# Patient Record
Sex: FEMALE | Race: WHITE | Employment: OTHER | URBAN - METROPOLITAN AREA
[De-identification: names, ages, dates, MRNs, and addresses within clinical notes are randomized per-mention and may not be internally consistent; named-entity substitution may affect disease eponyms.]

---

## 2017-02-10 ENCOUNTER — TELEPHONE (OUTPATIENT)
Dept: FAMILY MEDICINE CLINIC | Age: 67
End: 2017-02-10

## 2017-02-10 RX ORDER — VENLAFAXINE HYDROCHLORIDE 150 MG/1
150 CAPSULE, EXTENDED RELEASE ORAL DAILY
Qty: 90 CAPSULE | Refills: 1 | Status: SHIPPED | OUTPATIENT
Start: 2017-02-10 | End: 2017-09-12 | Stop reason: SDUPTHER

## 2017-04-21 RX ORDER — LEVOTHYROXINE SODIUM 0.12 MG/1
TABLET ORAL
Qty: 90 TABLET | Refills: 0 | Status: SHIPPED | OUTPATIENT
Start: 2017-04-21 | End: 2017-04-21 | Stop reason: SDUPTHER

## 2017-04-21 RX ORDER — LEVOTHYROXINE SODIUM 0.12 MG/1
TABLET ORAL
Qty: 90 TABLET | Refills: 0 | Status: SHIPPED | OUTPATIENT
Start: 2017-04-21 | End: 2017-08-19 | Stop reason: SDUPTHER

## 2017-04-21 RX ORDER — CLOPIDOGREL BISULFATE 75 MG/1
TABLET ORAL
Qty: 90 TABLET | Refills: 0 | Status: SHIPPED | OUTPATIENT
Start: 2017-04-21 | End: 2018-04-09 | Stop reason: SDUPTHER

## 2017-09-12 ENCOUNTER — OFFICE VISIT (OUTPATIENT)
Dept: FAMILY MEDICINE CLINIC | Age: 67
End: 2017-09-12

## 2017-09-12 VITALS
OXYGEN SATURATION: 98 % | HEIGHT: 60 IN | HEART RATE: 86 BPM | SYSTOLIC BLOOD PRESSURE: 122 MMHG | TEMPERATURE: 97.9 F | WEIGHT: 150 LBS | RESPIRATION RATE: 16 BRPM | BODY MASS INDEX: 29.45 KG/M2 | DIASTOLIC BLOOD PRESSURE: 74 MMHG

## 2017-09-12 DIAGNOSIS — F41.9 ANXIETY AND DEPRESSION: Primary | ICD-10-CM

## 2017-09-12 DIAGNOSIS — E55.9 VITAMIN D DEFICIENCY: ICD-10-CM

## 2017-09-12 DIAGNOSIS — E03.9 HYPOTHYROIDISM, UNSPECIFIED TYPE: ICD-10-CM

## 2017-09-12 DIAGNOSIS — E78.5 HYPERLIPIDEMIA, UNSPECIFIED HYPERLIPIDEMIA TYPE: ICD-10-CM

## 2017-09-12 DIAGNOSIS — F32.A ANXIETY AND DEPRESSION: Primary | ICD-10-CM

## 2017-09-12 DIAGNOSIS — Z12.31 ENCOUNTER FOR MAMMOGRAM TO ESTABLISH BASELINE MAMMOGRAM: ICD-10-CM

## 2017-09-12 LAB
ALBUMIN SERPL-MCNC: 4.5 G/DL (ref 3.4–5)
ALP BLD-CCNC: 84 U/L (ref 40–129)
ALT SERPL-CCNC: 25 U/L (ref 10–40)
AST SERPL-CCNC: 32 U/L (ref 15–37)
BILIRUB SERPL-MCNC: 0.6 MG/DL (ref 0–1)
BILIRUBIN DIRECT: <0.2 MG/DL (ref 0–0.3)
BILIRUBIN, INDIRECT: NORMAL MG/DL (ref 0–1)
CHOLESTEROL, TOTAL: 226 MG/DL (ref 0–199)
HDLC SERPL-MCNC: 61 MG/DL (ref 40–60)
LDL CHOLESTEROL CALCULATED: 140 MG/DL
TOTAL PROTEIN: 7.9 G/DL (ref 6.4–8.2)
TRIGL SERPL-MCNC: 126 MG/DL (ref 0–150)
TSH SERPL DL<=0.05 MIU/L-ACNC: 2.12 UIU/ML (ref 0.27–4.2)
VITAMIN D 25-HYDROXY: 53.7 NG/ML
VLDLC SERPL CALC-MCNC: 25 MG/DL

## 2017-09-12 PROCEDURE — 1090F PRES/ABSN URINE INCON ASSESS: CPT | Performed by: FAMILY MEDICINE

## 2017-09-12 PROCEDURE — G8399 PT W/DXA RESULTS DOCUMENT: HCPCS | Performed by: FAMILY MEDICINE

## 2017-09-12 PROCEDURE — 1036F TOBACCO NON-USER: CPT | Performed by: FAMILY MEDICINE

## 2017-09-12 PROCEDURE — 4040F PNEUMOC VAC/ADMIN/RCVD: CPT | Performed by: FAMILY MEDICINE

## 2017-09-12 PROCEDURE — 3014F SCREEN MAMMO DOC REV: CPT | Performed by: FAMILY MEDICINE

## 2017-09-12 PROCEDURE — 1123F ACP DISCUSS/DSCN MKR DOCD: CPT | Performed by: FAMILY MEDICINE

## 2017-09-12 PROCEDURE — G8419 CALC BMI OUT NRM PARAM NOF/U: HCPCS | Performed by: FAMILY MEDICINE

## 2017-09-12 PROCEDURE — 99214 OFFICE O/P EST MOD 30 MIN: CPT | Performed by: FAMILY MEDICINE

## 2017-09-12 PROCEDURE — 36415 COLL VENOUS BLD VENIPUNCTURE: CPT | Performed by: FAMILY MEDICINE

## 2017-09-12 PROCEDURE — 3017F COLORECTAL CA SCREEN DOC REV: CPT | Performed by: FAMILY MEDICINE

## 2017-09-12 PROCEDURE — G8427 DOCREV CUR MEDS BY ELIG CLIN: HCPCS | Performed by: FAMILY MEDICINE

## 2017-09-12 RX ORDER — ATORVASTATIN CALCIUM 80 MG/1
80 TABLET, FILM COATED ORAL DAILY
Qty: 90 TABLET | Refills: 2 | Status: SHIPPED | OUTPATIENT
Start: 2017-09-12 | End: 2018-04-09 | Stop reason: SDUPTHER

## 2017-09-12 RX ORDER — CLOPIDOGREL BISULFATE 75 MG/1
TABLET ORAL
Qty: 90 TABLET | Refills: 2 | Status: SHIPPED | OUTPATIENT
Start: 2017-09-12 | End: 2018-04-09 | Stop reason: SDUPTHER

## 2017-09-12 RX ORDER — VENLAFAXINE HYDROCHLORIDE 150 MG/1
150 CAPSULE, EXTENDED RELEASE ORAL DAILY
Qty: 90 CAPSULE | Refills: 2 | Status: SHIPPED | OUTPATIENT
Start: 2017-09-12 | End: 2018-04-09 | Stop reason: SDUPTHER

## 2017-09-12 RX ORDER — LEVOTHYROXINE SODIUM 0.12 MG/1
125 TABLET ORAL DAILY
Qty: 90 TABLET | Refills: 2 | Status: SHIPPED | OUTPATIENT
Start: 2017-09-12 | End: 2018-04-09 | Stop reason: SDUPTHER

## 2017-09-12 RX ORDER — ALPRAZOLAM 0.25 MG/1
0.25 TABLET ORAL 3 TIMES DAILY PRN
Qty: 40 TABLET | Refills: 2 | Status: SHIPPED | OUTPATIENT
Start: 2017-09-12 | End: 2018-04-09 | Stop reason: SDUPTHER

## 2018-03-09 ENCOUNTER — OFFICE VISIT (OUTPATIENT)
Dept: FAMILY MEDICINE CLINIC | Age: 68
End: 2018-03-09

## 2018-03-09 VITALS
OXYGEN SATURATION: 97 % | DIASTOLIC BLOOD PRESSURE: 78 MMHG | BODY MASS INDEX: 27.75 KG/M2 | WEIGHT: 147 LBS | TEMPERATURE: 97.6 F | HEIGHT: 61 IN | HEART RATE: 104 BPM | SYSTOLIC BLOOD PRESSURE: 130 MMHG

## 2018-03-09 DIAGNOSIS — R05.8 COUGH WITH SPUTUM: Primary | ICD-10-CM

## 2018-03-09 DIAGNOSIS — J01.90 ACUTE BACTERIAL SINUSITIS: ICD-10-CM

## 2018-03-09 DIAGNOSIS — B96.89 ACUTE BACTERIAL SINUSITIS: ICD-10-CM

## 2018-03-09 LAB
A/G RATIO: 1.5 (ref 1.1–2.2)
ALBUMIN SERPL-MCNC: 4.9 G/DL (ref 3.4–5)
ALP BLD-CCNC: 92 U/L (ref 40–129)
ALT SERPL-CCNC: 23 U/L (ref 10–40)
ANION GAP SERPL CALCULATED.3IONS-SCNC: 15 MMOL/L (ref 3–16)
AST SERPL-CCNC: 32 U/L (ref 15–37)
BASOPHILS ABSOLUTE: 0 K/UL (ref 0–0.2)
BASOPHILS RELATIVE PERCENT: 0.3 %
BILIRUB SERPL-MCNC: 0.8 MG/DL (ref 0–1)
BUN BLDV-MCNC: 9 MG/DL (ref 7–20)
CALCIUM SERPL-MCNC: 9.9 MG/DL (ref 8.3–10.6)
CHLORIDE BLD-SCNC: 99 MMOL/L (ref 99–110)
CHOLESTEROL, TOTAL: 231 MG/DL (ref 0–199)
CO2: 25 MMOL/L (ref 21–32)
CREAT SERPL-MCNC: 1 MG/DL (ref 0.6–1.2)
EOSINOPHILS ABSOLUTE: 0.2 K/UL (ref 0–0.6)
EOSINOPHILS RELATIVE PERCENT: 1.3 %
GFR AFRICAN AMERICAN: >60
GFR NON-AFRICAN AMERICAN: 55
GLOBULIN: 3.2 G/DL
GLUCOSE BLD-MCNC: 104 MG/DL (ref 70–99)
HCT VFR BLD CALC: 46.3 % (ref 36–48)
HDLC SERPL-MCNC: 61 MG/DL (ref 40–60)
HEMOGLOBIN: 15.7 G/DL (ref 12–16)
LDL CHOLESTEROL CALCULATED: 135 MG/DL
LYMPHOCYTES ABSOLUTE: 3.4 K/UL (ref 1–5.1)
LYMPHOCYTES RELATIVE PERCENT: 26.9 %
MCH RBC QN AUTO: 32.7 PG (ref 26–34)
MCHC RBC AUTO-ENTMCNC: 34 G/DL (ref 31–36)
MCV RBC AUTO: 96.3 FL (ref 80–100)
MONOCYTES ABSOLUTE: 0.7 K/UL (ref 0–1.3)
MONOCYTES RELATIVE PERCENT: 5.8 %
NEUTROPHILS ABSOLUTE: 8.2 K/UL (ref 1.7–7.7)
NEUTROPHILS RELATIVE PERCENT: 65.7 %
PDW BLD-RTO: 12.7 % (ref 12.4–15.4)
PLATELET # BLD: 311 K/UL (ref 135–450)
PMV BLD AUTO: 8.3 FL (ref 5–10.5)
POTASSIUM SERPL-SCNC: 5.1 MMOL/L (ref 3.5–5.1)
RBC # BLD: 4.81 M/UL (ref 4–5.2)
SODIUM BLD-SCNC: 139 MMOL/L (ref 136–145)
TOTAL PROTEIN: 8.1 G/DL (ref 6.4–8.2)
TRIGL SERPL-MCNC: 177 MG/DL (ref 0–150)
TSH SERPL DL<=0.05 MIU/L-ACNC: 3.43 UIU/ML (ref 0.27–4.2)
VLDLC SERPL CALC-MCNC: 35 MG/DL
WBC # BLD: 12.5 K/UL (ref 4–11)

## 2018-03-09 PROCEDURE — 3014F SCREEN MAMMO DOC REV: CPT | Performed by: NURSE PRACTITIONER

## 2018-03-09 PROCEDURE — 1090F PRES/ABSN URINE INCON ASSESS: CPT | Performed by: NURSE PRACTITIONER

## 2018-03-09 PROCEDURE — 4040F PNEUMOC VAC/ADMIN/RCVD: CPT | Performed by: NURSE PRACTITIONER

## 2018-03-09 PROCEDURE — G8484 FLU IMMUNIZE NO ADMIN: HCPCS | Performed by: NURSE PRACTITIONER

## 2018-03-09 PROCEDURE — 99214 OFFICE O/P EST MOD 30 MIN: CPT | Performed by: NURSE PRACTITIONER

## 2018-03-09 PROCEDURE — 3017F COLORECTAL CA SCREEN DOC REV: CPT | Performed by: NURSE PRACTITIONER

## 2018-03-09 PROCEDURE — G8419 CALC BMI OUT NRM PARAM NOF/U: HCPCS | Performed by: NURSE PRACTITIONER

## 2018-03-09 PROCEDURE — G8427 DOCREV CUR MEDS BY ELIG CLIN: HCPCS | Performed by: NURSE PRACTITIONER

## 2018-03-09 PROCEDURE — 1036F TOBACCO NON-USER: CPT | Performed by: NURSE PRACTITIONER

## 2018-03-09 PROCEDURE — G8399 PT W/DXA RESULTS DOCUMENT: HCPCS | Performed by: NURSE PRACTITIONER

## 2018-03-09 PROCEDURE — 1123F ACP DISCUSS/DSCN MKR DOCD: CPT | Performed by: NURSE PRACTITIONER

## 2018-03-09 RX ORDER — PROMETHAZINE HYDROCHLORIDE AND CODEINE PHOSPHATE 6.25; 1 MG/5ML; MG/5ML
5 SYRUP ORAL EVERY 4 HOURS PRN
Qty: 240 ML | Refills: 0 | Status: SHIPPED | OUTPATIENT
Start: 2018-03-09 | End: 2018-03-19

## 2018-03-09 RX ORDER — AZITHROMYCIN 250 MG/1
TABLET, FILM COATED ORAL
Qty: 1 PACKET | Refills: 0 | Status: SHIPPED | OUTPATIENT
Start: 2018-03-09 | End: 2018-03-19

## 2018-03-09 ASSESSMENT — PATIENT HEALTH QUESTIONNAIRE - PHQ9
1. LITTLE INTEREST OR PLEASURE IN DOING THINGS: 0
SUM OF ALL RESPONSES TO PHQ QUESTIONS 1-9: 0
SUM OF ALL RESPONSES TO PHQ9 QUESTIONS 1 & 2: 0
2. FEELING DOWN, DEPRESSED OR HOPELESS: 0

## 2018-03-09 ASSESSMENT — ENCOUNTER SYMPTOMS
SINUS PRESSURE: 1
SHORTNESS OF BREATH: 1
SORE THROAT: 1
COUGH: 1
SWOLLEN GLANDS: 1

## 2018-03-09 NOTE — PROGRESS NOTES
clopidogrel (PLAVIX) 75 MG tablet TAKE 1 TABLET DAILY 90 tablet 2    levothyroxine (SYNTHROID) 125 MCG tablet Take 1 tablet by mouth Daily 90 tablet 2    venlafaxine (EFFEXOR XR) 150 MG extended release capsule Take 1 capsule by mouth daily TAKE 1 CAPSULE DAILY 90 capsule 2    levothyroxine (SYNTHROID) 125 MCG tablet TAKE 1 TABLET BY MOUTH DAILY 90 tablet 1    clopidogrel (PLAVIX) 75 MG tablet TAKE 1 TABLET DAILY 90 tablet 0    Lactobacillus Rhamnosus, GG, ( PROBIOTIC DIGESTIVE CARE) CAPS Take 1 tablet by mouth daily      Omega-3 Fatty Acids (OMEGA 3 PO) Take 300 mg by mouth      Calcium Carbonate-Vitamin D (CALCIUM + D PO) Take  by mouth.  Cholecalciferol (VITAMIN D3) 5000 UNITS CAPS Take 1 tablet by mouth daily.  aspirin 325 MG tablet Take 325 mg by mouth daily.  zoledronic acid (RECLAST) 5 MG/100ML SOLN Infuse 100 mLs intravenously once for 1 dose 100 mL 0     No current facility-administered medications on file prior to visit. Review of Systems   Constitutional: Positive for chills. HENT: Positive for congestion, sinus pressure, sneezing and sore throat. Respiratory: Positive for cough and shortness of breath. Neurological: Positive for headaches. Patient Active Problem List    Diagnosis Date Noted    Elevated LFTs 04/12/2016    Fibromyalgia 10/09/2015    Hyperlipidemia 03/15/2013    Hypothyroidism 03/15/2013    Anxiety and depression 03/15/2013    Osteopenia 03/15/2013    Vitamin D deficiency 03/15/2013       Objective:     Physical Exam   Constitutional: She is oriented to person, place, and time. She appears well-developed and well-nourished. HENT:   Head: Normocephalic and atraumatic. Right Ear: External ear normal.   Left Ear: External ear normal.   Mouth/Throat: Oropharynx is clear and moist. No oropharyngeal exudate.    Sinus tenderness at ethmoid process bilaterally   Eyes: Conjunctivae and EOM are normal. Pupils are equal, round, and reactive to

## 2018-03-19 ENCOUNTER — TELEPHONE (OUTPATIENT)
Dept: FAMILY MEDICINE CLINIC | Age: 68
End: 2018-03-19

## 2018-03-26 ENCOUNTER — TELEPHONE (OUTPATIENT)
Dept: FAMILY MEDICINE CLINIC | Age: 68
End: 2018-03-26

## 2018-03-26 RX ORDER — AMOXICILLIN AND CLAVULANATE POTASSIUM 875; 125 MG/1; MG/1
1 TABLET, FILM COATED ORAL 2 TIMES DAILY
Qty: 20 TABLET | Refills: 0 | Status: SHIPPED | OUTPATIENT
Start: 2018-03-26 | End: 2018-04-05

## 2018-03-26 NOTE — TELEPHONE ENCOUNTER
Symptoms:glands are still swollen,hoarse,mucus-yellow,feels bad, cough    How long have you had the symptoms:since appt 3/9/18    What medications have you tried:zpak    [facility]    Pt would like to see if something else could be called in. She still has cough med. Please advise.

## 2018-04-09 ENCOUNTER — OFFICE VISIT (OUTPATIENT)
Dept: FAMILY MEDICINE CLINIC | Age: 68
End: 2018-04-09

## 2018-04-09 VITALS
HEIGHT: 59 IN | BODY MASS INDEX: 30.24 KG/M2 | DIASTOLIC BLOOD PRESSURE: 110 MMHG | HEART RATE: 80 BPM | TEMPERATURE: 97.5 F | SYSTOLIC BLOOD PRESSURE: 176 MMHG | WEIGHT: 150 LBS

## 2018-04-09 DIAGNOSIS — Z00.00 PHYSICAL EXAM: Primary | ICD-10-CM

## 2018-04-09 DIAGNOSIS — F41.9 ANXIETY: ICD-10-CM

## 2018-04-09 DIAGNOSIS — Z12.31 ENCOUNTER FOR SCREENING MAMMOGRAM FOR BREAST CANCER: ICD-10-CM

## 2018-04-09 DIAGNOSIS — Z12.11 SCREENING FOR COLON CANCER: ICD-10-CM

## 2018-04-09 DIAGNOSIS — Z13.1 ENCOUNTER FOR SCREENING FOR DIABETES MELLITUS: ICD-10-CM

## 2018-04-09 DIAGNOSIS — K59.00 CONSTIPATION, UNSPECIFIED CONSTIPATION TYPE: ICD-10-CM

## 2018-04-09 DIAGNOSIS — M85.80 OSTEOPENIA, UNSPECIFIED LOCATION: ICD-10-CM

## 2018-04-09 PROCEDURE — 99215 OFFICE O/P EST HI 40 MIN: CPT | Performed by: NURSE PRACTITIONER

## 2018-04-09 RX ORDER — ZOLEDRONIC ACID 5 MG/100ML
5 INJECTION, SOLUTION INTRAVENOUS ONCE
Qty: 100 ML | Refills: 0 | Status: SHIPPED | OUTPATIENT
Start: 2018-04-09 | End: 2019-10-01 | Stop reason: ALTCHOICE

## 2018-04-09 RX ORDER — LEVOTHYROXINE SODIUM 0.12 MG/1
125 TABLET ORAL DAILY
Qty: 90 TABLET | Refills: 1 | Status: SHIPPED | OUTPATIENT
Start: 2018-04-09 | End: 2018-10-04 | Stop reason: SDUPTHER

## 2018-04-09 RX ORDER — VENLAFAXINE HYDROCHLORIDE 150 MG/1
150 CAPSULE, EXTENDED RELEASE ORAL DAILY
Qty: 90 CAPSULE | Refills: 2 | Status: SHIPPED | OUTPATIENT
Start: 2018-04-09 | End: 2019-01-06 | Stop reason: SDUPTHER

## 2018-04-09 RX ORDER — CLOPIDOGREL BISULFATE 75 MG/1
TABLET ORAL
Qty: 90 TABLET | Refills: 2 | Status: SHIPPED | OUTPATIENT
Start: 2018-04-09 | End: 2019-04-12 | Stop reason: SDUPTHER

## 2018-04-09 RX ORDER — ATORVASTATIN CALCIUM 80 MG/1
80 TABLET, FILM COATED ORAL DAILY
Qty: 90 TABLET | Refills: 2 | Status: SHIPPED | OUTPATIENT
Start: 2018-04-09 | End: 2019-04-12 | Stop reason: SDUPTHER

## 2018-04-09 RX ORDER — ALPRAZOLAM 0.25 MG/1
0.25 TABLET ORAL 3 TIMES DAILY PRN
Qty: 40 TABLET | Refills: 0 | Status: SHIPPED | OUTPATIENT
Start: 2018-04-09 | End: 2018-10-26 | Stop reason: SDUPTHER

## 2018-04-09 ASSESSMENT — ENCOUNTER SYMPTOMS
SHORTNESS OF BREATH: 0
RESPIRATORY NEGATIVE: 1
BLURRED VISION: 0
ORTHOPNEA: 0

## 2018-05-17 DIAGNOSIS — Z12.11 SCREENING FOR COLON CANCER: ICD-10-CM

## 2018-05-17 LAB
CONTROL: NORMAL
HEMOCCULT STL QL: NORMAL

## 2018-05-17 PROCEDURE — 82274 ASSAY TEST FOR BLOOD FECAL: CPT | Performed by: NURSE PRACTITIONER

## 2018-10-05 RX ORDER — LEVOTHYROXINE SODIUM 0.12 MG/1
125 TABLET ORAL DAILY
Qty: 90 TABLET | Refills: 1 | Status: SHIPPED | OUTPATIENT
Start: 2018-10-05 | End: 2019-07-15 | Stop reason: SDUPTHER

## 2018-10-12 ENCOUNTER — OFFICE VISIT (OUTPATIENT)
Dept: FAMILY MEDICINE CLINIC | Age: 68
End: 2018-10-12
Payer: MEDICARE

## 2018-10-12 VITALS
WEIGHT: 148 LBS | OXYGEN SATURATION: 98 % | SYSTOLIC BLOOD PRESSURE: 152 MMHG | HEART RATE: 68 BPM | BODY MASS INDEX: 29.84 KG/M2 | DIASTOLIC BLOOD PRESSURE: 110 MMHG | HEIGHT: 59 IN

## 2018-10-12 DIAGNOSIS — L03.119 CELLULITIS OF WRIST: ICD-10-CM
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
## 2018-10-12 DIAGNOSIS — I10 ESSENTIAL HYPERTENSION: ICD-10-CM

## 2018-10-12 PROCEDURE — 99213 OFFICE O/P EST LOW 20 MIN: CPT | Performed by: FAMILY MEDICINE

## 2018-10-12 PROCEDURE — G8399 PT W/DXA RESULTS DOCUMENT: HCPCS | Performed by: FAMILY MEDICINE

## 2018-10-12 PROCEDURE — G8417 CALC BMI ABV UP PARAM F/U: HCPCS | Performed by: FAMILY MEDICINE

## 2018-10-12 PROCEDURE — G8484 FLU IMMUNIZE NO ADMIN: HCPCS | Performed by: FAMILY MEDICINE

## 2018-10-12 PROCEDURE — 3017F COLORECTAL CA SCREEN DOC REV: CPT | Performed by: FAMILY MEDICINE

## 2018-10-12 PROCEDURE — 4040F PNEUMOC VAC/ADMIN/RCVD: CPT | Performed by: FAMILY MEDICINE

## 2018-10-12 PROCEDURE — 1036F TOBACCO NON-USER: CPT | Performed by: FAMILY MEDICINE

## 2018-10-12 PROCEDURE — G8427 DOCREV CUR MEDS BY ELIG CLIN: HCPCS | Performed by: FAMILY MEDICINE

## 2018-10-12 PROCEDURE — 1101F PT FALLS ASSESS-DOCD LE1/YR: CPT | Performed by: FAMILY MEDICINE

## 2018-10-12 PROCEDURE — 1123F ACP DISCUSS/DSCN MKR DOCD: CPT | Performed by: FAMILY MEDICINE

## 2018-10-12 PROCEDURE — 1090F PRES/ABSN URINE INCON ASSESS: CPT | Performed by: FAMILY MEDICINE

## 2018-10-12 RX ORDER — BLOOD PRESSURE TEST KIT
1 KIT MISCELLANEOUS 2 TIMES DAILY
Qty: 1 KIT | Refills: 0 | Status: SHIPPED | OUTPATIENT
Start: 2018-10-12

## 2018-10-12 RX ORDER — CEPHALEXIN 500 MG/1
500 CAPSULE ORAL 4 TIMES DAILY
Qty: 28 CAPSULE | Refills: 0 | Status: SHIPPED | OUTPATIENT
Start: 2018-10-12 | End: 2018-10-19

## 2018-10-12 RX ORDER — LOSARTAN POTASSIUM 50 MG/1
50 TABLET ORAL DAILY
Qty: 30 TABLET | Refills: 3 | Status: SHIPPED | OUTPATIENT
Start: 2018-10-12 | End: 2018-10-26 | Stop reason: ALTCHOICE

## 2018-10-12 ASSESSMENT — PATIENT HEALTH QUESTIONNAIRE - PHQ9
SUM OF ALL RESPONSES TO PHQ QUESTIONS 1-9: 0
2. FEELING DOWN, DEPRESSED OR HOPELESS: 0
1. LITTLE INTEREST OR PLEASURE IN DOING THINGS: 0
SUM OF ALL RESPONSES TO PHQ9 QUESTIONS 1 & 2: 0
SUM OF ALL RESPONSES TO PHQ QUESTIONS 1-9: 0

## 2018-10-24 ENCOUNTER — TELEPHONE (OUTPATIENT)
Dept: FAMILY MEDICINE CLINIC | Age: 68
End: 2018-10-24

## 2018-10-26 ENCOUNTER — OFFICE VISIT (OUTPATIENT)
Dept: FAMILY MEDICINE CLINIC | Age: 68
End: 2018-10-26
Payer: MEDICARE

## 2018-10-26 VITALS
WEIGHT: 150 LBS | HEART RATE: 70 BPM | TEMPERATURE: 97.7 F | BODY MASS INDEX: 30.3 KG/M2 | DIASTOLIC BLOOD PRESSURE: 106 MMHG | RESPIRATION RATE: 16 BRPM | SYSTOLIC BLOOD PRESSURE: 183 MMHG

## 2018-10-26 DIAGNOSIS — I10 HYPERTENSION, UNSPECIFIED TYPE: Primary | ICD-10-CM

## 2018-10-26 DIAGNOSIS — F32.A ANXIETY AND DEPRESSION: ICD-10-CM

## 2018-10-26 DIAGNOSIS — R94.31 ABNORMAL ELECTROCARDIOGRAM: ICD-10-CM

## 2018-10-26 DIAGNOSIS — Z82.49 FH: CORONARY ARTERY DISEASE: ICD-10-CM

## 2018-10-26 DIAGNOSIS — F41.9 ANXIETY AND DEPRESSION: ICD-10-CM

## 2018-10-26 DIAGNOSIS — Z23 NEED FOR PROPHYLACTIC VACCINATION AND INOCULATION AGAINST VARICELLA: ICD-10-CM

## 2018-10-26 DIAGNOSIS — Z23 NEED FOR PROPHYLACTIC VACCINATION AGAINST DIPHTHERIA-TETANUS-PERTUSSIS (DTP): ICD-10-CM

## 2018-10-26 PROCEDURE — 1090F PRES/ABSN URINE INCON ASSESS: CPT | Performed by: NURSE PRACTITIONER

## 2018-10-26 PROCEDURE — 3017F COLORECTAL CA SCREEN DOC REV: CPT | Performed by: NURSE PRACTITIONER

## 2018-10-26 PROCEDURE — 99213 OFFICE O/P EST LOW 20 MIN: CPT | Performed by: NURSE PRACTITIONER

## 2018-10-26 PROCEDURE — 1036F TOBACCO NON-USER: CPT | Performed by: NURSE PRACTITIONER

## 2018-10-26 PROCEDURE — 1101F PT FALLS ASSESS-DOCD LE1/YR: CPT | Performed by: NURSE PRACTITIONER

## 2018-10-26 PROCEDURE — 4040F PNEUMOC VAC/ADMIN/RCVD: CPT | Performed by: NURSE PRACTITIONER

## 2018-10-26 PROCEDURE — G8427 DOCREV CUR MEDS BY ELIG CLIN: HCPCS | Performed by: NURSE PRACTITIONER

## 2018-10-26 PROCEDURE — G8399 PT W/DXA RESULTS DOCUMENT: HCPCS | Performed by: NURSE PRACTITIONER

## 2018-10-26 PROCEDURE — G8417 CALC BMI ABV UP PARAM F/U: HCPCS | Performed by: NURSE PRACTITIONER

## 2018-10-26 PROCEDURE — G8484 FLU IMMUNIZE NO ADMIN: HCPCS | Performed by: NURSE PRACTITIONER

## 2018-10-26 PROCEDURE — 1123F ACP DISCUSS/DSCN MKR DOCD: CPT | Performed by: NURSE PRACTITIONER

## 2018-10-26 RX ORDER — LOSARTAN POTASSIUM AND HYDROCHLOROTHIAZIDE 25; 100 MG/1; MG/1
1 TABLET ORAL DAILY
Qty: 30 TABLET | Refills: 3 | Status: SHIPPED | OUTPATIENT
Start: 2018-10-26 | End: 2019-06-26 | Stop reason: SDUPTHER

## 2018-10-26 RX ORDER — ALPRAZOLAM 0.25 MG/1
0.25 TABLET ORAL 3 TIMES DAILY PRN
Qty: 50 TABLET | Refills: 0 | Status: SHIPPED | OUTPATIENT
Start: 2018-10-26 | End: 2018-11-25

## 2018-10-26 ASSESSMENT — PATIENT HEALTH QUESTIONNAIRE - PHQ9
SUM OF ALL RESPONSES TO PHQ QUESTIONS 1-9: 1
SUM OF ALL RESPONSES TO PHQ9 QUESTIONS 1 & 2: 1
1. LITTLE INTEREST OR PLEASURE IN DOING THINGS: 0
2. FEELING DOWN, DEPRESSED OR HOPELESS: 1
SUM OF ALL RESPONSES TO PHQ QUESTIONS 1-9: 1

## 2018-10-26 ASSESSMENT — ENCOUNTER SYMPTOMS
BLURRED VISION: 0
ORTHOPNEA: 0
SHORTNESS OF BREATH: 1

## 2018-10-26 NOTE — PATIENT INSTRUCTIONS
Thank you for enrolling in [address] Please follow the instructions below to securely access your online medical record. [facility] allows you to send messages to your doctor, view your test results, renew your prescriptions, schedule appointments, and more. How Do I Sign Up? 1. In your Internet browser, go to [email or web address]  2. Click on the Sign Up Now link in the Sign In box. You will see the New Member Sign Up page. 3. Enter your [facility] Access Code exactly as it appears below. You will not need to use this code after youve completed the sign-up process. If you do not sign up before the expiration date, you must request a new code. [facility] Access Code: Activation code not generated  Current [facility] Status: Active    4. Enter your Social Security Number ([id number]) and Date of Birth (mm/dd/yyyy) as indicated and click Submit. You will be taken to the next sign-up page. 5. Create a [facility] ID. This will be your [facility] login ID and cannot be changed, so think of one that is secure and easy to remember. 6. Create a [facility] password. You can change your password at any time. 7. Enter your Password Reset Question and Answer. This can be used at a later time if you forget your password. 8. Enter your e-mail address. You will receive e-mail notification when new information is available in [address] 9. Click Sign Up. You can now view your medical record. Additional Information  If you have questions, please contact your physician practice where you receive care. Remember, [facility] is NOT to be used for urgent needs. For medical emergencies, dial 911.

## 2018-10-29 ENCOUNTER — HOSPITAL ENCOUNTER (OUTPATIENT)
Dept: MAMMOGRAPHY | Age: 68
Discharge: HOME | End: 2018-10-29
Payer: MEDICARE

## 2018-10-29 DIAGNOSIS — Z12.31 ENCOUNTER FOR SCREENING MAMMOGRAM FOR BREAST CANCER: ICD-10-CM

## 2018-10-29 PROCEDURE — 77063 BREAST TOMOSYNTHESIS BI: CPT

## 2018-12-06 ENCOUNTER — HOSPITAL ENCOUNTER (OUTPATIENT)
Dept: NON INVASIVE DIAGNOSTICS | Age: 68
Discharge: HOME | End: 2018-12-06
Payer: MEDICARE

## 2018-12-06 VITALS — BODY MASS INDEX: 28.47 KG/M2 | WEIGHT: 145 LBS | HEIGHT: 60 IN

## 2018-12-06 DIAGNOSIS — Z82.49 FH: CORONARY ARTERY DISEASE: ICD-10-CM

## 2018-12-06 DIAGNOSIS — R94.31 ABNORMAL ELECTROCARDIOGRAM: ICD-10-CM

## 2018-12-06 PROCEDURE — 93017 CV STRESS TEST TRACING ONLY: CPT

## 2018-12-06 PROCEDURE — 93350 STRESS TTE ONLY: CPT

## 2018-12-06 ASSESSMENT — PAIN - FUNCTIONAL ASSESSMENT: PAIN_FUNCTIONAL_ASSESSMENT: 0-10

## 2018-12-12 ENCOUNTER — TELEPHONE (OUTPATIENT)
Dept: FAMILY MEDICINE CLINIC | Age: 68
End: 2018-12-12

## 2019-01-07 RX ORDER — VENLAFAXINE HYDROCHLORIDE 150 MG/1
CAPSULE, EXTENDED RELEASE ORAL
Qty: 90 CAPSULE | Refills: 2 | Status: SHIPPED | OUTPATIENT
Start: 2019-01-07 | End: 2019-10-01 | Stop reason: ALTCHOICE

## 2019-04-12 RX ORDER — CLOPIDOGREL BISULFATE 75 MG/1
TABLET ORAL
Qty: 90 TABLET | Refills: 1 | Status: SHIPPED | OUTPATIENT
Start: 2019-04-12 | End: 2020-07-14

## 2019-04-12 RX ORDER — ATORVASTATIN CALCIUM 80 MG/1
80 TABLET, FILM COATED ORAL DAILY
Qty: 90 TABLET | Refills: 1 | Status: SHIPPED | OUTPATIENT
Start: 2019-04-12 | End: 2020-10-19 | Stop reason: SDUPTHER

## 2019-06-28 RX ORDER — LOSARTAN POTASSIUM AND HYDROCHLOROTHIAZIDE 25; 100 MG/1; MG/1
TABLET ORAL
Qty: 30 TABLET | Refills: 0 | Status: SHIPPED | OUTPATIENT
Start: 2019-06-28 | End: 2019-07-27 | Stop reason: SDUPTHER

## 2019-07-15 RX ORDER — LEVOTHYROXINE SODIUM 0.12 MG/1
125 TABLET ORAL DAILY
Qty: 90 TABLET | Refills: 1 | Status: SHIPPED | OUTPATIENT
Start: 2019-07-15 | End: 2020-10-13

## 2019-07-29 RX ORDER — LOSARTAN POTASSIUM AND HYDROCHLOROTHIAZIDE 25; 100 MG/1; MG/1
TABLET ORAL
Qty: 30 TABLET | Refills: 0 | Status: SHIPPED | OUTPATIENT
Start: 2019-07-29 | End: 2019-08-22 | Stop reason: SDUPTHER

## 2019-08-22 RX ORDER — LOSARTAN POTASSIUM AND HYDROCHLOROTHIAZIDE 25; 100 MG/1; MG/1
TABLET ORAL
Qty: 30 TABLET | Refills: 0 | Status: SHIPPED | OUTPATIENT
Start: 2019-08-22 | End: 2019-10-03 | Stop reason: SDUPTHER

## 2019-08-22 NOTE — TELEPHONE ENCOUNTER
Pt will need appointment for check up with carli for additional refills.  She is due for a recheck and labs

## 2019-08-27 NOTE — TELEPHONE ENCOUNTER
Pharmacy states the Losartan/ HCTZ is on a long term back order and would like an alternative medication sent in. Please advise.

## 2019-08-28 RX ORDER — VALSARTAN AND HYDROCHLOROTHIAZIDE 160; 12.5 MG/1; MG/1
1 TABLET, FILM COATED ORAL DAILY
Qty: 30 TABLET | Refills: 1 | Status: SHIPPED | OUTPATIENT
Start: 2019-08-28 | End: 2019-09-25 | Stop reason: SDUPTHER

## 2019-09-27 RX ORDER — VALSARTAN AND HYDROCHLOROTHIAZIDE 160; 12.5 MG/1; MG/1
TABLET, FILM COATED ORAL
Qty: 30 TABLET | Refills: 1 | Status: SHIPPED | OUTPATIENT
Start: 2019-09-27 | End: 2019-10-01 | Stop reason: ALTCHOICE

## 2019-09-30 RX ORDER — HYDROCHLOROTHIAZIDE 25 MG/1
TABLET ORAL
Qty: 30 TABLET | Refills: 0 | Status: SHIPPED | OUTPATIENT
Start: 2019-09-30 | End: 2019-10-01 | Stop reason: ALTCHOICE

## 2019-09-30 RX ORDER — LOSARTAN POTASSIUM 100 MG/1
TABLET ORAL
Qty: 30 TABLET | Refills: 0 | Status: SHIPPED | OUTPATIENT
Start: 2019-09-30 | End: 2019-10-01 | Stop reason: ALTCHOICE

## 2019-10-01 ENCOUNTER — OFFICE VISIT (OUTPATIENT)
Dept: FAMILY MEDICINE CLINIC | Age: 69
End: 2019-10-01
Payer: MEDICARE

## 2019-10-01 VITALS
HEART RATE: 109 BPM | WEIGHT: 152 LBS | RESPIRATION RATE: 16 BRPM | SYSTOLIC BLOOD PRESSURE: 134 MMHG | OXYGEN SATURATION: 95 % | DIASTOLIC BLOOD PRESSURE: 84 MMHG | TEMPERATURE: 98.2 F | BODY MASS INDEX: 29.69 KG/M2

## 2019-10-01 DIAGNOSIS — Z23 NEED FOR PROPHYLACTIC VACCINATION AND INOCULATION AGAINST VARICELLA: ICD-10-CM

## 2019-10-01 DIAGNOSIS — F44.89 CONFUSION STATE: ICD-10-CM

## 2019-10-01 DIAGNOSIS — Z13.1 ENCOUNTER FOR SCREENING FOR DIABETES MELLITUS: ICD-10-CM

## 2019-10-01 DIAGNOSIS — F41.9 ANXIETY: ICD-10-CM

## 2019-10-01 DIAGNOSIS — Z12.11 SCREENING FOR COLON CANCER: ICD-10-CM

## 2019-10-01 DIAGNOSIS — M79.7 FIBROMYALGIA: ICD-10-CM

## 2019-10-01 DIAGNOSIS — I10 ESSENTIAL HYPERTENSION: Primary | ICD-10-CM

## 2019-10-01 DIAGNOSIS — K76.9 LIVER DISEASE, UNSPECIFIED: ICD-10-CM

## 2019-10-01 DIAGNOSIS — I10 ESSENTIAL HYPERTENSION: ICD-10-CM

## 2019-10-01 DIAGNOSIS — Z23 NEED FOR PROPHYLACTIC VACCINATION AGAINST DIPHTHERIA-TETANUS-PERTUSSIS (DTP): ICD-10-CM

## 2019-10-01 LAB
BASOPHILS ABSOLUTE: 0.1 K/UL (ref 0–0.2)
BASOPHILS RELATIVE PERCENT: 0.6 %
EOSINOPHILS ABSOLUTE: 0.2 K/UL (ref 0–0.6)
EOSINOPHILS RELATIVE PERCENT: 2 %
HCT VFR BLD CALC: 42.1 % (ref 36–48)
HEMOGLOBIN: 14.3 G/DL (ref 12–16)
LYMPHOCYTES ABSOLUTE: 2.1 K/UL (ref 1–5.1)
LYMPHOCYTES RELATIVE PERCENT: 24.6 %
MCH RBC QN AUTO: 33.5 PG (ref 26–34)
MCHC RBC AUTO-ENTMCNC: 33.9 G/DL (ref 31–36)
MCV RBC AUTO: 98.9 FL (ref 80–100)
MONOCYTES ABSOLUTE: 0.6 K/UL (ref 0–1.3)
MONOCYTES RELATIVE PERCENT: 6.7 %
NEUTROPHILS ABSOLUTE: 5.6 K/UL (ref 1.7–7.7)
NEUTROPHILS RELATIVE PERCENT: 66.1 %
PDW BLD-RTO: 13.2 % (ref 12.4–15.4)
PLATELET # BLD: 280 K/UL (ref 135–450)
PMV BLD AUTO: 7.8 FL (ref 5–10.5)
RBC # BLD: 4.26 M/UL (ref 4–5.2)
VITAMIN D 25-HYDROXY: 51.1 NG/ML
WBC # BLD: 8.5 K/UL (ref 4–11)

## 2019-10-01 PROCEDURE — 1036F TOBACCO NON-USER: CPT | Performed by: NURSE PRACTITIONER

## 2019-10-01 PROCEDURE — 1090F PRES/ABSN URINE INCON ASSESS: CPT | Performed by: NURSE PRACTITIONER

## 2019-10-01 PROCEDURE — G8427 DOCREV CUR MEDS BY ELIG CLIN: HCPCS | Performed by: NURSE PRACTITIONER

## 2019-10-01 PROCEDURE — G0444 DEPRESSION SCREEN ANNUAL: HCPCS | Performed by: NURSE PRACTITIONER

## 2019-10-01 PROCEDURE — G8417 CALC BMI ABV UP PARAM F/U: HCPCS | Performed by: NURSE PRACTITIONER

## 2019-10-01 PROCEDURE — 99214 OFFICE O/P EST MOD 30 MIN: CPT | Performed by: NURSE PRACTITIONER

## 2019-10-01 PROCEDURE — G8484 FLU IMMUNIZE NO ADMIN: HCPCS | Performed by: NURSE PRACTITIONER

## 2019-10-01 PROCEDURE — 3017F COLORECTAL CA SCREEN DOC REV: CPT | Performed by: NURSE PRACTITIONER

## 2019-10-01 PROCEDURE — 1123F ACP DISCUSS/DSCN MKR DOCD: CPT | Performed by: NURSE PRACTITIONER

## 2019-10-01 PROCEDURE — 4040F PNEUMOC VAC/ADMIN/RCVD: CPT | Performed by: NURSE PRACTITIONER

## 2019-10-01 PROCEDURE — G8399 PT W/DXA RESULTS DOCUMENT: HCPCS | Performed by: NURSE PRACTITIONER

## 2019-10-01 RX ORDER — DULOXETIN HYDROCHLORIDE 30 MG/1
30 CAPSULE, DELAYED RELEASE ORAL DAILY
Qty: 30 CAPSULE | Refills: 5 | Status: SHIPPED | OUTPATIENT
Start: 2019-10-01 | End: 2020-10-19 | Stop reason: ALTCHOICE

## 2019-10-01 RX ORDER — ALPRAZOLAM 0.5 MG/1
0.5 TABLET ORAL NIGHTLY PRN
Qty: 15 TABLET | Refills: 0 | Status: SHIPPED | OUTPATIENT
Start: 2019-10-01 | End: 2019-10-31

## 2019-10-01 ASSESSMENT — PATIENT HEALTH QUESTIONNAIRE - PHQ9
SUM OF ALL RESPONSES TO PHQ9 QUESTIONS 1 & 2: 6
10. IF YOU CHECKED OFF ANY PROBLEMS, HOW DIFFICULT HAVE THESE PROBLEMS MADE IT FOR YOU TO DO YOUR WORK, TAKE CARE OF THINGS AT HOME, OR GET ALONG WITH OTHER PEOPLE: 2
3. TROUBLE FALLING OR STAYING ASLEEP: 3
8. MOVING OR SPEAKING SO SLOWLY THAT OTHER PEOPLE COULD HAVE NOTICED. OR THE OPPOSITE, BEING SO FIGETY OR RESTLESS THAT YOU HAVE BEEN MOVING AROUND A LOT MORE THAN USUAL: 3
9. THOUGHTS THAT YOU WOULD BE BETTER OFF DEAD, OR OF HURTING YOURSELF: 0
2. FEELING DOWN, DEPRESSED OR HOPELESS: 3
1. LITTLE INTEREST OR PLEASURE IN DOING THINGS: 3
7. TROUBLE CONCENTRATING ON THINGS, SUCH AS READING THE NEWSPAPER OR WATCHING TELEVISION: 3
5. POOR APPETITE OR OVEREATING: 2
4. FEELING TIRED OR HAVING LITTLE ENERGY: 3
SUM OF ALL RESPONSES TO PHQ QUESTIONS 1-9: 20
SUM OF ALL RESPONSES TO PHQ QUESTIONS 1-9: 20
6. FEELING BAD ABOUT YOURSELF - OR THAT YOU ARE A FAILURE OR HAVE LET YOURSELF OR YOUR FAMILY DOWN: 0

## 2019-10-01 ASSESSMENT — COLUMBIA-SUICIDE SEVERITY RATING SCALE - C-SSRS
6. HAVE YOU EVER DONE ANYTHING, STARTED TO DO ANYTHING, OR PREPARED TO DO ANYTHING TO END YOUR LIFE?: NO
1. WITHIN THE PAST MONTH, HAVE YOU WISHED YOU WERE DEAD OR WISHED YOU COULD GO TO SLEEP AND NOT WAKE UP?: NO
2. HAVE YOU ACTUALLY HAD ANY THOUGHTS OF KILLING YOURSELF?: NO

## 2019-10-01 ASSESSMENT — ENCOUNTER SYMPTOMS
SHORTNESS OF BREATH: 0
ORTHOPNEA: 0
BLURRED VISION: 0

## 2019-10-02 LAB
A/G RATIO: 1.6 (ref 1.1–2.2)
ALBUMIN SERPL-MCNC: 5 G/DL (ref 3.4–5)
ALP BLD-CCNC: 75 U/L (ref 40–129)
ALT SERPL-CCNC: 20 U/L (ref 10–40)
ANION GAP SERPL CALCULATED.3IONS-SCNC: 19 MMOL/L (ref 3–16)
AST SERPL-CCNC: 22 U/L (ref 15–37)
BILIRUB SERPL-MCNC: 0.5 MG/DL (ref 0–1)
BUN BLDV-MCNC: 10 MG/DL (ref 7–20)
CALCIUM SERPL-MCNC: 11.6 MG/DL (ref 8.3–10.6)
CHLORIDE BLD-SCNC: 101 MMOL/L (ref 99–110)
CHOLESTEROL, FASTING: 198 MG/DL (ref 0–199)
CO2: 24 MMOL/L (ref 21–32)
CREAT SERPL-MCNC: 1.2 MG/DL (ref 0.6–1.2)
GFR AFRICAN AMERICAN: 54
GFR NON-AFRICAN AMERICAN: 44
GLOBULIN: 3.2 G/DL
GLUCOSE BLD-MCNC: 96 MG/DL (ref 70–99)
GLUCOSE FASTING: 96 MG/DL (ref 70–99)
HDLC SERPL-MCNC: 67 MG/DL (ref 40–60)
LDL CHOLESTEROL CALCULATED: 104 MG/DL
POTASSIUM SERPL-SCNC: 4.8 MMOL/L (ref 3.5–5.1)
SODIUM BLD-SCNC: 144 MMOL/L (ref 136–145)
TOTAL PROTEIN: 8.2 G/DL (ref 6.4–8.2)
TRIGLYCERIDE, FASTING: 133 MG/DL (ref 0–150)
VLDLC SERPL CALC-MCNC: 27 MG/DL

## 2019-10-08 ENCOUNTER — OFFICE VISIT (OUTPATIENT)
Dept: FAMILY MEDICINE CLINIC | Age: 69
End: 2019-10-08
Payer: MEDICARE

## 2019-10-08 VITALS
HEART RATE: 105 BPM | HEIGHT: 59 IN | BODY MASS INDEX: 31.65 KG/M2 | OXYGEN SATURATION: 94 % | RESPIRATION RATE: 16 BRPM | DIASTOLIC BLOOD PRESSURE: 82 MMHG | TEMPERATURE: 98.7 F | SYSTOLIC BLOOD PRESSURE: 148 MMHG | WEIGHT: 157 LBS

## 2019-10-08 DIAGNOSIS — I10 ESSENTIAL HYPERTENSION: ICD-10-CM

## 2019-10-08 DIAGNOSIS — Z00.00 ROUTINE GENERAL MEDICAL EXAMINATION AT A HEALTH CARE FACILITY: Primary | ICD-10-CM

## 2019-10-08 PROCEDURE — G0438 PPPS, INITIAL VISIT: HCPCS | Performed by: NURSE PRACTITIONER

## 2019-10-08 PROCEDURE — 4040F PNEUMOC VAC/ADMIN/RCVD: CPT | Performed by: NURSE PRACTITIONER

## 2019-10-08 PROCEDURE — 3017F COLORECTAL CA SCREEN DOC REV: CPT | Performed by: NURSE PRACTITIONER

## 2019-10-08 PROCEDURE — 1123F ACP DISCUSS/DSCN MKR DOCD: CPT | Performed by: NURSE PRACTITIONER

## 2019-10-08 PROCEDURE — G8484 FLU IMMUNIZE NO ADMIN: HCPCS | Performed by: NURSE PRACTITIONER

## 2019-10-08 RX ORDER — LOSARTAN POTASSIUM AND HYDROCHLOROTHIAZIDE 25; 100 MG/1; MG/1
TABLET ORAL
Qty: 30 TABLET | Refills: 0 | Status: SHIPPED | OUTPATIENT
Start: 2019-10-08 | End: 2019-10-15

## 2019-10-08 ASSESSMENT — PATIENT HEALTH QUESTIONNAIRE - PHQ9
SUM OF ALL RESPONSES TO PHQ QUESTIONS 1-9: 1
SUM OF ALL RESPONSES TO PHQ QUESTIONS 1-9: 1

## 2019-10-15 ENCOUNTER — TELEPHONE (OUTPATIENT)
Dept: FAMILY MEDICINE CLINIC | Age: 69
End: 2019-10-15

## 2019-10-15 DIAGNOSIS — Z12.11 SCREENING FOR COLON CANCER: ICD-10-CM

## 2019-10-15 LAB
CONTROL: ABNORMAL
HEMOCCULT STL QL: ABNORMAL

## 2019-10-15 PROCEDURE — 82274 ASSAY TEST FOR BLOOD FECAL: CPT | Performed by: NURSE PRACTITIONER

## 2019-10-15 RX ORDER — LOSARTAN POTASSIUM AND HYDROCHLOROTHIAZIDE 25; 100 MG/1; MG/1
TABLET ORAL
Qty: 90 TABLET | Refills: 0 | Status: SHIPPED | OUTPATIENT
Start: 2019-10-15 | End: 2019-10-21 | Stop reason: ALTCHOICE

## 2019-10-21 RX ORDER — VALSARTAN AND HYDROCHLOROTHIAZIDE 80; 12.5 MG/1; MG/1
1 TABLET, FILM COATED ORAL DAILY
Qty: 30 TABLET | Refills: 0 | Status: SHIPPED | OUTPATIENT
Start: 2019-10-21 | End: 2020-10-19 | Stop reason: SDUPTHER

## 2019-10-29 RX ORDER — VALSARTAN AND HYDROCHLOROTHIAZIDE 160; 12.5 MG/1; MG/1
TABLET, FILM COATED ORAL
Qty: 30 TABLET | Refills: 1 | Status: SHIPPED | OUTPATIENT
Start: 2019-10-29 | End: 2019-11-04

## 2019-11-04 RX ORDER — VALSARTAN AND HYDROCHLOROTHIAZIDE 160; 12.5 MG/1; MG/1
TABLET, FILM COATED ORAL
Qty: 90 TABLET | Refills: 0 | Status: SHIPPED | OUTPATIENT
Start: 2019-11-04 | End: 2020-10-19 | Stop reason: ALTCHOICE

## 2019-11-15 RX ORDER — VALSARTAN AND HYDROCHLOROTHIAZIDE 80; 12.5 MG/1; MG/1
1 TABLET, FILM COATED ORAL DAILY
Qty: 30 TABLET | Refills: 1 | OUTPATIENT
Start: 2019-11-15

## 2020-03-13 RX ORDER — HYDROCHLOROTHIAZIDE 25 MG/1
25 TABLET ORAL EVERY MORNING
Qty: 30 TABLET | Refills: 3 | Status: SHIPPED | OUTPATIENT
Start: 2020-03-13 | End: 2020-10-08

## 2020-03-13 RX ORDER — VENLAFAXINE HYDROCHLORIDE 150 MG/1
150 CAPSULE, EXTENDED RELEASE ORAL DAILY
Qty: 30 CAPSULE | Refills: 3 | Status: SHIPPED | OUTPATIENT
Start: 2020-03-13 | End: 2020-09-25

## 2020-03-13 RX ORDER — ATORVASTATIN CALCIUM 80 MG/1
80 TABLET, FILM COATED ORAL DAILY
Qty: 30 TABLET | Refills: 3 | Status: SHIPPED | OUTPATIENT
Start: 2020-03-13

## 2020-03-13 RX ORDER — LOSARTAN POTASSIUM 100 MG/1
100 TABLET ORAL DAILY
Qty: 90 TABLET | Refills: 1 | Status: SHIPPED | OUTPATIENT
Start: 2020-03-13 | End: 2020-09-25

## 2020-07-14 RX ORDER — CLOPIDOGREL BISULFATE 75 MG/1
TABLET ORAL
Qty: 90 TABLET | Refills: 0 | Status: SHIPPED | OUTPATIENT
Start: 2020-07-14 | End: 2021-01-07

## 2020-09-25 RX ORDER — LOSARTAN POTASSIUM 100 MG/1
TABLET ORAL
Qty: 90 TABLET | Refills: 1 | Status: SHIPPED | OUTPATIENT
Start: 2020-09-25 | End: 2021-03-22 | Stop reason: SDUPTHER

## 2020-09-25 RX ORDER — VENLAFAXINE HYDROCHLORIDE 150 MG/1
CAPSULE, EXTENDED RELEASE ORAL
Qty: 90 CAPSULE | Refills: 1 | Status: SHIPPED | OUTPATIENT
Start: 2020-09-25 | End: 2021-03-22 | Stop reason: SDUPTHER

## 2020-10-08 RX ORDER — HYDROCHLOROTHIAZIDE 25 MG/1
TABLET ORAL
Qty: 90 TABLET | Refills: 0 | Status: SHIPPED | OUTPATIENT
Start: 2020-10-08 | End: 2021-01-04

## 2020-10-08 NOTE — TELEPHONE ENCOUNTER
Called & made apt  Future Appointments   Date Time Provider Jaylan Ryder   10/19/2020  1:00 PM Samreen Rowan, APRN - CNP [facility]

## 2020-10-13 RX ORDER — LEVOTHYROXINE SODIUM 0.12 MG/1
TABLET ORAL
Qty: 90 TABLET | Refills: 0 | Status: SHIPPED | OUTPATIENT
Start: 2020-10-13 | End: 2020-12-28 | Stop reason: ALTCHOICE

## 2020-10-13 NOTE — TELEPHONE ENCOUNTER
Please call patient to schedule an appointment. not in since 2019 needs virtual appointment for her thyroid

## 2020-10-13 NOTE — TELEPHONE ENCOUNTER
Future Appointments   Date Time Provider Jaylan Ryder   10/19/2020  1:00 PM JAMSHID Mckeon - CNP [facility] [address]     LOV 1/13/2020

## 2020-10-14 NOTE — TELEPHONE ENCOUNTER
Started to call pt & realized she is scheduled for an apt. Lm with apt info.   Future Appointments   Date Time Provider Jaylan Britni   10/19/2020  1:00 PM Daniel Echolsal, JAMSHID - CNP [facility] [address]

## 2020-10-19 ENCOUNTER — VIRTUAL VISIT (OUTPATIENT)
Dept: FAMILY MEDICINE CLINIC | Age: 70
End: 2020-10-19
Payer: MEDICARE

## 2020-10-19 ENCOUNTER — TELEPHONE (OUTPATIENT)
Dept: FAMILY MEDICINE CLINIC | Age: 70
End: 2020-10-19

## 2020-10-19 PROCEDURE — G8427 DOCREV CUR MEDS BY ELIG CLIN: HCPCS | Performed by: NURSE PRACTITIONER

## 2020-10-19 PROCEDURE — 1090F PRES/ABSN URINE INCON ASSESS: CPT | Performed by: NURSE PRACTITIONER

## 2020-10-19 PROCEDURE — 3017F COLORECTAL CA SCREEN DOC REV: CPT | Performed by: NURSE PRACTITIONER

## 2020-10-19 PROCEDURE — G8399 PT W/DXA RESULTS DOCUMENT: HCPCS | Performed by: NURSE PRACTITIONER

## 2020-10-19 PROCEDURE — G0444 DEPRESSION SCREEN ANNUAL: HCPCS | Performed by: NURSE PRACTITIONER

## 2020-10-19 PROCEDURE — 99214 OFFICE O/P EST MOD 30 MIN: CPT | Performed by: NURSE PRACTITIONER

## 2020-10-19 PROCEDURE — 1123F ACP DISCUSS/DSCN MKR DOCD: CPT | Performed by: NURSE PRACTITIONER

## 2020-10-19 PROCEDURE — 4040F PNEUMOC VAC/ADMIN/RCVD: CPT | Performed by: NURSE PRACTITIONER

## 2020-10-19 PROCEDURE — G8431 POS CLIN DEPRES SCRN F/U DOC: HCPCS | Performed by: NURSE PRACTITIONER

## 2020-10-19 RX ORDER — ASCORBIC ACID 1000 MG
TABLET ORAL
COMMUNITY
End: 2022-04-18 | Stop reason: ALTCHOICE

## 2020-10-19 ASSESSMENT — PATIENT HEALTH QUESTIONNAIRE - PHQ9
9. THOUGHTS THAT YOU WOULD BE BETTER OFF DEAD, OR OF HURTING YOURSELF: 0
7. TROUBLE CONCENTRATING ON THINGS, SUCH AS READING THE NEWSPAPER OR WATCHING TELEVISION: 3
6. FEELING BAD ABOUT YOURSELF - OR THAT YOU ARE A FAILURE OR HAVE LET YOURSELF OR YOUR FAMILY DOWN: 0
SUM OF ALL RESPONSES TO PHQ9 QUESTIONS 1 & 2: 6
SUM OF ALL RESPONSES TO PHQ QUESTIONS 1-9: 21
SUM OF ALL RESPONSES TO PHQ QUESTIONS 1-9: 21
1. LITTLE INTEREST OR PLEASURE IN DOING THINGS: 3
8. MOVING OR SPEAKING SO SLOWLY THAT OTHER PEOPLE COULD HAVE NOTICED. OR THE OPPOSITE, BEING SO FIGETY OR RESTLESS THAT YOU HAVE BEEN MOVING AROUND A LOT MORE THAN USUAL: 3
4. FEELING TIRED OR HAVING LITTLE ENERGY: 3
SUM OF ALL RESPONSES TO PHQ QUESTIONS 1-9: 21
5. POOR APPETITE OR OVEREATING: 3
10. IF YOU CHECKED OFF ANY PROBLEMS, HOW DIFFICULT HAVE THESE PROBLEMS MADE IT FOR YOU TO DO YOUR WORK, TAKE CARE OF THINGS AT HOME, OR GET ALONG WITH OTHER PEOPLE: 2
2. FEELING DOWN, DEPRESSED OR HOPELESS: 3
3. TROUBLE FALLING OR STAYING ASLEEP: 3

## 2020-10-19 ASSESSMENT — COLUMBIA-SUICIDE SEVERITY RATING SCALE - C-SSRS
1. WITHIN THE PAST MONTH, HAVE YOU WISHED YOU WERE DEAD OR WISHED YOU COULD GO TO SLEEP AND NOT WAKE UP?: NO
2. HAVE YOU ACTUALLY HAD ANY THOUGHTS OF KILLING YOURSELF?: NO
6. HAVE YOU EVER DONE ANYTHING, STARTED TO DO ANYTHING, OR PREPARED TO DO ANYTHING TO END YOUR LIFE?: NO

## 2020-10-19 ASSESSMENT — ENCOUNTER SYMPTOMS: RESPIRATORY NEGATIVE: 1

## 2020-10-19 NOTE — PROGRESS NOTES
10/19/2020    TELEHEALTH EVALUATION -- Audio/Visual (During [id number] public health emergency)    HPI:    Dov Escamilla (:  1950) has requested an audio/video evaluation for the following concern(s):fatigue and memory    Ms Flores is a 66yo pt of mine who presents for increase in lethargy and memory loss. She initially had an appointment at 1:00 but did not get into the system until 2:00. She was rescheduled for a 4:00 appointment. 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 She gets 2 hrs sleep then up for 3 hrs then maybe back for a few hours. Never a complete night sleep. She has not tried anything to help her sleep. She also c/o more memory loss. About 10 yrs ago she had a TIA currently on Plavix. I don't know if the TIA had some transient memory loss component. She is 77 yo and therefore has some normal loss of synapses of the brain. When asked if she is doing anything to jog her brain she tells me she read daily. Note she is on Ginko biloba too. I have know pt for several years and her presentation today is the same as it always was. Noted after reviewing her labs she has been dropping her GFR since 2018 and 2019 GFR 44 creat 1.2-will repeat BMP    Review of Systems   Respiratory: Negative. Cardiovascular:        HLD fairly well controlled on lipitor    HTN- losartan and HCTZ    TIA- plavxi   Genitourinary:        Ckd gfr dropping   Psychiatric/Behavioral: Positive for confusion, decreased concentration and sleep disturbance. Depression controlled w effexor       Prior to Visit Medications    Medication Sig Taking?  Authorizing Provider   Ginkgo Biloba 40 MG TABS Take by mouth Yes Historical Provider, MD   levothyroxine (SYNTHROID) 125 MCG tablet TAKE 1 TABLET BY MOUTH EVERY DAY Yes JAMSHID Mckeon CNP   losartan (COZAAR) 100 MG tablet TAKE 1 TABLET BY MOUTH EVERY DAY Yes JAMSHID Mckeon CNP   venlafaxine (EFFEXOR XR) 150 MG extended release capsule TAKE 1 CAPSULE BY MOUTH EVERY DAY Yes JAMSHID Vasquez CNP   clopidogrel (PLAVIX) 75 MG tablet TAKE 1 TABLET DAILY Yes JAMSHID Epstein CNP   atorvastatin (LIPITOR) 80 MG tablet Take 1 tablet by mouth daily Yes JAMSHID Vasquez CNP   aspirin 81 MG tablet Take 81 mg by mouth as needed  Yes Historical Provider, MD   Blood Pressure KIT 1 kit by Does not apply route 2 times daily  Patient taking differently: 1 kit by Does not apply route three times a week  Yes Cheryle Diggs,    Lactobacillus Rhamnosus, GG, (RA PROBIOTIC DIGESTIVE CARE) CAPS Take 1 tablet by mouth daily Yes Historical Provider, MD   Omega-3 Fatty Acids (OMEGA 3 PO) Take 300 mg by mouth Yes Historical Provider, MD   Calcium Carbonate-Vitamin D (CALCIUM + D PO) Take  by mouth. Yes Historical Provider, MD   Cholecalciferol (VITAMIN D3) 5000 UNITS CAPS Take 1 tablet by mouth daily. Yes Historical Provider, MD   hydroCHLOROthiazide (HYDRODIURIL) 25 MG tablet TAKE 1 TABLET BY MOUTH EVERY DAY IN THE MORNING  Patient not taking: Reported on 10/19/2020  JAMSHID Vasquez CNP       Social History     Tobacco Use    Smoking status: Never Smoker    Smokeless tobacco: Never Used   Substance Use Topics    Alcohol use: Yes     Alcohol/week: 2.0 standard drinks     Types: 2 Cans of beer per week     Comment: social    Drug use:  No            PHYSICAL EXAMINATION:  [ INSTRUCTIONS:  \"[x]\" Indicates a positive item  \"[]\" Indicates a negative item  -- DELETE ALL ITEMS NOT EXAMINED]  Vital Signs: (As obtained by patient/caregiver or practitioner observation)    Blood pressure-  Heart rate-    Respiratory rate-    Temperature-  Pulse oximetry-     Constitutional: [x] Appears well-developed and well-nourished [x] No apparent distress      [] Abnormal-   Mental status  [x] Alert and awake  [x] Oriented to person/place/time [x]Able to follow commands      Eyes:  EOM    [x] Normal  [] Abnormal-  Sclera  [x]  Normal  [] Abnormal -         Discharge []  None visible  [] Abnormal -    HENT:   [x] Normocephalic, atraumatic. [] Abnormal   [] Mouth/Throat: Mucous membranes are moist.     External Ears [] Normal  [] Abnormal-     Neck: [x] No visualized mass     Pulmonary/Chest: [x] Respiratory effort normal.  [x] No visualized signs of difficulty breathing or respiratory distress        [] Abnormal-      Musculoskeletal:   [] Normal gait with no signs of ataxia         [x] Normal range of motion of neck        [] Abnormal-       Neurological:        [] No Facial Asymmetry (Cranial nerve 7 motor function) (limited exam to video visit)          [x] No gaze palsy        [] Abnormal-         Skin:        [x] No significant exanthematous lesions or discoloration noted on facial skin         [] Abnormal-            Psychiatric:       [x] Normal Affect [] No Hallucinations        [] Abnormal-     Other pertinent observable physical exam findings-     ASSESSMENT/PLAN:  1. 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 Memory loss-feels her memory has changed. Forgets things Took some of her husbands medicine, does not know the name or sure of what it is for. Try OTC Coline   3. ckd- GFR is dropping since 2018 latest GFR is 44 creat 1.2-recheck bmp      Roma Nuñez is a 78 y.o. female being evaluated by a Virtual Visit (video visit) encounter to address concerns as mentioned above. A caregiver was present when appropriate. Due to this being a TeleHealth encounter (During [facility]- public health emergency), evaluation of the following organ systems was limited: Vitals/Constitutional/EENT/Resp/CV/GI//MS/Neuro/Skin/Heme-Lymph-Imm.   Pursuant to the emergency declaration under the [facility] waiver authority and the [employer] Act, this Virtual Visit was conducted with patient's (and/or legal guardian's) consent, to reduce the patient's risk of exposure to COVID-19 and provide necessary medical care. The patient (and/or legal guardian) has also been advised to contact this office for worsening conditions or problems, and seek emergency medical treatment and/or call 911 if deemed necessary. Patient identification was verified at the start of the visit: Yes    Total time spent on this encounter: 25-30    Services were provided through a video synchronous discussion virtually to substitute for in-person clinic visit. Patient and provider were located at their individual homes. --JAMSHID Oh CNP on 10/19/2020 at 4:13 PM    An electronic signature was used to authenticate this note. On the basis of positive falls risk screening, assessment and plan is as follows: home safety tips provided.

## 2020-10-19 NOTE — PATIENT INSTRUCTIONS
Memory loss-choline otc, read do puzzles etc    Fatigue- 2/2 to taking care of her daughter    Kidney problems creat 1.6 GFR 44 the GFR has been dropping since 2018-recheck

## 2020-11-11 ENCOUNTER — APPOINTMENT (OUTPATIENT)
Dept: GENERAL RADIOLOGY | Age: 70
DRG: 305 | End: 2020-11-11
Payer: MEDICARE

## 2020-11-11 ENCOUNTER — HOSPITAL ENCOUNTER (INPATIENT)
Age: 70
LOS: 1 days | Discharge: HOME | DRG: 305 | End: 2020-11-13
Attending: FAMILY MEDICINE | Admitting: INTERNAL MEDICINE
Payer: MEDICARE

## 2020-11-11 ENCOUNTER — NURSE TRIAGE (OUTPATIENT)
Dept: OTHER | Facility: CLINIC | Age: 70
End: 2020-11-11

## 2020-11-11 LAB
ANION GAP SERPL CALCULATED.3IONS-SCNC: 15 MMOL/L (ref 3–16)
BASOPHILS ABSOLUTE: 0.1 K/UL (ref 0–0.2)
BASOPHILS RELATIVE PERCENT: 0.9 %
BUN BLDV-MCNC: 7 MG/DL (ref 7–20)
CALCIUM SERPL-MCNC: 9.8 MG/DL (ref 8.3–10.6)
CHLORIDE BLD-SCNC: 101 MMOL/L (ref 99–110)
CO2: 24 MMOL/L (ref 21–32)
CREAT SERPL-MCNC: 0.9 MG/DL (ref 0.6–1.2)
EOSINOPHILS ABSOLUTE: 0.1 K/UL (ref 0–0.6)
EOSINOPHILS RELATIVE PERCENT: 1.9 %
GFR AFRICAN AMERICAN: >60
GFR NON-AFRICAN AMERICAN: >60
GLUCOSE BLD-MCNC: 84 MG/DL (ref 70–99)
HCT VFR BLD CALC: 40 % (ref 36–48)
HEMOGLOBIN: 13.5 G/DL (ref 12–16)
LYMPHOCYTES ABSOLUTE: 2.9 K/UL (ref 1–5.1)
LYMPHOCYTES RELATIVE PERCENT: 38.6 %
MCH RBC QN AUTO: 33.2 PG (ref 26–34)
MCHC RBC AUTO-ENTMCNC: 33.8 G/DL (ref 31–36)
MCV RBC AUTO: 98.2 FL (ref 80–100)
MONOCYTES ABSOLUTE: 0.4 K/UL (ref 0–1.3)
MONOCYTES RELATIVE PERCENT: 5.7 %
NEUTROPHILS ABSOLUTE: 4 K/UL (ref 1.7–7.7)
NEUTROPHILS RELATIVE PERCENT: 52.9 %
PDW BLD-RTO: 13.6 % (ref 12.4–15.4)
PLATELET # BLD: 212 K/UL (ref 135–450)
PMV BLD AUTO: 6.9 FL (ref 5–10.5)
POTASSIUM SERPL-SCNC: 3.3 MMOL/L (ref 3.5–5.1)
RBC # BLD: 4.07 M/UL (ref 4–5.2)
SODIUM BLD-SCNC: 140 MMOL/L (ref 136–145)
TROPONIN: <0.01 NG/ML
WBC # BLD: 7.6 K/UL (ref 4–11)

## 2020-11-11 PROCEDURE — 84443 ASSAY THYROID STIM HORMONE: CPT

## 2020-11-11 PROCEDURE — 71046 X-RAY EXAM CHEST 2 VIEWS: CPT

## 2020-11-11 PROCEDURE — 83735 ASSAY OF MAGNESIUM: CPT

## 2020-11-11 PROCEDURE — 93005 ELECTROCARDIOGRAM TRACING: CPT | Performed by: FAMILY MEDICINE

## 2020-11-11 PROCEDURE — 6370000000 HC RX 637 (ALT 250 FOR IP): Performed by: FAMILY MEDICINE

## 2020-11-11 PROCEDURE — 99284 EMERGENCY DEPT VISIT MOD MDM: CPT

## 2020-11-11 PROCEDURE — 80048 BASIC METABOLIC PNL TOTAL CA: CPT

## 2020-11-11 PROCEDURE — 85025 COMPLETE CBC W/AUTO DIFF WBC: CPT

## 2020-11-11 PROCEDURE — 84484 ASSAY OF TROPONIN QUANT: CPT

## 2020-11-11 PROCEDURE — 84439 ASSAY OF FREE THYROXINE: CPT

## 2020-11-11 RX ORDER — ASPIRIN 81 MG/1
324 TABLET, CHEWABLE ORAL ONCE
Status: COMPLETED | OUTPATIENT
Start: 2020-11-11 | End: 2020-11-11

## 2020-11-11 RX ORDER — LABETALOL HYDROCHLORIDE 5 MG/ML
10 INJECTION, SOLUTION INTRAVENOUS ONCE
Status: COMPLETED | OUTPATIENT
Start: 2020-11-11 | End: 2020-11-12

## 2020-11-11 RX ORDER — NITROGLYCERIN 0.4 MG/1
0.4 TABLET SUBLINGUAL ONCE
Status: COMPLETED | OUTPATIENT
Start: 2020-11-11 | End: 2020-11-11

## 2020-11-11 RX ADMIN — ASPIRIN 81 MG CHEWABLE TABLET 324 MG: 81 TABLET CHEWABLE at 23:14

## 2020-11-11 RX ADMIN — NITROGLYCERIN 0.4 MG: 0.4 TABLET SUBLINGUAL at 23:49

## 2020-11-11 RX ADMIN — NITROGLYCERIN 0.4 MG: 0.4 TABLET SUBLINGUAL at 23:15

## 2020-11-11 RX ADMIN — NITROGLYCERIN 1 INCH: 20 OINTMENT TOPICAL at 23:15

## 2020-11-11 ASSESSMENT — PAIN DESCRIPTION - LOCATION: LOCATION: CHEST

## 2020-11-11 ASSESSMENT — PAIN DESCRIPTION - PAIN TYPE: TYPE: ACUTE PAIN

## 2020-11-11 ASSESSMENT — PAIN SCALES - GENERAL: PAINLEVEL_OUTOF10: 4

## 2020-11-11 ASSESSMENT — PAIN DESCRIPTION - DESCRIPTORS: DESCRIPTORS: PRESSURE

## 2020-11-11 NOTE — TELEPHONE ENCOUNTER
Reason for Disposition   [1] Systolic BP  >= 154 OR Diastolic >= 440 AND [4] cardiac or neurologic symptoms (e.g., chest pain, difficulty breathing, unsteady gait, blurred vision)    Answer Assessment - Initial Assessment Questions  1. BLOOD PRESSURE: \"What is the blood pressure? \" \"Did you take at least two measurements 5 minutes apart?\"      192/125 this afternoon, previous reading was 180/110    2. ONSET: \"When did you take your blood pressure? \"      Yesterday    3. HOW: \"How did you obtain the blood pressure? \" (e.g., visiting nurse, automatic home BP monitor)      Automatic wrist cuff    4. HISTORY: \"Do you have a history of high blood pressure? \"     HTN    5. MEDICATIONS: Lisa Fluke you taking any medications for blood pressure? \" \"Have you missed any doses recently? \"      Does take BP medications, no recent missed doses. 6. OTHER SYMPTOMS: \"Do you have any symptoms? \" (e.g., headache, chest pain, blurred vision, difficulty breathing, weakness)      Headache, chest pressure, SOB with exertion    7. PREGNANCY: \"Is there any chance you are pregnant? \" \"When was your last menstrual period? \"      N/A    Protocols used: HIGH BLOOD PRESSURE-ADULT-AH    Pt reports having chest pressure, elevated BP, SOB with exertion since yesterday. States she purchased a wrist cuff and BP reading this afternoon was 192/125. Yesterday evening BP was 180/110. States yesterday she was sweating and pale. Reviewed for pt to be evaluated at ED d/t continued chest pressure and elevated BP. Caller provided care advice and instructed to call back with worsening symptoms. Attention Provider: Thank you for allowing me to participate in the care of your patient. The patient was connected to triage in response to information provided to the [facility]. Please do not respond through this encounter as the response is not directed to a shared pool.

## 2020-11-12 ENCOUNTER — APPOINTMENT (OUTPATIENT)
Dept: ULTRASOUND IMAGING | Age: 70
DRG: 305 | End: 2020-11-12
Payer: MEDICARE

## 2020-11-12 PROBLEM — I16.1 HYPERTENSIVE EMERGENCY: Status: ACTIVE | Noted: 2020-11-12

## 2020-11-12 LAB
A/G RATIO: 1.4 (ref 1.1–2.2)
ALBUMIN SERPL-MCNC: 4.2 G/DL (ref 3.4–5)
ALP BLD-CCNC: 83 U/L (ref 40–129)
ALT SERPL-CCNC: 18 U/L (ref 10–40)
ANION GAP SERPL CALCULATED.3IONS-SCNC: 13 MMOL/L (ref 3–16)
AST SERPL-CCNC: 21 U/L (ref 15–37)
BASOPHILS ABSOLUTE: 0.1 K/UL (ref 0–0.2)
BASOPHILS RELATIVE PERCENT: 1.1 %
BILIRUB SERPL-MCNC: 0.6 MG/DL (ref 0–1)
BUN BLDV-MCNC: 8 MG/DL (ref 7–20)
CALCIUM SERPL-MCNC: 9.6 MG/DL (ref 8.3–10.6)
CHLORIDE BLD-SCNC: 102 MMOL/L (ref 99–110)
CHOLESTEROL, TOTAL: 202 MG/DL (ref 0–199)
CO2: 25 MMOL/L (ref 21–32)
CREAT SERPL-MCNC: 0.9 MG/DL (ref 0.6–1.2)
EKG ATRIAL RATE: 59 BPM
EKG ATRIAL RATE: 85 BPM
EKG DIAGNOSIS: NORMAL
EKG DIAGNOSIS: NORMAL
EKG P AXIS: 45 DEGREES
EKG P AXIS: 69 DEGREES
EKG P-R INTERVAL: 138 MS
EKG P-R INTERVAL: 146 MS
EKG Q-T INTERVAL: 370 MS
EKG Q-T INTERVAL: 472 MS
EKG QRS DURATION: 80 MS
EKG QRS DURATION: 80 MS
EKG QTC CALCULATION (BAZETT): 440 MS
EKG QTC CALCULATION (BAZETT): 467 MS
EKG R AXIS: -4 DEGREES
EKG R AXIS: 16 DEGREES
EKG T AXIS: 29 DEGREES
EKG T AXIS: 58 DEGREES
EKG VENTRICULAR RATE: 59 BPM
EKG VENTRICULAR RATE: 85 BPM
EOSINOPHILS ABSOLUTE: 0.1 K/UL (ref 0–0.6)
EOSINOPHILS RELATIVE PERCENT: 2.4 %
GFR AFRICAN AMERICAN: >60
GFR NON-AFRICAN AMERICAN: >60
GLOBULIN: 3 G/DL
GLUCOSE BLD-MCNC: 87 MG/DL (ref 70–99)
HCT VFR BLD CALC: 36.3 % (ref 36–48)
HDLC SERPL-MCNC: 49 MG/DL (ref 40–60)
HEMOGLOBIN: 12.4 G/DL (ref 12–16)
LDL CHOLESTEROL CALCULATED: 119 MG/DL
LYMPHOCYTES ABSOLUTE: 2.6 K/UL (ref 1–5.1)
LYMPHOCYTES RELATIVE PERCENT: 44.3 %
MAGNESIUM: 1.9 MG/DL (ref 1.8–2.4)
MCH RBC QN AUTO: 33.3 PG (ref 26–34)
MCHC RBC AUTO-ENTMCNC: 34.2 G/DL (ref 31–36)
MCV RBC AUTO: 97.2 FL (ref 80–100)
MONOCYTES ABSOLUTE: 0.4 K/UL (ref 0–1.3)
MONOCYTES RELATIVE PERCENT: 5.9 %
NEUTROPHILS ABSOLUTE: 2.8 K/UL (ref 1.7–7.7)
NEUTROPHILS RELATIVE PERCENT: 46.3 %
PDW BLD-RTO: 13.3 % (ref 12.4–15.4)
PLATELET # BLD: 207 K/UL (ref 135–450)
PMV BLD AUTO: 7.3 FL (ref 5–10.5)
POTASSIUM REFLEX MAGNESIUM: 3.6 MMOL/L (ref 3.5–5.1)
RBC # BLD: 3.73 M/UL (ref 4–5.2)
SARS-COV-2, NAAT: NOT DETECTED
SODIUM BLD-SCNC: 140 MMOL/L (ref 136–145)
T4 FREE: 0.6 NG/DL (ref 0.9–1.8)
TOTAL PROTEIN: 7.2 G/DL (ref 6.4–8.2)
TRIGL SERPL-MCNC: 170 MG/DL (ref 0–150)
TROPONIN: <0.01 NG/ML
TSH REFLEX: 30.64 UIU/ML (ref 0.27–4.2)
VLDLC SERPL CALC-MCNC: 34 MG/DL
WBC # BLD: 6 K/UL (ref 4–11)

## 2020-11-12 PROCEDURE — 93005 ELECTROCARDIOGRAM TRACING: CPT | Performed by: INTERNAL MEDICINE

## 2020-11-12 PROCEDURE — 93010 ELECTROCARDIOGRAM REPORT: CPT | Performed by: INTERNAL MEDICINE

## 2020-11-12 PROCEDURE — 6370000000 HC RX 637 (ALT 250 FOR IP): Performed by: INTERNAL MEDICINE

## 2020-11-12 PROCEDURE — 2500000003 HC RX 250 WO HCPCS: Performed by: FAMILY MEDICINE

## 2020-11-12 PROCEDURE — 85025 COMPLETE CBC W/AUTO DIFF WBC: CPT

## 2020-11-12 PROCEDURE — 2580000003 HC RX 258: Performed by: INTERNAL MEDICINE

## 2020-11-12 PROCEDURE — 84484 ASSAY OF TROPONIN QUANT: CPT

## 2020-11-12 PROCEDURE — 6360000002 HC RX W HCPCS: Performed by: INTERNAL MEDICINE

## 2020-11-12 PROCEDURE — 99221 1ST HOSP IP/OBS SF/LOW 40: CPT | Performed by: NURSE PRACTITIONER

## 2020-11-12 PROCEDURE — U0002 COVID-19 LAB TEST NON-CDC: HCPCS

## 2020-11-12 PROCEDURE — 6370000000 HC RX 637 (ALT 250 FOR IP): Performed by: FAMILY MEDICINE

## 2020-11-12 PROCEDURE — 76770 US EXAM ABDO BACK WALL COMP: CPT

## 2020-11-12 PROCEDURE — 80053 COMPREHEN METABOLIC PANEL: CPT

## 2020-11-12 PROCEDURE — 99223 1ST HOSP IP/OBS HIGH 75: CPT | Performed by: INTERNAL MEDICINE

## 2020-11-12 PROCEDURE — 2060000000 HC ICU INTERMEDIATE R&B

## 2020-11-12 PROCEDURE — 80061 LIPID PANEL: CPT

## 2020-11-12 RX ORDER — LEVOTHYROXINE SODIUM 0.12 MG/1
125 TABLET ORAL DAILY
Status: DISCONTINUED | OUTPATIENT
Start: 2020-11-12 | End: 2020-11-13

## 2020-11-12 RX ORDER — ATORVASTATIN CALCIUM 40 MG/1
80 TABLET, FILM COATED ORAL DAILY
Status: DISCONTINUED | OUTPATIENT
Start: 2020-11-12 | End: 2020-11-13 | Stop reason: HOSPADM

## 2020-11-12 RX ORDER — CLONIDINE HYDROCHLORIDE 0.1 MG/1
0.1 TABLET ORAL EVERY 4 HOURS PRN
Status: DISCONTINUED | OUTPATIENT
Start: 2020-11-12 | End: 2020-11-13 | Stop reason: HOSPADM

## 2020-11-12 RX ORDER — SODIUM CHLORIDE 0.9 % (FLUSH) 0.9 %
10 SYRINGE (ML) INJECTION PRN
Status: DISCONTINUED | OUTPATIENT
Start: 2020-11-12 | End: 2020-11-13 | Stop reason: HOSPADM

## 2020-11-12 RX ORDER — SODIUM CHLORIDE 0.9 % (FLUSH) 0.9 %
10 SYRINGE (ML) INJECTION EVERY 12 HOURS SCHEDULED
Status: DISCONTINUED | OUTPATIENT
Start: 2020-11-12 | End: 2020-11-13 | Stop reason: HOSPADM

## 2020-11-12 RX ORDER — POTASSIUM CHLORIDE 20 MEQ/1
40 TABLET, EXTENDED RELEASE ORAL ONCE
Status: COMPLETED | OUTPATIENT
Start: 2020-11-12 | End: 2020-11-12

## 2020-11-12 RX ORDER — ASPIRIN 81 MG/1
81 TABLET, CHEWABLE ORAL DAILY
Status: DISCONTINUED | OUTPATIENT
Start: 2020-11-12 | End: 2020-11-13 | Stop reason: HOSPADM

## 2020-11-12 RX ORDER — VENLAFAXINE HYDROCHLORIDE 75 MG/1
150 CAPSULE, EXTENDED RELEASE ORAL
Status: DISCONTINUED | OUTPATIENT
Start: 2020-11-12 | End: 2020-11-13 | Stop reason: HOSPADM

## 2020-11-12 RX ORDER — AMLODIPINE BESYLATE 5 MG/1
5 TABLET ORAL DAILY
Status: DISCONTINUED | OUTPATIENT
Start: 2020-11-12 | End: 2020-11-13 | Stop reason: HOSPADM

## 2020-11-12 RX ORDER — ONDANSETRON 2 MG/ML
4 INJECTION INTRAMUSCULAR; INTRAVENOUS EVERY 6 HOURS PRN
Status: DISCONTINUED | OUTPATIENT
Start: 2020-11-12 | End: 2020-11-13 | Stop reason: HOSPADM

## 2020-11-12 RX ORDER — MORPHINE SULFATE 2 MG/ML
2 INJECTION, SOLUTION INTRAMUSCULAR; INTRAVENOUS
Status: DISCONTINUED | OUTPATIENT
Start: 2020-11-12 | End: 2020-11-13 | Stop reason: HOSPADM

## 2020-11-12 RX ORDER — LOSARTAN POTASSIUM 25 MG/1
100 TABLET ORAL DAILY
Status: DISCONTINUED | OUTPATIENT
Start: 2020-11-12 | End: 2020-11-13 | Stop reason: HOSPADM

## 2020-11-12 RX ORDER — ACETAMINOPHEN 325 MG/1
650 TABLET ORAL EVERY 6 HOURS PRN
Status: DISCONTINUED | OUTPATIENT
Start: 2020-11-12 | End: 2020-11-13 | Stop reason: HOSPADM

## 2020-11-12 RX ORDER — CLOPIDOGREL BISULFATE 75 MG/1
75 TABLET ORAL DAILY
Status: DISCONTINUED | OUTPATIENT
Start: 2020-11-12 | End: 2020-11-13 | Stop reason: HOSPADM

## 2020-11-12 RX ORDER — ACETAMINOPHEN 650 MG/1
650 SUPPOSITORY RECTAL EVERY 6 HOURS PRN
Status: DISCONTINUED | OUTPATIENT
Start: 2020-11-12 | End: 2020-11-13 | Stop reason: HOSPADM

## 2020-11-12 RX ORDER — PROMETHAZINE HYDROCHLORIDE 25 MG/1
12.5 TABLET ORAL EVERY 6 HOURS PRN
Status: DISCONTINUED | OUTPATIENT
Start: 2020-11-12 | End: 2020-11-13 | Stop reason: HOSPADM

## 2020-11-12 RX ORDER — HYDROCHLOROTHIAZIDE 25 MG/1
25 TABLET ORAL DAILY
Status: DISCONTINUED | OUTPATIENT
Start: 2020-11-12 | End: 2020-11-13 | Stop reason: HOSPADM

## 2020-11-12 RX ADMIN — VENLAFAXINE HYDROCHLORIDE 150 MG: 75 CAPSULE, EXTENDED RELEASE ORAL at 07:28

## 2020-11-12 RX ADMIN — ENOXAPARIN SODIUM 40 MG: 40 INJECTION SUBCUTANEOUS at 07:28

## 2020-11-12 RX ADMIN — ACETAMINOPHEN 650 MG: 325 TABLET ORAL at 05:37

## 2020-11-12 RX ADMIN — Medication 10 ML: at 07:29

## 2020-11-12 RX ADMIN — ACETAMINOPHEN 650 MG: 325 TABLET ORAL at 19:42

## 2020-11-12 RX ADMIN — POTASSIUM CHLORIDE 40 MEQ: 1500 TABLET, EXTENDED RELEASE ORAL at 01:55

## 2020-11-12 RX ADMIN — Medication 10 ML: at 21:10

## 2020-11-12 RX ADMIN — LEVOTHYROXINE SODIUM 125 MCG: 125 TABLET ORAL at 07:42

## 2020-11-12 RX ADMIN — NITROGLYCERIN 1 INCH: 20 OINTMENT TOPICAL at 05:39

## 2020-11-12 RX ADMIN — CLOPIDOGREL BISULFATE 75 MG: 75 TABLET ORAL at 07:28

## 2020-11-12 RX ADMIN — ASPIRIN 81 MG CHEWABLE TABLET 81 MG: 81 TABLET CHEWABLE at 07:28

## 2020-11-12 RX ADMIN — LABETALOL HYDROCHLORIDE 10 MG: 5 INJECTION, SOLUTION INTRAVENOUS at 00:05

## 2020-11-12 RX ADMIN — ATORVASTATIN CALCIUM 80 MG: 40 TABLET, FILM COATED ORAL at 07:28

## 2020-11-12 RX ADMIN — HYDROCHLOROTHIAZIDE 25 MG: 25 TABLET ORAL at 07:28

## 2020-11-12 RX ADMIN — LOSARTAN POTASSIUM 100 MG: 25 TABLET, FILM COATED ORAL at 07:42

## 2020-11-12 RX ADMIN — AMLODIPINE BESYLATE 5 MG: 5 TABLET ORAL at 11:06

## 2020-11-12 ASSESSMENT — PAIN SCALES - GENERAL
PAINLEVEL_OUTOF10: 7
PAINLEVEL_OUTOF10: 3
PAINLEVEL_OUTOF10: 0

## 2020-11-12 NOTE — CARE COORDINATION
Case Management Assessment  Initial Evaluation      Patient Name: Estela Vicente  YOB: 1950  Diagnosis: Hypertensive emergency [I16.1]  Date / Time: 11/11/2020 10:47 PM    Admission status/Date:  11/12/2020  Chart Reviewed: Yes      Patient Interviewed: Yes   Family Interviewed:  No      Hospitalization in the last 30 days:  No      Health Care Decision Maker :    Saulo Hyatt (spouse) [phone]    Met with: patient  Christopher Rodríguez conducted  (bedside/phone): bedside    Current PCP: iRta Calloway 90 required for SNF : NO      3 night stay required - YES    ADLS  Support Systems/Care Needs:    Transportation: self    Meal Preparation: self    Housing  Living Arrangements: 2 story house w/ sp  Steps: multiple-amb w/o device  Intent for return to present living arrangements: Yes  Identified Issues: NA    Home Care Information  Active with [address] : No   Passport/Waiver : No      Durable Medical Equiptment   DME Provider: [facility]  Equipment:  Walker___Cane___RTS___ BSC___Shower Chair___Hospital Bed___W/C____Other________  02 at ____Liter(s)---wears(frequency)_______ HHN ___ CPAP___ BiPap___   N/A__X__      Home O2 Use :  No      Community Service Affiliation  Dialysis:  No    · Agency:  · Location:  · Dialysis Schedule:  · Phone:   · Fax: Other Community Services: NA    DISCHARGE PLAN: Explained Case Management role/services. Chart reviewed. Met with pt at bedside and explained the role of the CM. IPTA. Denies any new HHC or DME needs. +CM will follow.

## 2020-11-12 NOTE — ED PROVIDER NOTES
Triage Chief Complaint:   Chest Pain (c/o mid chest pressure x 3 days. states has had chest pain like this x 1 year. has not seen pcp for chest pain. )    [place]:  Robinson Gonzalez is a 69 y.o. female that presents with 3 days of intermittent chest heaviness and an episode this evening with associated diaphoresis and shortness of breath. Patient noticed her blood pressure was high at home so she presents for evaluation. On presentation she denies chest pain but notes a \"chest heaviness\". On presentation no shortness of breath diaphoresis nausea vomiting or radiation of symptoms. Patient denies prior history of cardiac disease. Patient denies headache, vision change, unilateral weakness, word finding difficulty, or other CVA symptoms.     ROS:  General:  No fevers, no chills, no weakness  Eyes:  No recent vison changes, no discharge  ENT:  No sore throat, no nasal congestion, no hearing changes  Cardiovascular: As above  Respiratory:  No shortness of breath, no cough, no wheezing  Gastrointestinal:  No pain, no nausea, no vomiting, no diarrhea  Musculoskeletal:  No muscle pain, no joint pain  Skin:  No rash, no pruritis, no easy bruising  Neurologic:  No speech problems, no headache, no extremity numbness, no extremity tingling, no extremity weakness  Psychiatric:  No anxiety, no hallucinations or delusions, no suicidal or homicidal ideation  Genitourinary:  No dysuria, no hematuria  Endocrine:  No unexpected weight gain, no unexpected weight loss  Extremities:  no edema, no pain    Past Medical History:   Diagnosis Date    Anxiety     Fibromyalgia     Hyperlipidemia     Hypertension     Hypothyroidism     Osteopenia     Dexa 7/08    Thyroid disease     Transient ischemic attack 2003     Past Surgical History:   Procedure Laterality Date    HYSTERECTOMY  1989     Family History   Problem Relation Age of Onset    Ovarian Cancer Mother     High Blood Pressure Father      Social History     Socioeconomic History    Marital status:      Spouse name: Not on file    Number of children: Not on file    Years of education: Not on file    Highest education level: Not on file   Occupational History    Not on file   Social Needs    Financial resource strain: Not on file    Food insecurity     Worry: Not on file     Inability: Not on file    Transportation needs     Medical: Not on file     Non-medical: Not on file   Tobacco Use    Smoking status: Never Smoker    Smokeless tobacco: Never Used   Substance and Sexual Activity    Alcohol use: Yes     Alcohol/week: 2.0 standard drinks     Types: 2 Cans of beer per week     Comment: social    Drug use: No    Sexual activity: Yes     Partners: Male   Lifestyle    Physical activity     Days per week: Not on file     Minutes per session: Not on file    Stress: Not on file   Relationships    Social connections     Talks on phone: Not on file     Gets together: Not on file     Attends [employer] service: Not on file     Active member of club or organization: Not on file     Attends meetings of clubs or organizations: Not on file     Relationship status: Not on file    Intimate partner violence     Fear of current or ex partner: Not on file     Emotionally abused: Not on file     Physically abused: Not on file     Forced sexual activity: Not on file   Other Topics Concern    Not on file   Social History Narrative    Not on file     No current facility-administered medications for this encounter.       Current Outpatient Medications   Medication Sig Dispense Refill    Ginkgo Biloba 40 MG TABS Take by mouth      levothyroxine (SYNTHROID) 125 MCG tablet TAKE 1 TABLET BY MOUTH EVERY DAY 90 tablet 0    hydroCHLOROthiazide (HYDRODIURIL) 25 MG tablet TAKE 1 TABLET BY MOUTH EVERY DAY IN THE MORNING (Patient not taking: Reported on 10/19/2020) 90 tablet 0    losartan (COZAAR) 100 MG tablet TAKE 1 TABLET BY MOUTH EVERY DAY 90 tablet 1    venlafaxine (EFFEXOR XR) 150 MG extended release capsule TAKE 1 CAPSULE BY MOUTH EVERY DAY 90 capsule 1    clopidogrel (PLAVIX) 75 MG tablet TAKE 1 TABLET DAILY 90 tablet 0    atorvastatin (LIPITOR) 80 MG tablet Take 1 tablet by mouth daily 30 tablet 3    aspirin 81 MG tablet Take 81 mg by mouth as needed       Blood Pressure KIT 1 kit by Does not apply route 2 times daily (Patient taking differently: 1 kit by Does not apply route three times a week ) 1 kit 0    Lactobacillus Rhamnosus, GG, ( PROBIOTIC DIGESTIVE CARE) CAPS Take 1 tablet by mouth daily      Omega-3 Fatty Acids (OMEGA 3 PO) Take 300 mg by mouth      Calcium Carbonate-Vitamin D (CALCIUM + D PO) Take  by mouth.  Cholecalciferol (VITAMIN D3) 5000 UNITS CAPS Take 1 tablet by mouth daily. Allergies   Allergen Reactions    Lisinopril-Hydrochlorothiazide Nausea Only    Sulfa Antibiotics        Nursing Notes Reviewed    Physical Exam:  ED Triage Vitals [11/11/20 2213]   Enc Vitals Group      BP (!) 201/119      Pulse 84      Resp 20      Temp 98.8 °F (37.1 °C)      Temp Source Oral      SpO2 99 %      Weight 140 lb (63.5 kg)      Height 5' (1.524 m)      Head Circumference       Peak Flow       Pain Score       Pain Loc       Pain Edu? Excl. in [address]? GENERAL APPEARANCE: Awake and alert. Cooperative. No acute distress. HEAD: Normocephalic. Atraumatic. EYES: EOM's grossly intact. Sclera anicteric. ENT: Mucous membranes are moist. Tolerates saliva. No trismus. NECK: Supple. No meningismus. Trachea midline. HEART: RRR. Radial pulses 2+. LUNGS: Respirations unlabored. CTAB  ABDOMEN: Soft. Non-tender. No guarding or rebound. EXTREMITIES: No acute deformities. SKIN: Warm and dry. NEUROLOGICAL: No gross facial drooping. Moves all 4 extremities spontaneously. Cranial nerves normal.  NIH stroke scale 0. PSYCHIATRIC: Normal mood.     I have reviewed and interpreted all of the currently available lab results from this visit (if applicable):  Results for orders placed or performed during the hospital encounter of 90/66/09   Basic Metabolic Panel   Result Value Ref Range    Sodium 140 136 - 145 mmol/L    Potassium 3.3 (L) 3.5 - 5.1 mmol/L    Chloride 101 99 - 110 mmol/L    CO2 24 21 - 32 mmol/L    Anion Gap 15 3 - 16    Glucose 84 70 - 99 mg/dL    BUN 7 7 - 20 mg/dL    CREATININE 0.9 0.6 - 1.2 mg/dL    GFR Non-African American >60 >60    GFR African American >60 >60    Calcium 9.8 8.3 - 10.6 mg/dL   Troponin   Result Value Ref Range    Troponin <0.01 <0.01 ng/mL   CBC Auto Differential   Result Value Ref Range    WBC 7.6 4.0 - 11.0 K/uL    RBC 4.07 4.00 - 5.20 M/uL    Hemoglobin 13.5 12.0 - 16.0 g/dL    Hematocrit 40.0 36.0 - 48.0 %    MCV 98.2 80.0 - 100.0 fL    MCH 33.2 26.0 - 34.0 pg    MCHC 33.8 31.0 - 36.0 g/dL    RDW 13.6 12.4 - 15.4 %    Platelets 744 607 - 901 K/uL    MPV 6.9 5.0 - 10.5 fL    Neutrophils % 52.9 %    Lymphocytes % 38.6 %    Monocytes % 5.7 %    Eosinophils % 1.9 %    Basophils % 0.9 %    Neutrophils Absolute 4.0 1.7 - 7.7 K/uL    Lymphocytes Absolute 2.9 1.0 - 5.1 K/uL    Monocytes Absolute 0.4 0.0 - 1.3 K/uL    Eosinophils Absolute 0.1 0.0 - 0.6 K/uL    Basophils Absolute 0.1 0.0 - 0.2 K/uL   EKG 12 Lead   Result Value Ref Range    Ventricular Rate 85 BPM    Atrial Rate 85 BPM    P-R Interval 138 ms    QRS Duration 80 ms    Q-T Interval 370 ms    QTc Calculation (Bazett) 440 ms    P Axis 45 degrees    R Axis -4 degrees    T Axis 29 degrees    Diagnosis       Normal sinus rhythmPossible Left atrial enlargementLeft ventricular hypertrophyAbnormal ECGNo previous ECGs available      Radiographs (if obtained):  [] The following radiograph was interpreted by myself in the absence of a radiologist:   [] Radiologist's Report Reviewed:  XR CHEST (2 VW)   Final Result   No acute cardiopulmonary findings.                EKG (if obtained): (All EKG's are interpreted by myself in the absence of a cardiologist) EKG with no comparison demonstrates normal sinus rhythm with a rate of 85. Normal axis. . QRS 80. QTc 440. Normal R wave progression. No Q waves. Nonspecific ST changes in the anterior leads. No ST elevation MI. No A. fib or a flutter. No Brugada. Abnormal EKG. Chart review shows recent radiographs:  Xr Chest (2 Vw)    Result Date: 11/11/2020  EXAMINATION: TWO XRAY VIEWS OF THE CHEST 11/11/2020 10:34 pm COMPARISON: Chest radiographs 12/10/2012 HISTORY: ORDERING SYSTEM PROVIDED HISTORY: chest pain TECHNOLOGIST PROVIDED HISTORY: Reason for exam:->chest pain Reason for Exam: chest pressure FINDINGS: Clear lungs. Punctate calcified granuloma at the right lung base. Normal heart and pleural surfaces. Midthoracic spondylosis. .     No acute cardiopulmonary findings. MDM:  74-year-old female with intermittent episodes of chest heaviness and hypertension presents with the same. Will treat blood pressure with 1 sublingual nitro and 1 inch Nitropaste as well as give 4 baby aspirin for chest pain. Initial EKG with no evidence of ST elevation MI. No evidence of hypertensive encephalopathy or CVA symptoms. 2336: Initial troponin negative. Glucose normal.  Initial chemistry other than potassium of 3.3 normal.  Will correct potassium with oral replacement. After 1 sublingual nitro and Nitropaste patient states chest pressure is feeling better. Will repeat sublingual nitro and give dose of labetalol for blood pressure control. 189/112    0010: Patient chest pain gone after 2 sublingual nitro. Labetalol just given. Patient will be admitted for malignant hypertension and ACS rule out. In total critical care time of 35 minutes with cardiac, pulmonary systems of acute risk of decompensation and collapse requiring sublingual and IV medications for blood pressure control    0030: Blood pressure now 160/86. Will not attempt to lower any further. Patient asymptomatic with no chest pain. Clinical Impression:  1.  Acute chest pain 2. Malignant hypertension      Disposition referral (if applicable):  No follow-up provider specified. Disposition medications (if applicable):  New Prescriptions    No medications on file       Comment: Please note this report has been produced using speech recognition software and may contain errors related to that system including errors in grammar, punctuation, and spelling, as well as words and phrases that may be inappropriate. If there are any questions or concerns please feel free to contact the dictating provider for clarification.       Amelia Manzanares MD  11/12/20 0010       Amelia Manzanares MD  11/12/20 Valente Banda

## 2020-11-12 NOTE — PROGRESS NOTES
Shift change, bedside report given to [facility]. Pt exhibits no s/s of distress. Call light in reach. Care has been transferred at this time.

## 2020-11-12 NOTE — PLAN OF CARE
69 yoF p/w typical CP assoc w/ diaphoresis and SOB. Found to be markedly hypertensive. BP and sx improved w/ SL nitro and nitro paste. BP improved to the point that pt can go to PCU w/ PRN's w/o a gtt.     Hypertensive emergency  CP  Hypokalemia

## 2020-11-12 NOTE — CONSULTS
[facility]  [phone]        Reason for Consultation/Chief Complaint: \"I have been having chest pain, high BP, and weakness. \"    Consulted for CP, HTN crisis    History of Present Illness:  Jian Escobar is a 69 y.o. patient who presented to [facility] 11/11/20 with c/o weakness, CP, and high BP. PMH HTN, HLD, hypothyroidism, TIA, and anxiety. Reports being diagnosed with HTN 1 year ago and takes losartan 100mg qd. She does miss doses of medication intermittently. Most recent exercise stress ECHO 12/06/18 Fair to good exercise capacity Normal resting LV wall motion and LVEF 50-55%; At peak exercise, there is augmentation/thickening/contraction of all viewable wall segments. LVEF with exercise is 75% No ischemia. Now presents with c/o weakness, high BP, and CP. She is vague historian. Reports being at [address] [facility] [address] 3 days ago and felt weak, sweaty, nauseated, and dizzy. Called  to get her. Next few days reported high home BP reading 438 systolic. Normally SBP 160mmHg per her report. Also c/o CP in mid-chest without radiation and described as tight discomfort. Happens intermittently nothing making better or worse. Also has SOB at times. Admit EKG revealed NSR; possible LVH; LAE; nonspecific ST change. Second EKG SB 59bpm (no sig change from 2012 EKG); note CXR nothing acute; troponins negative x 2; K+ 3.6;  BP in /119, 221/117, 220/102; TSH 30.64. Received SL NTG, NTP, and IV labetalol in ER. Patient with no complaints of palpitations, edema, or orthopnea/PND. I have been asked to provide consultation regarding further management and testing. Past Medical History:   has a past medical history of Anxiety, Fibromyalgia, Hyperlipidemia, Hypertension, Hypothyroidism, Osteopenia, Thyroid disease, and Transient ischemic attack. Surgical History:   has a past surgical history that includes Hysterectomy (1989). Social History:   reports that she has never smoked.  She has never used smokeless tobacco. She reports current alcohol use of about 2.0 standard drinks of alcohol per week. She reports that she does not use drugs. Family History:  family history includes High Blood Pressure in her father; Ovarian Cancer in her mother. Home Medications:  Were reviewed and are listed in nursing record. and/or listed below  Prior to Admission medications    Medication Sig Start Date End Date Taking? Authorizing Provider   Ginkgo Biloba 40 MG TABS Take by mouth    Historical Provider, MD   levothyroxine (SYNTHROID) 125 MCG tablet TAKE 1 TABLET BY MOUTH EVERY DAY 10/13/20   JAMSHID Diaz CNP   hydroCHLOROthiazide (HYDRODIURIL) 25 MG tablet TAKE 1 TABLET BY MOUTH EVERY DAY IN THE MORNING  Patient not taking: Reported on 10/19/2020 10/8/20   JAMSHID Diaz CNP   losartan (COZAAR) 100 MG tablet TAKE 1 TABLET BY MOUTH EVERY DAY 9/25/20   JAMSHID Diaz CNP   venlafaxine (EFFEXOR XR) 150 MG extended release capsule TAKE 1 CAPSULE BY MOUTH EVERY DAY 9/25/20   JAMSHID Diaz CNP   clopidogrel (PLAVIX) 75 MG tablet TAKE 1 TABLET DAILY 7/14/20   JAMSHID Diaz CNP   atorvastatin (LIPITOR) 80 MG tablet Take 1 tablet by mouth daily 3/13/20   JAMSHID Diaz CNP   aspirin 81 MG tablet Take 81 mg by mouth as needed     Historical Provider, MD   Blood Pressure KIT 1 kit by Does not apply route 2 times daily  Patient taking differently: 1 kit by Does not apply route three times a week  10/12/18   Michael Mcmahan DO   Lactobacillus Rhamnosus, GG, ( PROBIOTIC DIGESTIVE CARE) CAPS Take 1 tablet by mouth daily    Historical Provider, MD   Omega-3 Fatty Acids (OMEGA 3 PO) Take 300 mg by mouth    Historical Provider, MD   Calcium Carbonate-Vitamin D (CALCIUM + D PO) Take  by mouth. Historical Provider, MD   Cholecalciferol (VITAMIN D3) 5000 UNITS CAPS Take 1 tablet by mouth daily.     Historical Provider, MD Allergies:  Lisinopril-hydrochlorothiazide and Sulfa antibiotics     Review of Systems:   12 point ROS negative in all areas as listed below except as in [place]  Constitutional, EENT, Cardiovascular, pulmonary, GI, , Musculoskeletal, skin, neurological, hematological, endocrine, Psychiatric    Physical Examination:    Vitals:    11/12/20 0715   BP: (!) 144/86   Pulse: 61   Resp: 12   Temp: (!) 65 °F (18.3 °C)   SpO2: 93%    Weight: 140 lb (63.5 kg)         General Appearance:  Alert, cooperative, no distress, appears stated age   Head:  Normocephalic, without obvious abnormality, atraumatic   Eyes:  PERRL, conjunctiva/corneas clear       Nose: Nares normal, no drainage or sinus tenderness   Throat: Lips, mucosa, and tongue normal   Neck: Supple, symmetrical, trachea midline, no adenopathy, thyroid: not enlarged, symmetric, no tenderness/mass/nodules, no carotid bruit or JVD       Lungs:   Clear to auscultation bilaterally, respirations unlabored   Chest Wall:  No tenderness or deformity   Heart:  Regular rate and rhythm, S1, S2 normal, no murmur, rub or gallop   Abdomen:   Soft, non-tender, bowel sounds active all four quadrants,  no masses, no organomegaly           Extremities: Extremities normal, atraumatic, no cyanosis or edema   Pulses: 2+ and symmetric   Skin: Skin color, texture, turgor normal, no rashes or lesions   Pysch: Normal mood and affect   Neurologic: Normal gross motor and sensory exam.         Labs  CBC:   Lab Results   Component Value Date    WBC 6.0 11/12/2020    RBC 3.73 11/12/2020    HGB 12.4 11/12/2020    HCT 36.3 11/12/2020    MCV 97.2 11/12/2020    RDW 13.3 11/12/2020     11/12/2020     CMP:    Lab Results   Component Value Date     11/12/2020    K 3.6 11/12/2020     11/12/2020    CO2 25 11/12/2020    BUN 8 11/12/2020    CREATININE 0.9 11/12/2020    GFRAA >60 11/12/2020    GFRAA >60 03/15/2013    AGRATIO 1.4 11/12/2020    LABGLOM >60 11/12/2020    GLUCOSE 87 11/12/2020 PROT 7.2 2020    PROT 8.0 03/15/2013    CALCIUM 9.6 2020    BILITOT 0.6 2020    ALKPHOS 83 2020    AST 21 2020    ALT 18 2020     PT/INR:  No results found for: PTINR  Lab Results   Component Value Date    TROPONINI <0.01 2020     EKG 20  Sinus bradycardiaNonspecific ST abnormalityNo previous ECGs availableConfirmed by BAR GIFFORD MDHEN ([id number]) on 2020 7:29:46      EK20 I have reviewed EKG with the following interpretation:  Impression:  See HPI Normal sinus rhythmPossible Left atrial enlargementLeft ventricular hypertrophyAbnormal ECGConfirmed by Becki Abel MD, [address] () on 2020 5:00:02 AM     CXR 20  FINDINGS: Clear lungs. Punctate calcified granuloma at the right lung base. Normal heart and pleural surfaces. Midthoracic spondylosis. .     Stress echo 18  Fair to good exercise capacity   Normal resting LV wall motion and LVEF 50-55%   At peak exercise, there is augmentation/thickening/contraction of all   viewable wall segments. LVEF with exercise is 75%   No ischemia    Assessment:  Angelica Woods is a 69 y.o. patient who presented to [facility] with c/o weakness, CP, and high BP. PMH HTN, HLD, hypothyroidism, TIA, and anxiety. Reports being diagnosed with HTN 1 year ago and takes losartan 100mg qd. She does miss doses of medication intermittently. Most recent exercise stress ECHO 18 Fair to good exercise capacity Normal resting LV wall motion and LVEF 50-55%; At peak exercise, there is augmentation/thickening/contraction of all viewable wall segments. LVEF with exercise is 75% No ischemia. Now presents with c/o weakness, high BP, and CP. She is vague historian. Reports being at [address] [facility] [address] 3 days ago and felt weak, sweaty, nauseated, and dizzy. Called  to get her. Next few days reported high home BP reading 241 systolic. Normally SBP 160mmHg per her report.  Also c/o CP in mid-chest without radiation and described

## 2020-11-12 NOTE — FLOWSHEET NOTE
11/12/20 1826   Vital Signs   Temp 98.7 °F (37.1 °C)   Temp Source Oral   Pulse 81   Heart Rate Source Monitor   Resp 16   BP (!) 152/85   BP Location Right upper arm   BP Upper/Lower Upper   Level of Consciousness 0   MEWS Score 1   Height and Weight   Height 5' (1.524 m)   Weight 148 lb 12.8 oz (67.5 kg)   BSA (Calculated - sq m) 1.69 sq meters   BMI (Calculated) 29.1   Oxygen Therapy   SpO2 97 %   O2 Device None (Room air)     Pt. Brought up from ER by this RN in stable condition. Pt. Was ER hold and admission questions already completed. Pt. Oriented to room and call light system. Vital signs as shown above. Pt. Assessment completed- see flow sheet. Pt. Refusing 4 eyes skin assessment. Pt. Denies any skin issues, no skin issues noted on visible skin. Patient is able to demonstrated the ability to move from a reclining position to an upright position within the recliner. Pt. Sitting up in chair. Pt. denies further needs at this time. Will continue to monitor. Call light is within reach.   Shirley Morataya RN

## 2020-11-12 NOTE — FLOWSHEET NOTE
11/12/20 0715   Vital Signs   Temp (!) 65 °F (18.3 °C)   Pulse 61   Resp 12   BP (!) 144/86   Level of Consciousness 0   MEWS Score 2   Oxygen Therapy   SpO2 93 %   Pt resting in bed, denies needs. AM assessment complete. AM meds administered. Call light in reach.

## 2020-11-12 NOTE — H&P
Hospital Medicine History & Physical      PCP: JAMSHID Vasquez CNP    Date of Admission: 11/11/2020    Date of Service: Pt seen/examined on 11/12/2020     Chief Complaint:    Chief Complaint   Patient presents with    Chest Pain     c/o mid chest pressure x 3 days. states has had chest pain like this x 1 year. has not seen pcp for chest pain. History Of Present Illness: The patient is a 69 y.o. female with hypertension, hyperlipidemia, hypothyroidism, h/o TIA, fibromyalgia and anxiety who presented to [facility] ED with complaint of mid-sternal chest pain that has been occurring intermittently for the last couple months, but is becoming more frequent. Patient describes the pain as a pressure and tightness. It was associated with dizziness, nausea, shortness of breath, and diaphoresis. Aggravated by stress. She states she is not taking care of herself, eating well, or sleeping well. Alleviated by rest.   At its worst the pain was a 9/10 on the pain scale. She is helping her daughter who is going through a divorce currently. She reports some cough and congestion. She has been lifting a lot. Denies any pain with inspiration, abd pain, vomiting, diarrhea. The patient has had a cardiac work up in the past.  She states a couple years ago. Cardiac risk factors:   - HTN yes  - HLD yes  - DM denies  - Tobacco abuse former  - FHx of CAD father had a heart attack. The patient denies any known history of connective tissue disease or aneurysms. The patient denies any recent surgery, hospitalization, immobilization, long car/plane trip, active malignancy, long bone fracture, oral estrogen use, or history of DVT/PE.     Past Medical History:        Diagnosis Date    Anxiety     Fibromyalgia     Hyperlipidemia     Hypertension     Hypothyroidism     Osteopenia     Dexa 7/08    Thyroid disease     Transient ischemic attack 2003       Past Surgical History:        Procedure Laterality Date    HYSTERECTOMY  1989       Medications Prior to Admission:    Prior to Admission medications    Medication Sig Start Date End Date Taking? Authorizing Provider   Ginkgo Biloba 40 MG TABS Take by mouth    Historical Provider, MD   levothyroxine (SYNTHROID) 125 MCG tablet TAKE 1 TABLET BY MOUTH EVERY DAY 10/13/20   Elaine Garcia, APRN - SAE   hydroCHLOROthiazide (HYDRODIURIL) 25 MG tablet TAKE 1 TABLET BY MOUTH EVERY DAY IN THE MORNING  Patient not taking: Reported on 10/19/2020 10/8/20   Elaine Garcia, APRN - CNP   losartan (COZAAR) 100 MG tablet TAKE 1 TABLET BY MOUTH EVERY DAY 9/25/20   Elaine Garcia, APRN - CNP   venlafaxine (EFFEXOR XR) 150 MG extended release capsule TAKE 1 CAPSULE BY MOUTH EVERY DAY 9/25/20   Elaine Zelaya, APRN - CNP   clopidogrel (PLAVIX) 75 MG tablet TAKE 1 TABLET DAILY 7/14/20   Elaine Garcia, APRN - CNP   atorvastatin (LIPITOR) 80 MG tablet Take 1 tablet by mouth daily 3/13/20   Elaine Zelaya, APRN - CNP   aspirin 81 MG tablet Take 81 mg by mouth as needed     Historical Provider, MD   Blood Pressure KIT 1 kit by Does not apply route 2 times daily  Patient taking differently: 1 kit by Does not apply route three times a week  10/12/18   Aletha Lundberg DO   Lactobacillus Rhamnosus, GG, ( PROBIOTIC DIGESTIVE CARE) CAPS Take 1 tablet by mouth daily    Historical Provider, MD   Omega-3 Fatty Acids (OMEGA 3 PO) Take 300 mg by mouth    Historical Provider, MD   Calcium Carbonate-Vitamin D (CALCIUM + D PO) Take  by mouth. Historical Provider, MD   Cholecalciferol (VITAMIN D3) 5000 UNITS CAPS Take 1 tablet by mouth daily. Historical Provider, MD       Allergies:  Lisinopril-hydrochlorothiazide and Sulfa antibiotics    Social History:     TOBACCO:   reports that she has never smoked. She has never used smokeless tobacco.  ETOH:   reports current alcohol use of about 2.0 standard drinks of alcohol per week.       Family of all   viewable wall segments. LVEF with exercise is 75%   No ischemia      Results      ECG   No ST changes are noted   EKG data is negative for ischemia      Arrhythmias   Frequent premature atrial contractions. Occasional premature ventricular contractions. Symptoms   No symptoms with exercise. ASSESSMENT/PLAN:    Chest pain  - Admitted for cardiac evaluation to PCU  - monitor on tele. Cardiology consulted  - Serial troponin negative x2, EKG no acute ischemic changes. - most likely due to uncontrolled BP  - Will have stress tomorrow. - on aspirin, Plavix, Atorvastatin. Hypertensive emergency  - she states she is compliant with her medications. Also states she is not taking care of herself.   - 220/102  - continue Losartan, HCTZ  - nitro-paste. BP improved to 144/86. Hypokalemia   - replaced. Stable. Hypothyroidism  - TSH 30.64  - home dose of synthroid 125 mcg     Hyperlipidemia   - on Atorvastatin.      Anxiety  - continue Effexor-XR    DVT Prophylaxis: Lovenox  Diet: Diet NPO Effective Now Exceptions are: Ice Chips, Sips with Meds  Code Status: Full Code    James Ayala FNP-C  11/12/2020

## 2020-11-13 ENCOUNTER — APPOINTMENT (OUTPATIENT)
Dept: NUCLEAR MEDICINE | Age: 70
DRG: 305 | End: 2020-11-13
Payer: MEDICARE

## 2020-11-13 VITALS
OXYGEN SATURATION: 97 % | BODY MASS INDEX: 28.98 KG/M2 | RESPIRATION RATE: 18 BRPM | HEIGHT: 60 IN | WEIGHT: 147.6 LBS | SYSTOLIC BLOOD PRESSURE: 127 MMHG | HEART RATE: 72 BPM | TEMPERATURE: 97.7 F | DIASTOLIC BLOOD PRESSURE: 63 MMHG

## 2020-11-13 LAB
LV EF: 75 %
LVEF MODALITY: NORMAL

## 2020-11-13 PROCEDURE — 6370000000 HC RX 637 (ALT 250 FOR IP): Performed by: INTERNAL MEDICINE

## 2020-11-13 PROCEDURE — 93017 CV STRESS TEST TRACING ONLY: CPT

## 2020-11-13 PROCEDURE — 6360000002 HC RX W HCPCS: Performed by: INTERNAL MEDICINE

## 2020-11-13 PROCEDURE — 2580000003 HC RX 258: Performed by: INTERNAL MEDICINE

## 2020-11-13 PROCEDURE — A9502 TC99M TETROFOSMIN: HCPCS | Performed by: INTERNAL MEDICINE

## 2020-11-13 PROCEDURE — 99232 SBSQ HOSP IP/OBS MODERATE 35: CPT | Performed by: INTERNAL MEDICINE

## 2020-11-13 PROCEDURE — 3430000000 HC RX DIAGNOSTIC RADIOPHARMACEUTICAL: Performed by: INTERNAL MEDICINE

## 2020-11-13 PROCEDURE — 78452 HT MUSCLE IMAGE SPECT MULT: CPT

## 2020-11-13 PROCEDURE — 99238 HOSP IP/OBS DSCHRG MGMT 30/<: CPT | Performed by: INTERNAL MEDICINE

## 2020-11-13 RX ORDER — AMLODIPINE BESYLATE 5 MG/1
5 TABLET ORAL DAILY
Qty: 30 TABLET | Refills: 0 | Status: SHIPPED | OUTPATIENT
Start: 2020-11-13 | End: 2020-12-28 | Stop reason: SDUPTHER

## 2020-11-13 RX ORDER — AMLODIPINE BESYLATE 5 MG/1
5 TABLET ORAL DAILY
Qty: 30 TABLET | Refills: 3 | Status: SHIPPED | OUTPATIENT
Start: 2020-11-13 | End: 2020-11-13

## 2020-11-13 RX ORDER — LEVOTHYROXINE SODIUM 0.12 MG/1
125 TABLET ORAL DAILY
Status: DISCONTINUED | OUTPATIENT
Start: 2020-11-13 | End: 2020-11-13 | Stop reason: HOSPADM

## 2020-11-13 RX ADMIN — ACETAMINOPHEN 650 MG: 325 TABLET ORAL at 10:25

## 2020-11-13 RX ADMIN — ATORVASTATIN CALCIUM 80 MG: 40 TABLET, FILM COATED ORAL at 13:54

## 2020-11-13 RX ADMIN — TETROFOSMIN 10.9 MILLICURIE: 1.38 INJECTION, POWDER, LYOPHILIZED, FOR SOLUTION INTRAVENOUS at 10:40

## 2020-11-13 RX ADMIN — LEVOTHYROXINE SODIUM 125 MCG: 125 TABLET ORAL at 16:46

## 2020-11-13 RX ADMIN — TETROFOSMIN 30.1 MILLICURIE: 1.38 INJECTION, POWDER, LYOPHILIZED, FOR SOLUTION INTRAVENOUS at 11:52

## 2020-11-13 RX ADMIN — AMLODIPINE BESYLATE 5 MG: 5 TABLET ORAL at 13:54

## 2020-11-13 RX ADMIN — LOSARTAN POTASSIUM 100 MG: 25 TABLET, FILM COATED ORAL at 13:54

## 2020-11-13 RX ADMIN — VENLAFAXINE HYDROCHLORIDE 150 MG: 75 CAPSULE, EXTENDED RELEASE ORAL at 13:54

## 2020-11-13 RX ADMIN — ASPIRIN 81 MG CHEWABLE TABLET 81 MG: 81 TABLET CHEWABLE at 13:54

## 2020-11-13 RX ADMIN — REGADENOSON 0.4 MG: 0.08 INJECTION, SOLUTION INTRAVENOUS at 11:52

## 2020-11-13 RX ADMIN — HYDROCHLOROTHIAZIDE 25 MG: 25 TABLET ORAL at 13:54

## 2020-11-13 RX ADMIN — Medication 10 ML: at 10:27

## 2020-11-13 RX ADMIN — CLOPIDOGREL BISULFATE 75 MG: 75 TABLET ORAL at 16:29

## 2020-11-13 ASSESSMENT — PAIN SCALES - GENERAL: PAINLEVEL_OUTOF10: 3

## 2020-11-13 ASSESSMENT — PAIN DESCRIPTION - DESCRIPTORS: DESCRIPTORS: HEADACHE

## 2020-11-13 ASSESSMENT — PAIN - FUNCTIONAL ASSESSMENT
PAIN_FUNCTIONAL_ASSESSMENT: ACTIVITIES ARE NOT PREVENTED
PAIN_FUNCTIONAL_ASSESSMENT: 0-10

## 2020-11-13 NOTE — PROGRESS NOTES
Bedside report given to [facility]. Pt. denies further needs at this time. Call light is within reach.   Breanna Seo RN

## 2020-11-13 NOTE — PROGRESS NOTES
Assessment completed as charted. Patient denies any c/p. Patient medicated with tylenol for c/o headache as 3/10 on pain scale. Patient leaving to go down for stress test at this time via w/c. VSS. Will give AM medications when patient returns to the floor. Will monitor.

## 2020-11-13 NOTE — PROGRESS NOTES
Pt had Lexiscan Myoview stress test, pt bienvenido well, pt had no chest pain, pt waiting to be scanned, resp easy/even. Pt in good condition.

## 2020-11-13 NOTE — CARE COORDINATION
DISCHARGE ORDER  Date/Time 2020 3:34 PM  Completed by: Carmelita Yeung, Case Management    Patient Name: Elena Corona      : 1950  Admitting Diagnosis: Hypertensive emergency [I16.1]      Admit order Date and Status:2020 inpt  (verify MD's last order for status of admission)      Noted discharge order. If applicable PT/OT recommendation at Discharge: NA  DME recommendation by PT/OT:NA  Confirmed discharge plan: Yes  with whom___pt____________  If pt confirmed DC plan does family need to be contacted by CM No if yes who______  Discharge Plan: Chart reviewed, met with pt at bedside. Pt is returning home with spouse and denies dc needs. Pt is IPTA. Reviewed chart. Role of discharge planner explained and patient verbalized understanding. Discharge order is noted. Has Home O2 in place on admit:  No    Pt is being d/c'd to home today. Pt's O2 sats are 97% on RA. Discharge timeout done with Mirian Vernon. All discharge needs and concerns addressed.

## 2020-11-13 NOTE — PROGRESS NOTES
Pt in bed, awake, A/O X4. Shift assessment complete, night meds given. No C/o pain at this time. Night time snack given. . No other needs expressed. Call light in reach. Will monitor.  Marletta Rounds

## 2020-11-13 NOTE — DISCHARGE SUMMARY
Name:  Bard Padilla  Room:  /6804-17  MRN:    [id number]    Discharge Summary      This discharge summary is in conjunction with a complete physical exam done on the day of discharge. Attending Physician: Dr. Germain Chawla  Discharging Physician: Dr. Epi Acevedo: 11/11/2020  Discharge:   11/13/2020    HPI:  The patient is a 69 y.o. female with hypertension, hyperlipidemia, hypothyroidism, h/o TIA, fibromyalgia and anxiety who presented to [facility] ED with complaint of mid-sternal chest pain that has been occurring intermittently for the last couple months, but is becoming more frequent. Patient describes the pain as a pressure and tightness. It was associated with dizziness, nausea, shortness of breath, and diaphoresis. Aggravated by stress. She states she is not taking care of herself, eating well, or sleeping well. Alleviated by rest.   At its worst the pain was a 9/10 on the pain scale. She is helping her daughter who is going through a divorce currently.      She reports some cough and congestion. She has been lifting a lot. Denies any pain with inspiration, abd pain, vomiting, diarrhea.       The patient has had a cardiac work up in the past.  She states a couple years ago.     Cardiac risk factors:   - HTN yes  - HLD yes  - DM denies  - Tobacco abuse former  - FHx of CAD father had a heart attack.      The patient denies any known history of connective tissue disease or aneurysms. The patient denies any recent surgery, hospitalization, immobilization, long car/plane trip, active malignancy, long bone fracture, oral estrogen use, or history of DVT/PE. Diagnoses this Admission and Hospital Course   Chest pain  - Admitted for cardiac evaluation to PCU  - monitored on tele. Cardiology consulted  - Serial troponin negative x2, EKG no acute ischemic changes. - most likely due to uncontrolled BP  - on aspirin, Plavix, Atorvastatin.    Stress test negative  Stressed compliance with home medications.      Hypertensive emergency  - she does admit to non-compliance with medications. - 220/102  - continued Losartan, HCTZ  - nitro-paste. BP improved to 144/86.     Hypokalemia   - replaced. Stable.      Hypothyroidism  - TSH 30.64  - non-compliant with medications. - continued home dose of synthroid 125 mcg   Encouraged compliance to medications. F/u OP with PCP. Needs repeat in 4-6 weeks.      Hyperlipidemia   - on Atorvastatin.      Anxiety  - continued Effexor-XR     DVT Prophylaxis: Lovenox    Procedures (Please Review Full Report for Details)  N/A    Consults    Cardiology       Physical Exam at Discharge:    /83   Pulse 66   Temp 97.6 °F (36.4 °C) (Oral)   Resp 16   Ht 5' (1.524 m)   Wt 147 lb 9.6 oz (67 kg)   SpO2 96%   BMI 28.83 kg/m²     Gen: No distress. Alert. Eyes: PERRL. No sclera icterus. No conjunctival injection. ENT: No discharge. Pharynx clear. Neck: NTrachea midline. Resp: No accessory muscle use. No crackles. No wheezes. No rhonchi. CV: Regular rate. Regular rhythm. No murmur. No rub. No edema. GI: Non-tender. Non-distended. Normal bowel sounds. No hernia. Skin: Warm and dry. No nodule on exposed extremities. No rash on exposed extremities. M/S: No cyanosis. No joint deformity. No clubbing. Neuro: Awake. Grossly nonfocal    Psych: Oriented x 3. No anxiety or agitation.      CBC:   Recent Labs     11/11/20 2305 11/12/20  0531   WBC 7.6 6.0   HGB 13.5 12.4   HCT 40.0 36.3   MCV 98.2 97.2    207     BMP:   Recent Labs     11/11/20  2305 11/12/20  0531    140   K 3.3* 3.6    102   CO2 24 25   BUN 7 8   CREATININE 0.9 0.9     LIVER PROFILE:   Recent Labs     11/12/20  0531   AST 21   ALT 18   BILITOT 0.6   ALKPHOS [address]     11/11/20 2305 11/12/20  0740   TROPONINI <0.01 <0.01       CULTURES  COVID: not detected    RADIOLOGY  NM Cardiac Stress Test Nuclear Imaging   Final Result      US RENAL COMPLETE Final Result   Unremarkable ultrasound of the kidneys and urinary bladder. XR CHEST (2 VW)   Final Result   No acute cardiopulmonary findings. NM Stress  Summary   Ttia Ortiz is uniform isotope uptake at stress and rest. There is no evidence of    myocardial ischemia or scar. Hyperdynamic post-stress LVEF of >75%. Normal study. Discharge Medications     Medication List      START taking these medications    amLODIPine 5 MG tablet  Commonly known as:  NORVASC  Take 1 tablet by mouth daily        CHANGE how you take these medications    Blood Pressure Kit  1 kit by Does not apply route 2 times daily  What changed:  when to take this        CONTINUE taking these medications    aspirin 81 MG tablet     atorvastatin 80 MG tablet  Commonly known as:  LIPITOR  Take 1 tablet by mouth daily     CALCIUM + D PO     clopidogrel 75 MG tablet  Commonly known as:  PLAVIX  TAKE 1 TABLET DAILY     Ginkgo Biloba 40 MG Tabs     hydroCHLOROthiazide 25 MG tablet  Commonly known as:  HYDRODIURIL  TAKE 1 TABLET BY MOUTH EVERY DAY IN THE MORNING     levothyroxine 125 MCG tablet  Commonly known as:  SYNTHROID  TAKE 1 TABLET BY MOUTH EVERY DAY     losartan 100 MG tablet  Commonly known as:  COZAAR  TAKE 1 TABLET BY MOUTH EVERY DAY     OMEGA 3 PO     RA Probiotic Digestive Care Caps     venlafaxine 150 MG extended release capsule  Commonly known as:  EFFEXOR XR  TAKE 1 CAPSULE BY MOUTH EVERY DAY     vitamin D3 125 MCG (5000 UT) Caps           Where to Get Your Medications      These medications were sent to [facility]/pharmacy [place] [id number], [address]    Phone:  [phone]   · amLODIPine 5 MG tablet           Discharged in stable condition to home. Follow Up: Follow up with PCP. LISETTE Price.

## 2020-11-13 NOTE — PROGRESS NOTES
Pt in bed, eyes closed. No distress noted. Call light in reach. Will continue to monitor.   Lorna Carson

## 2020-11-13 NOTE — PROGRESS NOTES
Pt in bed, eyes closed. No distress noted. Call light in reach. Will continue to monitor.   Jose Willis

## 2020-11-13 NOTE — PROGRESS NOTES
[facility] Daily Progress Note      Admit Date:  11/11/2020    Subjective:  Ms. Keira Chisholm is seen for chest pain HBP and weakness  Initial consult by my associate Dr Olegario Schmitz as follows    History of Present Illness:  Paxton Akbar is a 69 y.o. patient who presented to [facility] 11/11/20 with c/o weakness, CP, and high BP. PMH HTN, HLD, hypothyroidism, TIA, and anxiety. Reports being diagnosed with HTN 1 year ago and takes losartan 100mg qd. She does miss doses of medication intermittently. Most recent exercise stress ECHO 12/06/18 Fair to good exercise capacity Normal resting LV wall motion and LVEF 50-55%; At peak exercise, there is augmentation/thickening/contraction of all viewable wall segments. LVEF with exercise is 75% No ischemia. Now presents with c/o weakness, high BP, and CP. She is vague historian. Reports being at [address] 3 days ago and felt weak, sweaty, nauseated, and dizzy. Called  to get her. Next few days reported high home BP reading 677 systolic. Normally SBP 160mmHg per her report. Also c/o CP in mid-chest without radiation and described as tight discomfort. Happens intermittently nothing making better or worse. Also has SOB at times. Admit EKG revealed NSR; possible LVH; LAE; nonspecific ST change. Second EKG SB 59bpm (no sig change from 2012 EKG); note CXR nothing acute; troponins negative x 2; K+ 3.6;  BP in /119, 221/117, 220/102; TSH 30.64. Received SL NTG, NTP, and IV labetalol in ER. Patient with no complaints of palpitations, edema, or orthopnea/PND.  I have been asked to provide consultation regarding further management and testing.     ROS:  12 point ROS negative in all areas as listed below except as in [place]  Constitutional, EENT,  GI, , Musculoskeletal, skin, neurological, hematological,  Psychiatric    Past Medical History:   Diagnosis Date    Anxiety     Fibromyalgia     Hyperlipidemia     Hypertension     Hypothyroidism     Osteopenia     Dexa 7/08    Thyroid disease     Transient ischemic attack 2003     Past Surgical History:   Procedure Laterality Date    APPENDECTOMY      COLONOSCOPY      EYE SURGERY      HYSTERECTOMY  1989       Objective:   /83   Pulse 66   Temp 97.6 °F (36.4 °C) (Oral)   Resp 16   Ht 5' (1.524 m)   Wt 147 lb 9.6 oz (67 kg)   SpO2 96%   BMI 28.83 kg/m²       Intake/Output Summary (Last 24 hours) at 11/13/2020 0814  Last data filed at 11/13/2020 0156  Gross per 24 hour   Intake 240 ml   Output 900 ml   Net -660 ml       TELEMETRY: NSR 67/min    Physical Exam:  General: No Respiratory distress, appears well developed and well nourished. Eyes:  Sclera nonicteric  Nose/Sinuses:  negative findings: nose shows no deformity, asymmetry, or inflammation, nasal mucosa normal, septum midline with no perforation or bleeding  Back:  no pain to palpation  Joint:  no active joint inflammation  Musculoskeletal:  negative  Skin:  Warm and dry  Neck:  Negative for JVD and Carotid Bruits. Chest:  Clear to auscultation, respiration easy  Cardiovascular:  RRR, S1S2 normal, no murmur, no rub or thrill.   Abdomen:  Soft normal liver and spleen  Extremities:   No edema, clubbing, cyanosis,  Pulses:  pedal pulses are normal.  Neuro: intact    Medications:    aspirin  81 mg Oral Daily    atorvastatin  80 mg Oral Daily    clopidogrel  75 mg Oral Daily    hydroCHLOROthiazide  25 mg Oral Daily    levothyroxine  125 mcg Oral Daily    losartan  100 mg Oral Daily    venlafaxine  150 mg Oral Daily with breakfast    sodium chloride flush  10 mL Intravenous 2 times per day    enoxaparin  40 mg Subcutaneous Daily    amLODIPine  5 mg Oral Daily       sodium chloride flush, acetaminophen **OR** acetaminophen, promethazine **OR** ondansetron, morphine, cloNIDine, labetalol, regadenoson    Lab Data:  CBC:   Recent Labs     11/11/20  2305 11/12/20  0531   WBC 7.6 6.0   HGB 13.5 12.4   HCT 40.0 36.3   MCV 98.2 97.2   PLT 212 207     BMP:   Recent Labs     20  2305 20  0531    140   K 3.3* 3.6    102   CO2 24 25   BUN 7 8   CREATININE 0.9 0.9     LIVER PROFILE:   Recent Labs     20  0531   AST 21   ALT 18   BILITOT 0.6   ALKPHOS 83     PT/INR: No results for input(s): PROTIME, INR in the last 72 hours. APTT: No results for input(s): APTT in the last 72 hours. BNP:  No results for input(s): BNP in the last 72 hours. IMAGING:   Letha Pill <0.01 2020      EKG 20  Sinus bradycardiaNonspecific ST abnormalityNo previous ECGs availableConfirmed by BAR GIFFORD MDHEN ([id number]) on 2020 7:29:46       EK20 I have reviewed EKG with the following interpretation:  Impression:  See HPI Normal sinus rhythmPossible Left atrial enlargementLeft ventricular hypertrophyAbnormal ECGConfirmed by Ralph Gilbert MD, [address] () on 2020 5:00:02 AM      CXR 20  FINDINGS: Clear lungs.  Punctate calcified granuloma at the right lung base.  Normal heart and pleural surfaces.  Midthoracic spondylosis. .        Assessment:  Hypertensive urgency  Chest pain  hypothyroidism  Patient Active Problem List    Diagnosis Date Noted    Hypertensive emergency 2020    Chest pain     Hypokalemia     Elevated TSH     Elevated LFTs 2016    Fibromyalgia 10/09/2015    Hyperlipidemia 03/15/2013    Hypothyroidism 03/15/2013    Anxiety and depression 03/15/2013    Osteopenia 03/15/2013    Vitamin D deficiency 03/15/2013       Plan:  1. Echo doppler of heart  2. Stress test  3. Continue current antihypertensive drugs  4.  If stress test and echo unremarkable can be discharged from cardiology perspective  ·     Live Calvillo MD 2020 8:14 AM

## 2020-11-18 ENCOUNTER — TELEPHONE (OUTPATIENT)
Dept: FAMILY MEDICINE CLINIC | Age: 70
End: 2020-11-18

## 2020-11-18 NOTE — TELEPHONE ENCOUNTER
----- Message from Lucia Pink sent at 11/17/2020  4:58 PM EST -----  Subject: Appointment Request    Reason for Call: Urgent (Patient Request) Hospital Follow Up    QUESTIONS  Type of Appointment? Established Patient  Reason for appointment request? Requested Provider unavailable - SAE Salguero  Additional Information for Provider? PT released from hospital on 11/15   from [facility] in [place] for chest pain/pressure   PT needs F/U appt   PT also searching for referral for cardiologist   screened green  ---------------------------------------------------------------------------  --------------  [address]  What is the best way for the office to contact you? OK to leave message on   voicemail  Preferred Call Back Phone Number? [phone]  ---------------------------------------------------------------------------  --------------  SCRIPT ANSWERS  Relationship to Patient? Self  Appointment reason? Well Care/Follow Ups  Select a Well Care/Follow Ups appointment reason? Adult Hospital Follow Up   [facility]  (Patient requests to see provider urgently. )? Yes  (Has the patient been discharged from the hospital within 2 business days   AND does not have a Telephone Encounter  Follow Up From [address] [place] [address]   documented in [address]?)? Yes  Do you have any questions for your primary care provider that need to be   answered prior to your appointment? (Use RN Triage if question pertains to   anything on the red flag list)? No  (Patient needs follow up visit after hospital discharge) Book first   available appointment within 7 days OF DISCHARGE   if no appt   proceed to book the next available time slot within 14 days OF DISCHARGE   AND Send Message to Provider. [facility] Follow Up appointment cannot be   booked beyond 14 Days and should result in a Message to Provider. ? Yes   Have you been diagnosed with   tested for   or told that you are suspected of having COVID-19 (Coronavirus)? No  Have you had a fever or taken medication to treat a fever within the past   3 days? No  Have you had a cough   shortness of breath or flu-like symptoms within the past 3 days? No  Do you currently have flu-like symptoms including fever or chills   cough   shortness of breath   or difficulty breathing   or new loss of taste or smell? No  (Service Expert  click yes below to proceed with [facility] [employer]ual   Scheduling)?  Yes

## 2020-12-22 ENCOUNTER — TELEPHONE (OUTPATIENT)
Dept: FAMILY MEDICINE CLINIC | Age: 70
End: 2020-12-22

## 2020-12-22 NOTE — TELEPHONE ENCOUNTER
----- Message from Migel Vincent sent at 12/22/2020  3:25 PM EST -----  Subject: Message to Provider    QUESTIONS  Information for Provider? patient has a vv for a follow up on 12/28/20   with Dalila Rocha   before the appt she would like the nurse or doctor to look thru her blood   work that was done at her last hospital stay   and let her know if anymore labs are needed before her appt   ---------------------------------------------------------------------------  --------------  [address]  What is the best way for the office to contact you? OK to leave message on   voicemail  Preferred Call Back Phone Number? [phone]  ---------------------------------------------------------------------------  --------------  SCRIPT ANSWERS  Relationship to Patient?  Self

## 2020-12-23 ENCOUNTER — TELEPHONE (OUTPATIENT)
Dept: FAMILY MEDICINE CLINIC | Age: 70
End: 2020-12-23

## 2020-12-28 ENCOUNTER — VIRTUAL VISIT (OUTPATIENT)
Dept: FAMILY MEDICINE CLINIC | Age: 70
End: 2020-12-28
Payer: MEDICARE

## 2020-12-28 PROCEDURE — G8427 DOCREV CUR MEDS BY ELIG CLIN: HCPCS | Performed by: NURSE PRACTITIONER

## 2020-12-28 PROCEDURE — 4040F PNEUMOC VAC/ADMIN/RCVD: CPT | Performed by: NURSE PRACTITIONER

## 2020-12-28 PROCEDURE — 3017F COLORECTAL CA SCREEN DOC REV: CPT | Performed by: NURSE PRACTITIONER

## 2020-12-28 PROCEDURE — 1123F ACP DISCUSS/DSCN MKR DOCD: CPT | Performed by: NURSE PRACTITIONER

## 2020-12-28 PROCEDURE — 1090F PRES/ABSN URINE INCON ASSESS: CPT | Performed by: NURSE PRACTITIONER

## 2020-12-28 PROCEDURE — G8399 PT W/DXA RESULTS DOCUMENT: HCPCS | Performed by: NURSE PRACTITIONER

## 2020-12-28 PROCEDURE — 99214 OFFICE O/P EST MOD 30 MIN: CPT | Performed by: NURSE PRACTITIONER

## 2020-12-28 RX ORDER — LEVOTHYROXINE SODIUM 137 UG/1
137 TABLET ORAL DAILY
Qty: 90 TABLET | Refills: 1 | Status: SHIPPED | OUTPATIENT
Start: 2020-12-28 | End: 2021-01-15 | Stop reason: SDUPTHER

## 2020-12-28 RX ORDER — AMLODIPINE BESYLATE 5 MG/1
5 TABLET ORAL DAILY
Qty: 30 TABLET | Refills: 0 | Status: SHIPPED | OUTPATIENT
Start: 2020-12-28

## 2020-12-28 ASSESSMENT — ENCOUNTER SYMPTOMS
SHORTNESS OF BREATH: 1
CHEST TIGHTNESS: 1

## 2020-12-28 ASSESSMENT — PATIENT HEALTH QUESTIONNAIRE - PHQ9
2. FEELING DOWN, DEPRESSED OR HOPELESS: 0
SUM OF ALL RESPONSES TO PHQ QUESTIONS 1-9: 1
2. FEELING DOWN, DEPRESSED OR HOPELESS: 1
SUM OF ALL RESPONSES TO PHQ QUESTIONS 1-9: 0
SUM OF ALL RESPONSES TO PHQ QUESTIONS 1-9: 0
SUM OF ALL RESPONSES TO PHQ9 QUESTIONS 1 & 2: 1
SUM OF ALL RESPONSES TO PHQ9 QUESTIONS 1 & 2: 0
SUM OF ALL RESPONSES TO PHQ QUESTIONS 1-9: 1
1. LITTLE INTEREST OR PLEASURE IN DOING THINGS: 0
SUM OF ALL RESPONSES TO PHQ QUESTIONS 1-9: 0
SUM OF ALL RESPONSES TO PHQ QUESTIONS 1-9: 1
1. LITTLE INTEREST OR PLEASURE IN DOING THINGS: 0

## 2020-12-28 NOTE — PROGRESS NOTES
2020    TELEHEALTH EVALUATION -- Audio/Visual (During - public health emergency)    HPI:    Floyd Cowan (:  1950) has requested an audio/video evaluation for the following concern(s): Follow-up after ED visit for blood pressure and heart. She presents as a follow-up from an ED visit on  for chest pain. Her history includes hypertension, hyperlipidemia, hypothyroidism. History father has hypertension. In the ED she presented with midsternal chest pain, tenderness associated with fatigue and nausea. She admits that she has not been taking good care of herself states she is under a great deal of stress (getting a divorce) and she knows that can aggravate her symptoms. She  has had a cardiac work up in the past which was also negative. She underwent a stress Myoview while in the ED and it was negative for any signs of ischemia. Of note her ejection fraction was hyperdynamic at 78% it was suggested that she follow-up with cardiology. She has an appointment with the cardiologist . In the ED her troponins were negative. Her TSH was very hypoactive at 30.64. And a low T4 of 0.6. She states she is on levothyroxine but not sure she is taking the complete dose. I will increase it from 125 up to 137 mcg. She should repeat her TSH in 1 month and we will adjust our plan of care at that time. Explained that all of these changes can cause her chest pressure and tightness, hypothyroidism, Hypertension,and of course stress. She was negative for Covid in the ED. During this interview she is very anxious and concerned. Review of Systems   HENT: Negative for hearing loss. Respiratory: Positive for chest tightness and shortness of breath. Cardiovascular: Positive for chest pain and palpitations. Negative for leg swelling.         Hypertension controlled with mask, losartan    Hypertriglyceremia (170) treated with atorvastatin History of a TIA treated with Plavix   Endocrine:        Hypothyroidism (TSH 30.64) on levothyroxine 125 mcg we will increase this   Musculoskeletal:        History of osteopenia   Neurological:        History of fibromyalgia no treatment   Psychiatric/Behavioral: Positive for confusion and decreased concentration. The patient is nervous/anxious (Effexor). Prior to Visit Medications    Medication Sig Taking? Authorizing Provider   amLODIPine (NORVASC) 5 MG tablet Take 1 tablet by mouth daily Yes Maryann Setting, APRN - CNP   levothyroxine (SYNTHROID) 137 MCG tablet Take 1 tablet by mouth daily Yes Maryann Setting, APRN - CNP   Ginkgo Biloba 40 MG TABS Take by mouth Yes Historical Provider, MD   hydroCHLOROthiazide (HYDRODIURIL) 25 MG tablet TAKE 1 TABLET BY MOUTH EVERY DAY IN THE MORNING Yes Maryann Setting, APRN - CNP   losartan (COZAAR) 100 MG tablet TAKE 1 TABLET BY MOUTH EVERY DAY Yes Gladis Carpetner, APRN - CNP   venlafaxine (EFFEXOR XR) 150 MG extended release capsule TAKE 1 CAPSULE BY MOUTH EVERY DAY Yes Maryann Setting, APRN - CNP   clopidogrel (PLAVIX) 75 MG tablet TAKE 1 TABLET DAILY Yes Gladis Carpenter, APRN - CNP   atorvastatin (LIPITOR) 80 MG tablet Take 1 tablet by mouth daily Yes Maryann Setting, APRN - CNP   aspirin 81 MG tablet Take 81 mg by mouth as needed  Yes Historical Provider, MD   Blood Pressure KIT 1 kit by Does not apply route 2 times daily  Patient taking differently: 1 kit by Does not apply route three times a week  Yes Blanca Bose,    Lactobacillus Rhamnosus, GG, (RA PROBIOTIC DIGESTIVE CARE) CAPS Take 1 tablet by mouth daily Yes Historical Provider, MD   Omega-3 Fatty Acids (OMEGA 3 PO) Take 300 mg by mouth Yes Historical Provider, MD   Calcium Carbonate-Vitamin D (CALCIUM + D PO) Take  by mouth. Yes Historical Provider, MD   Cholecalciferol (VITAMIN D3) 5000 UNITS CAPS Take 1 tablet by mouth daily.  Yes Historical Provider, MD Social History     Tobacco Use    Smoking status: Never Smoker    Smokeless tobacco: Never Used   Substance Use Topics    Alcohol use: Yes     Alcohol/week: 2.0 standard drinks     Types: 2 Cans of beer per week     Comment: social    Drug use: No            PHYSICAL EXAMINATION:  [ INSTRUCTIONS:  \"[x]\" Indicates a positive item  \"[]\" Indicates a negative item  -- DELETE ALL ITEMS NOT EXAMINED]  Vital Signs: (As obtained by patient/caregiver or practitioner observation)    Blood pressure-  Heart rate-    Respiratory rate-    Temperature-  Pulse oximetry-     Constitutional: [x] Appears well-developed and well-nourished [] No apparent distress      [x] Abnormal-anxious, disoriented  Mental status  [x] Alert and awake  [x] Oriented to person/place/time [x]Able to follow commands      Eyes:  EOM    []  Normal  [] Abnormal-  Sclera  [x]  Normal  [] Abnormal -         Discharge [x]  None visible  [] Abnormal -    HENT:   [x] Normocephalic, atraumatic.   [] Abnormal   [] Mouth/Throat: Mucous membranes are moist.     External Ears [] Normal  [] Abnormal-     Neck: [] No visualized mass     Pulmonary/Chest: [x] Respiratory effort normal.  [x] No visualized signs of difficulty breathing or respiratory distress        [] Abnormal-      Musculoskeletal:   [] Normal gait with no signs of ataxia         [x] Normal range of motion of neck        [] Abnormal-       Neurological:        [] No Facial Asymmetry (Cranial nerve 7 motor function) (limited exam to video visit)          [x] No gaze palsy        [] Abnormal-         Skin:        [x] No significant exanthematous lesions or discoloration noted on facial skin         [] Abnormal-            Psychiatric:       [x] Normal Affect [x] No Hallucinations        [] Abnormal-     Other pertinent observable physical exam findings-     ASSESSMENT/PLAN: 1.  For hospital visit for atypical chest pain and pressure. Negative for any ischemia on stress Myoview. EF hyperdynamic at 78%. We will follow-up with appointment with cardiologist on January 12  2. Hypothyroidism-levels 30.64 T4 low 0.6-increase levothyroxine to 137 mcg. Repeat TSH in 1 month  3. Increased stress level (divorce)-be sure to take the Effexor daily  4. History of TIA-continue Plavix and aspirin, continue to monitor symptoms            Chely Ortega is a 69 y.o. female being evaluated by a Virtual Visit (video visit) encounter to address concerns as mentioned above. A caregiver was present when appropriate. Due to this being a TeleHealth encounter (During HWEDJ-74 public health emergency), evaluation of the following organ systems was limited: Vitals/Constitutional/EENT/Resp/CV/GI//MS/Neuro/Skin/Heme-Lymph-Imm. Pursuant to the emergency declaration under the [address] [place] [address], [address] [place] [address] authority and the [employer] Act, this Virtual Visit was conducted with patient's (and/or legal guardian's) consent, to reduce the patient's risk of exposure to COVID-19 and provide necessary medical care. The patient (and/or legal guardian) has also been advised to contact this office for worsening conditions or problems, and seek emergency medical treatment and/or call 911 if deemed necessary. Patient identification was verified at the start of the visit: Yes    Total time spent on this encounter: 22    Services were provided through a video synchronous discussion virtually to substitute for in-person clinic visit. Patient and provider were located at their individual homes. --JAMSHID Nick - CNP on 12/28/2020 at 11:35 AM    An electronic signature was used to authenticate this note.

## 2021-01-07 RX ORDER — CLOPIDOGREL BISULFATE 75 MG/1
TABLET ORAL
Qty: 90 TABLET | Refills: 1 | Status: SHIPPED | OUTPATIENT
Start: 2021-01-07 | End: 2021-07-02

## 2021-01-12 ENCOUNTER — TELEPHONE (OUTPATIENT)
Dept: FAMILY MEDICINE CLINIC | Age: 71
End: 2021-01-12

## 2021-01-14 ENCOUNTER — NURSE ONLY (OUTPATIENT)
Dept: FAMILY MEDICINE CLINIC | Age: 71
End: 2021-01-14

## 2021-01-14 VITALS — TEMPERATURE: 98.5 F

## 2021-01-14 DIAGNOSIS — E02 SUBCLINICAL IODINE-DEFICIENCY HYPOTHYROIDISM: ICD-10-CM

## 2021-01-14 DIAGNOSIS — N28.9 KIDNEY FUNCTION ABNORMAL: ICD-10-CM

## 2021-01-14 LAB
ANION GAP SERPL CALCULATED.3IONS-SCNC: 13 MMOL/L (ref 3–16)
BUN BLDV-MCNC: 9 MG/DL (ref 7–20)
CALCIUM SERPL-MCNC: 10.1 MG/DL (ref 8.3–10.6)
CHLORIDE BLD-SCNC: 100 MMOL/L (ref 99–110)
CO2: 25 MMOL/L (ref 21–32)
CREAT SERPL-MCNC: 1 MG/DL (ref 0.6–1.2)
GFR AFRICAN AMERICAN: >60
GFR NON-AFRICAN AMERICAN: 55
GLUCOSE BLD-MCNC: 91 MG/DL (ref 70–99)
POTASSIUM SERPL-SCNC: 3.5 MMOL/L (ref 3.5–5.1)
SODIUM BLD-SCNC: 138 MMOL/L (ref 136–145)
TSH REFLEX FT4: 1.16 UIU/ML (ref 0.27–4.2)

## 2021-01-15 RX ORDER — LEVOTHYROXINE SODIUM 137 UG/1
137 TABLET ORAL DAILY
Qty: 90 TABLET | Refills: 1 | Status: SHIPPED | OUTPATIENT
Start: 2021-01-15 | End: 2021-09-23

## 2021-03-10 RX ORDER — HYDROCHLOROTHIAZIDE 25 MG/1
TABLET ORAL
Qty: 30 TABLET | Refills: 2 | Status: SHIPPED | OUTPATIENT
Start: 2021-03-10 | End: 2021-06-22

## 2021-03-10 RX ORDER — DULOXETIN HYDROCHLORIDE 30 MG/1
CAPSULE, DELAYED RELEASE ORAL
Qty: 90 CAPSULE | Refills: 1 | Status: SHIPPED | OUTPATIENT
Start: 2021-03-10 | End: 2021-03-26 | Stop reason: SINTOL

## 2021-03-22 RX ORDER — LOSARTAN POTASSIUM 100 MG/1
TABLET ORAL
Qty: 90 TABLET | Refills: 1 | Status: SHIPPED | OUTPATIENT
Start: 2021-03-22 | End: 2021-09-21 | Stop reason: SDUPTHER

## 2021-03-22 RX ORDER — VENLAFAXINE HYDROCHLORIDE 150 MG/1
CAPSULE, EXTENDED RELEASE ORAL
Qty: 90 CAPSULE | Refills: 1 | Status: SHIPPED | OUTPATIENT
Start: 2021-03-22

## 2021-07-02 RX ORDER — CLOPIDOGREL BISULFATE 75 MG/1
TABLET ORAL
Qty: 90 TABLET | Refills: 0 | Status: SHIPPED | OUTPATIENT
Start: 2021-07-02 | End: 2022-04-18 | Stop reason: SDUPTHER

## 2021-09-21 RX ORDER — LOSARTAN POTASSIUM 100 MG/1
TABLET ORAL
Qty: 90 TABLET | Refills: 1 | Status: SHIPPED | OUTPATIENT
Start: 2021-09-21 | End: 2022-04-18 | Stop reason: SDUPTHER

## 2021-09-21 RX ORDER — HYDROCHLOROTHIAZIDE 25 MG/1
TABLET ORAL
Qty: 90 TABLET | Refills: 0 | Status: SHIPPED | OUTPATIENT
Start: 2021-09-21 | End: 2021-12-13

## 2022-01-05 RX ORDER — HYDROCHLOROTHIAZIDE 25 MG/1
TABLET ORAL
Qty: 30 TABLET | Refills: 2 | Status: SHIPPED | OUTPATIENT
Start: 2022-01-05 | End: 2022-04-18 | Stop reason: SDUPTHER

## 2022-03-22 NOTE — TELEPHONE ENCOUNTER
No future appointments. LOV 12/28/20    Left message for patient to call back to set up appointment.

## 2022-03-23 RX ORDER — LOSARTAN POTASSIUM 100 MG/1
TABLET ORAL
Qty: 90 TABLET | Refills: 1 | OUTPATIENT
Start: 2022-03-23

## 2022-03-27 ENCOUNTER — TELEPHONE (OUTPATIENT)
Dept: FAMILY MEDICINE CLINIC | Age: 72
End: 2022-03-27

## 2022-03-27 RX ORDER — LOSARTAN POTASSIUM 100 MG/1
TABLET ORAL
Qty: 90 TABLET | Refills: 1 | OUTPATIENT
Start: 2022-03-27

## 2022-03-27 NOTE — TELEPHONE ENCOUNTER
Please call patient. She has not been seen since 2020. She has not had an office visit since well before that. I will not refill any medications until I see her in the office and draw lab work.

## 2022-04-08 RX ORDER — HYDROCHLOROTHIAZIDE 25 MG/1
TABLET ORAL
Qty: 90 TABLET | OUTPATIENT
Start: 2022-04-08

## 2022-04-18 ENCOUNTER — OFFICE VISIT (OUTPATIENT)
Dept: FAMILY MEDICINE CLINIC | Age: 72
End: 2022-04-18

## 2022-04-18 VITALS
TEMPERATURE: 96.8 F | OXYGEN SATURATION: 97 % | RESPIRATION RATE: 16 BRPM | DIASTOLIC BLOOD PRESSURE: 82 MMHG | SYSTOLIC BLOOD PRESSURE: 124 MMHG | WEIGHT: 156 LBS | HEIGHT: 59 IN | BODY MASS INDEX: 31.45 KG/M2 | HEART RATE: 98 BPM

## 2022-04-18 DIAGNOSIS — E55.9 VITAMIN D DEFICIENCY, UNSPECIFIED: ICD-10-CM

## 2022-04-18 DIAGNOSIS — I10 HYPERTENSION, UNSPECIFIED TYPE: ICD-10-CM

## 2022-04-18 DIAGNOSIS — Z91.81 AT HIGH RISK FOR FALLS: ICD-10-CM

## 2022-04-18 DIAGNOSIS — Z12.31 ENCOUNTER FOR SCREENING MAMMOGRAM FOR BREAST CANCER: Primary | ICD-10-CM

## 2022-04-18 PROCEDURE — 99999 PR OFFICE/OUTPT VISIT,PROCEDURE ONLY: CPT | Performed by: NURSE PRACTITIONER

## 2022-04-18 RX ORDER — LOSARTAN POTASSIUM 100 MG/1
TABLET ORAL
Qty: 90 TABLET | Refills: 1 | Status: SHIPPED | OUTPATIENT
Start: 2022-04-18 | End: 2022-10-24

## 2022-04-18 RX ORDER — CLOPIDOGREL BISULFATE 75 MG/1
TABLET ORAL
Qty: 90 TABLET | Refills: 0 | Status: SHIPPED | OUTPATIENT
Start: 2022-04-18 | End: 2022-07-18

## 2022-04-18 RX ORDER — ARIPIPRAZOLE 5 MG/1
5 TABLET ORAL DAILY
Qty: 30 TABLET | Refills: 3 | Status: SHIPPED | OUTPATIENT
Start: 2022-04-18 | End: 2022-05-12

## 2022-04-18 RX ORDER — HYDROCHLOROTHIAZIDE 25 MG/1
TABLET ORAL
Qty: 30 TABLET | Refills: 2 | Status: SHIPPED | OUTPATIENT
Start: 2022-04-18 | End: 2022-07-19

## 2022-04-18 SDOH — ECONOMIC STABILITY: TRANSPORTATION INSECURITY
IN THE PAST 12 MONTHS, HAS LACK OF TRANSPORTATION KEPT YOU FROM MEETINGS, WORK, OR FROM GETTING THINGS NEEDED FOR DAILY LIVING?: NO

## 2022-04-18 SDOH — ECONOMIC STABILITY: HOUSING INSECURITY
IN THE LAST 12 MONTHS, WAS THERE A TIME WHEN YOU DID NOT HAVE A STEADY PLACE TO SLEEP OR SLEPT IN A SHELTER (INCLUDING NOW)?: NO

## 2022-04-18 SDOH — ECONOMIC STABILITY: FOOD INSECURITY: WITHIN THE PAST 12 MONTHS, YOU WORRIED THAT YOUR FOOD WOULD RUN OUT BEFORE YOU GOT MONEY TO BUY MORE.: NEVER TRUE

## 2022-04-18 SDOH — ECONOMIC STABILITY: TRANSPORTATION INSECURITY
IN THE PAST 12 MONTHS, HAS THE LACK OF TRANSPORTATION KEPT YOU FROM MEDICAL APPOINTMENTS OR FROM GETTING MEDICATIONS?: NO

## 2022-04-18 SDOH — ECONOMIC STABILITY: INCOME INSECURITY: IN THE LAST 12 MONTHS, WAS THERE A TIME WHEN YOU WERE NOT ABLE TO PAY THE MORTGAGE OR RENT ON TIME?: NO

## 2022-04-18 SDOH — ECONOMIC STABILITY: FOOD INSECURITY: WITHIN THE PAST 12 MONTHS, THE FOOD YOU BOUGHT JUST DIDN'T LAST AND YOU DIDN'T HAVE MONEY TO GET MORE.: NEVER TRUE

## 2022-04-18 ASSESSMENT — PATIENT HEALTH QUESTIONNAIRE - PHQ9
8. MOVING OR SPEAKING SO SLOWLY THAT OTHER PEOPLE COULD HAVE NOTICED. OR THE OPPOSITE, BEING SO FIGETY OR RESTLESS THAT YOU HAVE BEEN MOVING AROUND A LOT MORE THAN USUAL: 3
SUM OF ALL RESPONSES TO PHQ QUESTIONS 1-9: 23
10. IF YOU CHECKED OFF ANY PROBLEMS, HOW DIFFICULT HAVE THESE PROBLEMS MADE IT FOR YOU TO DO YOUR WORK, TAKE CARE OF THINGS AT HOME, OR GET ALONG WITH OTHER PEOPLE: 3
7. TROUBLE CONCENTRATING ON THINGS, SUCH AS READING THE NEWSPAPER OR WATCHING TELEVISION: 2
SUM OF ALL RESPONSES TO PHQ9 QUESTIONS 1 & 2: 6
1. LITTLE INTEREST OR PLEASURE IN DOING THINGS: 3
SUM OF ALL RESPONSES TO PHQ QUESTIONS 1-9: 23
SUM OF ALL RESPONSES TO PHQ QUESTIONS 1-9: 23
5. POOR APPETITE OR OVEREATING: 3
3. TROUBLE FALLING OR STAYING ASLEEP: 3
9. THOUGHTS THAT YOU WOULD BE BETTER OFF DEAD, OR OF HURTING YOURSELF: 0
4. FEELING TIRED OR HAVING LITTLE ENERGY: 3
2. FEELING DOWN, DEPRESSED OR HOPELESS: 3
6. FEELING BAD ABOUT YOURSELF - OR THAT YOU ARE A FAILURE OR HAVE LET YOURSELF OR YOUR FAMILY DOWN: 3
SUM OF ALL RESPONSES TO PHQ QUESTIONS 1-9: 23

## 2022-04-18 ASSESSMENT — COLUMBIA-SUICIDE SEVERITY RATING SCALE - C-SSRS
2. HAVE YOU ACTUALLY HAD ANY THOUGHTS OF KILLING YOURSELF?: NO
1. WITHIN THE PAST MONTH, HAVE YOU WISHED YOU WERE DEAD OR WISHED YOU COULD GO TO SLEEP AND NOT WAKE UP?: YES
6. HAVE YOU EVER DONE ANYTHING, STARTED TO DO ANYTHING, OR PREPARED TO DO ANYTHING TO END YOUR LIFE?: NO

## 2022-04-18 ASSESSMENT — SOCIAL DETERMINANTS OF HEALTH (SDOH): HOW HARD IS IT FOR YOU TO PAY FOR THE VERY BASICS LIKE FOOD, HOUSING, MEDICAL CARE, AND HEATING?: NOT HARD AT ALL

## 2022-04-18 NOTE — PROGRESS NOTES
Subjective:      Chief Complaint   Patient presents with    Medication Check    Depression     positive screening       Patient ID: Mohan Brooks is a 66 y.o. female who presents for     HPI    Family History   Problem Relation Age of Onset    Ovarian Cancer Mother     High Blood Pressure Father        Social History     Socioeconomic History    Marital status:      Spouse name: Not on file    Number of children: Not on file    Years of education: Not on file    Highest education level: Not on file   Occupational History    Not on file   Tobacco Use    Smoking status: Never Smoker    Smokeless tobacco: Never Used   Vaping Use    Vaping Use: Never used   Substance and Sexual Activity    Alcohol use: Yes     Alcohol/week: 2.0 standard drinks     Types: 2 Cans of beer per week     Comment: social    Drug use: No    Sexual activity: Yes     Partners: Male   Other Topics Concern    Not on file   Social History Narrative    Not on file     Social Determinants of Health     Financial Resource Strain: Low Risk     Difficulty of Paying Living Expenses: Not hard at all   Food Insecurity: No Food Insecurity    Worried About [address] in the Last Year: Never true    [address] in the Last Year: Never true   Transportation Needs: No Transportation Needs    Lack of Transportation (Medical): No    Lack of Transportation (Non-Medical):  No   Physical Activity:     Days of Exercise per Week: Not on file    Minutes of Exercise per Session: Not on file   Stress:     Feeling of Stress : Not on file   Social Connections:     Frequency of Communication with Friends and Family: Not on file    Frequency of Social Gatherings with Friends and Family: Not on file    Attends Hindu Services: Not on file    Active Member of Clubs or Organizations: Not on file    Attends Club or Organization Meetings: Not on file    Marital Status: Not on file   Intimate Partner Violence:     Fear of Current or Ex-Partner: Not on file    Emotionally Abused: Not on file    Physically Abused: Not on file    Sexually Abused: Not on file   Housing Stability: Unknown    Unable to Pay for Housing in the Last Year: No    Number of [place] in the Last Year: Not on file    Unstable Housing in the Last Year: No       Current Outpatient Medications on File Prior to Visit   Medication Sig Dispense Refill    venlafaxine (EFFEXOR XR) 150 MG extended release capsule TAKE 1 CAPSULE BY MOUTH EVERY DAY 90 capsule 1    atorvastatin (LIPITOR) 80 MG tablet Take 1 tablet by mouth daily 30 tablet 3    aspirin 81 MG tablet Take 81 mg by mouth as needed       Blood Pressure KIT 1 kit by Does not apply route 2 times daily (Patient taking differently: 1 kit by Does not apply route three times a week ) 1 kit 0    Lactobacillus Rhamnosus, GG, ( PROBIOTIC DIGESTIVE CARE) CAPS Take 1 tablet by mouth daily      Omega-3 Fatty Acids (OMEGA 3 PO) Take 300 mg by mouth      Calcium Carbonate-Vitamin D (CALCIUM + D PO) Take  by mouth.  Cholecalciferol (VITAMIN D3) 5000 UNITS CAPS Take 1 tablet by mouth daily.  amLODIPine (NORVASC) 5 MG tablet Take 1 tablet by mouth daily (Patient not taking: Reported on 4/18/2022) 30 tablet 0     No current facility-administered medications on file prior to visit.        Review of Systems    Objective:     Physical Exam    Assessment:       Plan:

## 2022-04-19 DIAGNOSIS — E02 SUBCLINICAL IODINE-DEFICIENCY HYPOTHYROIDISM: ICD-10-CM

## 2022-04-19 DIAGNOSIS — N18.30 STAGE 3 CHRONIC KIDNEY DISEASE, UNSPECIFIED WHETHER STAGE 3A OR 3B CKD (HCC): ICD-10-CM

## 2022-04-19 DIAGNOSIS — E78.2 MIXED HYPERLIPIDEMIA: Primary | ICD-10-CM

## 2022-04-19 LAB
ANION GAP SERPL CALCULATED.3IONS-SCNC: 20 MMOL/L (ref 3–16)
BASOPHILS ABSOLUTE: 0.1 K/UL (ref 0–0.2)
BASOPHILS RELATIVE PERCENT: 0.9 %
BUN BLDV-MCNC: 16 MG/DL (ref 7–20)
CALCIUM SERPL-MCNC: 10.5 MG/DL (ref 8.3–10.6)
CHLORIDE BLD-SCNC: 102 MMOL/L (ref 99–110)
CHOLESTEROL, FASTING: 325 MG/DL (ref 0–199)
CO2: 21 MMOL/L (ref 21–32)
CREAT SERPL-MCNC: 1.2 MG/DL (ref 0.6–1.2)
EOSINOPHILS ABSOLUTE: 0.4 K/UL (ref 0–0.6)
EOSINOPHILS RELATIVE PERCENT: 6 %
GFR AFRICAN AMERICAN: 53
GFR NON-AFRICAN AMERICAN: 44
GLUCOSE BLD-MCNC: 115 MG/DL (ref 70–99)
HCT VFR BLD CALC: 41.5 % (ref 36–48)
HDLC SERPL-MCNC: 53 MG/DL (ref 40–60)
HEMOGLOBIN: 14.1 G/DL (ref 12–16)
LDL CHOLESTEROL CALCULATED: 239 MG/DL
LYMPHOCYTES ABSOLUTE: 2 K/UL (ref 1–5.1)
LYMPHOCYTES RELATIVE PERCENT: 27.9 %
MCH RBC QN AUTO: 35.1 PG (ref 26–34)
MCHC RBC AUTO-ENTMCNC: 34.1 G/DL (ref 31–36)
MCV RBC AUTO: 103 FL (ref 80–100)
MONOCYTES ABSOLUTE: 0.4 K/UL (ref 0–1.3)
MONOCYTES RELATIVE PERCENT: 6.3 %
NEUTROPHILS ABSOLUTE: 4.1 K/UL (ref 1.7–7.7)
NEUTROPHILS RELATIVE PERCENT: 58.9 %
PDW BLD-RTO: 13.4 % (ref 12.4–15.4)
PLATELET # BLD: 240 K/UL (ref 135–450)
PMV BLD AUTO: 7.7 FL (ref 5–10.5)
POTASSIUM SERPL-SCNC: 4.5 MMOL/L (ref 3.5–5.1)
RBC # BLD: 4.03 M/UL (ref 4–5.2)
SODIUM BLD-SCNC: 143 MMOL/L (ref 136–145)
TRIGLYCERIDE, FASTING: 165 MG/DL (ref 0–150)
TSH REFLEX: 1.97 UIU/ML (ref 0.27–4.2)
VITAMIN D 25-HYDROXY: 40 NG/ML
VLDLC SERPL CALC-MCNC: 33 MG/DL
WBC # BLD: 7 K/UL (ref 4–11)

## 2022-04-19 RX ORDER — LEVOTHYROXINE SODIUM 0.15 MG/1
150 TABLET ORAL DAILY
Qty: 30 TABLET | Refills: 3 | Status: SHIPPED | OUTPATIENT
Start: 2022-04-19 | End: 2022-05-12

## 2022-05-12 RX ORDER — ARIPIPRAZOLE 5 MG/1
TABLET ORAL
Qty: 30 TABLET | Refills: 3 | Status: SHIPPED | OUTPATIENT
Start: 2022-05-12 | End: 2022-10-19 | Stop reason: SINTOL

## 2022-05-12 RX ORDER — LEVOTHYROXINE SODIUM 0.15 MG/1
TABLET ORAL
Qty: 30 TABLET | Refills: 3 | Status: SHIPPED | OUTPATIENT
Start: 2022-05-12

## 2022-05-23 RX ORDER — LEVOTHYROXINE SODIUM 0.15 MG/1
TABLET ORAL
Qty: 90 TABLET | Refills: 1 | OUTPATIENT
Start: 2022-05-23

## 2022-05-26 ENCOUNTER — TELEPHONE (OUTPATIENT)
Dept: FAMILY MEDICINE CLINIC | Age: 72
End: 2022-05-26

## 2022-07-08 ENCOUNTER — OFFICE VISIT (OUTPATIENT)
Dept: FAMILY MEDICINE CLINIC | Age: 72
End: 2022-07-08
Payer: MEDICARE

## 2022-07-08 VITALS
BODY MASS INDEX: 31.51 KG/M2 | RESPIRATION RATE: 18 BRPM | WEIGHT: 156 LBS | DIASTOLIC BLOOD PRESSURE: 86 MMHG | SYSTOLIC BLOOD PRESSURE: 134 MMHG | HEART RATE: 86 BPM | OXYGEN SATURATION: 98 %

## 2022-07-08 DIAGNOSIS — R10.30 LOWER ABDOMINAL PAIN: ICD-10-CM

## 2022-07-08 DIAGNOSIS — N30.01 ACUTE CYSTITIS WITH HEMATURIA: Primary | ICD-10-CM

## 2022-07-08 LAB
BILIRUBIN, POC: NORMAL
BLOOD URINE, POC: NORMAL
CLARITY, POC: CLEAR
COLOR, POC: YELLOW
GLUCOSE URINE, POC: NORMAL
KETONES, POC: NORMAL
LEUKOCYTE EST, POC: NORMAL
NITRITE, POC: NORMAL
PH, POC: 5.5
PROTEIN, POC: NORMAL
SPECIFIC GRAVITY, POC: 1.02
UROBILINOGEN, POC: 0.2

## 2022-07-08 PROCEDURE — G8427 DOCREV CUR MEDS BY ELIG CLIN: HCPCS | Performed by: NURSE PRACTITIONER

## 2022-07-08 PROCEDURE — G8399 PT W/DXA RESULTS DOCUMENT: HCPCS | Performed by: NURSE PRACTITIONER

## 2022-07-08 PROCEDURE — 3017F COLORECTAL CA SCREEN DOC REV: CPT | Performed by: NURSE PRACTITIONER

## 2022-07-08 PROCEDURE — 99213 OFFICE O/P EST LOW 20 MIN: CPT | Performed by: NURSE PRACTITIONER

## 2022-07-08 PROCEDURE — 1123F ACP DISCUSS/DSCN MKR DOCD: CPT | Performed by: NURSE PRACTITIONER

## 2022-07-08 PROCEDURE — 1036F TOBACCO NON-USER: CPT | Performed by: NURSE PRACTITIONER

## 2022-07-08 PROCEDURE — G8417 CALC BMI ABV UP PARAM F/U: HCPCS | Performed by: NURSE PRACTITIONER

## 2022-07-08 PROCEDURE — 1090F PRES/ABSN URINE INCON ASSESS: CPT | Performed by: NURSE PRACTITIONER

## 2022-07-08 PROCEDURE — 81003 URINALYSIS AUTO W/O SCOPE: CPT | Performed by: NURSE PRACTITIONER

## 2022-07-08 RX ORDER — CIPROFLOXACIN 500 MG/1
500 TABLET, FILM COATED ORAL 2 TIMES DAILY
Qty: 14 TABLET | Refills: 0 | Status: SHIPPED | OUTPATIENT
Start: 2022-07-08 | End: 2022-07-15

## 2022-07-08 ASSESSMENT — ENCOUNTER SYMPTOMS
VOMITING: 1
RESPIRATORY NEGATIVE: 1

## 2022-07-08 NOTE — PROGRESS NOTES
7/8/2022    This is a 66 y.o. female   Chief Complaint   Patient presents with    Fatigue    Abdominal Pain     lower abdominal pain    Other     going on for 1-2 weeks   . Ramírez Bella is seen today for evaluation of a possible UTI. Symptoms started approximately 10 days ago with urinary frequency and urgency. She noted today some hematuria. She also describes lower pelvic spasm. No vaginal discharge. No fevers or chills but she did have 1 episode of vomiting. Denies consistent abdominal pain.        Patient Active Problem List   Diagnosis    Hyperlipidemia    Hypothyroidism    Anxiety and depression    Osteopenia    Vitamin D deficiency    Fibromyalgia    Elevated LFTs    Hypertensive emergency    Chest pain    Hypokalemia    Elevated TSH    Hypertensive urgency       Current Outpatient Medications   Medication Sig Dispense Refill    levothyroxine (SYNTHROID) 150 MCG tablet TAKE 1 TABLET BY MOUTH EVERY DAY 30 tablet 3    ARIPiprazole (ABILIFY) 5 MG tablet TAKE 1 TABLET BY MOUTH EVERY DAY 30 tablet 3    hydroCHLOROthiazide (HYDRODIURIL) 25 MG tablet TAKE 1 TABLET BY MOUTH EVERY DAY 30 tablet 2    losartan (COZAAR) 100 MG tablet TAKE 1 TABLET BY MOUTH EVERY DAY 90 tablet 1    clopidogrel (PLAVIX) 75 MG tablet TAKE 1 TABLET BY MOUTH EVERY DAY 90 tablet 0    venlafaxine (EFFEXOR XR) 150 MG extended release capsule TAKE 1 CAPSULE BY MOUTH EVERY DAY 90 capsule 1    amLODIPine (NORVASC) 5 MG tablet Take 1 tablet by mouth daily 30 tablet 0    atorvastatin (LIPITOR) 80 MG tablet Take 1 tablet by mouth daily 30 tablet 3    aspirin 81 MG tablet Take 81 mg by mouth as needed       Blood Pressure KIT 1 kit by Does not apply route 2 times daily (Patient taking differently: 1 kit by Does not apply route three times a week ) 1 kit 0    Lactobacillus Rhamnosus, GG, ( PROBIOTIC DIGESTIVE CARE) CAPS Take 1 tablet by mouth daily      Omega-3 Fatty Acids (OMEGA 3 PO) Take 300 mg by mouth      Calcium Carbonate-Vitamin D (CALCIUM + D PO) Take  by mouth.  Cholecalciferol (VITAMIN D3) 5000 UNITS CAPS Take 1 tablet by mouth daily. No current facility-administered medications for this visit. Allergies   Allergen Reactions    Lisinopril-Hydrochlorothiazide Nausea Only    Sulfa Antibiotics        /86 (Site: Left Upper Arm, Position: Sitting)   Pulse 86   Resp 18   Wt 156 lb (70.8 kg)   SpO2 98%   Breastfeeding No   BMI 31.51 kg/m²     Social History     Tobacco Use    Smoking status: Never Smoker    Smokeless tobacco: Never Used   Substance Use Topics    Alcohol use: Yes     Alcohol/week: 2.0 standard drinks     Types: 2 Cans of beer per week     Comment: social       Review of Systems   Constitutional: Negative for chills, diaphoresis and fever. Respiratory: Negative. Cardiovascular: Negative. Gastrointestinal: Positive for vomiting. Genitourinary: Positive for dysuria, enuresis, frequency, hematuria and pelvic pain (Cramping). Negative for decreased urine volume, vaginal bleeding and vaginal discharge. Neurological: Negative. Physical Exam  Vitals and nursing note reviewed. Constitutional:       General: She is not in acute distress. Appearance: She is well-developed. She is not diaphoretic. HENT:      Head: Normocephalic and atraumatic. Right Ear: External ear normal.      Left Ear: External ear normal.      Nose: Nose normal.      Mouth/Throat:      Pharynx: No oropharyngeal exudate. Eyes:      General:         Right eye: No discharge. Left eye: No discharge. Conjunctiva/sclera: Conjunctivae normal.   Cardiovascular:      Rate and Rhythm: Normal rate and regular rhythm. Heart sounds: Normal heart sounds. No murmur heard. No friction rub. No gallop. Pulmonary:      Effort: Pulmonary effort is normal. No respiratory distress. Breath sounds: Normal breath sounds. No wheezing or rales.    Abdominal:      General: Bowel sounds are normal. There is no distension. Palpations: Abdomen is soft. Tenderness: There is abdominal tenderness (Suprapubic). There is no right CVA tenderness, left CVA tenderness, guarding or rebound. Genitourinary:     Comments: UA negative except for trace leukocytes  Musculoskeletal:         General: No tenderness. Normal range of motion. Cervical back: Normal range of motion and neck supple. Lymphadenopathy:      Cervical: No cervical adenopathy. Skin:     General: Skin is warm and dry. Coloration: Skin is not pale. Findings: No erythema or rash. Neurological:      Mental Status: She is alert and oriented to person, place, and time. Motor: No abnormal muscle tone. Coordination: Coordination normal.   Psychiatric:         Behavior: Behavior normal.         Thought Content: Thought content normal.         Judgment: Judgment normal.         Diagnosis       ICD-10-CM    1. Lower abdominal pain  R10.30 POCT Urinalysis No Micro (Auto)     Culture, Urine   2. Acute cystitis with hematuria  N30.01 ciprofloxacin (CIPRO) 500 MG tablet        Plan    Treat based on symptomology pending urine culture  Cipro  Push fluids  Report if symptoms worsen    Orders Placed This Encounter   Procedures    POCT Urinalysis No Micro (Auto)       Orders Placed This Encounter   Medications    ciprofloxacin (CIPRO) 500 MG tablet     Sig: Take 1 tablet by mouth 2 times daily for 7 days     Dispense:  14 tablet     Refill:  0       Patient Education:  Plan  Return if symptoms worsen or fail to improve.

## 2022-07-10 LAB — URINE CULTURE, ROUTINE: NORMAL

## 2022-07-18 RX ORDER — CLOPIDOGREL BISULFATE 75 MG/1
TABLET ORAL
Qty: 90 TABLET | Refills: 1 | Status: SHIPPED | OUTPATIENT
Start: 2022-07-18

## 2022-07-19 RX ORDER — HYDROCHLOROTHIAZIDE 25 MG/1
TABLET ORAL
Qty: 90 TABLET | Refills: 2 | Status: SHIPPED | OUTPATIENT
Start: 2022-07-19

## 2022-07-21 ENCOUNTER — TELEPHONE (OUTPATIENT)
Dept: FAMILY MEDICINE CLINIC | Age: 72
End: 2022-07-21

## 2022-09-27 ENCOUNTER — PATIENT MESSAGE (OUTPATIENT)
Dept: FAMILY MEDICINE CLINIC | Age: 72
End: 2022-09-27

## 2022-10-03 ENCOUNTER — TELEPHONE (OUTPATIENT)
Dept: FAMILY MEDICINE CLINIC | Age: 72
End: 2022-10-03

## 2022-10-03 DIAGNOSIS — F32.A ANXIETY AND DEPRESSION: Primary | ICD-10-CM

## 2022-10-03 DIAGNOSIS — F41.9 ANXIETY AND DEPRESSION: Primary | ICD-10-CM

## 2022-10-03 NOTE — TELEPHONE ENCOUNTER
----- Message from Arneil Candice sent at 10/3/2022 11:39 AM EDT -----  Subject: Referral Request    Reason for referral request? Pt would referral to mental health therapist   or provider. Pt is having some depression for about 1 year, is not having   feelings of harming herself or others. No preferences, please contact as   soon as possible with a referral.  Provider patient wants to be referred to(if known):     Provider Phone Number(if known):     Additional Information for Provider?   ---------------------------------------------------------------------------  --------------  [address] [facility] [employer]    [phone]; OK to leave message on voicemail  ---------------------------------------------------------------------------  --------------

## 2022-10-03 NOTE — TELEPHONE ENCOUNTER
Does this patient need to be seen first or can we place referral while Garrison Santiago is out of office?  Please advise

## 2022-10-03 NOTE — TELEPHONE ENCOUNTER
----- Message from Alejandro Tam sent at 10/3/2022 11:39 AM EDT -----  Subject: Referral Request    Reason for referral request? Pt would referral to mental health therapist   or provider. Pt is having some depression for about 1 year, is not having   feelings of harming herself or others. No preferences, please contact as   soon as possible with a referral.  Provider patient wants to be referred to(if known):     Provider Phone Number(if known):     Additional Information for Provider?   ---------------------------------------------------------------------------  --------------  [address] [facility] [employer]    [phone]; OK to leave message on voicemail  ---------------------------------------------------------------------------  --------------

## 2022-10-19 ENCOUNTER — OFFICE VISIT (OUTPATIENT)
Dept: FAMILY MEDICINE CLINIC | Age: 72
End: 2022-10-19
Payer: MEDICARE

## 2022-10-19 VITALS
BODY MASS INDEX: 30.9 KG/M2 | TEMPERATURE: 97.3 F | SYSTOLIC BLOOD PRESSURE: 166 MMHG | OXYGEN SATURATION: 97 % | DIASTOLIC BLOOD PRESSURE: 104 MMHG | WEIGHT: 153 LBS | HEART RATE: 91 BPM | RESPIRATION RATE: 16 BRPM

## 2022-10-19 DIAGNOSIS — I10 PRIMARY HYPERTENSION: Primary | ICD-10-CM

## 2022-10-19 DIAGNOSIS — N39.3 STRESS INCONTINENCE OF URINE: ICD-10-CM

## 2022-10-19 DIAGNOSIS — F32.0 CURRENT MILD EPISODE OF MAJOR DEPRESSIVE DISORDER, UNSPECIFIED WHETHER RECURRENT (HCC): ICD-10-CM

## 2022-10-19 DIAGNOSIS — I10 PRIMARY HYPERTENSION: ICD-10-CM

## 2022-10-19 DIAGNOSIS — G30.9 ALZHEIMER'S DISEASE, UNSPECIFIED (CODE) (HCC): ICD-10-CM

## 2022-10-19 PROCEDURE — 1123F ACP DISCUSS/DSCN MKR DOCD: CPT | Performed by: NURSE PRACTITIONER

## 2022-10-19 PROCEDURE — G8427 DOCREV CUR MEDS BY ELIG CLIN: HCPCS | Performed by: NURSE PRACTITIONER

## 2022-10-19 PROCEDURE — 3017F COLORECTAL CA SCREEN DOC REV: CPT | Performed by: NURSE PRACTITIONER

## 2022-10-19 PROCEDURE — G8399 PT W/DXA RESULTS DOCUMENT: HCPCS | Performed by: NURSE PRACTITIONER

## 2022-10-19 PROCEDURE — G8484 FLU IMMUNIZE NO ADMIN: HCPCS | Performed by: NURSE PRACTITIONER

## 2022-10-19 PROCEDURE — 1036F TOBACCO NON-USER: CPT | Performed by: NURSE PRACTITIONER

## 2022-10-19 PROCEDURE — 1090F PRES/ABSN URINE INCON ASSESS: CPT | Performed by: NURSE PRACTITIONER

## 2022-10-19 PROCEDURE — G8417 CALC BMI ABV UP PARAM F/U: HCPCS | Performed by: NURSE PRACTITIONER

## 2022-10-19 PROCEDURE — 99214 OFFICE O/P EST MOD 30 MIN: CPT | Performed by: NURSE PRACTITIONER

## 2022-10-19 RX ORDER — TOLTERODINE 4 MG/1
4 CAPSULE, EXTENDED RELEASE ORAL DAILY
Qty: 30 CAPSULE | Refills: 3 | Status: SHIPPED | OUTPATIENT
Start: 2022-10-19

## 2022-10-19 RX ORDER — DONEPEZIL HYDROCHLORIDE 10 MG/1
10 TABLET, FILM COATED ORAL NIGHTLY
Qty: 30 TABLET | Refills: 3 | Status: SHIPPED | OUTPATIENT
Start: 2022-10-19

## 2022-10-19 ASSESSMENT — PATIENT HEALTH QUESTIONNAIRE - PHQ9
2. FEELING DOWN, DEPRESSED OR HOPELESS: 3
7. TROUBLE CONCENTRATING ON THINGS, SUCH AS READING THE NEWSPAPER OR WATCHING TELEVISION: 3
SUM OF ALL RESPONSES TO PHQ QUESTIONS 1-9: 19
9. THOUGHTS THAT YOU WOULD BE BETTER OFF DEAD, OR OF HURTING YOURSELF: 1
SUM OF ALL RESPONSES TO PHQ QUESTIONS 1-9: 19
8. MOVING OR SPEAKING SO SLOWLY THAT OTHER PEOPLE COULD HAVE NOTICED. OR THE OPPOSITE, BEING SO FIGETY OR RESTLESS THAT YOU HAVE BEEN MOVING AROUND A LOT MORE THAN USUAL: 1
SUM OF ALL RESPONSES TO PHQ9 QUESTIONS 1 & 2: 6
3. TROUBLE FALLING OR STAYING ASLEEP: 3
4. FEELING TIRED OR HAVING LITTLE ENERGY: 3
6. FEELING BAD ABOUT YOURSELF - OR THAT YOU ARE A FAILURE OR HAVE LET YOURSELF OR YOUR FAMILY DOWN: 1
5. POOR APPETITE OR OVEREATING: 1
SUM OF ALL RESPONSES TO PHQ QUESTIONS 1-9: 19
1. LITTLE INTEREST OR PLEASURE IN DOING THINGS: 3
SUM OF ALL RESPONSES TO PHQ QUESTIONS 1-9: 18

## 2022-10-19 ASSESSMENT — COLUMBIA-SUICIDE SEVERITY RATING SCALE - C-SSRS
1. WITHIN THE PAST MONTH, HAVE YOU WISHED YOU WERE DEAD OR WISHED YOU COULD GO TO SLEEP AND NOT WAKE UP?: YES
4. HAVE YOU HAD THESE THOUGHTS AND HAD SOME INTENTION OF ACTING ON THEM?: NO
3. HAVE YOU BEEN THINKING ABOUT HOW YOU MIGHT KILL YOURSELF?: NO
6. HAVE YOU EVER DONE ANYTHING, STARTED TO DO ANYTHING, OR PREPARED TO DO ANYTHING TO END YOUR LIFE?: NO
5. HAVE YOU STARTED TO WORK OUT OR WORKED OUT THE DETAILS OF HOW TO KILL YOURSELF? DO YOU INTEND TO CARRY OUT THIS PLAN?: NO
2. HAVE YOU ACTUALLY HAD ANY THOUGHTS OF KILLING YOURSELF?: YES

## 2022-10-19 ASSESSMENT — ENCOUNTER SYMPTOMS
WHEEZING: 1
CHEST TIGHTNESS: 0
COUGH: 1
CHOKING: 0
SHORTNESS OF BREATH: 0

## 2022-10-19 NOTE — PATIENT INSTRUCTIONS
Hypertension uncontrolled-unsure if patient is taking her medications or not. We will follow her in 1 week.   Urinary incontinence-Detrol  Memory poor-Aricept
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
## 2022-10-20 ENCOUNTER — FOLLOWUP TELEPHONE ENCOUNTER (OUTPATIENT)
Dept: PSYCHIATRY | Age: 72
End: 2022-10-20

## 2022-10-20 LAB
ANION GAP SERPL CALCULATED.3IONS-SCNC: 18 MMOL/L (ref 3–16)
BUN BLDV-MCNC: 11 MG/DL (ref 7–20)
CALCIUM SERPL-MCNC: 9.9 MG/DL (ref 8.3–10.6)
CHLORIDE BLD-SCNC: 101 MMOL/L (ref 99–110)
CHOLESTEROL, FASTING: 296 MG/DL (ref 0–199)
CO2: 20 MMOL/L (ref 21–32)
CREAT SERPL-MCNC: 1.2 MG/DL (ref 0.6–1.2)
GFR SERPL CREATININE-BSD FRML MDRD: 48 ML/MIN/{1.73_M2}
GLUCOSE BLD-MCNC: 99 MG/DL (ref 70–99)
HDLC SERPL-MCNC: 65 MG/DL (ref 40–60)
LDL CHOLESTEROL CALCULATED: 195 MG/DL
POTASSIUM SERPL-SCNC: 3.6 MMOL/L (ref 3.5–5.1)
SODIUM BLD-SCNC: 139 MMOL/L (ref 136–145)
TRIGLYCERIDE, FASTING: 182 MG/DL (ref 0–150)
VLDLC SERPL CALC-MCNC: 36 MG/DL

## 2022-10-24 ENCOUNTER — TELEPHONE (OUTPATIENT)
Dept: FAMILY MEDICINE CLINIC | Age: 72
End: 2022-10-24

## 2022-10-24 RX ORDER — LOSARTAN POTASSIUM 100 MG/1
TABLET ORAL
Qty: 90 TABLET | Refills: 1 | Status: SHIPPED | OUTPATIENT
Start: 2022-10-24

## 2022-10-24 NOTE — TELEPHONE ENCOUNTER
Received fax from pharmacy requesting alternative medication Tolterodine Tart ER 4 mg cap= Not covered

## 2022-10-24 NOTE — TELEPHONE ENCOUNTER
Last ov 10/19/2022   Future Appointments   Date Time Provider Jaylan Ryder   12/21/2022 10:00 AM JAMSHID Leonardo - [facility]

## 2022-10-25 NOTE — TELEPHONE ENCOUNTER
tolterodine (DETROL LA) 4 MG extended release capsule [[id number]]     Order Details  Dose: 4 mg Route: Oral Frequency: DAILY   Dispense Quantity: 30 capsule      Please submit pa

## 2022-11-08 ENCOUNTER — FOLLOWUP TELEPHONE ENCOUNTER (OUTPATIENT)
Dept: PSYCHIATRY | Age: 72
End: 2022-11-08

## 2022-11-09 ENCOUNTER — HOSPITAL ENCOUNTER (OUTPATIENT)
Dept: PSYCHIATRY | Age: 72
Setting detail: THERAPIES SERIES
Discharge: HOME | End: 2022-11-09
Payer: MEDICARE

## 2022-11-09 ASSESSMENT — SLEEP AND FATIGUE QUESTIONNAIRES
SLEEP PATTERN: DISTURBED/INTERRUPTED SLEEP;NIGHTMARES/TERRORS;RESTLESSNESS
DO YOU USE A SLEEP AID: COMMENT
AVERAGE NUMBER OF SLEEP HOURS: 6
DO YOU HAVE DIFFICULTY SLEEPING: YES

## 2022-11-09 ASSESSMENT — ANXIETY QUESTIONNAIRES
7. FEELING AFRAID AS IF SOMETHING AWFUL MIGHT HAPPEN: 1
GAD7 TOTAL SCORE: 13
5. BEING SO RESTLESS THAT IT IS HARD TO SIT STILL: 3
2. NOT BEING ABLE TO STOP OR CONTROL WORRYING: 2
6. BECOMING EASILY ANNOYED OR IRRITABLE: 3
3. WORRYING TOO MUCH ABOUT DIFFERENT THINGS: 2
4. TROUBLE RELAXING: 1
IF YOU CHECKED OFF ANY PROBLEMS ON THIS QUESTIONNAIRE, HOW DIFFICULT HAVE THESE PROBLEMS MADE IT FOR YOU TO DO YOUR WORK, TAKE CARE OF THINGS AT HOME, OR GET ALONG WITH OTHER PEOPLE: VERY DIFFICULT
1. FEELING NERVOUS, ANXIOUS, OR ON EDGE: 1

## 2022-11-09 ASSESSMENT — PATIENT HEALTH QUESTIONNAIRE - PHQ9: SUM OF ALL RESPONSES TO PHQ QUESTIONS 1-9: 14

## 2022-11-10 ENCOUNTER — HOSPITAL ENCOUNTER (OUTPATIENT)
Dept: PSYCHIATRY | Age: 72
Setting detail: THERAPIES SERIES
Discharge: HOME | End: 2022-11-10
Payer: MEDICARE

## 2022-11-10 VITALS
OXYGEN SATURATION: 98 % | HEART RATE: 74 BPM | SYSTOLIC BLOOD PRESSURE: 188 MMHG | RESPIRATION RATE: 17 BRPM | DIASTOLIC BLOOD PRESSURE: 114 MMHG | TEMPERATURE: 96.9 F

## 2022-11-10 DIAGNOSIS — F32.A ANXIETY AND DEPRESSION: Primary | ICD-10-CM

## 2022-11-10 DIAGNOSIS — F41.9 ANXIETY AND DEPRESSION: Primary | ICD-10-CM

## 2022-11-10 PROCEDURE — 90853 GROUP PSYCHOTHERAPY: CPT | Performed by: SOCIAL WORKER

## 2022-11-10 PROCEDURE — 90853 GROUP PSYCHOTHERAPY: CPT

## 2022-11-10 RX ORDER — HYDROXYZINE HYDROCHLORIDE 25 MG/1
25 TABLET, FILM COATED ORAL EVERY 8 HOURS PRN
Qty: 30 TABLET | Refills: 0 | Status: SHIPPED | OUTPATIENT
Start: 2022-11-10 | End: 2022-11-20

## 2022-11-10 NOTE — GROUP NOTE
Group Therapy Note    Date: 11/10/2022    Group Start Time: 11:15 AM  Group End Time: 12:15 PM  Group Topic: Music Therapy    [facility]    Deangelo Hendersonx        Group Therapy Note    Topic: Control through recovery    Music Therapy group consisted of tanner analysis intervention and verbal processing/counseling. Following verbal processing, pts discussed elements of control, reflecting on self and surroundings. Pts then engaged in [place], replacing lyrics with self-written reflections and positive coping statements. Attendees: 4       Notes:  Jacob Neal wrote about feeling helpless and feeling controlled by per past experiences. She verbalized positive coping statement \"I am stronger than I think\", sharing that happiness is hers to claim, and that she has potential to select \"now over then\". Status After Intervention:  Improved    Participation Level:  Active Listener and Interactive    Participation Quality: Sharing and Supportive      Speech:  normal      Thought Process/Content: Logical      Affective Functioning: Congruent      Mood: euthymic      Level of consciousness:  Alert and Oriented x4      Response to Learning: Capable of insight and Progressing to goal      Endings: None Reported    Modes of Intervention: Support, Exploration, Activity, and Media      Discipline Responsible: Psychoeducational Specialist      Signature:  Michael Carreno, MM, MT-BC

## 2022-11-10 NOTE — GROUP NOTE
Group Therapy Note    Date: 11/10/2022    Group Start Time:  8:00 AM  Group End Time:  9:45 AM  Group Topic: Psychotherapy    [facility]    IRMA Pozo        Group Therapy Note  Group members engaged in psychotherapy agenda group;identifying a personal agenda/goal and using interpersonal interactions among group members to develop goals and creat connections among group members. Attendees: 6       Patient's Goal:  Patient reported \"feeling happy about getting a new start. \"    Notes:  Patient reported that she is complicated and needs a lot of help in different areas. She shared that she lost her daughter last year and is lost without her. She had her daughter when she was 15 and they were the best of friends and each other's support. She shared that she is having major mood swings and takes things out on her  who she described as \"always supportive. \" The group members provided empathetic and validating responses to her grief and suffering. She was able to accept their feedback and process it. Status After Intervention:  Improved    Participation Level:  Active Listener and Interactive    Participation Quality: Appropriate, Attentive, Sharing, and Supportive      Speech:  normal      Thought Process/Content: Logical      Affective Functioning: Congruent      Mood: anxious      Level of consciousness:  Attentive      Response to Learning: Able to retain information      Endings: None Reported    Modes of Intervention: Support, Socialization, and Exploration      Discipline Responsible: /Counselor      Signature:  IRMA Pozo
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
## 2022-11-10 NOTE — GROUP NOTE
Group Therapy Note    Date: 11/10/2022    Group Start Time: 10:00 AM  Group End Time: 11:00 AM  Group Topic: Psychoeducation    [facility]    ZEESHAN Turner        Group Therapy Note    Attendees: 6    Participants learned about self identity, explored what time in their life made them the happiest and what they were doing at that time. Notes:  Cristy Cota attended group and was engaged in exploring her past to find times that helped shape who she is. She said she has always helped others and never herself. She shared that [place] is a happy time for her as well as playing the piano. She reports she does not get to play much because someone is always watching the TV. Peers encouraged her to set a time of day to play and tell her family she is going to do it and not to budge on it because she needs to enjoy herself.      Status After Intervention:  Improved    Participation Level: Interactive    Participation Quality: Appropriate, Attentive, and Sharing      Speech:  normal      Thought Process/Content: Logical      Affective Functioning: Congruent      Mood: euthymic      Level of consciousness:  Alert, Oriented x4, and Attentive      Response to Learning: Able to verbalize/acknowledge new learning and Progressing to goal      Endings: None Reported    Modes of Intervention: Education, Exploration, and Activity      Discipline Responsible: /Counselor      Signature:  ZEESHAN Burton

## 2022-11-11 ENCOUNTER — HOSPITAL ENCOUNTER (OUTPATIENT)
Dept: PSYCHIATRY | Age: 72
Setting detail: THERAPIES SERIES
Discharge: HOME | End: 2022-11-11
Payer: MEDICARE

## 2022-11-11 ENCOUNTER — FOLLOWUP TELEPHONE ENCOUNTER (OUTPATIENT)
Dept: PSYCHIATRY | Age: 72
End: 2022-11-11

## 2022-11-11 NOTE — TELEPHONE ENCOUNTER
Pt did not show up for IOP. Writer was not able to reach pt or  by phone. Well-check called in to [place] [facility] who was able to make physical contact with pt. Pt stated she overslept and will call.

## 2022-11-17 ENCOUNTER — FOLLOWUP TELEPHONE ENCOUNTER (OUTPATIENT)
Dept: PSYCHIATRY | Age: 72
End: 2022-11-17

## 2022-11-17 ENCOUNTER — APPOINTMENT (OUTPATIENT)
Dept: PSYCHIATRY | Age: 72
End: 2022-11-17
Payer: MEDICARE

## 2022-11-17 NOTE — TELEPHONE ENCOUNTER
Pt absent from IOP. Writer was able to contact via phone. Pt states she does not want to continue in program. Writer will mail OP resources.

## 2022-11-18 ENCOUNTER — APPOINTMENT (OUTPATIENT)
Dept: PSYCHIATRY | Age: 72
End: 2022-11-18
Payer: MEDICARE

## 2022-11-21 ENCOUNTER — APPOINTMENT (OUTPATIENT)
Dept: PSYCHIATRY | Age: 72
End: 2022-11-21
Payer: MEDICARE

## 2022-11-23 ENCOUNTER — COMMUNITY OUTREACH (OUTPATIENT)
Dept: FAMILY MEDICINE CLINIC | Age: 72
End: 2022-11-23

## 2022-11-23 NOTE — PROGRESS NOTES
Patient's HM shows they are overdue for Mammogram Screening. [employer] and  files searched. No results to attach to order nor HM updated.

## 2022-11-24 ENCOUNTER — APPOINTMENT (OUTPATIENT)
Dept: PSYCHIATRY | Age: 72
End: 2022-11-24
Payer: MEDICARE

## 2022-11-25 ENCOUNTER — APPOINTMENT (OUTPATIENT)
Dept: PSYCHIATRY | Age: 72
End: 2022-11-25
Payer: MEDICARE

## 2022-11-28 ENCOUNTER — APPOINTMENT (OUTPATIENT)
Dept: PSYCHIATRY | Age: 72
End: 2022-11-28
Payer: MEDICARE

## 2023-01-27 RX ORDER — CLOPIDOGREL BISULFATE 75 MG/1
TABLET ORAL
Qty: 90 TABLET | Refills: 1 | Status: SHIPPED | OUTPATIENT
Start: 2023-01-27

## 2023-04-06 ENCOUNTER — OFFICE VISIT (OUTPATIENT)
Dept: FAMILY MEDICINE CLINIC | Age: 73
End: 2023-04-06

## 2023-04-06 VITALS
TEMPERATURE: 97.6 F | BODY MASS INDEX: 29.43 KG/M2 | RESPIRATION RATE: 14 BRPM | WEIGHT: 146 LBS | SYSTOLIC BLOOD PRESSURE: 104 MMHG | OXYGEN SATURATION: 95 % | DIASTOLIC BLOOD PRESSURE: 80 MMHG | HEART RATE: 86 BPM | HEIGHT: 59 IN

## 2023-04-06 DIAGNOSIS — Z09 HOSPITAL DISCHARGE FOLLOW-UP: ICD-10-CM

## 2023-04-06 DIAGNOSIS — G30.9 ALZHEIMER'S DISEASE, UNSPECIFIED (CODE) (HCC): ICD-10-CM

## 2023-04-06 DIAGNOSIS — F32.0 CURRENT MILD EPISODE OF MAJOR DEPRESSIVE DISORDER, UNSPECIFIED WHETHER RECURRENT (HCC): ICD-10-CM

## 2023-04-06 DIAGNOSIS — I25.10 CORONARY ARTERY DISEASE INVOLVING NATIVE HEART, UNSPECIFIED VESSEL OR LESION TYPE, UNSPECIFIED WHETHER ANGINA PRESENT: ICD-10-CM

## 2023-04-06 DIAGNOSIS — I16.0 HYPERTENSIVE URGENCY: Primary | ICD-10-CM

## 2023-04-06 RX ORDER — NITROGLYCERIN 0.4 MG/1
TABLET SUBLINGUAL
COMMUNITY
Start: 2023-04-01

## 2023-04-06 RX ORDER — DONEPEZIL HYDROCHLORIDE 10 MG/1
10 TABLET, FILM COATED ORAL NIGHTLY
Qty: 30 TABLET | Refills: 3 | Status: SHIPPED | OUTPATIENT
Start: 2023-04-06

## 2023-04-06 RX ORDER — LEVOTHYROXINE SODIUM 0.15 MG/1
150 TABLET ORAL DAILY
Qty: 30 TABLET | Refills: 3 | Status: SHIPPED | OUTPATIENT
Start: 2023-04-06

## 2023-04-06 RX ORDER — ATORVASTATIN CALCIUM 80 MG/1
80 TABLET, FILM COATED ORAL DAILY
Qty: 30 TABLET | Refills: 3 | Status: SHIPPED | OUTPATIENT
Start: 2023-04-06

## 2023-04-06 RX ORDER — NIFEDIPINE 30 MG/1
30 TABLET, EXTENDED RELEASE ORAL DAILY
COMMUNITY
Start: 2023-04-01

## 2023-04-06 RX ORDER — VENLAFAXINE HYDROCHLORIDE 150 MG/1
CAPSULE, EXTENDED RELEASE ORAL
Qty: 90 CAPSULE | Refills: 1 | Status: SHIPPED | OUTPATIENT
Start: 2023-04-06

## 2023-04-06 RX ORDER — EZETIMIBE 10 MG/1
10 TABLET ORAL NIGHTLY
COMMUNITY
Start: 2023-04-01 | End: 2023-04-06 | Stop reason: SDUPTHER

## 2023-04-06 RX ORDER — CARVEDILOL 12.5 MG/1
12.5 TABLET ORAL 2 TIMES DAILY WITH MEALS
COMMUNITY
Start: 2023-04-01

## 2023-04-06 RX ORDER — EZETIMIBE 10 MG/1
10 TABLET ORAL NIGHTLY
Qty: 30 TABLET | Refills: 2 | Status: SHIPPED | OUTPATIENT
Start: 2023-04-06

## 2023-04-06 SDOH — ECONOMIC STABILITY: INCOME INSECURITY: HOW HARD IS IT FOR YOU TO PAY FOR THE VERY BASICS LIKE FOOD, HOUSING, MEDICAL CARE, AND HEATING?: NOT HARD AT ALL

## 2023-04-06 SDOH — ECONOMIC STABILITY: FOOD INSECURITY: WITHIN THE PAST 12 MONTHS, YOU WORRIED THAT YOUR FOOD WOULD RUN OUT BEFORE YOU GOT MONEY TO BUY MORE.: NEVER TRUE

## 2023-04-06 SDOH — ECONOMIC STABILITY: FOOD INSECURITY: WITHIN THE PAST 12 MONTHS, THE FOOD YOU BOUGHT JUST DIDN'T LAST AND YOU DIDN'T HAVE MONEY TO GET MORE.: NEVER TRUE

## 2023-04-06 ASSESSMENT — ANXIETY QUESTIONNAIRES
GAD7 TOTAL SCORE: 4
5. BEING SO RESTLESS THAT IT IS HARD TO SIT STILL: 0
6. BECOMING EASILY ANNOYED OR IRRITABLE: 0
3. WORRYING TOO MUCH ABOUT DIFFERENT THINGS: 1
IF YOU CHECKED OFF ANY PROBLEMS ON THIS QUESTIONNAIRE, HOW DIFFICULT HAVE THESE PROBLEMS MADE IT FOR YOU TO DO YOUR WORK, TAKE CARE OF THINGS AT HOME, OR GET ALONG WITH OTHER PEOPLE: NOT DIFFICULT AT ALL
4. TROUBLE RELAXING: 1
1. FEELING NERVOUS, ANXIOUS, OR ON EDGE: 1
7. FEELING AFRAID AS IF SOMETHING AWFUL MIGHT HAPPEN: 0
2. NOT BEING ABLE TO STOP OR CONTROL WORRYING: 1

## 2023-04-06 ASSESSMENT — PATIENT HEALTH QUESTIONNAIRE - PHQ9
2. FEELING DOWN, DEPRESSED OR HOPELESS: 1
SUM OF ALL RESPONSES TO PHQ QUESTIONS 1-9: 1
SUM OF ALL RESPONSES TO PHQ QUESTIONS 1-9: 1
SUM OF ALL RESPONSES TO PHQ QUESTIONS 1-9: 4
4. FEELING TIRED OR HAVING LITTLE ENERGY: 1
10. IF YOU CHECKED OFF ANY PROBLEMS, HOW DIFFICULT HAVE THESE PROBLEMS MADE IT FOR YOU TO DO YOUR WORK, TAKE CARE OF THINGS AT HOME, OR GET ALONG WITH OTHER PEOPLE: 1
6. FEELING BAD ABOUT YOURSELF - OR THAT YOU ARE A FAILURE OR HAVE LET YOURSELF OR YOUR FAMILY DOWN: 0
SUM OF ALL RESPONSES TO PHQ QUESTIONS 1-9: 4
SUM OF ALL RESPONSES TO PHQ9 QUESTIONS 1 & 2: 2
9. THOUGHTS THAT YOU WOULD BE BETTER OFF DEAD, OR OF HURTING YOURSELF: 0
1. LITTLE INTEREST OR PLEASURE IN DOING THINGS: 1
SUM OF ALL RESPONSES TO PHQ9 QUESTIONS 1 & 2: 1
SUM OF ALL RESPONSES TO PHQ QUESTIONS 1-9: 1
3. TROUBLE FALLING OR STAYING ASLEEP: 0
1. LITTLE INTEREST OR PLEASURE IN DOING THINGS: 1
7. TROUBLE CONCENTRATING ON THINGS, SUCH AS READING THE NEWSPAPER OR WATCHING TELEVISION: 0
5. POOR APPETITE OR OVEREATING: 1
SUM OF ALL RESPONSES TO PHQ QUESTIONS 1-9: 4
8. MOVING OR SPEAKING SO SLOWLY THAT OTHER PEOPLE COULD HAVE NOTICED. OR THE OPPOSITE, BEING SO FIGETY OR RESTLESS THAT YOU HAVE BEEN MOVING AROUND A LOT MORE THAN USUAL: 0
SUM OF ALL RESPONSES TO PHQ QUESTIONS 1-9: 1
2. FEELING DOWN, DEPRESSED OR HOPELESS: 0
SUM OF ALL RESPONSES TO PHQ QUESTIONS 1-9: 4

## 2023-04-06 ASSESSMENT — ENCOUNTER SYMPTOMS
COUGH: 0
WHEEZING: 0
CHEST TIGHTNESS: 0
SHORTNESS OF BREATH: 0

## 2023-04-06 NOTE — PROGRESS NOTES
in acute distress. Appearance: She is well-developed. She is ill-appearing (chronically ill). She is not diaphoretic. HENT:      Head: Normocephalic and atraumatic. Right Ear: External ear normal.      Left Ear: External ear normal.      Nose: Nose normal.      Mouth/Throat:      Pharynx: No oropharyngeal exudate. Eyes:      General:         Right eye: No discharge. Left eye: No discharge. Conjunctiva/sclera: Conjunctivae normal.   Cardiovascular:      Rate and Rhythm: Normal rate and regular rhythm. Heart sounds: Normal heart sounds. No murmur heard. No friction rub. No gallop. Pulmonary:      Effort: Pulmonary effort is normal. No respiratory distress. Breath sounds: Normal breath sounds. No wheezing or rales. Abdominal:      General: Bowel sounds are normal. There is no distension. Palpations: Abdomen is soft. Tenderness: There is no abdominal tenderness. Musculoskeletal:         General: No tenderness. Normal range of motion. Cervical back: Normal range of motion and neck supple. Lymphadenopathy:      Cervical: No cervical adenopathy. Skin:     General: Skin is warm and dry. Coloration: Skin is not pale. Findings: No erythema or rash. Neurological:      Mental Status: She is alert and oriented to person, place, and time. Motor: No abnormal muscle tone. Coordination: Coordination normal.   Psychiatric:         Attention and Perception: Attention normal.         Mood and Affect: Mood is depressed. Affect is flat. Speech: Speech normal.         Behavior: Behavior normal. Behavior is cooperative. Thought Content: Thought content normal. Thought content does not include suicidal ideation. Cognition and Memory: Cognition normal.         Judgment: Judgment normal.          An electronic signature was used to authenticate this note.   --JAMSHID Green - CNP

## 2023-04-06 NOTE — PATIENT INSTRUCTIONS
Schedule with cardiology  Follow up in 1 month  No additional changes to medication  Call to schedule cardiology  Start baby asa  Continue medications started in the hospital

## 2023-04-08 ENCOUNTER — APPOINTMENT (OUTPATIENT)
Dept: GENERAL RADIOLOGY | Age: 73
End: 2023-04-08
Payer: MEDICARE

## 2023-04-08 ENCOUNTER — HOSPITAL ENCOUNTER (EMERGENCY)
Age: 73
Discharge: HOME | End: 2023-04-08
Payer: MEDICARE

## 2023-04-08 VITALS
WEIGHT: 146 LBS | BODY MASS INDEX: 29.43 KG/M2 | HEART RATE: 64 BPM | RESPIRATION RATE: 20 BRPM | TEMPERATURE: 97.6 F | SYSTOLIC BLOOD PRESSURE: 159 MMHG | DIASTOLIC BLOOD PRESSURE: 82 MMHG | OXYGEN SATURATION: 100 % | HEIGHT: 59 IN

## 2023-04-08 DIAGNOSIS — R11.0 NAUSEA: Primary | ICD-10-CM

## 2023-04-08 LAB
ALBUMIN SERPL-MCNC: 5.1 G/DL (ref 3.4–5)
ALBUMIN/GLOB SERPL: 1.6 {RATIO} (ref 1.1–2.2)
ALP SERPL-CCNC: 89 U/L (ref 40–129)
ALT SERPL-CCNC: 13 U/L (ref 10–40)
ANION GAP SERPL CALCULATED.3IONS-SCNC: 10 MMOL/L (ref 3–16)
ANION GAP SERPL CALCULATED.3IONS-SCNC: 20 MMOL/L (ref 3–16)
AST SERPL-CCNC: 18 U/L (ref 15–37)
BASE EXCESS BLDV CALC-SCNC: 3.5 MMOL/L (ref -3–3)
BASOPHILS # BLD: 0.1 K/UL (ref 0–0.2)
BASOPHILS NFR BLD: 0.9 %
BILIRUB SERPL-MCNC: 0.7 MG/DL (ref 0–1)
BILIRUB UR QL STRIP.AUTO: NEGATIVE
BUN SERPL-MCNC: 7 MG/DL (ref 7–20)
BUN SERPL-MCNC: 8 MG/DL (ref 7–20)
CALCIUM SERPL-MCNC: 10.2 MG/DL (ref 8.3–10.6)
CALCIUM SERPL-MCNC: 10.4 MG/DL (ref 8.3–10.6)
CHLORIDE SERPL-SCNC: 102 MMOL/L (ref 99–110)
CHLORIDE SERPL-SCNC: 103 MMOL/L (ref 99–110)
CLARITY UR: CLEAR
CO2 BLDV-SCNC: 29 MMOL/L
CO2 SERPL-SCNC: 23 MMOL/L (ref 21–32)
CO2 SERPL-SCNC: 26 MMOL/L (ref 21–32)
COHGB MFR BLDV: 1.5 % (ref 0–1.5)
COLOR UR: YELLOW
CREAT SERPL-MCNC: 0.8 MG/DL (ref 0.6–1.2)
CREAT SERPL-MCNC: 0.9 MG/DL (ref 0.6–1.2)
DEPRECATED RDW RBC AUTO: 13.2 % (ref 12.4–15.4)
EKG ATRIAL RATE: 70 BPM
EKG DIAGNOSIS: NORMAL
EKG P AXIS: 54 DEGREES
EKG P-R INTERVAL: 140 MS
EKG Q-T INTERVAL: 432 MS
EKG QRS DURATION: 88 MS
EKG QTC CALCULATION (BAZETT): 466 MS
EKG R AXIS: 8 DEGREES
EKG T AXIS: 54 DEGREES
EKG VENTRICULAR RATE: 70 BPM
EOSINOPHIL # BLD: 0.1 K/UL (ref 0–0.6)
EOSINOPHIL NFR BLD: 1.6 %
EPI CELLS #/AREA URNS HPF: NORMAL /HPF (ref 0–5)
GFR SERPLBLD CREATININE-BSD FMLA CKD-EPI: >60 ML/MIN/{1.73_M2}
GFR SERPLBLD CREATININE-BSD FMLA CKD-EPI: >60 ML/MIN/{1.73_M2}
GLUCOSE SERPL-MCNC: 150 MG/DL (ref 70–99)
GLUCOSE SERPL-MCNC: 99 MG/DL (ref 70–99)
GLUCOSE UR STRIP.AUTO-MCNC: NEGATIVE MG/DL
HCO3 BLDV-SCNC: 27.5 MMOL/L (ref 23–29)
HCT VFR BLD AUTO: 42.5 % (ref 36–48)
HGB BLD-MCNC: 14.7 G/DL (ref 12–16)
HGB UR QL STRIP.AUTO: NEGATIVE
KETONES UR STRIP.AUTO-MCNC: NEGATIVE MG/DL
LEUKOCYTE ESTERASE UR QL STRIP.AUTO: ABNORMAL
LIPASE SERPL-CCNC: 81 U/L (ref 13–60)
LYMPHOCYTES # BLD: 2.2 K/UL (ref 1–5.1)
LYMPHOCYTES NFR BLD: 33.2 %
MAGNESIUM SERPL-MCNC: 2 MG/DL (ref 1.8–2.4)
MAGNESIUM SERPL-MCNC: 2.1 MG/DL (ref 1.8–2.4)
MCH RBC QN AUTO: 34.9 PG (ref 26–34)
MCHC RBC AUTO-ENTMCNC: 34.5 G/DL (ref 31–36)
MCV RBC AUTO: 101.2 FL (ref 80–100)
METHGB MFR BLDV: 0.3 %
MONOCYTES # BLD: 0.3 K/UL (ref 0–1.3)
MONOCYTES NFR BLD: 5 %
NEUTROPHILS # BLD: 3.9 K/UL (ref 1.7–7.7)
NEUTROPHILS NFR BLD: 59.3 %
NITRITE UR QL STRIP.AUTO: NEGATIVE
O2 CT VFR BLDV CALC: 14 VOL %
O2 THERAPY: ABNORMAL
PCO2 BLDV: 39.4 MMHG (ref 40–50)
PH BLDV: 7.46 [PH] (ref 7.35–7.45)
PH UR STRIP.AUTO: 5.5 [PH] (ref 5–8)
PLATELET # BLD AUTO: 300 K/UL (ref 135–450)
PMV BLD AUTO: 7.8 FL (ref 5–10.5)
PO2 BLDV: 36 MMHG (ref 25–40)
POTASSIUM SERPL-SCNC: 3.2 MMOL/L (ref 3.5–5.1)
POTASSIUM SERPL-SCNC: 3.5 MMOL/L (ref 3.5–5.1)
PROT SERPL-MCNC: 8.3 G/DL (ref 6.4–8.2)
PROT UR STRIP.AUTO-MCNC: NEGATIVE MG/DL
RBC # BLD AUTO: 4.2 M/UL (ref 4–5.2)
RBC #/AREA URNS HPF: NORMAL /HPF (ref 0–4)
SAO2 % BLDV: 72 %
SARS-COV-2 RDRP RESP QL NAA+PROBE: NOT DETECTED
SODIUM SERPL-SCNC: 138 MMOL/L (ref 136–145)
SODIUM SERPL-SCNC: 146 MMOL/L (ref 136–145)
SP GR UR STRIP.AUTO: <=1.005 (ref 1–1.03)
TROPONIN T SERPL-MCNC: <0.01 NG/ML
UA COMPLETE W REFLEX CULTURE PNL UR: ABNORMAL
UA DIPSTICK W REFLEX MICRO PNL UR: YES
URN SPEC COLLECT METH UR: ABNORMAL
UROBILINOGEN UR STRIP-ACNC: 0.2 E.U./DL
WBC # BLD AUTO: 6.6 K/UL (ref 4–11)
WBC #/AREA URNS HPF: NORMAL /HPF (ref 0–5)

## 2023-04-08 PROCEDURE — 82803 BLOOD GASES ANY COMBINATION: CPT

## 2023-04-08 PROCEDURE — 80053 COMPREHEN METABOLIC PANEL: CPT

## 2023-04-08 PROCEDURE — 6360000002 HC RX W HCPCS: Performed by: PHYSICIAN ASSISTANT

## 2023-04-08 PROCEDURE — 93010 ELECTROCARDIOGRAM REPORT: CPT | Performed by: INTERNAL MEDICINE

## 2023-04-08 PROCEDURE — 85025 COMPLETE CBC W/AUTO DIFF WBC: CPT

## 2023-04-08 PROCEDURE — 6370000000 HC RX 637 (ALT 250 FOR IP): Performed by: PHYSICIAN ASSISTANT

## 2023-04-08 PROCEDURE — 2580000003 HC RX 258: Performed by: PHYSICIAN ASSISTANT

## 2023-04-08 PROCEDURE — 71046 X-RAY EXAM CHEST 2 VIEWS: CPT

## 2023-04-08 PROCEDURE — 36415 COLL VENOUS BLD VENIPUNCTURE: CPT

## 2023-04-08 PROCEDURE — 93005 ELECTROCARDIOGRAM TRACING: CPT | Performed by: PHYSICIAN ASSISTANT

## 2023-04-08 PROCEDURE — 83690 ASSAY OF LIPASE: CPT

## 2023-04-08 PROCEDURE — 84484 ASSAY OF TROPONIN QUANT: CPT

## 2023-04-08 PROCEDURE — 87635 SARS-COV-2 COVID-19 AMP PRB: CPT

## 2023-04-08 PROCEDURE — 81001 URINALYSIS AUTO W/SCOPE: CPT

## 2023-04-08 PROCEDURE — 83735 ASSAY OF MAGNESIUM: CPT

## 2023-04-08 RX ORDER — ONDANSETRON 4 MG/1
4 TABLET, FILM COATED ORAL 3 TIMES DAILY PRN
Qty: 15 TABLET | Refills: 0 | Status: SHIPPED | OUTPATIENT
Start: 2023-04-08

## 2023-04-08 RX ORDER — ONDANSETRON 2 MG/ML
4 INJECTION INTRAMUSCULAR; INTRAVENOUS ONCE
Status: COMPLETED | OUTPATIENT
Start: 2023-04-08 | End: 2023-04-08

## 2023-04-08 RX ORDER — 0.9 % SODIUM CHLORIDE 0.9 %
1000 INTRAVENOUS SOLUTION INTRAVENOUS ONCE
Status: COMPLETED | OUTPATIENT
Start: 2023-04-08 | End: 2023-04-08

## 2023-04-08 RX ADMIN — POTASSIUM BICARBONATE 40 MEQ: 782 TABLET, EFFERVESCENT ORAL at 12:25

## 2023-04-08 RX ADMIN — ONDANSETRON 4 MG: 2 INJECTION INTRAMUSCULAR; INTRAVENOUS at 11:26

## 2023-04-08 RX ADMIN — SODIUM CHLORIDE 1000 ML: 9 INJECTION, SOLUTION INTRAVENOUS at 12:24

## 2023-04-08 ASSESSMENT — PAIN - FUNCTIONAL ASSESSMENT: PAIN_FUNCTIONAL_ASSESSMENT: NONE - DENIES PAIN

## 2023-04-09 NOTE — ED PROVIDER NOTES
I have reviewed the below EKG. I was not otherwise involved in this patient's care. EKG  The Ekg interpreted by myself  normal sinus rhythm with a rate of 70  Axis is   Normal  QTc is  normal  Intervals and Durations are unremarkable. No specific ST-T wave changes appreciated. No evidence of acute ischemia.    No significant change from prior EKG dated November 12, 2020        Johana Maria MD  04/08/23 1118
Record. FINAL IMPRESSION      1.  Nausea          DISPOSITION/PLAN     DISPOSITION Decision To Discharge 04/08/2023 02:01:44 PM      PATIENT REFERRED TO:  JAMSHID Baker - CNP  [address]  [phone]    Schedule an appointment as soon as possible for a visit   For a recheck in  7   days, sooner if no improvement or worsening of symptoms    [place] [facility]  [address] [facility] [address]  Go to   If symptoms worsen      DISCHARGE MEDICATIONS:  Discharge Medication List as of 4/8/2023  2:30 PM        START taking these medications    Details   ondansetron (ZOFRAN) 4 MG tablet Take 1 tablet by mouth 3 times daily as needed for Nausea or Vomiting, Disp-15 tablet, R-0Normal             DISCONTINUED MEDICATIONS:  Discharge Medication List as of 4/8/2023  2:30 PM        STOP taking these medications       Blood Pressure KIT Comments:   Reason for Stopping:                      (Please note that portions of this note were completed with a voice recognition program.  Efforts were made to edit the dictations but occasionally words are mis-transcribed.)    FRANCISCA Zavala (electronically signed)

## 2023-04-26 RX ORDER — LOSARTAN POTASSIUM 100 MG/1
TABLET ORAL
Qty: 90 TABLET | Refills: 0 | Status: SHIPPED | OUTPATIENT
Start: 2023-04-26

## 2023-05-01 RX ORDER — ATORVASTATIN CALCIUM 80 MG/1
TABLET, FILM COATED ORAL
Qty: 30 TABLET | Refills: 3 | Status: SHIPPED | OUTPATIENT
Start: 2023-05-01

## 2023-05-08 ENCOUNTER — TELEPHONE (OUTPATIENT)
Dept: FAMILY MEDICINE CLINIC | Age: 73
End: 2023-05-08

## 2023-05-08 ENCOUNTER — OFFICE VISIT (OUTPATIENT)
Dept: FAMILY MEDICINE CLINIC | Age: 73
End: 2023-05-08
Payer: MEDICARE

## 2023-05-08 VITALS
SYSTOLIC BLOOD PRESSURE: 124 MMHG | WEIGHT: 151 LBS | DIASTOLIC BLOOD PRESSURE: 78 MMHG | HEART RATE: 53 BPM | OXYGEN SATURATION: 97 % | RESPIRATION RATE: 16 BRPM | TEMPERATURE: 97.3 F | BODY MASS INDEX: 30.5 KG/M2

## 2023-05-08 DIAGNOSIS — G30.9 ALZHEIMER DISEASE (HCC): ICD-10-CM

## 2023-05-08 DIAGNOSIS — Z13.9 SCREENING DUE: Primary | ICD-10-CM

## 2023-05-08 DIAGNOSIS — Z13.9 SCREENING DUE: ICD-10-CM

## 2023-05-08 DIAGNOSIS — E55.9 VITAMIN D DEFICIENCY, UNSPECIFIED: ICD-10-CM

## 2023-05-08 DIAGNOSIS — R77.8 LOW SERUM COMPLEMENT FACTOR D: ICD-10-CM

## 2023-05-08 DIAGNOSIS — I16.0 HYPERTENSIVE URGENCY: ICD-10-CM

## 2023-05-08 DIAGNOSIS — Z00.00 MEDICARE ANNUAL WELLNESS VISIT, SUBSEQUENT: ICD-10-CM

## 2023-05-08 DIAGNOSIS — F02.80 ALZHEIMER DISEASE (HCC): ICD-10-CM

## 2023-05-08 PROCEDURE — 3074F SYST BP LT 130 MM HG: CPT | Performed by: NURSE PRACTITIONER

## 2023-05-08 PROCEDURE — 1124F ACP DISCUSS-NO DSCNMKR DOCD: CPT | Performed by: NURSE PRACTITIONER

## 2023-05-08 PROCEDURE — 3078F DIAST BP <80 MM HG: CPT | Performed by: NURSE PRACTITIONER

## 2023-05-08 PROCEDURE — 3017F COLORECTAL CA SCREEN DOC REV: CPT | Performed by: NURSE PRACTITIONER

## 2023-05-08 PROCEDURE — G0439 PPPS, SUBSEQ VISIT: HCPCS | Performed by: NURSE PRACTITIONER

## 2023-05-08 RX ORDER — DONEPEZIL HYDROCHLORIDE 10 MG/1
10 TABLET, FILM COATED ORAL NIGHTLY
Qty: 30 TABLET | Refills: 3 | Status: SHIPPED | OUTPATIENT
Start: 2023-05-08

## 2023-05-08 RX ORDER — DONEPEZIL HYDROCHLORIDE 5 MG/1
5 TABLET, FILM COATED ORAL NIGHTLY
Qty: 30 TABLET | Refills: 0 | Status: SHIPPED | OUTPATIENT
Start: 2023-05-08 | End: 2023-05-08 | Stop reason: SINTOL

## 2023-05-08 RX ORDER — VENLAFAXINE HYDROCHLORIDE 150 MG/1
CAPSULE, EXTENDED RELEASE ORAL
Qty: 90 CAPSULE | Refills: 1 | Status: SHIPPED | OUTPATIENT
Start: 2023-05-08

## 2023-05-08 SDOH — ECONOMIC STABILITY: FOOD INSECURITY: WITHIN THE PAST 12 MONTHS, YOU WORRIED THAT YOUR FOOD WOULD RUN OUT BEFORE YOU GOT MONEY TO BUY MORE.: NEVER TRUE

## 2023-05-08 SDOH — ECONOMIC STABILITY: FOOD INSECURITY: WITHIN THE PAST 12 MONTHS, THE FOOD YOU BOUGHT JUST DIDN'T LAST AND YOU DIDN'T HAVE MONEY TO GET MORE.: NEVER TRUE

## 2023-05-08 SDOH — ECONOMIC STABILITY: INCOME INSECURITY: HOW HARD IS IT FOR YOU TO PAY FOR THE VERY BASICS LIKE FOOD, HOUSING, MEDICAL CARE, AND HEATING?: NOT HARD AT ALL

## 2023-05-08 ASSESSMENT — ANXIETY QUESTIONNAIRES
3. WORRYING TOO MUCH ABOUT DIFFERENT THINGS: 0
5. BEING SO RESTLESS THAT IT IS HARD TO SIT STILL: 1
6. BECOMING EASILY ANNOYED OR IRRITABLE: 0
1. FEELING NERVOUS, ANXIOUS, OR ON EDGE: 1
4. TROUBLE RELAXING: 0
IF YOU CHECKED OFF ANY PROBLEMS ON THIS QUESTIONNAIRE, HOW DIFFICULT HAVE THESE PROBLEMS MADE IT FOR YOU TO DO YOUR WORK, TAKE CARE OF THINGS AT HOME, OR GET ALONG WITH OTHER PEOPLE: SOMEWHAT DIFFICULT
7. FEELING AFRAID AS IF SOMETHING AWFUL MIGHT HAPPEN: 0
2. NOT BEING ABLE TO STOP OR CONTROL WORRYING: 0
GAD7 TOTAL SCORE: 2

## 2023-05-08 ASSESSMENT — PATIENT HEALTH QUESTIONNAIRE - PHQ9
4. FEELING TIRED OR HAVING LITTLE ENERGY: 1
10. IF YOU CHECKED OFF ANY PROBLEMS, HOW DIFFICULT HAVE THESE PROBLEMS MADE IT FOR YOU TO DO YOUR WORK, TAKE CARE OF THINGS AT HOME, OR GET ALONG WITH OTHER PEOPLE: 1
3. TROUBLE FALLING OR STAYING ASLEEP: 1
9. THOUGHTS THAT YOU WOULD BE BETTER OFF DEAD, OR OF HURTING YOURSELF: 0
SUM OF ALL RESPONSES TO PHQ QUESTIONS 1-9: 5
7. TROUBLE CONCENTRATING ON THINGS, SUCH AS READING THE NEWSPAPER OR WATCHING TELEVISION: 0
SUM OF ALL RESPONSES TO PHQ QUESTIONS 1-9: 5
SUM OF ALL RESPONSES TO PHQ9 QUESTIONS 1 & 2: 3
1. LITTLE INTEREST OR PLEASURE IN DOING THINGS: 1
SUM OF ALL RESPONSES TO PHQ QUESTIONS 1-9: 5
6. FEELING BAD ABOUT YOURSELF - OR THAT YOU ARE A FAILURE OR HAVE LET YOURSELF OR YOUR FAMILY DOWN: 0
5. POOR APPETITE OR OVEREATING: 0
2. FEELING DOWN, DEPRESSED OR HOPELESS: 2
8. MOVING OR SPEAKING SO SLOWLY THAT OTHER PEOPLE COULD HAVE NOTICED. OR THE OPPOSITE, BEING SO FIGETY OR RESTLESS THAT YOU HAVE BEEN MOVING AROUND A LOT MORE THAN USUAL: 0
SUM OF ALL RESPONSES TO PHQ QUESTIONS 1-9: 5

## 2023-05-08 ASSESSMENT — ENCOUNTER SYMPTOMS
WHEEZING: 0
SHORTNESS OF BREATH: 0

## 2023-05-08 NOTE — PROGRESS NOTES
Subjective:      Chief Complaint   Patient presents with    Medicare AWV       Patient ID: Nataliia Moon is a 67 y.o. female who presents for a well visit. She is accompanied by her  who is her primary caretaker. In questioning patient about medications she is unable to really answer what they are and when she takes them. She looks as though she is having great difficulty understanding some things.  takes over states he will be putting her medications out and is going to use a pill container to put them in 5 days a week. To this patient states \"I think I have a pill container somewhere\". Noted when they walk this \"has somewhat of a shuffle and needs to hold onto her  for balance and support. ED [id number] was in the emergency room on 5/4/2023 for hypertensive crisis. She initially came in for shortness of breath and chest pressure. Troponins were negative but her blood pressure was 222 over 113. She told the ER doc that she had not taken her blood pressure meds that day? She was given Apresoline IV and blood pressure came down to 124/78 and she was discharged home. Hyperlipidemia-total 218 HDL 37  triglycerides 222 she is on Lipitor 80 mg but not sure if she is taking? She is also on Zetia. Coronary artery disease-Coronary Artery Disease history of stent on Plavix.   She is following up with cardiologist this week Dr. Masha Stout,    HPI see annual wellness visit    Family History   Problem Relation Age of Onset    Ovarian Cancer Mother     High Blood Pressure Father        Social History     Socioeconomic History    Marital status:      Spouse name: Not on file    Number of children: Not on file    Years of education: Not on file    Highest education level: Not on file   Occupational History    Not on file   Tobacco Use    Smoking status: Never    Smokeless tobacco: Never   Vaping Use    Vaping Use: Never used   Substance and Sexual Activity    Alcohol use:

## 2023-05-08 NOTE — TELEPHONE ENCOUNTER
Patients  Alicia Sarahleona called back said he was not aware of her needing any referrals . Please call Alicia Reis with information on why she is being referred out .

## 2023-05-08 NOTE — TELEPHONE ENCOUNTER
Notified  of referral that was put in for                  [address] [place] [address] [address] Neuroscience  Phone number provided [phone]

## 2023-05-08 NOTE — TELEPHONE ENCOUNTER
Called  [facility] to see if there is a preference to where they would like to go for Neurology?  Linda Ham is putting in a referral and would like to know, please ask when call is returned and let us know thank you

## 2023-05-08 NOTE — PATIENT INSTRUCTIONS
programs and medicines. These can increase your chances of quitting for good. Quitting smoking may be the most important step you can take to protect your heart. It is never too late to quit.     Limit alcohol to 2 drinks a day for men and 1 drink a day for women. Too much alcohol can cause health problems.     Manage other health problems such as diabetes, high blood pressure, and high cholesterol. If you think you may have a problem with alcohol or drug use, talk to your doctor. Medicines    Take your medicines exactly as prescribed. Call your doctor if you think you are having a problem with your medicine.     If your doctor recommends aspirin, take the amount directed each day. Make sure you take aspirin and not another kind of pain reliever, such as acetaminophen (Tylenol). When should you call for help? Call 911 if you have symptoms of a heart attack. These may include:    Chest pain or pressure, or a strange feeling in the chest.     Sweating.     Shortness of breath.     Pain, pressure, or a strange feeling in the back, neck, jaw, or upper belly or in one or both shoulders or arms.     Lightheadedness or sudden weakness.     A fast or irregular heartbeat. After you call 911, the  may tell you to chew 1 adult-strength or 2 to 4 low-dose aspirin. Wait for an ambulance. Do not try to drive yourself. Watch closely for changes in your health, and be sure to contact your doctor if you have any problems. Where can you learn more? Go to http://www.tom.com/ and enter F075 to learn more about \"A Healthy Heart: Care Instructions. \"  Current as of: September 7, 2022               Content Version: 13.6  © 5618-6113 Healthwise, Incorporated. Care instructions adapted under license by [facility] ([facility]).  If you have questions about a medical condition or this instruction, always ask your healthcare professional. [address] any warranty or liability for your use of this

## 2023-05-09 LAB
25(OH)D3 SERPL-MCNC: 25.6 NG/ML
ALBUMIN SERPL-MCNC: 4.2 G/DL (ref 3.4–5)
ALP SERPL-CCNC: 85 U/L (ref 40–129)
ALT SERPL-CCNC: <5 U/L (ref 10–40)
ANION GAP SERPL CALCULATED.3IONS-SCNC: 14 MMOL/L (ref 3–16)
AST SERPL-CCNC: 13 U/L (ref 15–37)
BILIRUB DIRECT SERPL-MCNC: <0.2 MG/DL (ref 0–0.3)
BILIRUB INDIRECT SERPL-MCNC: ABNORMAL MG/DL (ref 0–1)
BILIRUB SERPL-MCNC: 0.5 MG/DL (ref 0–1)
BUN SERPL-MCNC: 8 MG/DL (ref 7–20)
CALCIUM SERPL-MCNC: 9.5 MG/DL (ref 8.3–10.6)
CHLORIDE SERPL-SCNC: 103 MMOL/L (ref 99–110)
CO2 SERPL-SCNC: 22 MMOL/L (ref 21–32)
CREAT SERPL-MCNC: 1.4 MG/DL (ref 0.6–1.2)
GFR SERPLBLD CREATININE-BSD FMLA CKD-EPI: 40 ML/MIN/{1.73_M2}
GLUCOSE SERPL-MCNC: 93 MG/DL (ref 70–99)
POTASSIUM SERPL-SCNC: 3.5 MMOL/L (ref 3.5–5.1)
PROT SERPL-MCNC: 6.9 G/DL (ref 6.4–8.2)
SODIUM SERPL-SCNC: 139 MMOL/L (ref 136–145)

## 2023-05-12 LAB
6MAM UR QL: NOT DETECTED
7AMINOCLONAZEPAM UR QL: NOT DETECTED
A-OH ALPRAZ UR QL: NOT DETECTED
ALPHA-OH-MIDAZOLAM, URINE: NOT DETECTED
ALPRAZ UR QL: NOT DETECTED
AMPHET UR QL SCN: NOT DETECTED
ANNOTATION COMMENT IMP: NORMAL
ANNOTATION COMMENT IMP: NORMAL
BARBITURATES UR QL: NOT DETECTED
BUPRENORPHINE UR QL: NOT DETECTED
BZE UR QL: NOT DETECTED
CARBOXYTHC UR QL: NOT DETECTED
CARISOPRODOL UR QL: NOT DETECTED
CLONAZEPAM UR QL: NOT DETECTED
CODEINE UR QL: NOT DETECTED
CREAT UR-MCNC: 52 MG/DL (ref 20–400)
DIAZEPAM UR QL: NOT DETECTED
ETHYL GLUCURONIDE UR QL: PRESENT
FENTANYL UR QL: NOT DETECTED
GABAPENTIN: NOT DETECTED
HYDROCODONE UR QL: NOT DETECTED
HYDROMORPHONE UR QL: NOT DETECTED
LORAZEPAM UR QL: NOT DETECTED
MDA UR QL: NOT DETECTED
MDEA UR QL: NOT DETECTED
MDMA UR QL: NOT DETECTED
MEPERIDINE UR QL: NOT DETECTED
METHADONE UR QL: NOT DETECTED
METHAMPHET UR QL: NOT DETECTED
MIDAZOLAM UR QL SCN: NOT DETECTED
MORPHINE UR QL: NOT DETECTED
NALOXONE: NOT DETECTED
NORBUPRENORPHINE UR QL CFM: NOT DETECTED
NORDIAZEPAM UR QL: NOT DETECTED
NORFENTANYL UR QL: NOT DETECTED
NORHYDROCODONE UR QL CFM: NOT DETECTED
NOROXYCODONE UR QL CFM: NOT DETECTED
NOROXYMORPHONE, URINE: NOT DETECTED
OXAZEPAM UR QL: NOT DETECTED
OXYCODONE UR QL: NOT DETECTED
OXYMORPHONE UR QL: NOT DETECTED
PATHOLOGY STUDY: NORMAL
PCP UR QL: NOT DETECTED
PHENTERMINE UR QL: NOT DETECTED
PPAA UR QL: NOT DETECTED
PREGABALIN: NOT DETECTED
SERVICE CMNT-IMP: NORMAL
TAPENTADOL UR QL SCN: NOT DETECTED
TAPENTADOL-O-SULFATE, URINE: NOT DETECTED
TEMAZEPAM UR QL: NOT DETECTED
TRAMADOL UR QL: NOT DETECTED
ZOLPIDEM UR QL: NOT DETECTED

## 2023-05-19 DIAGNOSIS — N18.30 STAGE 3 CHRONIC KIDNEY DISEASE, UNSPECIFIED WHETHER STAGE 3A OR 3B CKD (HCC): Primary | ICD-10-CM

## 2023-06-01 ENCOUNTER — TELEPHONE (OUTPATIENT)
Dept: FAMILY MEDICINE CLINIC | Age: 73
End: 2023-06-01

## 2023-06-02 DIAGNOSIS — I25.10 CORONARY ARTERY DISEASE INVOLVING NATIVE CORONARY ARTERY OF NATIVE HEART WITHOUT ANGINA PECTORIS: ICD-10-CM

## 2023-06-02 DIAGNOSIS — I25.10 CORONARY ARTERY DISEASE INVOLVING NATIVE CORONARY ARTERY OF NATIVE HEART WITHOUT ANGINA PECTORIS: Primary | ICD-10-CM

## 2023-06-02 RX ORDER — SPIRONOLACTONE 25 MG/1
25 TABLET ORAL DAILY
Qty: 30 TABLET | Refills: 5 | Status: SHIPPED | OUTPATIENT
Start: 2023-06-02

## 2023-06-03 LAB
ALBUMIN SERPL-MCNC: 4.5 G/DL (ref 3.4–5)
ALBUMIN/GLOB SERPL: 1.6 {RATIO} (ref 1.1–2.2)
ALP SERPL-CCNC: 87 U/L (ref 40–129)
ALT SERPL-CCNC: 14 U/L (ref 10–40)
ANION GAP SERPL CALCULATED.3IONS-SCNC: 10 MMOL/L (ref 3–16)
AST SERPL-CCNC: 19 U/L (ref 15–37)
BILIRUB SERPL-MCNC: 0.3 MG/DL (ref 0–1)
BUN SERPL-MCNC: 14 MG/DL (ref 7–20)
CALCIUM SERPL-MCNC: 10.3 MG/DL (ref 8.3–10.6)
CHLORIDE SERPL-SCNC: 100 MMOL/L (ref 99–110)
CHOLEST SERPL-MCNC: 160 MG/DL (ref 0–199)
CO2 SERPL-SCNC: 30 MMOL/L (ref 21–32)
CREAT SERPL-MCNC: 1 MG/DL (ref 0.6–1.2)
GFR SERPLBLD CREATININE-BSD FMLA CKD-EPI: 59 ML/MIN/{1.73_M2}
GLUCOSE SERPL-MCNC: 86 MG/DL (ref 70–99)
HDLC SERPL-MCNC: 66 MG/DL (ref 40–60)
LDL CHOLESTEROL CALCULATED: 74 MG/DL
POTASSIUM SERPL-SCNC: 4.7 MMOL/L (ref 3.5–5.1)
PROT SERPL-MCNC: 7.4 G/DL (ref 6.4–8.2)
SODIUM SERPL-SCNC: 140 MMOL/L (ref 136–145)
TRIGL SERPL-MCNC: 100 MG/DL (ref 0–150)
VLDLC SERPL CALC-MCNC: 20 MG/DL

## 2023-07-05 RX ORDER — LEVOTHYROXINE SODIUM 0.15 MG/1
TABLET ORAL
Qty: 90 TABLET | Refills: 1 | Status: SHIPPED | OUTPATIENT
Start: 2023-07-05

## 2023-07-26 RX ORDER — LOSARTAN POTASSIUM 100 MG/1
TABLET ORAL
Qty: 90 TABLET | Refills: 0 | Status: SHIPPED | OUTPATIENT
Start: 2023-07-26

## 2023-07-31 RX ORDER — ATORVASTATIN CALCIUM 80 MG/1
TABLET, FILM COATED ORAL
Qty: 90 TABLET | Refills: 1 | Status: SHIPPED | OUTPATIENT
Start: 2023-07-31

## 2023-07-31 RX ORDER — CLOPIDOGREL BISULFATE 75 MG/1
TABLET ORAL
Qty: 90 TABLET | Refills: 1 | Status: SHIPPED | OUTPATIENT
Start: 2023-07-31

## 2023-07-31 RX ORDER — EZETIMIBE 10 MG/1
10 TABLET ORAL NIGHTLY
Qty: 90 TABLET | Refills: 1 | Status: SHIPPED | OUTPATIENT
Start: 2023-07-31

## 2023-08-23 ENCOUNTER — TELEPHONE (OUTPATIENT)
Dept: PRIMARY CARE CLINIC | Age: 73
End: 2023-08-23

## 2023-08-23 ENCOUNTER — SCHEDULED TELEPHONE ENCOUNTER (OUTPATIENT)
Dept: PRIMARY CARE CLINIC | Age: 73
End: 2023-08-23
Payer: MEDICARE

## 2023-08-23 DIAGNOSIS — E03.9 HYPOTHYROIDISM, UNSPECIFIED TYPE: ICD-10-CM

## 2023-08-23 DIAGNOSIS — R50.9 FEVER AND CHILLS: ICD-10-CM

## 2023-08-23 DIAGNOSIS — R53.83 FATIGUE, UNSPECIFIED TYPE: ICD-10-CM

## 2023-08-23 DIAGNOSIS — J02.9 PHARYNGITIS, UNSPECIFIED ETIOLOGY: ICD-10-CM

## 2023-08-23 DIAGNOSIS — J06.9 VIRAL UPPER RESPIRATORY TRACT INFECTION: ICD-10-CM

## 2023-08-23 DIAGNOSIS — J06.9 VIRAL UPPER RESPIRATORY TRACT INFECTION: Primary | ICD-10-CM

## 2023-08-23 PROCEDURE — 87880 STREP A ASSAY W/OPTIC: CPT | Performed by: NURSE PRACTITIONER

## 2023-08-23 PROCEDURE — 87804 INFLUENZA ASSAY W/OPTIC: CPT | Performed by: NURSE PRACTITIONER

## 2023-08-23 PROCEDURE — 99443 PR PHYS/QHP TELEPHONE EVALUATION 21-30 MIN: CPT | Performed by: NURSE PRACTITIONER

## 2023-08-23 NOTE — TELEPHONE ENCOUNTER
I have patient calling in the morning to come by for POCT for strep and FLU, Throat culture, COVID and TSH/T4 testing. Please help her to schedule.  Thanks, Zaira Murcia

## 2023-08-24 ENCOUNTER — NURSE ONLY (OUTPATIENT)
Dept: FAMILY MEDICINE CLINIC | Age: 73
End: 2023-08-24

## 2023-08-24 DIAGNOSIS — R50.9 FEVER, UNSPECIFIED FEVER CAUSE: Primary | ICD-10-CM

## 2023-08-24 DIAGNOSIS — E03.9 HYPOTHYROIDISM, UNSPECIFIED TYPE: ICD-10-CM

## 2023-08-24 DIAGNOSIS — R53.83 FATIGUE, UNSPECIFIED TYPE: ICD-10-CM

## 2023-08-24 LAB
INFLUENZA A ANTIBODY: NEGATIVE
INFLUENZA B ANTIBODY: NEGATIVE
Lab: 0
PERFORMING INSTRUMENT: NORMAL
QC PASS/FAIL: NORMAL
S PYO AG THROAT QL: NORMAL
SARS-COV-2, POC: NORMAL
T4 FREE SERPL-MCNC: 0.9 NG/DL (ref 0.9–1.8)
T4 SERPL-MCNC: 5.5 UG/DL (ref 4.5–10.9)
TSH SERPL DL<=0.005 MIU/L-ACNC: 57.89 UIU/ML (ref 0.27–4.2)

## 2023-08-24 ASSESSMENT — ENCOUNTER SYMPTOMS
TROUBLE SWALLOWING: 1
SORE THROAT: 1
SHORTNESS OF BREATH: 0
GASTROINTESTINAL NEGATIVE: 1
SINUS PAIN: 1
SINUS PRESSURE: 1
COUGH: 0
WHEEZING: 0
CHEST TIGHTNESS: 0

## 2023-08-24 NOTE — PROGRESS NOTES
Was seen yesterday 8/23/23 for VV test was ordered POCT covid,flu,strep Throat culture was ordered and sent due to POCT results. Pt was scheduled for a follow up with PCP per virtualist and TSH,T4 was drawn at the lab. Results of POCT was sent to ordering provider.

## 2023-08-24 NOTE — RESULT ENCOUNTER NOTE
Please inform patient that all of her testing- flu, rapid strep, and covid was negative. We will call in 48-72 hours with the throat culture results. Recommend in person evaluation if symptoms worsen in the next few days.  TY

## 2023-08-24 NOTE — PROGRESS NOTES
2023    TELEHEALTH EVALUATION -- Audio/Visual  Chief Complaint   Patient presents with    Fever    Chills    Fatigue    Pharyngitis    Concern For COVID-19       AUDIO ONLY VISIT UNABLE TO CONNECT DUE TO CONNECTIVITY ISSUES.    HPI:    Conor Flores (:  1950) has requested an audio/video evaluation for the following concern(s): This is an established patient of Nelly Cosby CNP. This is the first time I am seeing her today. She request this visit for possible Day 4 of symptoms of fatigue, fever/chills, pharyngitis possible COVID. Details of this discussion including any medical advice provided: Day 4 of symptoms including: Overwhelming fatigue and wanting to sleep all day, fever/chills, body aches and sore throat. She has tested for COVID ([facility] home test and is not ) and is negative. Sore throat mainly on right side. Painful to swallow. She has had strep in the past. Denies SOB, No wheezing, No cough. + Sinus pressure + Sinus pain, + headache. No mucus and if she does it is clear. No ear pain. She does have hypothyroid and last TSH/T4 I see in chart was 2022, she did not have redrawn as ordered. Review of Systems   Constitutional:  Positive for chills, fatigue and fever. HENT:  Positive for congestion, sinus pressure, sinus pain, sore throat and trouble swallowing. Negative for ear discharge, ear pain and postnasal drip. Respiratory:  Negative for cough, chest tightness, shortness of breath and wheezing. Cardiovascular: Negative. Gastrointestinal: Negative. Genitourinary: Negative. Musculoskeletal:  Positive for myalgias. Neurological:  Positive for headaches. Psychiatric/Behavioral:  Positive for sleep disturbance. Prior to Visit Medications    Medication Sig Taking?  Authorizing Provider   clopidogrel (PLAVIX) 75 MG tablet TAKE 1 TABLET BY MOUTH EVERY DAY Yes JAMSHID Bardales - CNP   ezetimibe (ZETIA) 10 MG tablet TAKE 1 TABLET BY
Dayne Donovan is a 67 y.o. female evaluated via telephone on 2023 for Fever, Chills, Fatigue, Pharyngitis, and Concern For COVID-19    Documentation:  I communicated with the patient and/or health care decision maker about This is an established patient of Heather Land CNP. This is the first time I am seeing her today. She request this visit for possible Day 4 of symptoms of fatigue, fever/chills, pharyngitis possible COVID. Details of this discussion including any medical advice provided: Day 4 of symptoms including: Overwhelming fatigue and wanting to sleep all day, fever/chills, body aches and sore throat. She has tested for COVID ([facility] home test and is not ) and is negative. Sore throat mainly on right side. Painful to swallow. She has had strep in the past. Denies SOB, No wheezing, No cough. + Sinus pressure + Sinus pain, + headache. No mucus and if she does it is clear. No ear pain. She does have hypothyroid and last TSH/T4 I see in chart was 2022, she did not have redrawn as ordered. I will add COVID test, Throat swab, POCT rapid strep and flu test as well as TSH/T4 to be done at office tomorrow. She will need follow up for her medications. She states she is not sure how to take them and has been taking all medicines she is prescribed at one time. Will have office print out med list with schedule for her tomorrow to provide her so she can set up her pill box but is still highly recommended that she follow up Thyroid and medications. Also:  Notify office if you do not improve or have worsening of condition. Take medication as prescribed. Most consistent with viral illness and antibiotics will not help at this time, however, continue to monitor if no improvement or worsening could develop in to bacterial infection and can be treated with antibiotics.  (Test pending and will treat accordingly based on test outcomes)  Drink plenty of fluids and rest.  Practice good hand
Future Appointments   Date Time Provider [address]   9/6/2023  1:00  Royce Diop
beer per week     Comment: social    Drug use: No        Allergies   Allergen Reactions    Lisinopril-Hydrochlorothiazide Nausea Only    Sulfa Antibiotics    ,   Past Medical History:   Diagnosis Date    Anxiety     CAD (coronary artery disease)     Fibromyalgia     Hyperlipidemia     Hypertension     Hypothyroidism     Osteopenia     Dexa 7/08    Thyroid disease     Transient ischemic attack 2003   ,   Past Surgical History:   Procedure Laterality Date    APPENDECTOMY      COLONOSCOPY      CORONARY STENT PLACEMENT  03/30/2023    EYE SURGERY      HYSTERECTOMY (CERVIX STATUS UNKNOWN)  1989   ,   Social History     Tobacco Use    Smoking status: Never    Smokeless tobacco: Never   Vaping Use    Vaping Use: Never used   Substance Use Topics    Alcohol use:  Yes     Alcohol/week: 2.0 standard drinks     Types: 2 Cans of beer per week     Comment: social    Drug use: No   ,   Family History   Problem Relation Age of Onset    Ovarian Cancer Mother     High Blood Pressure Father    ,   Immunization History   Administered Date(s) Administered    Influenza 10/25/2013    Influenza Virus Vaccine 12/02/2014, 10/09/2015    Influenza, AFLURIA (age 1 yrs+), FLUZONE, (age 10 mo+), MDV, 0.5mL 10/27/2016    Pneumococcal, PCV-13, PREVNAR 15, (age 6w+), IM, 0.5mL 04/11/2016    Pneumococcal, PPSV23, PNEUMOVAX 23, (age 2y+), SC/IM, 0.5mL 12/02/2014    Zoster Live (Zostavax) 11/03/2014       PHYSICAL EXAMINATION:  [ INSTRUCTIONS:  \"[x]\" Indicates a positive item  \"[]\" Indicates a negative item  -- DELETE ALL ITEMS NOT EXAMINED]  Vital Signs: (As obtained by patient/caregiver or practitioner observation)    Blood pressure-  Heart rate-    Respiratory rate-    Temperature-  Pulse oximetry-     Constitutional: [x] Appears well-developed and well-nourished [x] No apparent distress      [] Abnormal-   Mental status  [x] Alert and awake  [x] Oriented to person/place/time [x]Able to follow commands      Eyes:  EOM    [x]  Normal  []

## 2023-08-24 NOTE — TELEPHONE ENCOUNTER
Completed, results submitted to ordering provider and culture was sent, labs was completed at the lab.

## 2023-08-27 LAB — BACTERIA THROAT AEROBE CULT: NORMAL

## 2023-09-05 DIAGNOSIS — G30.9 ALZHEIMER DISEASE (HCC): ICD-10-CM

## 2023-09-05 DIAGNOSIS — F02.80 ALZHEIMER DISEASE (HCC): ICD-10-CM

## 2023-09-06 RX ORDER — DONEPEZIL HYDROCHLORIDE 10 MG/1
10 TABLET, FILM COATED ORAL NIGHTLY
Qty: 90 TABLET | Refills: 1 | Status: SHIPPED | OUTPATIENT
Start: 2023-09-06

## 2023-09-18 ENCOUNTER — OFFICE VISIT (OUTPATIENT)
Dept: FAMILY MEDICINE CLINIC | Age: 73
End: 2023-09-18

## 2023-09-18 VITALS
BODY MASS INDEX: 31.31 KG/M2 | TEMPERATURE: 97.3 F | WEIGHT: 155 LBS | OXYGEN SATURATION: 98 % | SYSTOLIC BLOOD PRESSURE: 138 MMHG | RESPIRATION RATE: 16 BRPM | DIASTOLIC BLOOD PRESSURE: 96 MMHG | HEART RATE: 69 BPM

## 2023-09-18 DIAGNOSIS — Z91.89 AT RISK FOR MEDICATION NONCOMPLIANCE: ICD-10-CM

## 2023-09-18 DIAGNOSIS — G30.9 ALZHEIMER'S DISEASE, UNSPECIFIED (CODE) (HCC): Primary | ICD-10-CM

## 2023-09-18 RX ORDER — NITROGLYCERIN 0.4 MG/1
0.4 TABLET SUBLINGUAL EVERY 5 MIN PRN
Qty: 25 TABLET | Refills: 2 | Status: SHIPPED | OUTPATIENT
Start: 2023-09-18

## 2023-09-18 SDOH — ECONOMIC STABILITY: FOOD INSECURITY: WITHIN THE PAST 12 MONTHS, THE FOOD YOU BOUGHT JUST DIDN'T LAST AND YOU DIDN'T HAVE MONEY TO GET MORE.: NEVER TRUE

## 2023-09-18 SDOH — ECONOMIC STABILITY: FOOD INSECURITY: WITHIN THE PAST 12 MONTHS, YOU WORRIED THAT YOUR FOOD WOULD RUN OUT BEFORE YOU GOT MONEY TO BUY MORE.: NEVER TRUE

## 2023-09-18 SDOH — ECONOMIC STABILITY: INCOME INSECURITY: HOW HARD IS IT FOR YOU TO PAY FOR THE VERY BASICS LIKE FOOD, HOUSING, MEDICAL CARE, AND HEATING?: NOT HARD AT ALL

## 2023-09-18 ASSESSMENT — PATIENT HEALTH QUESTIONNAIRE - PHQ9
6. FEELING BAD ABOUT YOURSELF - OR THAT YOU ARE A FAILURE OR HAVE LET YOURSELF OR YOUR FAMILY DOWN: 0
2. FEELING DOWN, DEPRESSED OR HOPELESS: 0
SUM OF ALL RESPONSES TO PHQ QUESTIONS 1-9: 0
1. LITTLE INTEREST OR PLEASURE IN DOING THINGS: 0
4. FEELING TIRED OR HAVING LITTLE ENERGY: 0
SUM OF ALL RESPONSES TO PHQ QUESTIONS 1-9: 0
10. IF YOU CHECKED OFF ANY PROBLEMS, HOW DIFFICULT HAVE THESE PROBLEMS MADE IT FOR YOU TO DO YOUR WORK, TAKE CARE OF THINGS AT HOME, OR GET ALONG WITH OTHER PEOPLE: 0
3. TROUBLE FALLING OR STAYING ASLEEP: 0
5. POOR APPETITE OR OVEREATING: 0
7. TROUBLE CONCENTRATING ON THINGS, SUCH AS READING THE NEWSPAPER OR WATCHING TELEVISION: 0
SUM OF ALL RESPONSES TO PHQ9 QUESTIONS 1 & 2: 0
9. THOUGHTS THAT YOU WOULD BE BETTER OFF DEAD, OR OF HURTING YOURSELF: 0
SUM OF ALL RESPONSES TO PHQ QUESTIONS 1-9: 0
SUM OF ALL RESPONSES TO PHQ QUESTIONS 1-9: 0
8. MOVING OR SPEAKING SO SLOWLY THAT OTHER PEOPLE COULD HAVE NOTICED. OR THE OPPOSITE, BEING SO FIGETY OR RESTLESS THAT YOU HAVE BEEN MOVING AROUND A LOT MORE THAN USUAL: 0

## 2023-09-18 ASSESSMENT — ANXIETY QUESTIONNAIRES
1. FEELING NERVOUS, ANXIOUS, OR ON EDGE: 0
6. BECOMING EASILY ANNOYED OR IRRITABLE: 0
4. TROUBLE RELAXING: 0
3. WORRYING TOO MUCH ABOUT DIFFERENT THINGS: 0
5. BEING SO RESTLESS THAT IT IS HARD TO SIT STILL: 0
GAD7 TOTAL SCORE: 0
7. FEELING AFRAID AS IF SOMETHING AWFUL MIGHT HAPPEN: 0
2. NOT BEING ABLE TO STOP OR CONTROL WORRYING: 0
IF YOU CHECKED OFF ANY PROBLEMS ON THIS QUESTIONNAIRE, HOW DIFFICULT HAVE THESE PROBLEMS MADE IT FOR YOU TO DO YOUR WORK, TAKE CARE OF THINGS AT HOME, OR GET ALONG WITH OTHER PEOPLE: NOT DIFFICULT AT ALL

## 2023-09-18 ASSESSMENT — ENCOUNTER SYMPTOMS
SHORTNESS OF BREATH: 0
COUGH: 0
WHEEZING: 0

## 2023-09-18 NOTE — PROGRESS NOTES
Subjective:      Chief Complaint   Patient presents with    Hypertension     Follow up discuss donepezil and spirolactone,  see nifedipine/carvedilol        Patient ID: Mana Ohara is a 67 y.o. female who presents for refill of medications. She also complains of extreme fatigue. She tells me she sleeps all day and all night.  accompanies her and asked me if I can help him with writing out all of her medications the times that should be taken and what they are for. AVS provided for  with written description of each medication, and what is the best time of the day or night to take them. Patient also brought along her medication in a shopping bag, stating these are the meds she was uncertain of. In the bag were a large number of duplicate medications and all the bottles were primarily filled telling me she really has not taken her medicine in quite some time. The  will go home and combined with duplicates making it easier and keep track of all medications from this timeline. Her TSH was 57.89 showing me that she most likely had never taken her levothyroxine. Patient does not know the names of medications or what they are for. Note she was treated for an upper respiratory tract infection at the end of August her fatigue may be a post viral load    HPI see above    Family History   Problem Relation Age of Onset    Ovarian Cancer Mother     High Blood Pressure Father        Social History     Socioeconomic History    Marital status:      Spouse name: Not on file    Number of children: Not on file    Years of education: Not on file    Highest education level: Not on file   Occupational History    Not on file   Tobacco Use    Smoking status: Never    Smokeless tobacco: Never   Vaping Use    Vaping Use: Never used   Substance and Sexual Activity    Alcohol use: Yes     Alcohol/week: 2.0 standard drinks of alcohol     Types: 2 Cans of beer per week     Comment: social    Drug use:  No

## 2023-09-18 NOTE — PATIENT INSTRUCTIONS
Alzheimer's/noncompliance-over 40 medication bottles brought in with patient almost all full.   Consider patient has not been taking medication  will assume dispensing medication to patient  AVS rewritten with medication description times daily take

## 2023-10-10 ENCOUNTER — HOSPITAL ENCOUNTER (EMERGENCY)
Age: 73
Discharge: HOME | End: 2023-10-10
Attending: STUDENT IN AN ORGANIZED HEALTH CARE EDUCATION/TRAINING PROGRAM
Payer: MEDICARE

## 2023-10-10 ENCOUNTER — APPOINTMENT (OUTPATIENT)
Dept: CT IMAGING | Age: 73
End: 2023-10-10
Payer: MEDICARE

## 2023-10-10 VITALS
SYSTOLIC BLOOD PRESSURE: 150 MMHG | DIASTOLIC BLOOD PRESSURE: 77 MMHG | TEMPERATURE: 98.2 F | RESPIRATION RATE: 14 BRPM | OXYGEN SATURATION: 99 % | HEART RATE: 71 BPM

## 2023-10-10 DIAGNOSIS — S09.90XA CLOSED HEAD INJURY, INITIAL ENCOUNTER: Primary | ICD-10-CM

## 2023-10-10 PROCEDURE — 6370000000 HC RX 637 (ALT 250 FOR IP): Performed by: STUDENT IN AN ORGANIZED HEALTH CARE EDUCATION/TRAINING PROGRAM

## 2023-10-10 PROCEDURE — 72125 CT NECK SPINE W/O DYE: CPT

## 2023-10-10 PROCEDURE — 70450 CT HEAD/BRAIN W/O DYE: CPT

## 2023-10-10 PROCEDURE — 99284 EMERGENCY DEPT VISIT MOD MDM: CPT

## 2023-10-10 RX ORDER — ACETAMINOPHEN 500 MG
1000 TABLET ORAL
Status: COMPLETED | OUTPATIENT
Start: 2023-10-10 | End: 2023-10-10

## 2023-10-10 RX ADMIN — ACETAMINOPHEN 1000 MG: 500 TABLET ORAL at 15:12

## 2023-10-10 ASSESSMENT — LIFESTYLE VARIABLES
HOW OFTEN DO YOU HAVE A DRINK CONTAINING ALCOHOL: 2-3 TIMES A WEEK
HOW MANY STANDARD DRINKS CONTAINING ALCOHOL DO YOU HAVE ON A TYPICAL DAY: 3 OR 4

## 2023-10-10 ASSESSMENT — PAIN DESCRIPTION - LOCATION: LOCATION: HEAD

## 2023-10-10 ASSESSMENT — PAIN SCALES - GENERAL: PAINLEVEL_OUTOF10: 8

## 2023-10-10 ASSESSMENT — PAIN - FUNCTIONAL ASSESSMENT: PAIN_FUNCTIONAL_ASSESSMENT: 0-10

## 2023-10-10 NOTE — DISCHARGE INSTRUCTIONS
Return to the nearest ED if you develop severe worsening headache, nausea and vomiting, changes in vision, confusion, other concerning symptoms. You can take 1000 mg of acetaminophen every 8 hours and 600 mg of ibuprofen every 6 hours for pain. Follow-up with your PCP in 2 to 3 days.

## 2023-10-11 NOTE — ED PROVIDER NOTES
[address] [facility] ED      CHIEF COMPLAINT  Fall (Patient states she fell a week ago and hit head, Denies LOC but is on blood thinners. Since then has had a terrible headache and feeling off balanced. )       HISTORY OF PRESENT ILLNESS  Gayle Carrel is a 67 y.o. female  who presents to the ED complaining of headache and feeling off balance. Patient states that she fell and struck her head on a shopping cart a week ago. Continues to have a scalp hematoma and some bruising. Is also been continued to have a headache and feeling off balance. And I get evaluated when this first occurred. Denies any nausea, vomiting, neck pain or stiffness. No change in vision, mild nausea, no vomiting. She does take Plavix. No anticoagulation. No other complaints, modifying factors or associated symptoms. I have reviewed the following from the nursing documentation. Past Medical History:   Diagnosis Date    Anxiety     CAD (coronary artery disease)     Fibromyalgia     Hyperlipidemia     Hypertension     Hypothyroidism     Osteopenia     Dexa 7/08    Thyroid disease     Transient ischemic attack 2003     Past Surgical History:   Procedure Laterality Date    APPENDECTOMY      COLONOSCOPY      CORONARY STENT PLACEMENT  03/30/2023    EYE SURGERY      HYSTERECTOMY (CERVIX STATUS UNKNOWN)  1989     Family History   Problem Relation Age of Onset    Ovarian Cancer Mother     High Blood Pressure Father      Social History     Socioeconomic History    Marital status:      Spouse name: Not on file    Number of children: Not on file    Years of education: Not on file    Highest education level: Not on file   Occupational History    Not on file   Tobacco Use    Smoking status: Some Days     Packs/day: .5     Types: Cigarettes    Smokeless tobacco: Never   Vaping Use    Vaping Use: Never used   Substance and Sexual Activity    Alcohol use:  Yes     Alcohol/week: 2.0 standard drinks of alcohol     Types: 2 Cans of beer

## 2023-10-27 NOTE — TELEPHONE ENCOUNTER
Future Appointments   Date Time Provider [address] [place] [address]   1/17/2024  2:00 PM JAMSHID Sandra - SAE GARCIA 9/18/2023

## 2023-10-30 RX ORDER — LOSARTAN POTASSIUM 100 MG/1
TABLET ORAL
Qty: 90 TABLET | Refills: 0 | Status: SHIPPED | OUTPATIENT
Start: 2023-10-30

## 2023-11-20 ENCOUNTER — TELEPHONE (OUTPATIENT)
Dept: FAMILY MEDICINE CLINIC | Age: 73
End: 2023-11-20

## 2023-11-20 NOTE — TELEPHONE ENCOUNTER
LOV 9/18/2023    Future Appointments   Date Time Provider [address]   1/17/2024  2:00 PM Jasmin [place], APRN - CNP [facility] [place] [facility]     LMTCB  needs to schedule nurse visit for Blood Pressure check

## 2024-01-03 ENCOUNTER — COMMUNITY OUTREACH (OUTPATIENT)
Dept: FAMILY MEDICINE CLINIC | Age: 74
End: 2024-01-03

## 2024-01-03 RX ORDER — LEVOTHYROXINE SODIUM 0.15 MG/1
TABLET ORAL
Qty: 90 TABLET | Refills: 1 | Status: SHIPPED | OUTPATIENT
Start: 2024-01-03

## 2024-01-03 NOTE — PROGRESS NOTES
Patient's HM shows they are overdue for Mammogram.   [employer] and  files searched without success.

## 2024-01-17 ENCOUNTER — OFFICE VISIT (OUTPATIENT)
Dept: FAMILY MEDICINE CLINIC | Age: 74
End: 2024-01-17
Payer: MEDICARE

## 2024-01-17 VITALS
RESPIRATION RATE: 16 BRPM | DIASTOLIC BLOOD PRESSURE: 88 MMHG | HEART RATE: 81 BPM | TEMPERATURE: 97.3 F | WEIGHT: 154 LBS | BODY MASS INDEX: 31.1 KG/M2 | OXYGEN SATURATION: 98 % | SYSTOLIC BLOOD PRESSURE: 146 MMHG

## 2024-01-17 DIAGNOSIS — R41.0 CONFUSION: ICD-10-CM

## 2024-01-17 DIAGNOSIS — I10 ESSENTIAL (PRIMARY) HYPERTENSION: ICD-10-CM

## 2024-01-17 DIAGNOSIS — R41.0 CONFUSION: Primary | ICD-10-CM

## 2024-01-17 DIAGNOSIS — I20.0 UNSTABLE ANGINA PECTORIS (HCC): ICD-10-CM

## 2024-01-17 PROCEDURE — 1124F ACP DISCUSS-NO DSCNMKR DOCD: CPT | Performed by: NURSE PRACTITIONER

## 2024-01-17 PROCEDURE — 3079F DIAST BP 80-89 MM HG: CPT | Performed by: NURSE PRACTITIONER

## 2024-01-17 PROCEDURE — 99214 OFFICE O/P EST MOD 30 MIN: CPT | Performed by: NURSE PRACTITIONER

## 2024-01-17 PROCEDURE — 3077F SYST BP >= 140 MM HG: CPT | Performed by: NURSE PRACTITIONER

## 2024-01-17 SDOH — ECONOMIC STABILITY: INCOME INSECURITY: HOW HARD IS IT FOR YOU TO PAY FOR THE VERY BASICS LIKE FOOD, HOUSING, MEDICAL CARE, AND HEATING?: NOT HARD AT ALL

## 2024-01-17 SDOH — ECONOMIC STABILITY: FOOD INSECURITY: WITHIN THE PAST 12 MONTHS, THE FOOD YOU BOUGHT JUST DIDN'T LAST AND YOU DIDN'T HAVE MONEY TO GET MORE.: NEVER TRUE

## 2024-01-17 SDOH — ECONOMIC STABILITY: FOOD INSECURITY: WITHIN THE PAST 12 MONTHS, YOU WORRIED THAT YOUR FOOD WOULD RUN OUT BEFORE YOU GOT MONEY TO BUY MORE.: NEVER TRUE

## 2024-01-17 ASSESSMENT — ANXIETY QUESTIONNAIRES
IF YOU CHECKED OFF ANY PROBLEMS ON THIS QUESTIONNAIRE, HOW DIFFICULT HAVE THESE PROBLEMS MADE IT FOR YOU TO DO YOUR WORK, TAKE CARE OF THINGS AT HOME, OR GET ALONG WITH OTHER PEOPLE: NOT DIFFICULT AT ALL
7. FEELING AFRAID AS IF SOMETHING AWFUL MIGHT HAPPEN: 0
3. WORRYING TOO MUCH ABOUT DIFFERENT THINGS: 0
5. BEING SO RESTLESS THAT IT IS HARD TO SIT STILL: 0
4. TROUBLE RELAXING: 0
GAD7 TOTAL SCORE: 0
2. NOT BEING ABLE TO STOP OR CONTROL WORRYING: 0
6. BECOMING EASILY ANNOYED OR IRRITABLE: 0
1. FEELING NERVOUS, ANXIOUS, OR ON EDGE: 0

## 2024-01-17 ASSESSMENT — PATIENT HEALTH QUESTIONNAIRE - PHQ9
1. LITTLE INTEREST OR PLEASURE IN DOING THINGS: 0
2. FEELING DOWN, DEPRESSED OR HOPELESS: 0
SUM OF ALL RESPONSES TO PHQ QUESTIONS 1-9: 0
SUM OF ALL RESPONSES TO PHQ QUESTIONS 1-9: 0
SUM OF ALL RESPONSES TO PHQ9 QUESTIONS 1 & 2: 0
SUM OF ALL RESPONSES TO PHQ QUESTIONS 1-9: 0
SUM OF ALL RESPONSES TO PHQ QUESTIONS 1-9: 0

## 2024-01-17 ASSESSMENT — ENCOUNTER SYMPTOMS
COUGH: 0
WHEEZING: 1
SHORTNESS OF BREATH: 0
APNEA: 0
CHOKING: 0
CHEST TIGHTNESS: 0
STRIDOR: 0

## 2024-01-17 NOTE — PROGRESS NOTES
Subjective:      Chief Complaint   Patient presents with    Hypertension       Patient ID: Siobhan Flores is a 73 y.o. female who presents for increased confusion.  She does not know what meds she is on know what meds she is taking.  She cannot remember if she went to see any neurologist even though it was ordered.   is with patient and he to is confused about what she has at done and what she has not had done.  Her last visit here was September 2023 again for confusion.  Again the medication list was reviewed, what medications were for and how to be taken or handwritten for the patient and the .  She was referred to Dr. Bunn  neurology at that time.  They cannot recall that office visit.  Denies any cardiac issues but tells me she has 3 stents placed about a year ago and does not know when she should see her cardiologist.  Denies any chest pain although she tells me at certain times she gets a heaviness midsternal and takes nitroglycerin goes away.  She has not let the cardiologist know this  At this point in time I am unable to understand whether or not the patient is taking any of her medications since she looks at me and tells me she does not remember taking them.  The  is not of any help in understanding this either.  Seems to be this exact same problem that brought her here last year around this time..            HPI see above    Family History   Problem Relation Age of Onset    Ovarian Cancer Mother     High Blood Pressure Father        Social History     Socioeconomic History    Marital status:      Spouse name: Not on file    Number of children: Not on file    Years of education: Not on file    Highest education level: Not on file   Occupational History    Not on file   Tobacco Use    Smoking status: Some Days     Current packs/day: 0.50     Types: Cigarettes    Smokeless tobacco: Never   Vaping Use    Vaping Use: Never used   Substance and Sexual Activity    Alcohol use: Yes

## 2024-01-17 NOTE — PATIENT INSTRUCTIONS
Confusion, disorientation-seems to be worsening.  Last seen September 2023 at which time she was prescribed Aricept however she tells me she has never taken it.  Also referred her to neurology she has never seen that person either.  We wrote the medication list and highlighted times of the day to take the pills.  Discussed the pathophysiology of her disease and why the medication works.  Referral to Dr. Jocelyne Bunn neurology  Labs

## 2024-01-18 LAB
ALBUMIN SERPL-MCNC: 4.8 G/DL (ref 3.4–5)
ALBUMIN/GLOB SERPL: 1.5 {RATIO} (ref 1.1–2.2)
ALP SERPL-CCNC: 90 U/L (ref 40–129)
ALT SERPL-CCNC: 15 U/L (ref 10–40)
ANION GAP SERPL CALCULATED.3IONS-SCNC: 14 MMOL/L (ref 3–16)
AST SERPL-CCNC: 25 U/L (ref 15–37)
BASOPHILS # BLD: 0.1 K/UL (ref 0–0.2)
BASOPHILS NFR BLD: 0.8 %
BILIRUB SERPL-MCNC: 0.5 MG/DL (ref 0–1)
BUN SERPL-MCNC: 13 MG/DL (ref 7–20)
CALCIUM SERPL-MCNC: 10.1 MG/DL (ref 8.3–10.6)
CHLORIDE SERPL-SCNC: 99 MMOL/L (ref 99–110)
CHOLEST SERPL-MCNC: 324 MG/DL (ref 0–199)
CO2 SERPL-SCNC: 27 MMOL/L (ref 21–32)
CREAT SERPL-MCNC: 1.1 MG/DL (ref 0.6–1.2)
DEPRECATED RDW RBC AUTO: 14 % (ref 12.4–15.4)
EOSINOPHIL # BLD: 0.4 K/UL (ref 0–0.6)
EOSINOPHIL NFR BLD: 3.3 %
GFR SERPLBLD CREATININE-BSD FMLA CKD-EPI: 53 ML/MIN/{1.73_M2}
GLUCOSE SERPL-MCNC: 89 MG/DL (ref 70–99)
HCT VFR BLD AUTO: 43.3 % (ref 36–48)
HDLC SERPL-MCNC: 56 MG/DL (ref 40–60)
HGB BLD-MCNC: 14.5 G/DL (ref 12–16)
LDLC SERPL CALC-MCNC: 216 MG/DL
LYMPHOCYTES # BLD: 3.6 K/UL (ref 1–5.1)
LYMPHOCYTES NFR BLD: 33.3 %
MCH RBC QN AUTO: 32.6 PG (ref 26–34)
MCHC RBC AUTO-ENTMCNC: 33.4 G/DL (ref 31–36)
MCV RBC AUTO: 97.7 FL (ref 80–100)
MONOCYTES # BLD: 0.5 K/UL (ref 0–1.3)
MONOCYTES NFR BLD: 4.9 %
NEUTROPHILS # BLD: 6.2 K/UL (ref 1.7–7.7)
NEUTROPHILS NFR BLD: 57.7 %
PLATELET # BLD AUTO: 312 K/UL (ref 135–450)
PMV BLD AUTO: 8 FL (ref 5–10.5)
POTASSIUM SERPL-SCNC: 3.7 MMOL/L (ref 3.5–5.1)
PROT SERPL-MCNC: 8 G/DL (ref 6.4–8.2)
SODIUM SERPL-SCNC: 140 MMOL/L (ref 136–145)
T4 FREE SERPL-MCNC: 0.2 NG/DL (ref 0.9–1.8)
TRIGL SERPL-MCNC: 261 MG/DL (ref 0–150)
TSH SERPL DL<=0.005 MIU/L-ACNC: 53.42 UIU/ML (ref 0.27–4.2)
VLDLC SERPL CALC-MCNC: 52 MG/DL
WBC # BLD AUTO: 10.8 K/UL (ref 4–11)

## 2024-01-24 RX ORDER — CLOPIDOGREL BISULFATE 75 MG/1
TABLET ORAL
Qty: 90 TABLET | Refills: 1 | Status: SHIPPED | OUTPATIENT
Start: 2024-01-24

## 2024-01-24 RX ORDER — EZETIMIBE 10 MG/1
10 TABLET ORAL NIGHTLY
Qty: 90 TABLET | Refills: 1 | Status: SHIPPED | OUTPATIENT
Start: 2024-01-24

## 2024-01-24 RX ORDER — ATORVASTATIN CALCIUM 80 MG/1
TABLET, FILM COATED ORAL
Qty: 90 TABLET | Refills: 1 | Status: SHIPPED | OUTPATIENT
Start: 2024-01-24

## 2024-02-05 RX ORDER — LOSARTAN POTASSIUM 100 MG/1
TABLET ORAL
Qty: 90 TABLET | Refills: 0 | Status: SHIPPED | OUTPATIENT
Start: 2024-02-05

## 2024-02-05 RX ORDER — SPIRONOLACTONE 25 MG/1
25 TABLET ORAL DAILY
Qty: 90 TABLET | Refills: 1 | Status: SHIPPED | OUTPATIENT
Start: 2024-02-05

## 2024-02-09 ENCOUNTER — TELEPHONE (OUTPATIENT)
Dept: FAMILY MEDICINE CLINIC | Age: 74
End: 2024-02-09

## 2024-02-09 NOTE — TELEPHONE ENCOUNTER
Patient called in regarding lab results. Given Information. Scheduled for appointment.   Please let pt know results creatinine is normal at 1.1 however your GFR which is the filtration rate of your kidneys is low at 53 it should be greater than 60.  Based on your past readings which also were also low I would like you to be seen by a nephrologist.  If you are already seen 1 it is fine if you need 1 please let me know.  Total cholesterol is high at 324 (normal 0-1 99)  HDL or good cholesterol 56  LDL or bad cholesterol is 216 (normal is less than 100)  Triglycerides are very high at 261 (normal is 0-150)  We need to address these numbers and improve them with diet and exercise and perhaps even different medication.  Please call the office and make an appointment for cholesterol  Your thyroid level is also high and you should be taking Synthroid for this?  Blood count is normal.  Please make an appointment to see me for your cholesterol    Future Appointments   Date Time Provider Department Center   2/12/2024  8:20 AM Sonali Vazquez MD [facility] - [facility]

## 2024-02-12 ENCOUNTER — OFFICE VISIT (OUTPATIENT)
Dept: FAMILY MEDICINE CLINIC | Age: 74
End: 2024-02-12
Payer: MEDICARE

## 2024-02-12 VITALS
HEART RATE: 76 BPM | DIASTOLIC BLOOD PRESSURE: 102 MMHG | SYSTOLIC BLOOD PRESSURE: 156 MMHG | RESPIRATION RATE: 16 BRPM | BODY MASS INDEX: 30.54 KG/M2 | WEIGHT: 151.2 LBS | TEMPERATURE: 96 F | OXYGEN SATURATION: 95 %

## 2024-02-12 DIAGNOSIS — I10 ESSENTIAL (PRIMARY) HYPERTENSION: Primary | ICD-10-CM

## 2024-02-12 DIAGNOSIS — G30.9 ALZHEIMER'S DISEASE, UNSPECIFIED (CODE) (HCC): ICD-10-CM

## 2024-02-12 DIAGNOSIS — Z91.89 AT RISK FOR MEDICATION NONCOMPLIANCE: ICD-10-CM

## 2024-02-12 DIAGNOSIS — E03.9 HYPOTHYROIDISM, UNSPECIFIED TYPE: ICD-10-CM

## 2024-02-12 DIAGNOSIS — E78.5 HYPERLIPIDEMIA, UNSPECIFIED HYPERLIPIDEMIA TYPE: ICD-10-CM

## 2024-02-12 PROBLEM — N18.30 CHRONIC RENAL DISEASE, STAGE III (HCC): Status: ACTIVE | Noted: 2024-02-12

## 2024-02-12 PROCEDURE — 3080F DIAST BP >= 90 MM HG: CPT | Performed by: STUDENT IN AN ORGANIZED HEALTH CARE EDUCATION/TRAINING PROGRAM

## 2024-02-12 PROCEDURE — 99214 OFFICE O/P EST MOD 30 MIN: CPT | Performed by: STUDENT IN AN ORGANIZED HEALTH CARE EDUCATION/TRAINING PROGRAM

## 2024-02-12 PROCEDURE — 1124F ACP DISCUSS-NO DSCNMKR DOCD: CPT | Performed by: STUDENT IN AN ORGANIZED HEALTH CARE EDUCATION/TRAINING PROGRAM

## 2024-02-12 PROCEDURE — 3077F SYST BP >= 140 MM HG: CPT | Performed by: STUDENT IN AN ORGANIZED HEALTH CARE EDUCATION/TRAINING PROGRAM

## 2024-02-12 RX ORDER — ROSUVASTATIN CALCIUM 20 MG/1
20 TABLET, COATED ORAL NIGHTLY
Qty: 30 TABLET | Refills: 3 | Status: CANCELLED | OUTPATIENT
Start: 2024-02-12

## 2024-02-12 ASSESSMENT — PATIENT HEALTH QUESTIONNAIRE - PHQ9
SUM OF ALL RESPONSES TO PHQ QUESTIONS 1-9: 0
SUM OF ALL RESPONSES TO PHQ9 QUESTIONS 1 & 2: 0
10. IF YOU CHECKED OFF ANY PROBLEMS, HOW DIFFICULT HAVE THESE PROBLEMS MADE IT FOR YOU TO DO YOUR WORK, TAKE CARE OF THINGS AT HOME, OR GET ALONG WITH OTHER PEOPLE: 0
5. POOR APPETITE OR OVEREATING: 0
SUM OF ALL RESPONSES TO PHQ QUESTIONS 1-9: 0
3. TROUBLE FALLING OR STAYING ASLEEP: 0
7. TROUBLE CONCENTRATING ON THINGS, SUCH AS READING THE NEWSPAPER OR WATCHING TELEVISION: 0
SUM OF ALL RESPONSES TO PHQ QUESTIONS 1-9: 0
2. FEELING DOWN, DEPRESSED OR HOPELESS: 0
4. FEELING TIRED OR HAVING LITTLE ENERGY: 0
6. FEELING BAD ABOUT YOURSELF - OR THAT YOU ARE A FAILURE OR HAVE LET YOURSELF OR YOUR FAMILY DOWN: 0
SUM OF ALL RESPONSES TO PHQ QUESTIONS 1-9: 0
9. THOUGHTS THAT YOU WOULD BE BETTER OFF DEAD, OR OF HURTING YOURSELF: 0
1. LITTLE INTEREST OR PLEASURE IN DOING THINGS: 0
8. MOVING OR SPEAKING SO SLOWLY THAT OTHER PEOPLE COULD HAVE NOTICED. OR THE OPPOSITE, BEING SO FIGETY OR RESTLESS THAT YOU HAVE BEEN MOVING AROUND A LOT MORE THAN USUAL: 0

## 2024-02-12 NOTE — PATIENT INSTRUCTIONS
- Start writing down blood pressures and heart rates   Increase Carvedilol/ Coreg  2 pills twice daily   Goal blood pressures <140/ 90 , HR 60 - 100   Call in 2 weeks with blood pressures       - Follow up 1 month repeat thyroid labs and lipid panel   - Schedule follow up with Cardiology   Jules Hemphill MD   [address]   [place], [place] [id number] [phone] (Work)     Manage with diet and exercise.  Diet should include  fruits and vegetables, fiber, and healthy fats (like those found in fish, nuts, and certain oils).    Avoid refined sugars, red meat, butter, fried foods, cheese, and other foods that have a lot of saturated fat.  Recommend 30 minutes of exercise 5 days a week

## 2024-02-12 NOTE — PROGRESS NOTES
[facility]  [address]  [address]  [place], [place] [address]  Tel: [phone]      2024   SUBJECTIVE/OBJECTIVE  HPI    Siobhan Flores (:  1950) is a 73 y.o. female, here for evaluation of the following medical concerns:  Chief Complaint   Patient presents with    Discuss Labs   Patient was last seen in 2024, with confusion.  Patient was unsure which medication she was taking.   helps with medications.  Takes 4 medications in the morning.  Patient is unsure of the name of the medications.  Within the last month or so patient has been taking medications as prescribed with the help of her .    Reviewed labs from 2024, T4 low at 0.2, TSH elevated 53.42.  GFR decreased 53, remaining electrolytes and creatinine within normal limits.  CBC within normal limits.  Total cholesterol levels very high 324, triglycerides 261 and LDL cholesterol 216.    Hypothyroidism  Currently on levothyroxine 150 mcg daily, most recent TSH elevated (2024) 53.42.  Patient takes medication with breakfast.  Endorses fatigue.  Denies any constipation.      Hypertension/CHF  Takes medications with food 9 -10.  Unable to name hypertension medications, but is prescribed losartan 100 mg daily, carvedilol 12.5 mg twice daily and spironolactone 25 mg daily. Home blood pressures : Measured 150 systolic.  Patient denies headache, lightheadedness, blurred vision, chest pain, palpitations, shortness of breath,  peripheral edema,  She is also on Plavix    Hyperlipidemia  Reports history of noncompliance. Previously on atorvastatin 80 mg daily, and Zetia 10 mg daily.  Endorses poor diet: Eats out often .  Limited exercise.     Dementia/memory loss  Patient was advised to follow-up with neurology, prescribed Aricept 10 mg daily, which she did not start. Effexor at bedtime 150 mg       Review of Systems  As per HPI      Physical exam  BP (!) 156/102   Pulse 76   Temp (!)

## 2024-03-13 ENCOUNTER — OFFICE VISIT (OUTPATIENT)
Dept: FAMILY MEDICINE CLINIC | Age: 74
End: 2024-03-13
Payer: MEDICARE

## 2024-03-13 VITALS
WEIGHT: 153 LBS | HEART RATE: 72 BPM | TEMPERATURE: 97.6 F | RESPIRATION RATE: 16 BRPM | DIASTOLIC BLOOD PRESSURE: 98 MMHG | OXYGEN SATURATION: 97 % | SYSTOLIC BLOOD PRESSURE: 162 MMHG | BODY MASS INDEX: 30.9 KG/M2

## 2024-03-13 DIAGNOSIS — F02.80 ALZHEIMER DISEASE (HCC): ICD-10-CM

## 2024-03-13 DIAGNOSIS — Z12.11 SCREEN FOR COLON CANCER: ICD-10-CM

## 2024-03-13 DIAGNOSIS — I10 ESSENTIAL (PRIMARY) HYPERTENSION: Primary | ICD-10-CM

## 2024-03-13 DIAGNOSIS — G30.9 ALZHEIMER DISEASE (HCC): ICD-10-CM

## 2024-03-13 DIAGNOSIS — Z12.31 ENCOUNTER FOR SCREENING MAMMOGRAM FOR MALIGNANT NEOPLASM OF BREAST: ICD-10-CM

## 2024-03-13 DIAGNOSIS — E03.9 HYPOTHYROIDISM, UNSPECIFIED TYPE: ICD-10-CM

## 2024-03-13 DIAGNOSIS — N18.30 STAGE 3 CHRONIC KIDNEY DISEASE, UNSPECIFIED WHETHER STAGE 3A OR 3B CKD (HCC): ICD-10-CM

## 2024-03-13 DIAGNOSIS — I25.118 CORONARY ARTERY DISEASE OF NATIVE HEART WITH STABLE ANGINA PECTORIS, UNSPECIFIED VESSEL OR LESION TYPE (HCC): ICD-10-CM

## 2024-03-13 DIAGNOSIS — E78.5 HYPERLIPIDEMIA, UNSPECIFIED HYPERLIPIDEMIA TYPE: ICD-10-CM

## 2024-03-13 DIAGNOSIS — F32.0 CURRENT MILD EPISODE OF MAJOR DEPRESSIVE DISORDER, UNSPECIFIED WHETHER RECURRENT (HCC): ICD-10-CM

## 2024-03-13 DIAGNOSIS — I20.89 ANGINA OF EFFORT: ICD-10-CM

## 2024-03-13 PROCEDURE — 1124F ACP DISCUSS-NO DSCNMKR DOCD: CPT | Performed by: NURSE PRACTITIONER

## 2024-03-13 PROCEDURE — 3080F DIAST BP >= 90 MM HG: CPT | Performed by: NURSE PRACTITIONER

## 2024-03-13 PROCEDURE — 3077F SYST BP >= 140 MM HG: CPT | Performed by: NURSE PRACTITIONER

## 2024-03-13 PROCEDURE — 99214 OFFICE O/P EST MOD 30 MIN: CPT | Performed by: NURSE PRACTITIONER

## 2024-03-13 RX ORDER — CARVEDILOL 25 MG/1
25 TABLET ORAL 2 TIMES DAILY
Qty: 180 TABLET | Refills: 0 | Status: SHIPPED | OUTPATIENT
Start: 2024-03-13

## 2024-03-13 ASSESSMENT — ENCOUNTER SYMPTOMS
SHORTNESS OF BREATH: 1
GASTROINTESTINAL NEGATIVE: 1
ALLERGIC/IMMUNOLOGIC NEGATIVE: 1
CHEST TIGHTNESS: 0
COUGH: 0
EYES NEGATIVE: 1
WHEEZING: 0

## 2024-03-13 ASSESSMENT — PATIENT HEALTH QUESTIONNAIRE - PHQ9
4. FEELING TIRED OR HAVING LITTLE ENERGY: 0
2. FEELING DOWN, DEPRESSED OR HOPELESS: 1
SUM OF ALL RESPONSES TO PHQ QUESTIONS 1-9: 2
1. LITTLE INTEREST OR PLEASURE IN DOING THINGS: 0
8. MOVING OR SPEAKING SO SLOWLY THAT OTHER PEOPLE COULD HAVE NOTICED. OR THE OPPOSITE, BEING SO FIGETY OR RESTLESS THAT YOU HAVE BEEN MOVING AROUND A LOT MORE THAN USUAL: 0
SUM OF ALL RESPONSES TO PHQ QUESTIONS 1-9: 2
6. FEELING BAD ABOUT YOURSELF - OR THAT YOU ARE A FAILURE OR HAVE LET YOURSELF OR YOUR FAMILY DOWN: 1
10. IF YOU CHECKED OFF ANY PROBLEMS, HOW DIFFICULT HAVE THESE PROBLEMS MADE IT FOR YOU TO DO YOUR WORK, TAKE CARE OF THINGS AT HOME, OR GET ALONG WITH OTHER PEOPLE: 0
5. POOR APPETITE OR OVEREATING: 0
9. THOUGHTS THAT YOU WOULD BE BETTER OFF DEAD, OR OF HURTING YOURSELF: 0
SUM OF ALL RESPONSES TO PHQ9 QUESTIONS 1 & 2: 1
SUM OF ALL RESPONSES TO PHQ QUESTIONS 1-9: 2
3. TROUBLE FALLING OR STAYING ASLEEP: 0
7. TROUBLE CONCENTRATING ON THINGS, SUCH AS READING THE NEWSPAPER OR WATCHING TELEVISION: 0
SUM OF ALL RESPONSES TO PHQ QUESTIONS 1-9: 2

## 2024-03-13 NOTE — PROGRESS NOTES
Plan  Hypertension uncontrolled, set schedule on phone or alarm reminders to take medications twice daily  Discussed the risks of uncontrolled hypertension with her coronary artery disease and her inconsistent medications for repeat cardiac emergencies  Referral for cardiology  -continue current medications  -new Rx for Carvedilol 25mg(1 tables 2 times daily)  Check blood pressure twice daily  Set alarms for reminder to take medications at 10am and 8pm  Take medications with food to decrease GI upset  Check with insurance for psychiatrist covered- consider counseling.  Patient states that this is just her lifestyle and is not sure if she is able to make changes    Orders Placed This Encounter   Procedures    JODI OMAIR DIGITAL SCREEN BILATERAL     Standing Status:   Future     Standing Expiration Date:   5/13/2025    [facility]     Referral Priority:   Routine     Referral Type:   Eval and Treat     Referral Reason:   Specialty Services Required     Requested Specialty:   Cardiology     Number of Visits Requested:   1    POCT Fecal Immunochemical Test (FIT)     Standing Status:   Future     Standing Expiration Date:   3/13/2025       Orders Placed This Encounter   Medications    carvedilol (COREG) 25 MG tablet     Sig: Take 1 tablet by mouth 2 times daily     Dispense:  180 tablet     Refill:  0       Patient Education:  Plan    Return in about 4 weeks (around 4/10/2024) for Mental health, HTN chronic care.

## 2024-03-13 NOTE — PATIENT INSTRUCTIONS
Schedule with cardiologist  Need to be more consistent with medications  Use medication set  Set alarms for 10 am for morning medications  Set  alarm for 8 pm for nighttime medication  Take medications with food  Check blood pressure twice daily-     Check insurance to see what psychiatrist are in network- consider  counseling

## 2024-03-28 DIAGNOSIS — F02.80 ALZHEIMER DISEASE (HCC): ICD-10-CM

## 2024-03-28 DIAGNOSIS — G30.9 ALZHEIMER DISEASE (HCC): ICD-10-CM

## 2024-03-28 RX ORDER — DONEPEZIL HYDROCHLORIDE 10 MG/1
10 TABLET, FILM COATED ORAL NIGHTLY
Qty: 90 TABLET | Refills: 1 | Status: SHIPPED | OUTPATIENT
Start: 2024-03-28

## 2024-03-28 NOTE — TELEPHONE ENCOUNTER
1/17/2024    Future Appointments   Date Time Provider Department Center   4/15/2024 11:00 AM Janet Live, APRN - CNP [facility]

## 2024-04-04 ENCOUNTER — PATIENT MESSAGE (OUTPATIENT)
Dept: FAMILY MEDICINE CLINIC | Age: 74
End: 2024-04-04

## 2024-04-15 ENCOUNTER — TELEPHONE (OUTPATIENT)
Dept: FAMILY MEDICINE CLINIC | Age: 74
End: 2024-04-15

## 2024-04-15 NOTE — TELEPHONE ENCOUNTER
Pt arrived for appointment when  came into the office stating he believes his wife is having a stroke. She notes facial numbness, leg numbness and overall \"not feeling right\". Blood pressure 210/120. HR78 o2sat 98 on room air while patient was still in her car. 911 was called. She admits that she ha not been taking her medications and states that they \"just make her sick\". Her  states she has not been eating either. EMS arrived and she was transported to [facility]

## 2024-04-19 ENCOUNTER — OFFICE VISIT (OUTPATIENT)
Dept: FAMILY MEDICINE CLINIC | Age: 74
End: 2024-04-19
Payer: MEDICARE

## 2024-04-19 VITALS
RESPIRATION RATE: 16 BRPM | DIASTOLIC BLOOD PRESSURE: 86 MMHG | WEIGHT: 157 LBS | TEMPERATURE: 97.6 F | BODY MASS INDEX: 31.71 KG/M2 | OXYGEN SATURATION: 96 % | SYSTOLIC BLOOD PRESSURE: 162 MMHG | HEART RATE: 69 BPM

## 2024-04-19 DIAGNOSIS — G30.9 ALZHEIMER DISEASE (HCC): Primary | ICD-10-CM

## 2024-04-19 DIAGNOSIS — I10 ESSENTIAL (PRIMARY) HYPERTENSION: ICD-10-CM

## 2024-04-19 DIAGNOSIS — E78.5 HYPERLIPIDEMIA, UNSPECIFIED HYPERLIPIDEMIA TYPE: ICD-10-CM

## 2024-04-19 DIAGNOSIS — F02.80 ALZHEIMER DISEASE (HCC): Primary | ICD-10-CM

## 2024-04-19 PROCEDURE — 3077F SYST BP >= 140 MM HG: CPT | Performed by: NURSE PRACTITIONER

## 2024-04-19 PROCEDURE — 3079F DIAST BP 80-89 MM HG: CPT | Performed by: NURSE PRACTITIONER

## 2024-04-19 PROCEDURE — 99214 OFFICE O/P EST MOD 30 MIN: CPT | Performed by: NURSE PRACTITIONER

## 2024-04-19 PROCEDURE — 1124F ACP DISCUSS-NO DSCNMKR DOCD: CPT | Performed by: NURSE PRACTITIONER

## 2024-04-19 ASSESSMENT — ENCOUNTER SYMPTOMS
NAUSEA: 1
WHEEZING: 0
ABDOMINAL PAIN: 1
COUGH: 0
CHEST TIGHTNESS: 0
SHORTNESS OF BREATH: 0

## 2024-04-19 NOTE — PROGRESS NOTES
4/19/2024    This is a 73 y.o. female   Chief Complaint   Patient presents with    Follow-Up from Hospital     Blood pressure check   .    Siobhan is seen today for ED follow-up for possible TIA or stroke symptoms.  ED notes were reviewed CT scans were reviewed showing no acute CVA.  Her blood pressure was uncontrolled.  Patient still admits that she has not taking her medications consistently.  She states she does not remember or that her schedule is so variable that she does not get up at the same time or go to sleep at the same time so she never takes her medicines consistently.  She states that if she takes her medicines she often gets sick to her stomach if she takes them on an empty stomach but also gets sick if she takes them with food.  She has not followed up with her neurologist regarding her worsening memory and states that she believes she is taking her Aricept but does not 100% sure.         Patient Active Problem List   Diagnosis    Hyperlipidemia    Hypothyroidism    Anxiety and depression    Osteopenia    Vitamin D deficiency    Fibromyalgia    Elevated LFTs    Hypertensive emergency    Chest pain    Hypokalemia    Elevated TSH    Hypertensive urgency    Alzheimer's disease, unspecified    Current mild episode of major depressive disorder, unspecified whether recurrent (HCC)    CAD in native artery    Chronic renal disease, stage III (HCC) [610973]       Current Outpatient Medications   Medication Sig Dispense Refill    donepezil (ARICEPT) 10 MG tablet TAKE 1 TABLET BY MOUTH EVERY DAY AT NIGHT 90 tablet 1    carvedilol (COREG) 25 MG tablet Take 1 tablet by mouth 2 times daily 180 tablet 0    losartan (COZAAR) 100 MG tablet TAKE 1 TABLET BY MOUTH EVERY DAY 90 tablet 0    spironolactone (ALDACTONE) 25 MG tablet TAKE 1 TABLET BY MOUTH EVERY DAY 90 tablet 1    clopidogrel (PLAVIX) 75 MG tablet TAKE 1 TABLET BY MOUTH EVERY DAY 90 tablet 1    atorvastatin (LIPITOR) 80 MG tablet TAKE 1 TABLET BY MOUTH

## 2024-04-19 NOTE — PATIENT INSTRUCTIONS
Schedule follow up with Neurologist at [place] [facility] Bunn  Needs to take medication consistently- set alarms  Schedule with cardiology  Will schedule with Brianda

## 2024-04-24 ENCOUNTER — PATIENT MESSAGE (OUTPATIENT)
Dept: FAMILY MEDICINE CLINIC | Age: 74
End: 2024-04-24

## 2024-04-25 ENCOUNTER — APPOINTMENT (OUTPATIENT)
Dept: GENERAL RADIOLOGY | Age: 74
End: 2024-04-25
Payer: MEDICARE

## 2024-04-25 ENCOUNTER — HOSPITAL ENCOUNTER (EMERGENCY)
Age: 74
Discharge: HOME | End: 2024-04-25
Attending: STUDENT IN AN ORGANIZED HEALTH CARE EDUCATION/TRAINING PROGRAM
Payer: MEDICARE

## 2024-04-25 VITALS
TEMPERATURE: 98 F | DIASTOLIC BLOOD PRESSURE: 93 MMHG | SYSTOLIC BLOOD PRESSURE: 189 MMHG | HEART RATE: 83 BPM | OXYGEN SATURATION: 96 % | RESPIRATION RATE: 16 BRPM

## 2024-04-25 DIAGNOSIS — R53.83 OTHER FATIGUE: Primary | ICD-10-CM

## 2024-04-25 DIAGNOSIS — F41.1 ANXIETY STATE: ICD-10-CM

## 2024-04-25 DIAGNOSIS — R20.2 PARESTHESIA: ICD-10-CM

## 2024-04-25 DIAGNOSIS — E87.3 RESPIRATORY ALKALOSIS: ICD-10-CM

## 2024-04-25 DIAGNOSIS — E87.6 HYPOKALEMIA: ICD-10-CM

## 2024-04-25 LAB
ANION GAP SERPL CALCULATED.3IONS-SCNC: 14 MMOL/L (ref 3–16)
BASE EXCESS BLDV CALC-SCNC: 0.6 MMOL/L (ref -3–3)
BASE EXCESS BLDV CALC-SCNC: 1.4 MMOL/L (ref -3–3)
BASOPHILS # BLD: 0.1 K/UL (ref 0–0.2)
BASOPHILS NFR BLD: 1.8 %
BUN SERPL-MCNC: 12 MG/DL (ref 7–20)
CALCIUM SERPL-MCNC: 10.2 MG/DL (ref 8.3–10.6)
CHLORIDE SERPL-SCNC: 99 MMOL/L (ref 99–110)
CO2 BLDV-SCNC: 22 MMOL/L
CO2 BLDV-SCNC: 24 MMOL/L
CO2 SERPL-SCNC: 23 MMOL/L (ref 21–32)
COHGB MFR BLDV: 0.1 % (ref 0–1.5)
COHGB MFR BLDV: 1.5 % (ref 0–1.5)
CREAT SERPL-MCNC: 0.8 MG/DL (ref 0.6–1.2)
DEPRECATED RDW RBC AUTO: 15.5 % (ref 12.4–15.4)
EOSINOPHIL # BLD: 0.2 K/UL (ref 0–0.6)
EOSINOPHIL NFR BLD: 2.5 %
GFR SERPLBLD CREATININE-BSD FMLA CKD-EPI: 77 ML/MIN/{1.73_M2}
GLUCOSE SERPL-MCNC: 117 MG/DL (ref 70–99)
HCO3 BLDV-SCNC: 21.2 MMOL/L (ref 23–29)
HCO3 BLDV-SCNC: 23.4 MMOL/L (ref 23–29)
HCT VFR BLD AUTO: 40.8 % (ref 36–48)
HGB BLD-MCNC: 14.1 G/DL (ref 12–16)
LACTATE BLDV-SCNC: 2.1 MMOL/L (ref 0.4–2)
LYMPHOCYTES # BLD: 3.1 K/UL (ref 1–5.1)
LYMPHOCYTES NFR BLD: 39.1 %
MAGNESIUM SERPL-MCNC: 1.8 MG/DL (ref 1.8–2.4)
MCH RBC QN AUTO: 33.9 PG (ref 26–34)
MCHC RBC AUTO-ENTMCNC: 34.6 G/DL (ref 31–36)
MCV RBC AUTO: 98.2 FL (ref 80–100)
METHGB MFR BLDV: 0 %
METHGB MFR BLDV: 0.3 %
MONOCYTES # BLD: 0.5 K/UL (ref 0–1.3)
MONOCYTES NFR BLD: 5.7 %
NEUTROPHILS # BLD: 4.1 K/UL (ref 1.7–7.7)
NEUTROPHILS NFR BLD: 50.9 %
NT-PROBNP SERPL-MCNC: 232 PG/ML (ref 0–449)
O2 CT VFR BLDV CALC: 19 VOL %
O2 CT VFR BLDV CALC: 19 VOL %
O2 THERAPY: ABNORMAL
O2 THERAPY: ABNORMAL
PCO2 BLDV: 24.9 MMHG (ref 40–50)
PCO2 BLDV: 29.5 MMHG (ref 40–50)
PH BLDV: 7.52 [PH] (ref 7.35–7.45)
PH BLDV: 7.55 [PH] (ref 7.35–7.45)
PLATELET # BLD AUTO: 225 K/UL (ref 135–450)
PMV BLD AUTO: 7.5 FL (ref 5–10.5)
PO2 BLDV: 155 MMHG (ref 25–40)
PO2 BLDV: 62.2 MMHG (ref 25–40)
POTASSIUM SERPL-SCNC: 3.1 MMOL/L (ref 3.5–5.1)
RBC # BLD AUTO: 4.16 M/UL (ref 4–5.2)
SAO2 % BLDV: 95 %
SAO2 % BLDV: 99 %
SODIUM SERPL-SCNC: 136 MMOL/L (ref 136–145)
TROPONIN, HIGH SENSITIVITY: 13 NG/L (ref 0–14)
WBC # BLD AUTO: 8 K/UL (ref 4–11)

## 2024-04-25 PROCEDURE — 80048 BASIC METABOLIC PNL TOTAL CA: CPT

## 2024-04-25 PROCEDURE — 85025 COMPLETE CBC W/AUTO DIFF WBC: CPT

## 2024-04-25 PROCEDURE — 2580000003 HC RX 258: Performed by: STUDENT IN AN ORGANIZED HEALTH CARE EDUCATION/TRAINING PROGRAM

## 2024-04-25 PROCEDURE — 71045 X-RAY EXAM CHEST 1 VIEW: CPT

## 2024-04-25 PROCEDURE — 83605 ASSAY OF LACTIC ACID: CPT

## 2024-04-25 PROCEDURE — 99285 EMERGENCY DEPT VISIT HI MDM: CPT

## 2024-04-25 PROCEDURE — 84484 ASSAY OF TROPONIN QUANT: CPT

## 2024-04-25 PROCEDURE — 96374 THER/PROPH/DIAG INJ IV PUSH: CPT

## 2024-04-25 PROCEDURE — 6370000000 HC RX 637 (ALT 250 FOR IP): Performed by: STUDENT IN AN ORGANIZED HEALTH CARE EDUCATION/TRAINING PROGRAM

## 2024-04-25 PROCEDURE — 6360000002 HC RX W HCPCS: Performed by: STUDENT IN AN ORGANIZED HEALTH CARE EDUCATION/TRAINING PROGRAM

## 2024-04-25 PROCEDURE — 83735 ASSAY OF MAGNESIUM: CPT

## 2024-04-25 PROCEDURE — 36415 COLL VENOUS BLD VENIPUNCTURE: CPT

## 2024-04-25 PROCEDURE — 83880 ASSAY OF NATRIURETIC PEPTIDE: CPT

## 2024-04-25 PROCEDURE — 82803 BLOOD GASES ANY COMBINATION: CPT

## 2024-04-25 PROCEDURE — 93005 ELECTROCARDIOGRAM TRACING: CPT | Performed by: STUDENT IN AN ORGANIZED HEALTH CARE EDUCATION/TRAINING PROGRAM

## 2024-04-25 RX ORDER — SODIUM CHLORIDE, SODIUM LACTATE, POTASSIUM CHLORIDE, AND CALCIUM CHLORIDE .6; .31; .03; .02 G/100ML; G/100ML; G/100ML; G/100ML
1000 INJECTION, SOLUTION INTRAVENOUS ONCE
Status: COMPLETED | OUTPATIENT
Start: 2024-04-25 | End: 2024-04-25

## 2024-04-25 RX ORDER — LANOLIN ALCOHOL/MO/W.PET/CERES
400 CREAM (GRAM) TOPICAL DAILY
Status: DISCONTINUED | OUTPATIENT
Start: 2024-04-25 | End: 2024-04-26 | Stop reason: HOSPADM

## 2024-04-25 RX ORDER — LORAZEPAM 2 MG/ML
1 INJECTION INTRAMUSCULAR ONCE
Status: COMPLETED | OUTPATIENT
Start: 2024-04-25 | End: 2024-04-25

## 2024-04-25 RX ADMIN — LORAZEPAM 1 MG: 2 INJECTION INTRAMUSCULAR; INTRAVENOUS at 19:52

## 2024-04-25 RX ADMIN — SODIUM CHLORIDE, POTASSIUM CHLORIDE, SODIUM LACTATE AND CALCIUM CHLORIDE 1000 ML: 600; 310; 30; 20 INJECTION, SOLUTION INTRAVENOUS at 19:51

## 2024-04-25 RX ADMIN — POTASSIUM BICARBONATE 40 MEQ: 782 TABLET, EFFERVESCENT ORAL at 20:46

## 2024-04-25 RX ADMIN — Medication 400 MG: at 20:46

## 2024-04-25 NOTE — ED PROVIDER NOTES
[facility] ED     EMERGENCY DEPARTMENT ENCOUNTER         Pt Name: Siobhan Flores   MRN: [id number]   Birthdate 1950   Date of evaluation: 4/25/2024   Provider: Prosper Newman MD   PCP: Janet Live, APRN - CNP   Note Started: 7:28 PM EDT 4/25/24       Chief Complaint     Fatigue (Generalized weakness and numbness all over for approximately one hour.)      History of Present Illness     Siobhan Flores is a 73 y.o. female who presents with 1 hour of generalized weakness and paresthesias through all 4 extremities.  She states she has a history of similar which previously has resolved does not have a clear diagnosis.  No recent illness no recent trauma no clear inciting factors.  She has no chest pain shortness of breath no other changes in her health.  EMS called by paramedics who found her with adequate blood sugar hemodynamically stable though quite hypertensive no interventions right.      I have reviewed the nursing notes and agree unless otherwise noted.    Review of Systems     Positives and pertinent negatives as per HPI.    Past Medical, Surgical, Family, and Social History     She has a past medical history of Anxiety, CAD (coronary artery disease), Fibromyalgia, Hyperlipidemia, Hypertension, Hypothyroidism, Osteopenia, Thyroid disease, and Transient ischemic attack.  She has a past surgical history that includes Hysterectomy (1989); Colonoscopy; Appendectomy; eye surgery; and Coronary stent placement (03/30/2023).  Her family history includes High Blood Pressure in her father; Ovarian Cancer in her mother.  She reports that she has quit smoking. Her smoking use included cigarettes. She has never used smokeless tobacco. She reports current alcohol use of about 2.0 standard drinks of alcohol per week. She reports that she does not use drugs.    SCREENINGS:    NIH Stroke Scale  Interval: Baseline  Level of Consciousness (1a): Alert  LOC Questions (1b): Answers both

## 2024-04-26 LAB
EKG ATRIAL RATE: 81 BPM
EKG DIAGNOSIS: NORMAL
EKG P AXIS: 64 DEGREES
EKG P-R INTERVAL: 136 MS
EKG Q-T INTERVAL: 406 MS
EKG QRS DURATION: 78 MS
EKG QTC CALCULATION (BAZETT): 471 MS
EKG R AXIS: 12 DEGREES
EKG T AXIS: 73 DEGREES
EKG VENTRICULAR RATE: 81 BPM

## 2024-04-26 PROCEDURE — 93010 ELECTROCARDIOGRAM REPORT: CPT | Performed by: INTERNAL MEDICINE

## 2024-04-26 NOTE — DISCHARGE INSTRUCTIONS
You were evaluated in the emergency department for numbness and tingling of extremities and face as well as some elevated blood pressure and fatigue. Assessments and testing completed during your visit were reassuring and at this time there is no indication for further testing, treatment or admission to the hospital. Given this it is appropriate to discharge you from the emergency department. At the time of discharge we discussed the following:    Please review this visit to the emergency department for further recommendations from your primary doctor.    Please note that sometimes it is difficult to diagnose a medical condition early in the disease process before the disease is fully manifest. Because of this, should you develop any new or worsening symptoms, you may return at any time to the emergency department for another evaluation. If available you are also recommended to review this visit with your primary care physician or other medical provider in the next 7 days. Thank you for allowing us to care for you today.

## 2024-04-28 ENCOUNTER — APPOINTMENT (OUTPATIENT)
Dept: GENERAL RADIOLOGY | Age: 74
End: 2024-04-28
Payer: MEDICARE

## 2024-04-28 ENCOUNTER — HOSPITAL ENCOUNTER (EMERGENCY)
Age: 74
Discharge: ANOTHER HOSPITAL | End: 2024-04-29
Attending: STUDENT IN AN ORGANIZED HEALTH CARE EDUCATION/TRAINING PROGRAM
Payer: MEDICARE

## 2024-04-28 DIAGNOSIS — I21.4 NSTEMI (NON-ST ELEVATED MYOCARDIAL INFARCTION) (HCC): Primary | ICD-10-CM

## 2024-04-28 DIAGNOSIS — I16.1 HYPERTENSIVE EMERGENCY: ICD-10-CM

## 2024-04-28 LAB
ALBUMIN SERPL-MCNC: 4.1 G/DL (ref 3.4–5)
ALBUMIN/GLOB SERPL: 1.2 {RATIO} (ref 1.1–2.2)
ALP SERPL-CCNC: 74 U/L (ref 40–129)
ALT SERPL-CCNC: 18 U/L (ref 10–40)
ANION GAP SERPL CALCULATED.3IONS-SCNC: 12 MMOL/L (ref 3–16)
AST SERPL-CCNC: 31 U/L (ref 15–37)
BASOPHILS # BLD: 0.1 K/UL (ref 0–0.2)
BASOPHILS NFR BLD: 1 %
BILIRUB SERPL-MCNC: 0.6 MG/DL (ref 0–1)
BUN SERPL-MCNC: 10 MG/DL (ref 7–20)
CALCIUM SERPL-MCNC: 10.4 MG/DL (ref 8.3–10.6)
CHLORIDE SERPL-SCNC: 101 MMOL/L (ref 99–110)
CO2 SERPL-SCNC: 29 MMOL/L (ref 21–32)
CREAT SERPL-MCNC: 1 MG/DL (ref 0.6–1.2)
DEPRECATED RDW RBC AUTO: 15.4 % (ref 12.4–15.4)
EOSINOPHIL # BLD: 0.1 K/UL (ref 0–0.6)
EOSINOPHIL NFR BLD: 1.9 %
GFR SERPLBLD CREATININE-BSD FMLA CKD-EPI: 59 ML/MIN/{1.73_M2}
GLUCOSE SERPL-MCNC: 95 MG/DL (ref 70–99)
HCT VFR BLD AUTO: 37.1 % (ref 36–48)
HGB BLD-MCNC: 12.6 G/DL (ref 12–16)
INR PPP: 0.98 (ref 0.85–1.15)
LIPASE SERPL-CCNC: 60 U/L (ref 13–60)
LYMPHOCYTES # BLD: 2 K/UL (ref 1–5.1)
LYMPHOCYTES NFR BLD: 29.7 %
MCH RBC QN AUTO: 34.3 PG (ref 26–34)
MCHC RBC AUTO-ENTMCNC: 33.9 G/DL (ref 31–36)
MCV RBC AUTO: 101 FL (ref 80–100)
MONOCYTES # BLD: 0.5 K/UL (ref 0–1.3)
MONOCYTES NFR BLD: 7.1 %
NEUTROPHILS # BLD: 4.1 K/UL (ref 1.7–7.7)
NEUTROPHILS NFR BLD: 60.3 %
PLATELET # BLD AUTO: 206 K/UL (ref 135–450)
PMV BLD AUTO: 6.8 FL (ref 5–10.5)
POTASSIUM SERPL-SCNC: 3.8 MMOL/L (ref 3.5–5.1)
PROT SERPL-MCNC: 7.6 G/DL (ref 6.4–8.2)
PROTHROMBIN TIME: 13.2 SEC (ref 11.9–14.9)
RBC # BLD AUTO: 3.67 M/UL (ref 4–5.2)
SODIUM SERPL-SCNC: 142 MMOL/L (ref 136–145)
TROPONIN, HIGH SENSITIVITY: 15 NG/L (ref 0–14)
TROPONIN, HIGH SENSITIVITY: 19 NG/L (ref 0–14)
WBC # BLD AUTO: 6.8 K/UL (ref 4–11)

## 2024-04-28 PROCEDURE — 71046 X-RAY EXAM CHEST 2 VIEWS: CPT

## 2024-04-28 PROCEDURE — 85610 PROTHROMBIN TIME: CPT

## 2024-04-28 PROCEDURE — 84484 ASSAY OF TROPONIN QUANT: CPT

## 2024-04-28 PROCEDURE — 96365 THER/PROPH/DIAG IV INF INIT: CPT

## 2024-04-28 PROCEDURE — 96366 THER/PROPH/DIAG IV INF ADDON: CPT

## 2024-04-28 PROCEDURE — 99285 EMERGENCY DEPT VISIT HI MDM: CPT

## 2024-04-28 PROCEDURE — 6370000000 HC RX 637 (ALT 250 FOR IP): Performed by: STUDENT IN AN ORGANIZED HEALTH CARE EDUCATION/TRAINING PROGRAM

## 2024-04-28 PROCEDURE — 80053 COMPREHEN METABOLIC PANEL: CPT

## 2024-04-28 PROCEDURE — 93005 ELECTROCARDIOGRAM TRACING: CPT | Performed by: STUDENT IN AN ORGANIZED HEALTH CARE EDUCATION/TRAINING PROGRAM

## 2024-04-28 PROCEDURE — 96375 TX/PRO/DX INJ NEW DRUG ADDON: CPT

## 2024-04-28 PROCEDURE — 96376 TX/PRO/DX INJ SAME DRUG ADON: CPT

## 2024-04-28 PROCEDURE — 6360000002 HC RX W HCPCS: Performed by: STUDENT IN AN ORGANIZED HEALTH CARE EDUCATION/TRAINING PROGRAM

## 2024-04-28 PROCEDURE — 85025 COMPLETE CBC W/AUTO DIFF WBC: CPT

## 2024-04-28 PROCEDURE — 83690 ASSAY OF LIPASE: CPT

## 2024-04-28 PROCEDURE — 36415 COLL VENOUS BLD VENIPUNCTURE: CPT

## 2024-04-28 RX ORDER — NITROGLYCERIN 20 MG/100ML
5-200 INJECTION INTRAVENOUS CONTINUOUS
Status: DISCONTINUED | OUTPATIENT
Start: 2024-04-28 | End: 2024-04-29 | Stop reason: HOSPADM

## 2024-04-28 RX ORDER — SPIRONOLACTONE 25 MG/1
25 TABLET ORAL ONCE
Status: COMPLETED | OUTPATIENT
Start: 2024-04-28 | End: 2024-04-28

## 2024-04-28 RX ORDER — LOSARTAN POTASSIUM 100 MG/1
100 TABLET ORAL ONCE
Status: COMPLETED | OUTPATIENT
Start: 2024-04-28 | End: 2024-04-28

## 2024-04-28 RX ORDER — ASPIRIN 81 MG/1
324 TABLET, CHEWABLE ORAL ONCE
Status: COMPLETED | OUTPATIENT
Start: 2024-04-28 | End: 2024-04-28

## 2024-04-28 RX ORDER — ONDANSETRON 2 MG/ML
4 INJECTION INTRAMUSCULAR; INTRAVENOUS ONCE
Status: COMPLETED | OUTPATIENT
Start: 2024-04-28 | End: 2024-04-28

## 2024-04-28 RX ORDER — HEPARIN SODIUM 1000 [USP'U]/ML
4000 INJECTION, SOLUTION INTRAVENOUS; SUBCUTANEOUS ONCE
Status: COMPLETED | OUTPATIENT
Start: 2024-04-28 | End: 2024-04-28

## 2024-04-28 RX ORDER — LABETALOL HYDROCHLORIDE 5 MG/ML
10 INJECTION, SOLUTION INTRAVENOUS ONCE
Status: COMPLETED | OUTPATIENT
Start: 2024-04-28 | End: 2024-04-28

## 2024-04-28 RX ORDER — HEPARIN SODIUM 10000 [USP'U]/100ML
12 INJECTION, SOLUTION INTRAVENOUS CONTINUOUS
Status: DISCONTINUED | OUTPATIENT
Start: 2024-04-28 | End: 2024-04-29 | Stop reason: HOSPADM

## 2024-04-28 RX ORDER — HEPARIN SODIUM 10000 [USP'U]/100ML
5-30 INJECTION, SOLUTION INTRAVENOUS CONTINUOUS
Status: DISCONTINUED | OUTPATIENT
Start: 2024-04-28 | End: 2024-04-28 | Stop reason: SDUPTHER

## 2024-04-28 RX ORDER — HEPARIN SODIUM 1000 [USP'U]/ML
4000 INJECTION, SOLUTION INTRAVENOUS; SUBCUTANEOUS PRN
Status: DISCONTINUED | OUTPATIENT
Start: 2024-04-28 | End: 2024-04-29 | Stop reason: HOSPADM

## 2024-04-28 RX ORDER — HEPARIN SODIUM 1000 [USP'U]/ML
2000 INJECTION, SOLUTION INTRAVENOUS; SUBCUTANEOUS PRN
Status: DISCONTINUED | OUTPATIENT
Start: 2024-04-28 | End: 2024-04-29 | Stop reason: HOSPADM

## 2024-04-28 RX ORDER — CARVEDILOL 6.25 MG/1
25 TABLET ORAL ONCE
Status: COMPLETED | OUTPATIENT
Start: 2024-04-28 | End: 2024-04-28

## 2024-04-28 RX ORDER — NITROGLYCERIN 0.4 MG/1
0.4 TABLET SUBLINGUAL EVERY 5 MIN PRN
Status: DISCONTINUED | OUTPATIENT
Start: 2024-04-28 | End: 2024-04-29 | Stop reason: HOSPADM

## 2024-04-28 RX ORDER — ACETAMINOPHEN 500 MG
1000 TABLET ORAL
Status: COMPLETED | OUTPATIENT
Start: 2024-04-28 | End: 2024-04-29

## 2024-04-28 RX ADMIN — SPIRONOLACTONE 25 MG: 25 TABLET ORAL at 17:49

## 2024-04-28 RX ADMIN — HEPARIN SODIUM 4000 UNITS: 1000 INJECTION INTRAVENOUS; SUBCUTANEOUS at 18:26

## 2024-04-28 RX ADMIN — NITROGLYCERIN 0.4 MG: 0.4 TABLET SUBLINGUAL at 15:52

## 2024-04-28 RX ADMIN — LABETALOL HYDROCHLORIDE 10 MG: 5 INJECTION, SOLUTION INTRAVENOUS at 22:48

## 2024-04-28 RX ADMIN — ONDANSETRON 4 MG: 2 INJECTION INTRAMUSCULAR; INTRAVENOUS at 15:52

## 2024-04-28 RX ADMIN — NITROGLYCERIN 0.4 MG: 0.4 TABLET SUBLINGUAL at 16:31

## 2024-04-28 RX ADMIN — CARVEDILOL 25 MG: 6.25 TABLET, FILM COATED ORAL at 17:48

## 2024-04-28 RX ADMIN — ASPIRIN 324 MG: 81 TABLET, CHEWABLE ORAL at 18:27

## 2024-04-28 RX ADMIN — HEPARIN SODIUM 12 UNITS/KG/HR: 10000 INJECTION, SOLUTION INTRAVENOUS at 18:27

## 2024-04-28 RX ADMIN — LOSARTAN POTASSIUM 100 MG: 100 TABLET, FILM COATED ORAL at 17:49

## 2024-04-28 ASSESSMENT — PAIN SCALES - GENERAL: PAINLEVEL_OUTOF10: 2

## 2024-04-28 ASSESSMENT — PAIN DESCRIPTION - DESCRIPTORS: DESCRIPTORS: DISCOMFORT

## 2024-04-28 ASSESSMENT — PAIN DESCRIPTION - LOCATION: LOCATION: CHEST

## 2024-04-28 NOTE — ED PROVIDER NOTES
may be present due to limitations of this technology and occasionally words are not transcribed correctly.      Tomas Garcia,   04/28/24 1901       Tomas Garcia,   04/28/24 [id number]

## 2024-04-28 NOTE — PROGRESS NOTES
Pharmacy to Manage Heparin Infusion per Hospital Nomogram    Dx:  Pt wt = 68 kg  Baseline aPTT ordered.    Oral factor Xa-inhibitors may alter and elevate anti-Xa levels used for unfractionated heparin monitoring. As a result, anti-Xa monitoring is not accurate while Xa-inhibitor activity is detectable. Utilize aPTT monitoring when patient received an oral factor Xa-inhibitor (apixaban, betrixaban, edoxaban or rivaroxaban) within 72 hours prior to admission (please document last administration time). The goal is to allow a washout of oral factor Xa-inhibitors by using aPTT for 72 hours, then change to ant-Xa levels for UFH.     Heparin (weight-based) Infusion: CAD/STEMI/NSTEMI/UA/AFib)   Heparin 60 units/kg IVP bolus (max 4,000 units) followed by Heparin infusion at 12 units/kg/hr (recommended max initial rate: 1000 units/hr).  Recheck anti-Xa (unless aPTT being used) in 6 hours.  Goal anti-Xa 0.3-0.7 IU/mL  Goal aPTT =  seconds.    Heparin 4000 unit bolus;  initial Heparin infusion at 12 units/kg/hr (820 units/hr;  8.2 ml/hr).  Monitor with Anti-Xa.  Candido Tapia R.Ph.4/28/20246:28 PM

## 2024-04-29 ENCOUNTER — HOSPITAL ENCOUNTER (INPATIENT)
Age: 74
LOS: 1 days | Discharge: HOME | DRG: 305 | End: 2024-04-30
Attending: INTERNAL MEDICINE | Admitting: STUDENT IN AN ORGANIZED HEALTH CARE EDUCATION/TRAINING PROGRAM
Payer: MEDICARE

## 2024-04-29 VITALS
HEIGHT: 60 IN | BODY MASS INDEX: 29.45 KG/M2 | RESPIRATION RATE: 14 BRPM | HEART RATE: 60 BPM | TEMPERATURE: 98.6 F | WEIGHT: 150 LBS | OXYGEN SATURATION: 95 % | SYSTOLIC BLOOD PRESSURE: 192 MMHG | DIASTOLIC BLOOD PRESSURE: 98 MMHG

## 2024-04-29 DIAGNOSIS — I10 ESSENTIAL (PRIMARY) HYPERTENSION: ICD-10-CM

## 2024-04-29 PROBLEM — I21.4 NSTEMI (NON-ST ELEVATED MYOCARDIAL INFARCTION) (HCC): Status: ACTIVE | Noted: 2024-04-29

## 2024-04-29 LAB
ALBUMIN SERPL-MCNC: 4 G/DL (ref 3.4–5)
ALBUMIN/GLOB SERPL: 1.5 {RATIO} (ref 1.1–2.2)
ALP SERPL-CCNC: 65 U/L (ref 40–129)
ALT SERPL-CCNC: 14 U/L (ref 10–40)
ANION GAP SERPL CALCULATED.3IONS-SCNC: 14 MMOL/L (ref 3–16)
ANTI-XA UNFRAC HEPARIN: 0.6 IU/ML (ref 0.3–0.7)
ANTI-XA UNFRAC HEPARIN: 0.64 IU/ML (ref 0.3–0.7)
AST SERPL-CCNC: 21 U/L (ref 15–37)
BASOPHILS # BLD: 0.1 K/UL (ref 0–0.2)
BASOPHILS NFR BLD: 0.7 %
BILIRUB SERPL-MCNC: 0.6 MG/DL (ref 0–1)
BUN SERPL-MCNC: 11 MG/DL (ref 7–20)
CALCIUM SERPL-MCNC: 9.7 MG/DL (ref 8.3–10.6)
CHLORIDE SERPL-SCNC: 99 MMOL/L (ref 99–110)
CHOLEST SERPL-MCNC: 195 MG/DL (ref 0–199)
CO2 SERPL-SCNC: 26 MMOL/L (ref 21–32)
CREAT SERPL-MCNC: 1 MG/DL (ref 0.6–1.2)
DEPRECATED RDW RBC AUTO: 15.6 % (ref 12.4–15.4)
EKG ATRIAL RATE: 52 BPM
EKG ATRIAL RATE: 73 BPM
EKG DIAGNOSIS: NORMAL
EKG DIAGNOSIS: NORMAL
EKG P AXIS: 50 DEGREES
EKG P AXIS: 62 DEGREES
EKG P-R INTERVAL: 144 MS
EKG P-R INTERVAL: 154 MS
EKG Q-T INTERVAL: 424 MS
EKG Q-T INTERVAL: 486 MS
EKG QRS DURATION: 80 MS
EKG QRS DURATION: 88 MS
EKG QTC CALCULATION (BAZETT): 451 MS
EKG QTC CALCULATION (BAZETT): 467 MS
EKG R AXIS: -5 DEGREES
EKG R AXIS: 21 DEGREES
EKG T AXIS: 37 DEGREES
EKG T AXIS: 61 DEGREES
EKG VENTRICULAR RATE: 52 BPM
EKG VENTRICULAR RATE: 73 BPM
EOSINOPHIL # BLD: 0.2 K/UL (ref 0–0.6)
EOSINOPHIL NFR BLD: 2.8 %
GFR SERPLBLD CREATININE-BSD FMLA CKD-EPI: 59 ML/MIN/{1.73_M2}
GLUCOSE SERPL-MCNC: 109 MG/DL (ref 70–99)
HCT VFR BLD AUTO: 36.3 % (ref 36–48)
HDLC SERPL-MCNC: 53 MG/DL (ref 40–60)
HGB BLD-MCNC: 12.5 G/DL (ref 12–16)
LDLC SERPL CALC-MCNC: 114 MG/DL
LYMPHOCYTES # BLD: 2.6 K/UL (ref 1–5.1)
LYMPHOCYTES NFR BLD: 33.7 %
MAGNESIUM SERPL-MCNC: 1.8 MG/DL (ref 1.8–2.4)
MAGNESIUM SERPL-MCNC: 1.8 MG/DL (ref 1.8–2.4)
MCH RBC QN AUTO: 34.9 PG (ref 26–34)
MCHC RBC AUTO-ENTMCNC: 34.5 G/DL (ref 31–36)
MCV RBC AUTO: 101.4 FL (ref 80–100)
MONOCYTES # BLD: 0.5 K/UL (ref 0–1.3)
MONOCYTES NFR BLD: 6.7 %
NEUTROPHILS # BLD: 4.4 K/UL (ref 1.7–7.7)
NEUTROPHILS NFR BLD: 56.1 %
PLATELET # BLD AUTO: 217 K/UL (ref 135–450)
PMV BLD AUTO: 7.5 FL (ref 5–10.5)
POTASSIUM SERPL-SCNC: 3.1 MMOL/L (ref 3.5–5.1)
PROT SERPL-MCNC: 6.7 G/DL (ref 6.4–8.2)
RBC # BLD AUTO: 3.58 M/UL (ref 4–5.2)
SODIUM SERPL-SCNC: 139 MMOL/L (ref 136–145)
T4 FREE SERPL-MCNC: 1.1 NG/DL (ref 0.9–1.8)
TRIGL SERPL-MCNC: 142 MG/DL (ref 0–150)
TROPONIN, HIGH SENSITIVITY: 19 NG/L (ref 0–14)
TSH SERPL DL<=0.005 MIU/L-ACNC: 9.42 UIU/ML (ref 0.27–4.2)
VLDLC SERPL CALC-MCNC: 28 MG/DL
WBC # BLD AUTO: 7.8 K/UL (ref 4–11)

## 2024-04-29 PROCEDURE — 85025 COMPLETE CBC W/AUTO DIFF WBC: CPT

## 2024-04-29 PROCEDURE — 84443 ASSAY THYROID STIM HORMONE: CPT

## 2024-04-29 PROCEDURE — 2060000000 HC ICU INTERMEDIATE R&B

## 2024-04-29 PROCEDURE — 85520 HEPARIN ASSAY: CPT

## 2024-04-29 PROCEDURE — 36415 COLL VENOUS BLD VENIPUNCTURE: CPT

## 2024-04-29 PROCEDURE — 6360000002 HC RX W HCPCS: Performed by: NURSE PRACTITIONER

## 2024-04-29 PROCEDURE — 83735 ASSAY OF MAGNESIUM: CPT

## 2024-04-29 PROCEDURE — 6370000000 HC RX 637 (ALT 250 FOR IP): Performed by: NURSE PRACTITIONER

## 2024-04-29 PROCEDURE — 93005 ELECTROCARDIOGRAM TRACING: CPT | Performed by: NURSE PRACTITIONER

## 2024-04-29 PROCEDURE — 84484 ASSAY OF TROPONIN QUANT: CPT

## 2024-04-29 PROCEDURE — 80053 COMPREHEN METABOLIC PANEL: CPT

## 2024-04-29 PROCEDURE — 6370000000 HC RX 637 (ALT 250 FOR IP): Performed by: STUDENT IN AN ORGANIZED HEALTH CARE EDUCATION/TRAINING PROGRAM

## 2024-04-29 PROCEDURE — 80061 LIPID PANEL: CPT

## 2024-04-29 PROCEDURE — 93010 ELECTROCARDIOGRAM REPORT: CPT | Performed by: INTERNAL MEDICINE

## 2024-04-29 PROCEDURE — 6360000002 HC RX W HCPCS: Performed by: STUDENT IN AN ORGANIZED HEALTH CARE EDUCATION/TRAINING PROGRAM

## 2024-04-29 PROCEDURE — 99223 1ST HOSP IP/OBS HIGH 75: CPT | Performed by: INTERNAL MEDICINE

## 2024-04-29 PROCEDURE — 84439 ASSAY OF FREE THYROXINE: CPT

## 2024-04-29 PROCEDURE — 96365 THER/PROPH/DIAG IV INF INIT: CPT

## 2024-04-29 PROCEDURE — 93306 TTE W/DOPPLER COMPLETE: CPT

## 2024-04-29 PROCEDURE — 6370000000 HC RX 637 (ALT 250 FOR IP): Performed by: INTERNAL MEDICINE

## 2024-04-29 PROCEDURE — 2580000003 HC RX 258: Performed by: NURSE PRACTITIONER

## 2024-04-29 RX ORDER — SPIRONOLACTONE 25 MG/1
50 TABLET ORAL DAILY
Status: DISCONTINUED | OUTPATIENT
Start: 2024-04-30 | End: 2024-04-29

## 2024-04-29 RX ORDER — ACETAMINOPHEN 325 MG/1
650 TABLET ORAL EVERY 6 HOURS PRN
Status: DISCONTINUED | OUTPATIENT
Start: 2024-04-29 | End: 2024-04-30 | Stop reason: HOSPADM

## 2024-04-29 RX ORDER — CLOPIDOGREL BISULFATE 75 MG/1
75 TABLET ORAL DAILY
Status: DISCONTINUED | OUTPATIENT
Start: 2024-04-29 | End: 2024-04-30 | Stop reason: HOSPADM

## 2024-04-29 RX ORDER — VENLAFAXINE HYDROCHLORIDE 150 MG/1
150 CAPSULE, EXTENDED RELEASE ORAL
Status: DISCONTINUED | OUTPATIENT
Start: 2024-04-29 | End: 2024-04-30 | Stop reason: HOSPADM

## 2024-04-29 RX ORDER — DONEPEZIL HYDROCHLORIDE 5 MG/1
10 TABLET, FILM COATED ORAL NIGHTLY
Status: DISCONTINUED | OUTPATIENT
Start: 2024-04-29 | End: 2024-04-30 | Stop reason: HOSPADM

## 2024-04-29 RX ORDER — POTASSIUM CHLORIDE 20 MEQ/1
20 TABLET, EXTENDED RELEASE ORAL ONCE
Status: COMPLETED | OUTPATIENT
Start: 2024-04-29 | End: 2024-04-29

## 2024-04-29 RX ORDER — ATORVASTATIN CALCIUM 80 MG/1
80 TABLET, FILM COATED ORAL DAILY
Status: DISCONTINUED | OUTPATIENT
Start: 2024-04-29 | End: 2024-04-29

## 2024-04-29 RX ORDER — PROCHLORPERAZINE EDISYLATE 5 MG/ML
10 INJECTION INTRAMUSCULAR; INTRAVENOUS EVERY 6 HOURS PRN
Status: DISCONTINUED | OUTPATIENT
Start: 2024-04-29 | End: 2024-04-30 | Stop reason: HOSPADM

## 2024-04-29 RX ORDER — SODIUM CHLORIDE 0.9 % (FLUSH) 0.9 %
5-40 SYRINGE (ML) INJECTION EVERY 12 HOURS SCHEDULED
Status: DISCONTINUED | OUTPATIENT
Start: 2024-04-29 | End: 2024-04-30 | Stop reason: HOSPADM

## 2024-04-29 RX ORDER — ATORVASTATIN CALCIUM 80 MG/1
80 TABLET, FILM COATED ORAL NIGHTLY
Status: DISCONTINUED | OUTPATIENT
Start: 2024-04-29 | End: 2024-04-30 | Stop reason: HOSPADM

## 2024-04-29 RX ORDER — LOSARTAN POTASSIUM 100 MG/1
100 TABLET ORAL DAILY
Status: DISCONTINUED | OUTPATIENT
Start: 2024-04-29 | End: 2024-04-30 | Stop reason: HOSPADM

## 2024-04-29 RX ORDER — SODIUM CHLORIDE 9 MG/ML
INJECTION, SOLUTION INTRAVENOUS PRN
Status: DISCONTINUED | OUTPATIENT
Start: 2024-04-29 | End: 2024-04-30 | Stop reason: HOSPADM

## 2024-04-29 RX ORDER — LEVOTHYROXINE SODIUM 0.15 MG/1
150 TABLET ORAL DAILY
Status: DISCONTINUED | OUTPATIENT
Start: 2024-04-29 | End: 2024-04-30 | Stop reason: HOSPADM

## 2024-04-29 RX ORDER — HEPARIN SODIUM 1000 [USP'U]/ML
30 INJECTION, SOLUTION INTRAVENOUS; SUBCUTANEOUS PRN
Status: DISCONTINUED | OUTPATIENT
Start: 2024-04-29 | End: 2024-04-29

## 2024-04-29 RX ORDER — SPIRONOLACTONE 25 MG/1
50 TABLET ORAL DAILY
Status: DISCONTINUED | OUTPATIENT
Start: 2024-04-29 | End: 2024-04-30 | Stop reason: HOSPADM

## 2024-04-29 RX ORDER — EZETIMIBE 10 MG/1
10 TABLET ORAL NIGHTLY
Status: DISCONTINUED | OUTPATIENT
Start: 2024-04-29 | End: 2024-04-30 | Stop reason: HOSPADM

## 2024-04-29 RX ORDER — POLYETHYLENE GLYCOL 3350 17 G/17G
17 POWDER, FOR SOLUTION ORAL DAILY PRN
Status: DISCONTINUED | OUTPATIENT
Start: 2024-04-29 | End: 2024-04-30 | Stop reason: HOSPADM

## 2024-04-29 RX ORDER — HEPARIN SODIUM 1000 [USP'U]/ML
4000 INJECTION, SOLUTION INTRAVENOUS; SUBCUTANEOUS PRN
Status: DISCONTINUED | OUTPATIENT
Start: 2024-04-29 | End: 2024-04-29

## 2024-04-29 RX ORDER — ACETAMINOPHEN 650 MG/1
650 SUPPOSITORY RECTAL EVERY 6 HOURS PRN
Status: DISCONTINUED | OUTPATIENT
Start: 2024-04-29 | End: 2024-04-30 | Stop reason: HOSPADM

## 2024-04-29 RX ORDER — SPIRONOLACTONE 25 MG/1
25 TABLET ORAL DAILY
Status: DISCONTINUED | OUTPATIENT
Start: 2024-04-29 | End: 2024-04-29

## 2024-04-29 RX ORDER — HEPARIN SODIUM 10000 [USP'U]/100ML
820 INJECTION, SOLUTION INTRAVENOUS CONTINUOUS
Status: DISCONTINUED | OUTPATIENT
Start: 2024-04-29 | End: 2024-04-29 | Stop reason: ALTCHOICE

## 2024-04-29 RX ORDER — CARVEDILOL 25 MG/1
25 TABLET ORAL 2 TIMES DAILY
Status: DISCONTINUED | OUTPATIENT
Start: 2024-04-29 | End: 2024-04-30

## 2024-04-29 RX ORDER — LABETALOL HYDROCHLORIDE 5 MG/ML
10 INJECTION, SOLUTION INTRAVENOUS EVERY 4 HOURS PRN
Status: DISCONTINUED | OUTPATIENT
Start: 2024-04-29 | End: 2024-04-30 | Stop reason: HOSPADM

## 2024-04-29 RX ORDER — SODIUM CHLORIDE 0.9 % (FLUSH) 0.9 %
5-40 SYRINGE (ML) INJECTION PRN
Status: DISCONTINUED | OUTPATIENT
Start: 2024-04-29 | End: 2024-04-30 | Stop reason: HOSPADM

## 2024-04-29 RX ADMIN — SPIRONOLACTONE 50 MG: 25 TABLET ORAL at 09:34

## 2024-04-29 RX ADMIN — NITROGLYCERIN 0.5 INCH: 20 OINTMENT TOPICAL at 04:50

## 2024-04-29 RX ADMIN — DONEPEZIL HYDROCHLORIDE 10 MG: 5 TABLET, FILM COATED ORAL at 19:58

## 2024-04-29 RX ADMIN — HEPARIN SODIUM 820 UNITS/HR: 10000 INJECTION, SOLUTION INTRAVENOUS at 01:47

## 2024-04-29 RX ADMIN — POTASSIUM CHLORIDE 20 MEQ: 1500 TABLET, EXTENDED RELEASE ORAL at 09:33

## 2024-04-29 RX ADMIN — NITROGLYCERIN 5 MCG/MIN: 20 INJECTION INTRAVENOUS at 00:17

## 2024-04-29 RX ADMIN — CARVEDILOL 25 MG: 25 TABLET, FILM COATED ORAL at 02:19

## 2024-04-29 RX ADMIN — EZETIMIBE 10 MG: 10 TABLET ORAL at 19:59

## 2024-04-29 RX ADMIN — LEVOTHYROXINE SODIUM 150 MCG: 0.15 TABLET ORAL at 09:33

## 2024-04-29 RX ADMIN — CARVEDILOL 25 MG: 25 TABLET, FILM COATED ORAL at 09:33

## 2024-04-29 RX ADMIN — Medication 10 ML: at 19:59

## 2024-04-29 RX ADMIN — ACETAMINOPHEN 1000 MG: 500 TABLET ORAL at 00:17

## 2024-04-29 RX ADMIN — LOSARTAN POTASSIUM 100 MG: 100 TABLET, FILM COATED ORAL at 09:33

## 2024-04-29 RX ADMIN — VENLAFAXINE HYDROCHLORIDE 150 MG: 150 CAPSULE, EXTENDED RELEASE ORAL at 09:33

## 2024-04-29 RX ADMIN — LABETALOL HYDROCHLORIDE 10 MG: 5 INJECTION INTRAVENOUS at 02:19

## 2024-04-29 RX ADMIN — CLOPIDOGREL BISULFATE 75 MG: 75 TABLET ORAL at 09:33

## 2024-04-29 RX ADMIN — ATORVASTATIN CALCIUM 80 MG: 80 TABLET, FILM COATED ORAL at 19:59

## 2024-04-29 ASSESSMENT — PAIN SCALES - GENERAL
PAINLEVEL_OUTOF10: 0
PAINLEVEL_OUTOF10: 0

## 2024-04-29 NOTE — DISCHARGE INSTRUCTIONS
Heart Failure Resources:  Heart Failure Interactive Workbook:  Go to https://[employer]ostima/publication/?p=608705 for a Free Heart Failure Interactive Workbook provided by The American Heart Association. This interactive workbook will provide information on Healthier Living with Heart Failure. Please copy and paste link into search bar. Use your mouse to scroll through the pages.    HF [place] mao:   Heart Failure Free smart phone mao available for iPhone and Android download. Use your phone to track sodium intake, fluid intake, symptoms, and weight.     Low Sodium Diet / Recipes:  Go to www.[employer] website for “renal” diet which is Low Sodium! [employer] is a dialysis company, but this website offers free seasonal cookbooks. Each quarter, they will release 25-30 new recipes with a breakdown of calories, sodium, and glucose. You can also go to www.[employer]/recipes website for free recipes.   Home Exercise Program:   Identification of Green/Yellow/Red zones:  You should be able to identify when you feel good (green zone), if you have 1-2 symptoms of HF (yellow zone), or if you are in need of medical attention (red zone).  In your CHF education folder you were provided a “stop light tool” to outline this information.     We want to you to rate your exertion levels:    Our therapy team has discussed means of identification with you such as the \"Mono scale.\"  The Mono rating scale ranges from 6 to 20, where 6 means \"no exertion at all\" and 20 means \"maximal exertion.\" The goal is to use this to gauge how much effort it is taking for you to do your normal daily tasks.   You should be able to recognize when too much exertion is being expended.    Elements of Energy Conservation:   Prioritize/Plan: Decide what needs to be done today, and what can wait for a later date, write to do lists, plan ahead to avoid extra trips, and gather supplies and equipment needed before starting an activity.   Position: Avoid

## 2024-04-29 NOTE — PLAN OF CARE
Problem: Chronic Conditions and Co-morbidities  Goal: Patient's chronic conditions and co-morbidity symptoms are monitored and maintained or improved  Outcome: Progressing   CHF Care Plan      Patient's EF (Ejection Fraction) is greater than 40%    Heart Failure Medications:  Diuretics:: Spironolactone    (One of the following REQUIRED for EF </= 40%/SYSTOLIC FAILURE but MAY be used in EF% >40%/DIASTOLIC FAILURE)        ACE:: None        ARB:: Losartan         ARNI:: None    (Beta Blockers)  NON- Evidenced Based Beta Blocker (for EF% >40%/DIASTOLIC FAILURE): None    Evidenced Based Beta Blocker::(REQUIRED for EF% <40%/SYSTOLIC FAILURE) Carvedilol- Coreg  ...................................................................................................................................................    Failed to redirect to the Timeline version of the [employer] SmartLink.      Patient's weights and intake/output reviewed    Daily Weight log at bedside, patient/family participation in use of log: \"yes    Patient's current weight today 151 pounds - admission weight    Intake/Output Summary (Last 24 hours) at 4/29/2024 1138  Last data filed at 4/29/2024 1121  Gross per 24 hour   Intake 240 ml   Output 300 ml   Net -60 ml       Education Booklet Provided: yes    Comorbidities Reviewed Yes    Patient has a past medical history of Anxiety, CAD (coronary artery disease), Fibromyalgia, Hyperlipidemia, Hypertension, Hypothyroidism, Osteopenia, Thyroid disease, and Transient ischemic attack.     >>For CHF and Comorbidity documentation on Education Time and Topics, please see Education Tab      Pt resting in bed at this time on room air. Pt denies shortness of breath. Pt without lower extremity edema.     Patient and/or Family's stated Goal of Care this Admission: increase activity tolerance, better understand heart failure and disease management, and be more comfortable prior to discharge        :

## 2024-04-29 NOTE — H&P
pneumothorax, edema or focal consolidation.  An acute osseous abnormality is not identified.     No evidence of acute cardiopulmonary disease.       PCP: Janet Live APRN - CNP    Past Medical History:        Diagnosis Date    Anxiety     CAD (coronary artery disease)     Fibromyalgia     Hyperlipidemia     Hypertension     Hypothyroidism     Osteopenia     Dexa 7/08    Thyroid disease     Transient ischemic attack 2003       Past Surgical History:        Procedure Laterality Date    APPENDECTOMY      COLONOSCOPY      CORONARY STENT PLACEMENT  03/30/2023    EYE SURGERY      HYSTERECTOMY (CERVIX STATUS UNKNOWN)  1989       Medications Prior to Admission:   Prior to Admission medications    Medication Sig Start Date End Date Taking? Authorizing Provider   donepezil (ARICEPT) 10 MG tablet TAKE 1 TABLET BY MOUTH EVERY DAY AT NIGHT 3/28/24   Janet Live APRN - CNP   carvedilol (COREG) 25 MG tablet Take 1 tablet by mouth 2 times daily 3/13/24   Janet Live APRN - CNP   losartan (COZAAR) 100 MG tablet TAKE 1 TABLET BY MOUTH EVERY DAY 2/5/24   Andressa Coburn DO   spironolactone (ALDACTONE) 25 MG tablet TAKE 1 TABLET BY MOUTH EVERY DAY 2/5/24   Andressa Coburn DO   clopidogrel (PLAVIX) 75 MG tablet TAKE 1 TABLET BY MOUTH EVERY DAY 1/24/24   Kiera Carpenter APRN - CNP   atorvastatin (LIPITOR) 80 MG tablet TAKE 1 TABLET BY MOUTH EVERY DAY 1/24/24   Kiera Carpenter APRN - CNP   ezetimibe (ZETIA) 10 MG tablet TAKE 1 TABLET BY MOUTH EVERY DAY AT NIGHT 1/24/24   Kiera Carpenter APRN - CNP   levothyroxine (SYNTHROID) 150 MCG tablet TAKE 1 TABLET BY MOUTH EVERY DAY 1/3/24   Kiera Carpenter APRN - CNP   venlafaxine (EFFEXOR XR) 150 MG extended release capsule TAKE 1 CAPSULE BY MOUTH EVERY DAY 12/22/23   Andressa Coburn DO   nitroGLYCERIN (NITROSTAT) 0.4 MG SL tablet Place 1 tablet under the tongue every 5 minutes as needed for Chest pain up to max of 3

## 2024-04-29 NOTE — PROGRESS NOTES
Pharmacy - RE:  Low-dose Heparin drip  Current rate = 12 units/kg/hr  Anti-Xa drawn @ 0017 = 0.64 IU/ml  Goal Anti-Xa = 0.3 - 0.7 IU/ml  Per protocol, continue current rate and obtain another Anti-Xa 4/29 @ 0630.    Sharda HebertD, [facility], 4/29/2024 1:09 AM

## 2024-04-30 VITALS
SYSTOLIC BLOOD PRESSURE: 176 MMHG | OXYGEN SATURATION: 98 % | TEMPERATURE: 98.1 F | BODY MASS INDEX: 29.82 KG/M2 | WEIGHT: 151.9 LBS | HEART RATE: 54 BPM | HEIGHT: 60 IN | DIASTOLIC BLOOD PRESSURE: 77 MMHG | RESPIRATION RATE: 18 BRPM

## 2024-04-30 LAB
ANION GAP SERPL CALCULATED.3IONS-SCNC: 10 MMOL/L (ref 3–16)
BUN SERPL-MCNC: 13 MG/DL (ref 7–20)
CALCIUM SERPL-MCNC: 10.1 MG/DL (ref 8.3–10.6)
CHLORIDE SERPL-SCNC: 100 MMOL/L (ref 99–110)
CO2 SERPL-SCNC: 28 MMOL/L (ref 21–32)
CREAT SERPL-MCNC: 1.3 MG/DL (ref 0.6–1.2)
DEPRECATED RDW RBC AUTO: 15 % (ref 12.4–15.4)
GFR SERPLBLD CREATININE-BSD FMLA CKD-EPI: 43 ML/MIN/{1.73_M2}
GLUCOSE SERPL-MCNC: 94 MG/DL (ref 70–99)
HCT VFR BLD AUTO: 33.9 % (ref 36–48)
HGB BLD-MCNC: 11.6 G/DL (ref 12–16)
MAGNESIUM SERPL-MCNC: 1.9 MG/DL (ref 1.8–2.4)
MCH RBC QN AUTO: 34.6 PG (ref 26–34)
MCHC RBC AUTO-ENTMCNC: 34.1 G/DL (ref 31–36)
MCV RBC AUTO: 101.5 FL (ref 80–100)
PLATELET # BLD AUTO: 199 K/UL (ref 135–450)
PMV BLD AUTO: 7.4 FL (ref 5–10.5)
POTASSIUM SERPL-SCNC: 3.2 MMOL/L (ref 3.5–5.1)
RBC # BLD AUTO: 3.34 M/UL (ref 4–5.2)
SODIUM SERPL-SCNC: 138 MMOL/L (ref 136–145)
WBC # BLD AUTO: 6.2 K/UL (ref 4–11)

## 2024-04-30 PROCEDURE — 2580000003 HC RX 258: Performed by: NURSE PRACTITIONER

## 2024-04-30 PROCEDURE — 6370000000 HC RX 637 (ALT 250 FOR IP): Performed by: NURSE PRACTITIONER

## 2024-04-30 PROCEDURE — 6370000000 HC RX 637 (ALT 250 FOR IP): Performed by: INTERNAL MEDICINE

## 2024-04-30 PROCEDURE — 6370000000 HC RX 637 (ALT 250 FOR IP): Performed by: STUDENT IN AN ORGANIZED HEALTH CARE EDUCATION/TRAINING PROGRAM

## 2024-04-30 PROCEDURE — 99232 SBSQ HOSP IP/OBS MODERATE 35: CPT | Performed by: STUDENT IN AN ORGANIZED HEALTH CARE EDUCATION/TRAINING PROGRAM

## 2024-04-30 PROCEDURE — 83735 ASSAY OF MAGNESIUM: CPT

## 2024-04-30 PROCEDURE — 85027 COMPLETE CBC AUTOMATED: CPT

## 2024-04-30 PROCEDURE — 36415 COLL VENOUS BLD VENIPUNCTURE: CPT

## 2024-04-30 PROCEDURE — 80048 BASIC METABOLIC PNL TOTAL CA: CPT

## 2024-04-30 RX ORDER — SPIRONOLACTONE 50 MG/1
50 TABLET, FILM COATED ORAL DAILY
Qty: 30 TABLET | Refills: 0 | Status: SHIPPED | OUTPATIENT
Start: 2024-05-01

## 2024-04-30 RX ORDER — ISOSORBIDE MONONITRATE 30 MG/1
30 TABLET, EXTENDED RELEASE ORAL DAILY
Qty: 30 TABLET | Refills: 0 | Status: SHIPPED | OUTPATIENT
Start: 2024-05-01

## 2024-04-30 RX ORDER — ISOSORBIDE MONONITRATE 30 MG/1
30 TABLET, EXTENDED RELEASE ORAL DAILY
Status: DISCONTINUED | OUTPATIENT
Start: 2024-04-30 | End: 2024-04-30 | Stop reason: HOSPADM

## 2024-04-30 RX ORDER — CARVEDILOL 6.25 MG/1
12.5 TABLET ORAL 2 TIMES DAILY
Status: DISCONTINUED | OUTPATIENT
Start: 2024-04-30 | End: 2024-04-30 | Stop reason: HOSPADM

## 2024-04-30 RX ORDER — CARVEDILOL 25 MG/1
12.5 TABLET ORAL 2 TIMES DAILY
Qty: 180 TABLET | Refills: 0 | Status: SHIPPED
Start: 2024-04-30

## 2024-04-30 RX ORDER — POTASSIUM CHLORIDE 20 MEQ/1
20 TABLET, EXTENDED RELEASE ORAL ONCE
Status: COMPLETED | OUTPATIENT
Start: 2024-04-30 | End: 2024-04-30

## 2024-04-30 RX ADMIN — POTASSIUM CHLORIDE 20 MEQ: 1500 TABLET, EXTENDED RELEASE ORAL at 12:11

## 2024-04-30 RX ADMIN — ISOSORBIDE MONONITRATE 30 MG: 30 TABLET, EXTENDED RELEASE ORAL at 13:26

## 2024-04-30 RX ADMIN — Medication 10 ML: at 09:21

## 2024-04-30 RX ADMIN — CLOPIDOGREL BISULFATE 75 MG: 75 TABLET ORAL at 09:20

## 2024-04-30 RX ADMIN — LEVOTHYROXINE SODIUM 150 MCG: 0.15 TABLET ORAL at 09:20

## 2024-04-30 RX ADMIN — VENLAFAXINE HYDROCHLORIDE 150 MG: 150 CAPSULE, EXTENDED RELEASE ORAL at 09:21

## 2024-04-30 RX ADMIN — SPIRONOLACTONE 50 MG: 25 TABLET ORAL at 09:21

## 2024-04-30 RX ADMIN — LOSARTAN POTASSIUM 100 MG: 100 TABLET, FILM COATED ORAL at 09:20

## 2024-04-30 NOTE — PROGRESS NOTES
04/29/24 1130   Encounter Summary   Encounter Overview/Reason Spiritual/Emotional Needs   Service Provided For Patient and family together   Support System Spouse   Last Encounter    (spouse at bedside, offered emotional support, nurtured hope, blessing)   Begin Time 1050   End Time  1100   Total Time Calculated 10 min   Spiritual/Emotional needs   Type Spiritual Support       
  Hospital Medicine Progress Note      Date of Admission: 4/29/2024  Hospital Day: 2    Chief Admission Complaint:  ***     Subjective:  ***    Telemetry (reviewed by me):  ***    Presenting Admission History:       73 y.o. female with a significant past medical history of CAD status post PCI, hypertension, hyperlipidemia, anxiety, hypothyroidism, and prior TIA who presents to [facility] as a direct admit from [facility] emergency department after presenting there yesterday with complaint of chest pain.  She reports on and off episodes of chest pain for approximately 3 days, last anywhere between seconds to a few minutes, substernal, nonradiating, described as a intense pressure, average 8 out of 10 on the pain scale.  She did have some nausea and vomiting yesterday with the chest pain episode which is new so it prompted her to want to seek medical care.  She denies any other associate symptoms such as palpitations, dizziness, diaphoresis, dyspnea, lower extremity swelling or unintentional weight gain.  [place] [facility] [place] laboratory studies, EKG, and chest x-ray.  Chest x-ray was negative. There were no acute ST segment or T wave changes on EKG. pertinent findings on laboratory studies today include elevated troponin at 15 with repeat at 19.  Chest is went there on 4/25 as well with similar complaint, troponin was 13 then.  She was also noted to be exquisitely hypertensive on arrival yesterday with blood pressure over 220 systolic.  She did receive doses of her home medications to include carvedilol, losartan, and spironolactone at [facility]. Blood pressure on arrival here was 181/107.  She was also started on heparin infusion.  Hospital team was consulted to admit.     Assessment/Plan:      Elevated Troponin 2/2 Hypertensive Crisis/Demand ischemia. NSTEMI ruled out.   -Hx of CAD  - Troponin 15->19  - Cardiology consulted  - Heparin discontinued  - Echocardiogram ordered  - Continue 
4 Eyes Skin Assessment     NAME:  Siobhan Flores  YOB: 1950  MEDICAL RECORD NUMBER:  [id number]    The patient is being assessed for  Admission    I agree that at least one RN has performed a thorough Head to Toe Skin Assessment on the patient. ALL assessment sites listed below have been assessed.      Areas assessed by both nurses:    Head, Face, Ears, Shoulders, Back, Chest, Arms, Elbows, Hands, Sacrum. Buttock, Coccyx, Ischium, Legs. Feet and Heels, and Under Medical Devices         Does the Patient have a Wound? No noted wound(s)       Murphy Prevention initiated by RN: No  Wound Care Orders initiated by RN: No    Pressure Injury (Stage 3,4, Unstageable, DTI, NWPT, and Complex wounds) if present, place Wound referral order by RN under : No    New Ostomies, if present place, Ostomy referral order under : No     Nurse 1 eSignature: Electronically signed by Araceli Hunt RN on 4/29/24 at 2:02 AM EDT    **SHARE this note so that the co-signing nurse can place an eSignature**    Nurse 2 eSignature: Electronically signed by Elda Bustamante RN on 4/29/24 at 2:53 AM EDT   
EMR reviewed patient was seen and examined    No chest pain or shortness of breath    Cardiology input appreciated    DC IV heparin  Aldactone was increased because of elevated blood pressure  Continue home medication  Possible discharge tomorrow  
Patient admitted to room 208 from [facility].  Patient oriented to room, call light, bed rails, phone, lights and bathroom.  Patient instructed about the schedule of the day including: vital sign frequency, lab draws, possible tests, frequency of MD and staff rounds, including RN/MD rounding together at bedside, daily weights, and I &O's.  Patient instructed about prescribed diet, how to use call light and television.   bed alarm in place, patient aware of placement and reason.   Telemetry box  in place, patient aware of placement and reason.  Bed locked, in lowest position, side rails up 2/4, call light within reach.  Will continue to monitor.       
Pt d/c'd home via private vehicle.  Removed peripheral IV and stopped bleeding.  Catheter intact. Pt tolerated well. No redness noted at site.  Notified CMU and removed tele box. Reviewed d/c instructions, home meds, and  f/u information utilizing teach-back method.  Scripts for aldactone and imdur given to patient. Patient verbalized understanding.      
   K 3.2 04/30/2024 05:41 AM    K 3.8 04/28/2024 03:49 PM     04/30/2024 05:41 AM    CO2 28 04/30/2024 05:41 AM    BUN 13 04/30/2024 05:41 AM    CREATININE 1.3 04/30/2024 05:41 AM    GFRAA 53 04/18/2022 02:23 PM    GFRAA >60 03/15/2013 12:42 PM    AGRATIO 1.5 04/29/2024 02:11 AM    LABGLOM 43 04/30/2024 05:41 AM    GLUCOSE 94 04/30/2024 05:41 AM    PROT 6.7 04/29/2024 02:11 AM    PROT 8.0 03/15/2013 12:42 PM    CALCIUM 10.1 04/30/2024 05:41 AM    BILITOT 0.6 04/29/2024 02:11 AM    ALKPHOS 65 04/29/2024 02:11 AM    AST 21 04/29/2024 02:11 AM    ALT 14 04/29/2024 02:11 AM     PT/INR:  No results found for: \"PTINR\"  Lab Results   Component Value Date    TROPONINI <0.01 04/08/2023       EKG:  I have reviewed EKG with the following interpretation:  Impression:  See HPI    Assessment:      Chest Pain- resolved with improved BP control   Hypertensive crisis- improved  Hypertension uncontrolled but improving- down to 140s systolic   Myocardial injury with mild elevated troponin secondary to hypertensive emergency  Sinus bradycardia with carvedilol 25 mg twice daily, will decrease  CAD status post LAD stent  Hypokalemia  Creatininemia likely from reinstitution of ARB/MRA  History of TIA  History of medication nonadherence      Recs:  Echo from yesterday revealed preserved LVEF 55 to 60%.  No regional wall motion abnormalities.  Mild LVH, grade 1 diastolic dysfunction.  Mild MAC.  Aortic valve sclerosis without stenosis.  Continue Aldactone 50mg daily for BP control and low K+; increased from 25 mg.  Add Imdur 30 mg daily for improved blood pressure control  Continue cozaar 100 qd, will decrease Coreg to 12.5 mg twice daily (sinus bradycardia low 50s with a 25 mg BID in hospital), plavix 75, lipitor 80, zetia 10 qd.  Replete potassium as needed  Encouraged continued medication adherence  Advised to check BP at home with goal blood pressure less than 130/80.  Advised to call office if blood pressure continues to be not

## 2024-04-30 NOTE — PLAN OF CARE
Problem: Discharge Planning  Goal: Discharge to home or other facility with appropriate resources  4/30/2024 0804 by Madeleine Posey RN  Outcome: Progressing     Problem: Safety - Adult  Goal: Free from fall injury  4/30/2024 0804 by Madeleine oPsey RN  Outcome: Progressing     Problem: Chronic Conditions and Co-morbidities  Goal: Patient's chronic conditions and co-morbidity symptoms are monitored and maintained or improved  4/30/2024 0804 by Madeleine Posey RN  Outcome: Progressing     Problem: Pain  Goal: Verbalizes/displays adequate comfort level or baseline comfort level  4/30/2024 0804 by Madeleine Posey RN  Outcome: Progressing   CHF Care Plan      Patient's EF (Ejection Fraction) is greater than 40%    Heart Failure Medications:  Diuretics:: Spironolactone    (One of the following REQUIRED for EF </= 40%/SYSTOLIC FAILURE but MAY be used in EF% >40%/DIASTOLIC FAILURE)        ACE:: None        ARB:: Losartan         ARNI:: None    (Beta Blockers)  NON- Evidenced Based Beta Blocker (for EF% >40%/DIASTOLIC FAILURE): None    Evidenced Based Beta Blocker::(REQUIRED for EF% <40%/SYSTOLIC FAILURE) Carvedilol- Coreg  ...................................................................................................................................................    Failed to redirect to the Timeline version of the [employer] SmartLink.      Patient's weights and intake/output reviewed    Daily Weight log at bedside, patient/family participation in use of log: \"yes    Patient's current weight today shows a difference of 0 lbs than last documented weight.      Intake/Output Summary (Last 24 hours) at 4/30/2024 0805  Last data filed at 4/29/2024 2045  Gross per 24 hour   Intake 661.44 ml   Output 450 ml   Net 211.44 ml       Education Booklet Provided: yes    Comorbidities Reviewed Yes    Patient has a past medical history of Anxiety, CAD (coronary artery disease), Fibromyalgia, Hyperlipidemia, Hypertension, Hypothyroidism,

## 2024-04-30 NOTE — PLAN OF CARE
Problem: Discharge Planning  Goal: Discharge to home or other facility with appropriate resources  Outcome: Progressing     Problem: Safety - Adult  Goal: Free from fall injury  Outcome: Progressing  Note: Pt will remain free from falls throughout hospital stay. Fall precautions in place, bed alarm on, bed in lowest position with wheels locked and side rails 2/4 up. Room door open and hourly rounding completed. Will continue to monitor throughout shift.      Problem: Chronic Conditions and Co-morbidities  Goal: Patient's chronic conditions and co-morbidity symptoms are monitored and maintained or improved  4/30/2024 0014 by Kenna White RN  Outcome: Progressing  Note:   CHF Care Plan      Patient's EF (Ejection Fraction) is greater than 40%    Heart Failure Medications:  Diuretics:: Spironolactone    (One of the following REQUIRED for EF </= 40%/SYSTOLIC FAILURE but MAY be used in EF% >40%/DIASTOLIC FAILURE)        ACE:: None        ARB:: Losartan         ARNI:: None    (Beta Blockers)  NON- Evidenced Based Beta Blocker (for EF% >40%/DIASTOLIC FAILURE): None    Evidenced Based Beta Blocker::(REQUIRED for EF% <40%/SYSTOLIC FAILURE) Carvedilol- Coreg  ...................................................................................................................................................    Failed to redirect to the Timeline version of the [employer] SmartLink.      Patient's weights and intake/output reviewed    Daily Weight log at bedside, patient/family participation in use of log: \"yes    Patient's current weight today is 151 lbs.      Intake/Output Summary (Last 24 hours) at 4/30/2024 0015  Last data filed at 4/29/2024 2045  Gross per 24 hour   Intake 661.44 ml   Output 450 ml   Net 211.44 ml       Education Booklet Provided: yes    Comorbidities Reviewed Yes    Patient has a past medical history of Anxiety, CAD (coronary artery disease), Fibromyalgia, Hyperlipidemia, Hypertension, Hypothyroidism,

## 2024-04-30 NOTE — CARE COORDINATION
Spoke with RN who states patient should be discharging today with no needs from CM.  Patient is from home with  and is independent at home.  She has insurance and a PCP.

## 2024-04-30 NOTE — CONSULTS
Pharmacy to Manage Heparin Infusion per Hospital Nomogram    Dx: NSTEMI  Patient weight = 68 kg   Baseline aPTT = N/A    Oral factor Xa-inhibitors may alter and elevate anti-Xa levels used for unfractionated heparin monitoring. As a result, anti-Xa monitoring is not accurate while Xa-inhibitor activity is detectable. Utilize aPTT monitoring when patient received an oral factor Xa-inhibitor (apixaban, betrixaban, edoxaban or rivaroxaban) within 72 hours prior to admission (please document last administration time). The goal is to allow a washout of oral factor Xa-inhibitors by using aPTT for 72 hours, then change to ant-Xa levels for UFH.       Heparin (weight-based) Infusion: CAD/STEMI/NSTEMI/UA/AFib)   Heparin 60 units/kg IVP bolus (max 4,000 units) followed by Heparin infusion at 12 units/kg/hr (recommended max initial rate: 1000 units/hr).  Recheck anti-Xa (unless aPTT being used) in 6 hours.  Goal anti-Xa 0.3-0.7 IU/mL  Goal aPTT =  seconds.    Pharmacy to manage Heparin - contact for questions.    Heparin 60 units/kg IV x 1 (max 4,000 units), then 12 units/kg/hr (recommended max initial rate 1,000 units/hr). Adjust infusion rate based off anti-Xa results below.     anti-Xa < 0.1    Heparin 60 units/kg bolus  Increase infusion by 4 units/kg/hr  anti-Xa 0.1-0.29 Heparin 30 units/kg bolus Increase infusion by 2 units/kg/hr  anti-Xa 0.3-0.7    No bolus No change   anti-Xa 0.71-0.8   No bolus Decrease infusion by 1 units/kg/hr  anti-Xa 0.81-0.99    No bolus Decrease infusion by 2 units/kg/hr  anti-Xa 1 or more     Hold heparin for 1 hour Decrease infusion by 3 units/kg/hr    Obtain anti-Xa 6 hours after bolus and 6 hours after any dose change until two consecutive therapeutic anti-Xa are achieved- then daily.    Philomena Fuentes, PharmD 4/29/2024  7:44 AM    -----------------------------------    4/29/2024  anti-Xa level = 0.60 IU/mL at 0601  Continue Heparin infusion rate at 820 units/hr   Recheck level in 
Nutrition Education    Consult received for CHF diet education. Provided pt with written and verbal instruction on HF nutrition therapy. Discussed low sodium diet, daily weights, and fluid restriction. Pt voiced understanding.     Time spent: 5 minutes    Educated on CHF diet  Learners: Patient and Significant Other  Readiness: Acceptance  Method: Explanation and Handout  Response: Verbalizes Understanding  Contact name and number provided.    Ramona Berg RD, LD  Contact Number: [phone]    
negative in all areas as listed below except as in [place]  Constitutional, EENT, Cardiovascular, pulmonary, GI, , Musculoskeletal, skin, neurological, hematological, endocrine, Psychiatric    Physical Examination:    Vitals:    04/29/24 0819   BP: (!) 178/80   Pulse: 53   Resp: 16   Temp: 97.5 °F (36.4 °C)   SpO2: 95%    Weight - Scale: 68.9 kg (151 lb 14.4 oz)         General Appearance:  Alert, cooperative, no distress, appears stated age   Head:  Normocephalic, without obvious abnormality, atraumatic   Eyes:  PERRL, conjunctiva/corneas clear       Nose: Nares normal, no drainage or sinus tenderness   Throat: Lips, mucosa, and tongue normal   Neck: Supple, symmetrical, trachea midline, no adenopathy, thyroid: not enlarged, symmetric, no tenderness/mass/nodules, no carotid bruit or JVD       Lungs:   Clear to auscultation bilaterally, respirations unlabored   Chest Wall:  No tenderness or deformity   Heart:  Regular rate and rhythm, S1, S2 normal, +soft SANDRO; no rub or gallop   Abdomen:   Soft, non-tender, bowel sounds active all four quadrants,  no masses, no organomegaly           Extremities: Extremities normal, atraumatic, no cyanosis or edema   Pulses: 2+ and symmetric   Skin: Skin color, texture, turgor normal, no rashes or lesions   Pysch: Normal mood and affect   Neurologic: Normal gross motor and sensory exam.         Labs  CBC:   Lab Results   Component Value Date/Time    WBC 7.8 04/29/2024 02:11 AM    RBC 3.58 04/29/2024 02:11 AM    HGB 12.5 04/29/2024 02:11 AM    HCT 36.3 04/29/2024 02:11 AM    .4 04/29/2024 02:11 AM    RDW 15.6 04/29/2024 02:11 AM     04/29/2024 02:11 AM     CMP:    Lab Results   Component Value Date/Time     04/29/2024 02:11 AM    K 3.1 04/29/2024 02:11 AM    K 3.8 04/28/2024 03:49 PM    CL 99 04/29/2024 02:11 AM    CO2 26 04/29/2024 02:11 AM    BUN 11 04/29/2024 02:11 AM    CREATININE 1.0 04/29/2024 02:11 AM    GFRAA 53 04/18/2022 02:23 PM    GFRAA >60 03/15/2013

## 2024-05-02 ENCOUNTER — OFFICE VISIT (OUTPATIENT)
Dept: FAMILY MEDICINE CLINIC | Age: 74
End: 2024-05-02

## 2024-05-02 ENCOUNTER — TELEPHONE (OUTPATIENT)
Dept: FAMILY MEDICINE CLINIC | Age: 74
End: 2024-05-02

## 2024-05-02 VITALS
SYSTOLIC BLOOD PRESSURE: 180 MMHG | OXYGEN SATURATION: 96 % | HEART RATE: 55 BPM | DIASTOLIC BLOOD PRESSURE: 92 MMHG | WEIGHT: 154 LBS | BODY MASS INDEX: 30.08 KG/M2 | RESPIRATION RATE: 16 BRPM | TEMPERATURE: 97.3 F

## 2024-05-02 DIAGNOSIS — F41.9 ANXIETY: ICD-10-CM

## 2024-05-02 DIAGNOSIS — I25.118 CORONARY ARTERY DISEASE OF NATIVE HEART WITH STABLE ANGINA PECTORIS, UNSPECIFIED VESSEL OR LESION TYPE (HCC): ICD-10-CM

## 2024-05-02 DIAGNOSIS — I10 ESSENTIAL (PRIMARY) HYPERTENSION: ICD-10-CM

## 2024-05-02 DIAGNOSIS — N18.30 STAGE 3 CHRONIC KIDNEY DISEASE, UNSPECIFIED WHETHER STAGE 3A OR 3B CKD (HCC): Primary | ICD-10-CM

## 2024-05-02 DIAGNOSIS — F32.0 CURRENT MILD EPISODE OF MAJOR DEPRESSIVE DISORDER, UNSPECIFIED WHETHER RECURRENT (HCC): ICD-10-CM

## 2024-05-02 DIAGNOSIS — Z09 HOSPITAL DISCHARGE FOLLOW-UP: ICD-10-CM

## 2024-05-02 RX ORDER — PANTOPRAZOLE SODIUM 20 MG/1
20 TABLET, DELAYED RELEASE ORAL
Qty: 30 TABLET | Refills: 1 | Status: SHIPPED | OUTPATIENT
Start: 2024-05-02

## 2024-05-02 ASSESSMENT — PATIENT HEALTH QUESTIONNAIRE - PHQ9
SUM OF ALL RESPONSES TO PHQ QUESTIONS 1-9: 0
SUM OF ALL RESPONSES TO PHQ QUESTIONS 1-9: 0
SUM OF ALL RESPONSES TO PHQ9 QUESTIONS 1 & 2: 0
SUM OF ALL RESPONSES TO PHQ QUESTIONS 1-9: 0
1. LITTLE INTEREST OR PLEASURE IN DOING THINGS: NOT AT ALL
SUM OF ALL RESPONSES TO PHQ QUESTIONS 1-9: 0
2. FEELING DOWN, DEPRESSED OR HOPELESS: NOT AT ALL

## 2024-05-02 ASSESSMENT — ANXIETY QUESTIONNAIRES
5. BEING SO RESTLESS THAT IT IS HARD TO SIT STILL: NOT AT ALL
3. WORRYING TOO MUCH ABOUT DIFFERENT THINGS: NOT AT ALL
2. NOT BEING ABLE TO STOP OR CONTROL WORRYING: NOT AT ALL
6. BECOMING EASILY ANNOYED OR IRRITABLE: NOT AT ALL
7. FEELING AFRAID AS IF SOMETHING AWFUL MIGHT HAPPEN: NOT AT ALL
1. FEELING NERVOUS, ANXIOUS, OR ON EDGE: SEVERAL DAYS
GAD7 TOTAL SCORE: 1
4. TROUBLE RELAXING: NOT AT ALL

## 2024-05-02 ASSESSMENT — ENCOUNTER SYMPTOMS
WHEEZING: 0
SHORTNESS OF BREATH: 0
CHEST TIGHTNESS: 0
NAUSEA: 1

## 2024-05-02 NOTE — TELEPHONE ENCOUNTER
LMTCB  Care Transitions Initial Follow Up Call    Outreach made within 2 business days of discharge: Yes    Patient: Siobhan Flores Patient : 1950   MRN: [id number]  Reason for Admission: There are no discharge diagnoses documented for the most recent discharge.  Discharge Date: 24       Spoke with:     Discharge department/facility:     [facility] Interactive Patient Contact:  Was patient able to fill all prescriptions:   Was patient instructed to bring all medications to the follow-up visit:   Is patient taking all medications as directed in the discharge summary?   Does patient understand their discharge instructions:   Does patient have questions or concerns that need addressed prior to 7-14 day follow up office visit:     Scheduled appointment with PCP within 7-14 days    Follow Up  Future Appointments   Date Time Provider Department Center   2024  1:40 PM Grantmaier, Janet M, APRN - CNP [facility] [place] [facility]   2024 10:15 AM Morgan Kong APRN - CNP [facility]   2024  1:20 PM Janet Live APRN - CNP [facility] [place] [facility] [place], Karla, MA

## 2024-05-02 NOTE — PATIENT INSTRUCTIONS
Call tonight if you can not find the medications  Start pantoprazole  Check blood daily and record

## 2024-05-02 NOTE — PROGRESS NOTES
since being home yesterday she took all of her medications except carvedilol and isosorbide . She still does not have a daily schedule and does not take any of her medications at regular intervals  Not checking blood pressure  She is frustrated with her  taking control and demanding that she do things on his schedule even though she does states that he is helping her remember her medications  Daily gerd symptoms causing nausea which may be contributing to compliance. He does not want to add medications she feels it may be helpful    Patient Active Problem List   Diagnosis    Hyperlipidemia    Hypothyroidism    Anxiety and depression    Osteopenia    Vitamin D deficiency    Fibromyalgia    Elevated LFTs    Hypertensive emergency    Chest pain    Hypokalemia    Elevated TSH    Hypertensive urgency    Alzheimer's disease, unspecified    Current mild episode of major depressive disorder, unspecified whether recurrent ([facility])    Coronary artery disease involving native coronary artery of native heart    Chronic renal disease, stage III ([facility]) [780653]    NSTEMI (non-ST elevated myocardial infarction) ([facility])       Medications listed as ordered at the time of discharge from hospital     Medication List            Accurate as of May 2, 2024  5:22 PM. If you have any questions, ask your nurse or doctor.                START taking these medications      pantoprazole 20 MG tablet  Commonly known as: PROTONIX  Take 1 tablet by mouth every morning (before breakfast)  Started by: Janet Live, APRN - CNP            CONTINUE taking these medications      atorvastatin 80 MG tablet  Commonly known as: LIPITOR  TAKE 1 TABLET BY MOUTH EVERY DAY     carvedilol 25 MG tablet  Commonly known as: COREG  Take 0.5 tablets by mouth 2 times daily     clopidogrel 75 MG tablet  Commonly known as: PLAVIX  TAKE 1 TABLET BY MOUTH EVERY DAY     donepezil 10 MG tablet  Commonly known as: ARICEPT  TAKE 1 TABLET BY MOUTH EVERY DAY AT NIGHT

## 2024-05-03 ENCOUNTER — TELEPHONE (OUTPATIENT)
Dept: FAMILY MEDICINE CLINIC | Age: 74
End: 2024-05-03

## 2024-05-03 NOTE — TELEPHONE ENCOUNTER
3rd AND FINAL Attempt; No Answer- Left HIPAA compliant voicemail with Non-Urgent Heart Failure Resource Line number for call back.     Elda Mason RN

## 2024-05-03 NOTE — TELEPHONE ENCOUNTER
1st Attempt; No Answer- Left HIPAA compliant voicemail with Non-Urgent Heart Failure Resource Line number for call back.     Elda Mason RN

## 2024-05-03 NOTE — TELEPHONE ENCOUNTER
2nd Attempt; No Answer- Left HIPAA compliant voicemail with Non-Urgent Heart Failure Resource Line number for call back.      Elda Mason RN

## 2024-05-06 NOTE — PROGRESS NOTES
anxiety. Recommend referral to behavioral health for scores 10 or greater.       Diagnosis:    1. Anxiety    2. Depressive disorder          Past Diagnosis:        Diagnosis Date    Anxiety     CAD (coronary artery disease)     Fibromyalgia     Hyperlipidemia     Hypertension     Hypothyroidism     Osteopenia     Dexa 7/08    Thyroid disease     Transient ischemic attack 2003         Plan:  Pt interventions:  Established rapport  Conducted functional assessment  Supportive techniques  Bishop-setting to identify pt's primary goals for [facility] visit / overall health  Collaborative treatment planning,Clarified role of [facility] in primary care,Recommended that pt establish with a mental health clinician with whom they can meet regularly for psychotherapy services    Pt Behavioral Change Plan:   See Pt Instructions

## 2024-05-07 ENCOUNTER — OFFICE VISIT (OUTPATIENT)
Dept: PSYCHOLOGY | Age: 74
End: 2024-05-07
Payer: MEDICARE

## 2024-05-07 DIAGNOSIS — F41.9 ANXIETY: Primary | ICD-10-CM

## 2024-05-07 DIAGNOSIS — F32.A DEPRESSIVE DISORDER: ICD-10-CM

## 2024-05-07 PROCEDURE — 90791 PSYCH DIAGNOSTIC EVALUATION: CPT | Performed by: SOCIAL WORKER

## 2024-05-07 PROCEDURE — 1124F ACP DISCUSS-NO DSCNMKR DOCD: CPT | Performed by: SOCIAL WORKER

## 2024-05-07 ASSESSMENT — ANXIETY QUESTIONNAIRES
2. NOT BEING ABLE TO STOP OR CONTROL WORRYING: NEARLY EVERY DAY
3. WORRYING TOO MUCH ABOUT DIFFERENT THINGS: NEARLY EVERY DAY
1. FEELING NERVOUS, ANXIOUS, OR ON EDGE: MORE THAN HALF THE DAYS
7. FEELING AFRAID AS IF SOMETHING AWFUL MIGHT HAPPEN: MORE THAN HALF THE DAYS
6. BECOMING EASILY ANNOYED OR IRRITABLE: MORE THAN HALF THE DAYS
5. BEING SO RESTLESS THAT IT IS HARD TO SIT STILL: NEARLY EVERY DAY
4. TROUBLE RELAXING: MORE THAN HALF THE DAYS
GAD7 TOTAL SCORE: 17

## 2024-05-21 ENCOUNTER — APPOINTMENT (OUTPATIENT)
Dept: GENERAL RADIOLOGY | Age: 74
End: 2024-05-21
Payer: MEDICARE

## 2024-05-21 ENCOUNTER — APPOINTMENT (OUTPATIENT)
Dept: CT IMAGING | Age: 74
End: 2024-05-21
Payer: MEDICARE

## 2024-05-21 ENCOUNTER — HOSPITAL ENCOUNTER (EMERGENCY)
Age: 74
Discharge: HOME | End: 2024-05-22
Attending: STUDENT IN AN ORGANIZED HEALTH CARE EDUCATION/TRAINING PROGRAM
Payer: MEDICARE

## 2024-05-21 DIAGNOSIS — I10 HYPERTENSION, UNSPECIFIED TYPE: Primary | ICD-10-CM

## 2024-05-21 DIAGNOSIS — R07.9 CHEST PAIN, UNSPECIFIED TYPE: ICD-10-CM

## 2024-05-21 LAB
ALBUMIN SERPL-MCNC: 4 G/DL (ref 3.4–5)
ALBUMIN/GLOB SERPL: 1.2 {RATIO} (ref 1.1–2.2)
ALP SERPL-CCNC: 73 U/L (ref 40–129)
ALT SERPL-CCNC: 10 U/L (ref 10–40)
ANION GAP SERPL CALCULATED.3IONS-SCNC: 12 MMOL/L (ref 3–16)
AST SERPL-CCNC: 19 U/L (ref 15–37)
BASOPHILS # BLD: 0 K/UL (ref 0–0.2)
BASOPHILS NFR BLD: 0.6 %
BILIRUB SERPL-MCNC: 0.4 MG/DL (ref 0–1)
BUN SERPL-MCNC: 9 MG/DL (ref 7–20)
CALCIUM SERPL-MCNC: 10.2 MG/DL (ref 8.3–10.6)
CHLORIDE SERPL-SCNC: 107 MMOL/L (ref 99–110)
CO2 SERPL-SCNC: 24 MMOL/L (ref 21–32)
CREAT SERPL-MCNC: 1 MG/DL (ref 0.6–1.2)
DEPRECATED RDW RBC AUTO: 14.5 % (ref 12.4–15.4)
EOSINOPHIL # BLD: 0.2 K/UL (ref 0–0.6)
EOSINOPHIL NFR BLD: 3.4 %
GFR SERPLBLD CREATININE-BSD FMLA CKD-EPI: 59 ML/MIN/{1.73_M2}
GLUCOSE SERPL-MCNC: 123 MG/DL (ref 70–99)
HCT VFR BLD AUTO: 39.8 % (ref 36–48)
HGB BLD-MCNC: 13.4 G/DL (ref 12–16)
LYMPHOCYTES # BLD: 2 K/UL (ref 1–5.1)
LYMPHOCYTES NFR BLD: 33.2 %
MAGNESIUM SERPL-MCNC: 1.8 MG/DL (ref 1.8–2.4)
MCH RBC QN AUTO: 33.8 PG (ref 26–34)
MCHC RBC AUTO-ENTMCNC: 33.6 G/DL (ref 31–36)
MCV RBC AUTO: 100.5 FL (ref 80–100)
MONOCYTES # BLD: 0.4 K/UL (ref 0–1.3)
MONOCYTES NFR BLD: 6.1 %
NEUTROPHILS # BLD: 3.4 K/UL (ref 1.7–7.7)
NEUTROPHILS NFR BLD: 56.7 %
PLATELET # BLD AUTO: 236 K/UL (ref 135–450)
PMV BLD AUTO: 7.8 FL (ref 5–10.5)
POTASSIUM SERPL-SCNC: 3 MMOL/L (ref 3.5–5.1)
PROT SERPL-MCNC: 7.4 G/DL (ref 6.4–8.2)
RBC # BLD AUTO: 3.96 M/UL (ref 4–5.2)
SODIUM SERPL-SCNC: 143 MMOL/L (ref 136–145)
TROPONIN, HIGH SENSITIVITY: 14 NG/L (ref 0–14)
TROPONIN, HIGH SENSITIVITY: 14 NG/L (ref 0–14)
WBC # BLD AUTO: 5.9 K/UL (ref 4–11)

## 2024-05-21 PROCEDURE — 36415 COLL VENOUS BLD VENIPUNCTURE: CPT

## 2024-05-21 PROCEDURE — 96374 THER/PROPH/DIAG INJ IV PUSH: CPT

## 2024-05-21 PROCEDURE — 80053 COMPREHEN METABOLIC PANEL: CPT

## 2024-05-21 PROCEDURE — 83735 ASSAY OF MAGNESIUM: CPT

## 2024-05-21 PROCEDURE — 96376 TX/PRO/DX INJ SAME DRUG ADON: CPT

## 2024-05-21 PROCEDURE — 99285 EMERGENCY DEPT VISIT HI MDM: CPT

## 2024-05-21 PROCEDURE — 6370000000 HC RX 637 (ALT 250 FOR IP): Performed by: STUDENT IN AN ORGANIZED HEALTH CARE EDUCATION/TRAINING PROGRAM

## 2024-05-21 PROCEDURE — 70450 CT HEAD/BRAIN W/O DYE: CPT

## 2024-05-21 PROCEDURE — 6360000002 HC RX W HCPCS: Performed by: STUDENT IN AN ORGANIZED HEALTH CARE EDUCATION/TRAINING PROGRAM

## 2024-05-21 PROCEDURE — 93005 ELECTROCARDIOGRAM TRACING: CPT | Performed by: STUDENT IN AN ORGANIZED HEALTH CARE EDUCATION/TRAINING PROGRAM

## 2024-05-21 PROCEDURE — 85025 COMPLETE CBC W/AUTO DIFF WBC: CPT

## 2024-05-21 PROCEDURE — 71045 X-RAY EXAM CHEST 1 VIEW: CPT

## 2024-05-21 PROCEDURE — 84484 ASSAY OF TROPONIN QUANT: CPT

## 2024-05-21 RX ORDER — POTASSIUM CHLORIDE 20 MEQ/1
40 TABLET, EXTENDED RELEASE ORAL ONCE
Status: COMPLETED | OUTPATIENT
Start: 2024-05-21 | End: 2024-05-21

## 2024-05-21 RX ORDER — ONDANSETRON 2 MG/ML
4 INJECTION INTRAMUSCULAR; INTRAVENOUS ONCE
Status: DISCONTINUED | OUTPATIENT
Start: 2024-05-21 | End: 2024-05-22 | Stop reason: HOSPADM

## 2024-05-21 RX ORDER — HYDRALAZINE HYDROCHLORIDE 20 MG/ML
5 INJECTION INTRAMUSCULAR; INTRAVENOUS ONCE
Status: COMPLETED | OUTPATIENT
Start: 2024-05-21 | End: 2024-05-21

## 2024-05-21 RX ADMIN — POTASSIUM CHLORIDE 40 MEQ: 1500 TABLET, EXTENDED RELEASE ORAL at 23:28

## 2024-05-21 RX ADMIN — HYDRALAZINE HYDROCHLORIDE 5 MG: 20 INJECTION INTRAMUSCULAR; INTRAVENOUS at 22:54

## 2024-05-21 ASSESSMENT — PAIN DESCRIPTION - LOCATION: LOCATION: CHEST

## 2024-05-21 ASSESSMENT — PAIN - FUNCTIONAL ASSESSMENT: PAIN_FUNCTIONAL_ASSESSMENT: 0-10

## 2024-05-21 ASSESSMENT — PAIN DESCRIPTION - DESCRIPTORS: DESCRIPTORS: PRESSURE

## 2024-05-22 VITALS
HEIGHT: 60 IN | OXYGEN SATURATION: 96 % | TEMPERATURE: 98.1 F | RESPIRATION RATE: 15 BRPM | DIASTOLIC BLOOD PRESSURE: 67 MMHG | BODY MASS INDEX: 29.45 KG/M2 | WEIGHT: 150 LBS | SYSTOLIC BLOOD PRESSURE: 151 MMHG | HEART RATE: 84 BPM

## 2024-05-22 LAB
EKG ATRIAL RATE: 77 BPM
EKG DIAGNOSIS: NORMAL
EKG P AXIS: 60 DEGREES
EKG P-R INTERVAL: 144 MS
EKG Q-T INTERVAL: 374 MS
EKG QRS DURATION: 84 MS
EKG QTC CALCULATION (BAZETT): 423 MS
EKG R AXIS: 1 DEGREES
EKG T AXIS: 56 DEGREES
EKG VENTRICULAR RATE: 77 BPM

## 2024-05-22 PROCEDURE — 6360000002 HC RX W HCPCS: Performed by: STUDENT IN AN ORGANIZED HEALTH CARE EDUCATION/TRAINING PROGRAM

## 2024-05-22 PROCEDURE — 93010 ELECTROCARDIOGRAM REPORT: CPT | Performed by: INTERNAL MEDICINE

## 2024-05-22 PROCEDURE — 96376 TX/PRO/DX INJ SAME DRUG ADON: CPT

## 2024-05-22 RX ORDER — HYDRALAZINE HYDROCHLORIDE 20 MG/ML
5 INJECTION INTRAMUSCULAR; INTRAVENOUS ONCE
Status: COMPLETED | OUTPATIENT
Start: 2024-05-22 | End: 2024-05-22

## 2024-05-22 RX ORDER — ONDANSETRON 4 MG/1
4 TABLET, ORALLY DISINTEGRATING ORAL EVERY 6 HOURS PRN
Qty: 21 TABLET | Refills: 0 | Status: SHIPPED | OUTPATIENT
Start: 2024-05-22

## 2024-05-22 RX ADMIN — HYDRALAZINE HYDROCHLORIDE 5 MG: 20 INJECTION INTRAMUSCULAR; INTRAVENOUS at 00:34

## 2024-05-22 NOTE — ED NOTES
Pt states she cannot stay in the hospital. Pt states she has multiple MD appts this week including with her cardiologist.

## 2024-05-22 NOTE — ED PROVIDER NOTES
tablet 1    venlafaxine (EFFEXOR XR) 150 MG extended release capsule TAKE 1 CAPSULE BY MOUTH EVERY DAY 90 capsule 1    nitroGLYCERIN (NITROSTAT) 0.4 MG SL tablet Place 1 tablet under the tongue every 5 minutes as needed for Chest pain up to max of 3 total doses. If no relief after 1 dose, call 911. 33 tablet 2     Allergies   Allergen Reactions    Lisinopril-Hydrochlorothiazide Nausea Only    Sulfa Antibiotics        Nursing Notes Reviewed    Physical Exam:  Triage VS:    ED Triage Vitals [05/21/24 2124]   Enc Vitals Group      BP (!) 205/102      Pulse 77      Respirations 20      Temp 98.1 °F (36.7 °C)      Temp Source Oral      SpO2 95 %      Weight - Scale 68 kg (150 lb)      Height 1.524 m (5')      Head Circumference       Peak Flow       Pain Score       Pain Loc       Pain Edu?       Excl. in GC?        My pulse ox interpretation is - normal    General appearance:  No acute distress.   Skin:  Warm. Dry.   Eye:  Extraocular movements intact.     Ears, nose, mouth and throat:  Oral mucosa moist   Neck:  Trachea midline.   Extremity:  No swelling.  Normal ROM     Heart:  Regular rate and rhythm, normal S1 & S2, no extra heart sounds.    Perfusion:  intact, equal and symmetric pulses of bilateral upper and lower extremities, less than 2-second cap refill and good perfusion in all extremities  Respiratory:  Lungs clear to auscultation bilaterally.  Respirations nonlabored.  No stridor, wheezing, rhonchi or crackles  Abdominal:  Normal bowel sounds.  Soft.  Nondistended, no tenderness palpation the abdomen pelvis, no flank pain, no CVA tenderness, no central spinal tenderness  Back:  No CVA tenderness to palpation     Neurological:  Alert and oriented times 3.  No focal neuro deficits.  No facial asymmetry, normal sensation light touch of the face, extraocular eye movements intact, pupils are 3 mm reactive bilaterally, no gross visual field deficits, no pronator drift of the bilateral upper and lower extremities,

## 2024-05-22 NOTE — DISCHARGE INSTRUCTIONS
Follow-up with your cardiologist for already scheduled appointment on Thursday.  Also call your primary doctor to set up follow-up appointment soon as able.  I do want to return to emergency department if you have any other episodes of chest pain any shortness of breath lightheadedness or dizziness, abdominal pain, fevers or chills, headache, blurred vision, focal deficits, motor or sensory changes, weakness or numbness or any new changing or worsening symptoms were always here for evaluation never hesitate to return.

## 2024-05-23 ENCOUNTER — OFFICE VISIT (OUTPATIENT)
Dept: FAMILY MEDICINE CLINIC | Age: 74
End: 2024-05-23
Payer: MEDICARE

## 2024-05-23 ENCOUNTER — OFFICE VISIT (OUTPATIENT)
Dept: CARDIOLOGY CLINIC | Age: 74
End: 2024-05-23
Payer: MEDICARE

## 2024-05-23 VITALS
OXYGEN SATURATION: 98 % | BODY MASS INDEX: 28.9 KG/M2 | HEART RATE: 66 BPM | RESPIRATION RATE: 16 BRPM | WEIGHT: 148 LBS | TEMPERATURE: 97.3 F | SYSTOLIC BLOOD PRESSURE: 170 MMHG | DIASTOLIC BLOOD PRESSURE: 90 MMHG

## 2024-05-23 VITALS
HEART RATE: 63 BPM | OXYGEN SATURATION: 97 % | BODY MASS INDEX: 29.09 KG/M2 | DIASTOLIC BLOOD PRESSURE: 98 MMHG | HEIGHT: 60 IN | WEIGHT: 148.2 LBS | SYSTOLIC BLOOD PRESSURE: 174 MMHG

## 2024-05-23 DIAGNOSIS — K21.9 GASTROESOPHAGEAL REFLUX DISEASE, UNSPECIFIED WHETHER ESOPHAGITIS PRESENT: ICD-10-CM

## 2024-05-23 DIAGNOSIS — I10 PRIMARY HYPERTENSION: ICD-10-CM

## 2024-05-23 DIAGNOSIS — I25.10 CORONARY ARTERY DISEASE INVOLVING NATIVE CORONARY ARTERY OF NATIVE HEART WITHOUT ANGINA PECTORIS: Primary | ICD-10-CM

## 2024-05-23 DIAGNOSIS — Z91.148 NONCOMPLIANCE WITH MEDICATION REGIMEN: ICD-10-CM

## 2024-05-23 DIAGNOSIS — M54.2 CERVICAL PAIN: Primary | ICD-10-CM

## 2024-05-23 PROCEDURE — 3077F SYST BP >= 140 MM HG: CPT | Performed by: NURSE PRACTITIONER

## 2024-05-23 PROCEDURE — 1124F ACP DISCUSS-NO DSCNMKR DOCD: CPT | Performed by: NURSE PRACTITIONER

## 2024-05-23 PROCEDURE — 99214 OFFICE O/P EST MOD 30 MIN: CPT | Performed by: NURSE PRACTITIONER

## 2024-05-23 PROCEDURE — 3080F DIAST BP >= 90 MM HG: CPT | Performed by: NURSE PRACTITIONER

## 2024-05-23 RX ORDER — ETODOLAC 400 MG/1
400 TABLET, FILM COATED ORAL 2 TIMES DAILY
Qty: 20 TABLET | Refills: 0 | Status: SHIPPED | OUTPATIENT
Start: 2024-05-23 | End: 2024-06-02

## 2024-05-23 RX ORDER — PANTOPRAZOLE SODIUM 20 MG/1
20 TABLET, DELAYED RELEASE ORAL 2 TIMES DAILY
Qty: 60 TABLET | Refills: 1 | Status: SHIPPED | OUTPATIENT
Start: 2024-05-23

## 2024-05-23 ASSESSMENT — ENCOUNTER SYMPTOMS
ABDOMINAL PAIN: 0
WHEEZING: 0
VOMITING: 0
COUGH: 0
CHEST TIGHTNESS: 0
CHEST TIGHTNESS: 1
GASTROINTESTINAL NEGATIVE: 1
SHORTNESS OF BREATH: 0
EYES NEGATIVE: 1
NAUSEA: 1
BLOOD IN STOOL: 0

## 2024-05-23 NOTE — PROGRESS NOTES
5/23/2024    This is a 73 y.o. female   Chief Complaint   Patient presents with    Hypertension     Saw DR Marx cardiology today.   .    Chayo is seen today for evalution of neck pain. It started about 1 week ago and now has become consistent. She notes pain with movement. It is describes as a crunching sound in her neck that hurts. She has tried heat and ice. She has also taken ibuprofen with some relief. She denies any radicular symptoms and can not recall any mechanism of injury.     Medications: she has not been taking her medications consistently. She is with her  today who states the cardiologist gave them directions and will try that. This was similar recommendations that were provided by me in the past which was not done. She has not yet taken any medications today. She notes continued nausea as contributing factor. She is not eating regularly.          Patient Active Problem List   Diagnosis    Hyperlipidemia    Hypothyroidism    Anxiety and depression    Osteopenia    Vitamin D deficiency    Fibromyalgia    Elevated LFTs    Hypertensive emergency    Chest pain    Hypokalemia    Elevated TSH    Hypertensive urgency    Alzheimer's disease, unspecified    Current mild episode of major depressive disorder, unspecified whether recurrent ([facility])    Coronary artery disease involving native coronary artery of native heart    Chronic renal disease, stage III ([facility]) [[id number]]    NSTEMI (non-ST elevated myocardial infarction) ([facility])       Current Outpatient Medications   Medication Sig Dispense Refill    ondansetron (ZOFRAN-ODT) 4 MG disintegrating tablet Take 1 tablet by mouth every 6 hours as needed for Nausea or Vomiting 21 tablet 0    pantoprazole (PROTONIX) 20 MG tablet Take 1 tablet by mouth every morning (before breakfast) 30 tablet 1    carvedilol (COREG) 25 MG tablet Take 0.5 tablets by mouth 2 times daily 180 tablet 0    isosorbide mononitrate (IMDUR) 30 MG extended release tablet Take 1 tablet by mouth

## 2024-05-23 NOTE — PATIENT INSTRUCTIONS
Try to take carvedilol and spironolactone in the morning with toast/peanut butter/glass of water  A few hours later, take plavix, losartan and imdur  Zetia and lipitor at night  Follow up in 6 weeks

## 2024-05-23 NOTE — PROGRESS NOTES
last 72 hours.  APTT:No results for input(s): \"APTT\" in the last 72 hours.  FASTING LIPID PANEL:  Lab Results   Component Value Date/Time    HDL 53 04/29/2024 02:11 AM    TRIG 142 04/29/2024 02:11 AM     LIVER PROFILE:  Recent Labs     05/21/24  2200   AST 19   ALT 10     BNP:   Lab Results   Component Value Date/Time    PROBNP 232 04/25/2024 07:35 PM     Reviewed all labs and imaging today    Assessment:   CAD: S/p MARQUITA LAD 3/2023   -Medical management for severe diagonal disease due to small vessel size  HTN: Uncontrolled  HLD  Hypokalemia  Thyroid disease  Fibromyalgia  History of TIA  Medication nonadherence    Plan:   1.  Continue carvedilol, losartan, Imdur, spironolactone, Plavix, statin, Zetia  2.  We discussed spacing BP medications apart with small meals to limit symptoms of nausea/vomiting which is prohibiting her from taking medications, will need to follow-up with PCP as well   -Carvedilol and spironolactone AM; Plavix, losartan, Imdur late AM; carvedilol PM  3.  Check BP at home and call the office if consistently out of goal range  4.  Regular exercise and healthy diet encouraged  5.  No need to recheck BMP due to hyperkalemia  6.  Follow-up in 6 weeks for BP check; will then need follow-up with MD, transitioning from [facility] cardiology    JAMSHID Espinal-CNP  [facility]  [phone]

## 2024-05-23 NOTE — PATIENT INSTRUCTIONS
Stop ibuprofen and start lodine for neck pain  Start home stretches  Continue home heat with alternating ice after stretches  Increase pantoprazole twice daily

## 2024-06-15 DIAGNOSIS — K21.9 GASTROESOPHAGEAL REFLUX DISEASE, UNSPECIFIED WHETHER ESOPHAGITIS PRESENT: ICD-10-CM

## 2024-06-17 RX ORDER — PANTOPRAZOLE SODIUM 20 MG/1
TABLET, DELAYED RELEASE ORAL
Qty: 180 TABLET | Refills: 0 | Status: SHIPPED | OUTPATIENT
Start: 2024-06-17

## 2024-06-17 NOTE — TELEPHONE ENCOUNTER
Future Appointments   Date Time Provider Department Center   6/26/2024  1:40 PM Janet Live APRN - CNP [facility] [place] - [facility]   7/8/2024  1:30 PM Morgan Kong APRN - CNP [facility] 5/23/2024

## 2024-06-24 ENCOUNTER — TELEPHONE (OUTPATIENT)
Dept: FAMILY MEDICINE CLINIC | Age: 74
End: 2024-06-24

## 2024-06-24 RX ORDER — ISOSORBIDE MONONITRATE 30 MG/1
30 TABLET, EXTENDED RELEASE ORAL DAILY
Qty: 30 TABLET | Refills: 0 | Status: SHIPPED | OUTPATIENT
Start: 2024-06-24

## 2024-06-24 RX ORDER — NITROGLYCERIN 0.4 MG/1
0.4 TABLET SUBLINGUAL EVERY 5 MIN PRN
Qty: 25 TABLET | Refills: 0 | Status: SHIPPED | OUTPATIENT
Start: 2024-06-24

## 2024-06-24 RX ORDER — ONDANSETRON 4 MG/1
4 TABLET, ORALLY DISINTEGRATING ORAL EVERY 6 HOURS PRN
Qty: 21 TABLET | Refills: 0 | Status: SHIPPED | OUTPATIENT
Start: 2024-06-24

## 2024-06-24 NOTE — TELEPHONE ENCOUNTER
Patient needs a refill            nitroGLYCERIN (NITROSTAT) 0.4 MG SL tablet    isosorbide mononitrate (IMDUR) 30 MG extended release tablet    ondansetron (ZOFRAN-ODT) 4 MG disintegrating tablet       [facility]   30 day

## 2024-06-26 ENCOUNTER — OFFICE VISIT (OUTPATIENT)
Dept: FAMILY MEDICINE CLINIC | Age: 74
End: 2024-06-26
Payer: MEDICARE

## 2024-06-26 VITALS
HEART RATE: 59 BPM | TEMPERATURE: 97.6 F | SYSTOLIC BLOOD PRESSURE: 162 MMHG | OXYGEN SATURATION: 97 % | WEIGHT: 147 LBS | BODY MASS INDEX: 28.71 KG/M2 | DIASTOLIC BLOOD PRESSURE: 84 MMHG | RESPIRATION RATE: 16 BRPM

## 2024-06-26 DIAGNOSIS — F41.9 ANXIETY: ICD-10-CM

## 2024-06-26 DIAGNOSIS — Z91.148 NONCOMPLIANCE WITH MEDICATION REGIMEN: ICD-10-CM

## 2024-06-26 DIAGNOSIS — Z00.00 MEDICARE ANNUAL WELLNESS VISIT, SUBSEQUENT: Primary | ICD-10-CM

## 2024-06-26 DIAGNOSIS — M54.2 CERVICAL PAIN: ICD-10-CM

## 2024-06-26 DIAGNOSIS — J30.9 ALLERGIC SINUSITIS: ICD-10-CM

## 2024-06-26 DIAGNOSIS — E78.5 HYPERLIPIDEMIA, UNSPECIFIED HYPERLIPIDEMIA TYPE: ICD-10-CM

## 2024-06-26 DIAGNOSIS — F02.80 ALZHEIMER DISEASE (HCC): ICD-10-CM

## 2024-06-26 DIAGNOSIS — G30.9 ALZHEIMER DISEASE (HCC): ICD-10-CM

## 2024-06-26 DIAGNOSIS — I10 ESSENTIAL (PRIMARY) HYPERTENSION: ICD-10-CM

## 2024-06-26 PROCEDURE — 3079F DIAST BP 80-89 MM HG: CPT | Performed by: NURSE PRACTITIONER

## 2024-06-26 PROCEDURE — 1124F ACP DISCUSS-NO DSCNMKR DOCD: CPT | Performed by: NURSE PRACTITIONER

## 2024-06-26 PROCEDURE — G0439 PPPS, SUBSEQ VISIT: HCPCS | Performed by: NURSE PRACTITIONER

## 2024-06-26 PROCEDURE — 3077F SYST BP >= 140 MM HG: CPT | Performed by: NURSE PRACTITIONER

## 2024-06-26 RX ORDER — LEVOTHYROXINE SODIUM 0.15 MG/1
150 TABLET ORAL DAILY
Qty: 90 TABLET | Refills: 1 | Status: SHIPPED | OUTPATIENT
Start: 2024-06-26

## 2024-06-26 RX ORDER — VENLAFAXINE HYDROCHLORIDE 150 MG/1
150 CAPSULE, EXTENDED RELEASE ORAL DAILY
Qty: 90 CAPSULE | Refills: 1 | Status: SHIPPED | OUTPATIENT
Start: 2024-06-26

## 2024-06-26 SDOH — ECONOMIC STABILITY: FOOD INSECURITY: WITHIN THE PAST 12 MONTHS, THE FOOD YOU BOUGHT JUST DIDN'T LAST AND YOU DIDN'T HAVE MONEY TO GET MORE.: NEVER TRUE

## 2024-06-26 SDOH — ECONOMIC STABILITY: INCOME INSECURITY: HOW HARD IS IT FOR YOU TO PAY FOR THE VERY BASICS LIKE FOOD, HOUSING, MEDICAL CARE, AND HEATING?: NOT HARD AT ALL

## 2024-06-26 SDOH — ECONOMIC STABILITY: FOOD INSECURITY: WITHIN THE PAST 12 MONTHS, YOU WORRIED THAT YOUR FOOD WOULD RUN OUT BEFORE YOU GOT MONEY TO BUY MORE.: NEVER TRUE

## 2024-06-26 ASSESSMENT — PATIENT HEALTH QUESTIONNAIRE - PHQ9
5. POOR APPETITE OR OVEREATING: NOT AT ALL
7. TROUBLE CONCENTRATING ON THINGS, SUCH AS READING THE NEWSPAPER OR WATCHING TELEVISION: NOT AT ALL
9. THOUGHTS THAT YOU WOULD BE BETTER OFF DEAD, OR OF HURTING YOURSELF: NOT AT ALL
8. MOVING OR SPEAKING SO SLOWLY THAT OTHER PEOPLE COULD HAVE NOTICED. OR THE OPPOSITE, BEING SO FIGETY OR RESTLESS THAT YOU HAVE BEEN MOVING AROUND A LOT MORE THAN USUAL: NOT AT ALL
SUM OF ALL RESPONSES TO PHQ QUESTIONS 1-9: 0
SUM OF ALL RESPONSES TO PHQ QUESTIONS 1-9: 0
10. IF YOU CHECKED OFF ANY PROBLEMS, HOW DIFFICULT HAVE THESE PROBLEMS MADE IT FOR YOU TO DO YOUR WORK, TAKE CARE OF THINGS AT HOME, OR GET ALONG WITH OTHER PEOPLE: NOT DIFFICULT AT ALL
6. FEELING BAD ABOUT YOURSELF - OR THAT YOU ARE A FAILURE OR HAVE LET YOURSELF OR YOUR FAMILY DOWN: NOT AT ALL
2. FEELING DOWN, DEPRESSED OR HOPELESS: NOT AT ALL
SUM OF ALL RESPONSES TO PHQ QUESTIONS 1-9: 0
1. LITTLE INTEREST OR PLEASURE IN DOING THINGS: NOT AT ALL
SUM OF ALL RESPONSES TO PHQ9 QUESTIONS 1 & 2: 0
SUM OF ALL RESPONSES TO PHQ QUESTIONS 1-9: 0
4. FEELING TIRED OR HAVING LITTLE ENERGY: NOT AT ALL

## 2024-06-26 ASSESSMENT — ANXIETY QUESTIONNAIRES
1. FEELING NERVOUS, ANXIOUS, OR ON EDGE: NOT AT ALL
GAD7 TOTAL SCORE: 0
4. TROUBLE RELAXING: NOT AT ALL
6. BECOMING EASILY ANNOYED OR IRRITABLE: NOT AT ALL
7. FEELING AFRAID AS IF SOMETHING AWFUL MIGHT HAPPEN: NOT AT ALL
IF YOU CHECKED OFF ANY PROBLEMS ON THIS QUESTIONNAIRE, HOW DIFFICULT HAVE THESE PROBLEMS MADE IT FOR YOU TO DO YOUR WORK, TAKE CARE OF THINGS AT HOME, OR GET ALONG WITH OTHER PEOPLE: NOT DIFFICULT AT ALL
2. NOT BEING ABLE TO STOP OR CONTROL WORRYING: NOT AT ALL
5. BEING SO RESTLESS THAT IT IS HARD TO SIT STILL: NOT AT ALL
3. WORRYING TOO MUCH ABOUT DIFFERENT THINGS: NOT AT ALL

## 2024-06-26 NOTE — PATIENT INSTRUCTIONS
Everyone starts somewhere.  How can you find safe ways to stay active?  Talk with your doctor about any physical challenges you're facing. Make a plan with your doctor if you have a health problem or aren't sure how to get started with activity.  If you're already active, ask your doctor if there is anything you should change to stay safe as your body and health change.  If you tend to feel dizzy after you take medicine, avoid activity at that time. Try being active before you take your medicine. This will reduce your risk of falls.  If you plan to be active at home, make sure to clear your space before you get started. Remove things like TV cords, coffee tables, and throw rugs. It's safest to have plenty of space to move freely.  The key to getting more active is to take it slow and steady. Try to improve only a little bit at a time. Pick just one area to improve on at first. And if an activity hurts, stop and talk to your doctor.  Where can you learn more?  Go to https://www.[employer].net/patientEd and enter P600 to learn more about \"Learning About Being Active as an Older Adult.\"  Current as of: June 5, 2023  Content Version: 14.1  © 2006-2024 [employer].   Care instructions adapted under license by [employer]. If you have questions about a medical condition or this instruction, always ask your healthcare professional. [employer] disclaims any warranty or liability for your use of this information.           Learning About Dental Care for Older Adults  Dental care for older adults: Overview  Dental care for older people is much the same as for younger adults. But older adults do have concerns that younger adults do not. Older adults may have problems with gum disease and decay on the roots of their teeth. They may need missing teeth replaced or broken fillings fixed. Or they may have dentures that need to be cared for. Some older adults may have trouble holding a toothbrush.  You

## 2024-06-26 NOTE — PROGRESS NOTES
Medicare Annual Wellness Visit    Siobhan Flores is here for Medicare AWV, Hypertension (150's over something ), and Neck Pain    Assessment & Plan   Medicare annual wellness visit, subsequent  Cervical pain improved, no further workup at this time, encouraged to continue home exercises  Noncompliance with medication regimen- continue  Alzheimer disease (HCC)  Continue aricept  Discussed setting normal schedule  Anxiety-     venlafaxine (EFFEXOR XR) 150 MG extended release capsule; Take 1 capsule by mouth daily, Disp-90 capsule, R-1Norma;  Hyperlipidemia, unspecified hyperlipidemia type    Stable continue statin  Allergic sinusitis- start daily Claritin and flonase  Htn- remains uncontrolled. Pt states readings are up and down. Encouraged to keep appointment with cardiology  Encouraged to  missing medications at pharmacy  Discussed cardiology should be taking over rx of cardiac medications    Recommendations for Preventive Services Due: see orders and patient instructions/AVS.  Recommended screening schedule for the next 5-10 years is provided to the patient in written form: see Patient Instructions/AVS.     Return in 3 months (on 9/26/2024).     Subjective   ros    Patient's complete Health Risk Assessment and screening values have been reviewed and are found in Flowsheets. The following problems were reviewed today and where indicated follow up appointments were made and/or referrals ordered.    Positive Risk Factor Screenings with Interventions:       Cognitive:   Clock Drawing Test (CDT): Normal  Words recalled: 0 Words Recalled  Total Score: (!) 2  Total Score Interpretation: Abnormal Mini-Cog    Interventions:  Patient comments: aricept not working as well     Alcohol Screening:  Alcohol Use: Heavy Drinker (10/10/2023)    AUDIT-C     Frequency of Alcohol Consumption: 2-3 times a week     Average Number of Drinks: 3 or 4     Frequency of Binge Drinking: Weekly               Interpretation of AUDIT-C

## 2024-07-08 ENCOUNTER — OFFICE VISIT (OUTPATIENT)
Dept: CARDIOLOGY CLINIC | Age: 74
End: 2024-07-08
Payer: MEDICARE

## 2024-07-08 VITALS
BODY MASS INDEX: 28.74 KG/M2 | HEART RATE: 79 BPM | HEIGHT: 60 IN | WEIGHT: 146.4 LBS | DIASTOLIC BLOOD PRESSURE: 78 MMHG | OXYGEN SATURATION: 96 % | SYSTOLIC BLOOD PRESSURE: 138 MMHG

## 2024-07-08 DIAGNOSIS — I25.10 CORONARY ARTERY DISEASE INVOLVING NATIVE CORONARY ARTERY OF NATIVE HEART WITHOUT ANGINA PECTORIS: Primary | ICD-10-CM

## 2024-07-08 DIAGNOSIS — I10 PRIMARY HYPERTENSION: ICD-10-CM

## 2024-07-08 PROCEDURE — 3078F DIAST BP <80 MM HG: CPT | Performed by: NURSE PRACTITIONER

## 2024-07-08 PROCEDURE — 99214 OFFICE O/P EST MOD 30 MIN: CPT | Performed by: NURSE PRACTITIONER

## 2024-07-08 PROCEDURE — 3075F SYST BP GE 130 - 139MM HG: CPT | Performed by: NURSE PRACTITIONER

## 2024-07-08 PROCEDURE — 1124F ACP DISCUSS-NO DSCNMKR DOCD: CPT | Performed by: NURSE PRACTITIONER

## 2024-07-08 ASSESSMENT — ENCOUNTER SYMPTOMS
GASTROINTESTINAL NEGATIVE: 1
CHEST TIGHTNESS: 1

## 2024-07-08 NOTE — PROGRESS NOTES
[facility]   Cardiology Note              Date:  July 8, 2024  Patientname: Siobhan Flores  YOB: 1950    Primary Care physician: Janet Live, JAMSHID - CNP    HISTORY OF PRESENT ILLNESS: Siobhan Flores is a 73 y.o. female with a history of CAD, HTN, HLD, thyroid disease, TIA, fibromyalgia.  Previously followed with [facility] cardiology.  She had MARQUITA LAD 3/2023, severe diffuse diagonal disease also noted however too small for intervention, medical management.  Most recently admitted 4/2024 for chest pain, hypertensive emergency, elevated troponin (felt to be demand ischemia from HTN).  Echo showed EF 55-60%, no wall motion abnormalities.  At office appointment 5/23/2024, she was hypertensive due to inability to take medications (nausea), staggered dosing recommended.    Today she presents for follow-up for HTN, CAD.  Overall she is feeling better and BP improved.  She did have an episode yesterday where she felt numb all over, workup in ED unremarkable, symptoms resolved.  She has occasional/rare chest pain that occurs mostly at rest, improves with 1 nitro.  No significant shortness of breath, palpitations, syncope, dizziness.  She is not tolerating BP medications with staggered dosing.    Cardiologist: Dr. Perez and Dr. Rowe evaluated while hospitalized     Past Medical History:   has a past medical history of Anxiety, CAD (coronary artery disease), Fibromyalgia, Hyperlipidemia, Hypertension, Hypothyroidism, Osteopenia, Thyroid disease, and Transient ischemic attack.    Past Surgical History:   has a past surgical history that includes Hysterectomy (1989); Colonoscopy; Appendectomy; eye surgery; and Coronary stent placement (03/30/2023).     Home Medications:    Prior to Admission medications    Medication Sig Start Date End Date Taking? Authorizing Provider   venlafaxine (EFFEXOR XR) 150 MG extended release capsule Take 1 capsule by mouth daily 6/26/24   Janet Live,

## 2024-07-08 NOTE — PATIENT INSTRUCTIONS
Try to increase dietary potassium (bananas, leaky green vegetable, tomatoes, orange juice)  Continue current medications  Check BP at home and call the office if consistently out of goal range  In 1 month, Obtain fasting labs, do not eat or drink 8 hours prior, water and black coffee ok  Follow up in 4 months with Dr. Rowe

## 2024-07-12 RX ORDER — ATORVASTATIN CALCIUM 80 MG/1
TABLET, FILM COATED ORAL
Qty: 90 TABLET | Refills: 1 | Status: SHIPPED | OUTPATIENT
Start: 2024-07-12

## 2024-07-18 RX ORDER — ISOSORBIDE MONONITRATE 30 MG/1
30 TABLET, EXTENDED RELEASE ORAL DAILY
Qty: 90 TABLET | Refills: 1 | Status: SHIPPED | OUTPATIENT
Start: 2024-07-18

## 2024-07-18 NOTE — TELEPHONE ENCOUNTER
6/26/2024    Future Appointments   Date Time Provider Department Center   10/3/2024  3:00 PM Janet Live, APRN - CNP [facility] [place] [facility]   12/3/2024  1:45 PM Tomas Rowe, DO [facility]

## 2024-07-19 ENCOUNTER — OFFICE VISIT (OUTPATIENT)
Dept: FAMILY MEDICINE CLINIC | Age: 74
End: 2024-07-19
Payer: MEDICARE

## 2024-07-19 ENCOUNTER — TELEPHONE (OUTPATIENT)
Dept: FAMILY MEDICINE CLINIC | Age: 74
End: 2024-07-19

## 2024-07-19 VITALS
BODY MASS INDEX: 29.53 KG/M2 | HEART RATE: 60 BPM | RESPIRATION RATE: 16 BRPM | DIASTOLIC BLOOD PRESSURE: 120 MMHG | TEMPERATURE: 97.1 F | WEIGHT: 151.2 LBS | OXYGEN SATURATION: 97 % | SYSTOLIC BLOOD PRESSURE: 200 MMHG

## 2024-07-19 DIAGNOSIS — R09.81 NASAL CONGESTION: ICD-10-CM

## 2024-07-19 DIAGNOSIS — I10 UNCONTROLLED HYPERTENSION: ICD-10-CM

## 2024-07-19 DIAGNOSIS — R05.8 PRODUCTIVE COUGH: ICD-10-CM

## 2024-07-19 DIAGNOSIS — R05.9 COUGH, UNSPECIFIED TYPE: Primary | ICD-10-CM

## 2024-07-19 DIAGNOSIS — R06.2 WHEEZING: ICD-10-CM

## 2024-07-19 DIAGNOSIS — Z12.11 COLON CANCER SCREENING: Primary | ICD-10-CM

## 2024-07-19 DIAGNOSIS — Z91.81 AT HIGH RISK FOR FALLS: ICD-10-CM

## 2024-07-19 PROCEDURE — 99214 OFFICE O/P EST MOD 30 MIN: CPT | Performed by: FAMILY MEDICINE

## 2024-07-19 PROCEDURE — 3080F DIAST BP >= 90 MM HG: CPT | Performed by: FAMILY MEDICINE

## 2024-07-19 PROCEDURE — 87426 SARSCOV CORONAVIRUS AG IA: CPT | Performed by: FAMILY MEDICINE

## 2024-07-19 PROCEDURE — 1124F ACP DISCUSS-NO DSCNMKR DOCD: CPT | Performed by: FAMILY MEDICINE

## 2024-07-19 PROCEDURE — 3077F SYST BP >= 140 MM HG: CPT | Performed by: FAMILY MEDICINE

## 2024-07-19 RX ORDER — AZITHROMYCIN 250 MG/1
250 TABLET, FILM COATED ORAL DAILY
Qty: 1 PACKET | Refills: 0 | Status: SHIPPED | OUTPATIENT
Start: 2024-07-19 | End: 2024-07-19 | Stop reason: ALTCHOICE

## 2024-07-19 RX ORDER — DOXYCYCLINE HYCLATE 100 MG
100 TABLET ORAL 2 TIMES DAILY
Qty: 10 TABLET | Refills: 0 | Status: SHIPPED | OUTPATIENT
Start: 2024-07-19 | End: 2024-07-24

## 2024-07-19 RX ORDER — METHYLPREDNISOLONE 4 MG/1
TABLET ORAL
Qty: 1 KIT | Refills: 0 | Status: SHIPPED | OUTPATIENT
Start: 2024-07-19 | End: 2024-07-25

## 2024-07-19 RX ORDER — FLUTICASONE PROPIONATE 50 MCG
1 SPRAY, SUSPENSION (ML) NASAL DAILY
Qty: 1 EACH | Refills: 0 | Status: SHIPPED | OUTPATIENT
Start: 2024-07-19

## 2024-07-19 RX ORDER — AMLODIPINE BESYLATE 5 MG/1
5 TABLET ORAL DAILY
Qty: 90 TABLET | Refills: 0 | Status: SHIPPED | OUTPATIENT
Start: 2024-07-19

## 2024-07-19 RX ORDER — BENZONATATE 200 MG/1
200 CAPSULE ORAL 3 TIMES DAILY PRN
Qty: 30 CAPSULE | Refills: 2 | Status: SHIPPED | OUTPATIENT
Start: 2024-07-19

## 2024-07-19 ASSESSMENT — PATIENT HEALTH QUESTIONNAIRE - PHQ9
6. FEELING BAD ABOUT YOURSELF - OR THAT YOU ARE A FAILURE OR HAVE LET YOURSELF OR YOUR FAMILY DOWN: NOT AT ALL
9. THOUGHTS THAT YOU WOULD BE BETTER OFF DEAD, OR OF HURTING YOURSELF: NOT AT ALL
5. POOR APPETITE OR OVEREATING: NOT AT ALL
8. MOVING OR SPEAKING SO SLOWLY THAT OTHER PEOPLE COULD HAVE NOTICED. OR THE OPPOSITE, BEING SO FIGETY OR RESTLESS THAT YOU HAVE BEEN MOVING AROUND A LOT MORE THAN USUAL: NOT AT ALL
1. LITTLE INTEREST OR PLEASURE IN DOING THINGS: NOT AT ALL
2. FEELING DOWN, DEPRESSED OR HOPELESS: NOT AT ALL
SUM OF ALL RESPONSES TO PHQ QUESTIONS 1-9: 0
4. FEELING TIRED OR HAVING LITTLE ENERGY: NOT AT ALL
SUM OF ALL RESPONSES TO PHQ QUESTIONS 1-9: 0
SUM OF ALL RESPONSES TO PHQ QUESTIONS 1-9: 0
SUM OF ALL RESPONSES TO PHQ9 QUESTIONS 1 & 2: 0
10. IF YOU CHECKED OFF ANY PROBLEMS, HOW DIFFICULT HAVE THESE PROBLEMS MADE IT FOR YOU TO DO YOUR WORK, TAKE CARE OF THINGS AT HOME, OR GET ALONG WITH OTHER PEOPLE: NOT DIFFICULT AT ALL
7. TROUBLE CONCENTRATING ON THINGS, SUCH AS READING THE NEWSPAPER OR WATCHING TELEVISION: NOT AT ALL
SUM OF ALL RESPONSES TO PHQ QUESTIONS 1-9: 0
3. TROUBLE FALLING OR STAYING ASLEEP: NOT AT ALL

## 2024-07-19 ASSESSMENT — ENCOUNTER SYMPTOMS: COUGH: 1

## 2024-07-19 NOTE — TELEPHONE ENCOUNTER
Called  notified that amlodipine was sent to pharmacy for high b/p today and to check it BID for a week and f/u in office with deric.

## 2024-07-19 NOTE — TELEPHONE ENCOUNTER
[facility] stating that azithromycin (ZITHROMAX Z-MOLLY) was sent on over today and the pt is on donepezil (ARICEPT) they wanted to make sure you know of the interaction of these two meds. Please call to give the ok or change the med

## 2024-07-19 NOTE — PROGRESS NOTES
7/19/2024    This is a 73 y.o. female   Chief Complaint   Patient presents with    Wheezing    Cough    Sinus Problem     Sinus drng.    .    HPI  Pt presents for:    Wheezing, cough, and sinus drainage:  Sx began yesterday or day before. Denies sick contacts. Admits to post nasal drip, admits to runny nose with clear discharge. COVID negative in office today. Denies SOB, was wheezing this morning    HTN: taking Coreg 25 mg (1/2 tab BID), Losartan 100 mg, and Spironolactone 50 mg daily, hasn't taken BP meds today. Readings usually 170-180/90, admits HA's, denies dizziness.  Past Medical History:   Diagnosis Date    Anxiety     CAD (coronary artery disease)     Fibromyalgia     Hyperlipidemia     Hypertension     Hypothyroidism     Osteopenia     Dexa 7/08    Thyroid disease     Transient ischemic attack 2003       Admission on 05/21/2024, Discharged on 05/22/2024   Component Date Value Ref Range Status    Ventricular Rate 05/21/2024 77  BPM Final    Atrial Rate 05/21/2024 77  BPM Final    P-R Interval 05/21/2024 144  ms Final    QRS Duration 05/21/2024 84  ms Final    Q-T Interval 05/21/2024 374  ms Final    QTc Calculation (Bazett) 05/21/2024 423  ms Final    P Axis 05/21/2024 60  degrees Final    R Axis 05/21/2024 1  degrees Final    T Axis 05/21/2024 56  degrees Final    Diagnosis 05/21/2024    Final                    Value:Normal sinus rhythmMinimal voltage criteria for LVH, may be normal variant ( R in aVL )Nonspecific ST abnormalityAbnormal ECGWhen compared with ECG of 29-APR-2024 04:46,Vent. rate has increased BY  25 BPMConfirmed by ASHANTI GIFFORD MD ([id number]) on 5/22/2024 7:26:41 AM      WBC 05/21/2024 5.9  4.0 - 11.0 K/uL Final    RBC 05/21/2024 3.96 (L)  4.00 - 5.20 M/uL Final    Hemoglobin 05/21/2024 13.4  12.0 - 16.0 g/dL Final    Hematocrit 05/21/2024 39.8  36.0 - 48.0 % Final    MCV 05/21/2024 100.5 (H)  80.0 - 100.0 fL Final    MCH 05/21/2024 33.8  26.0 - 34.0 pg Final    MCHC 05/21/2024 33.6  31.0 -

## 2024-07-19 NOTE — TELEPHONE ENCOUNTER
I am aware of the risk and it is low, but I will send in Doxy instead. Please let pharmacy know. Thank you.

## 2024-07-20 DIAGNOSIS — K21.9 GASTROESOPHAGEAL REFLUX DISEASE, UNSPECIFIED WHETHER ESOPHAGITIS PRESENT: ICD-10-CM

## 2024-07-20 DIAGNOSIS — G30.9 ALZHEIMER DISEASE (HCC): ICD-10-CM

## 2024-07-20 DIAGNOSIS — F02.80 ALZHEIMER DISEASE (HCC): ICD-10-CM

## 2024-07-20 DIAGNOSIS — I10 ESSENTIAL (PRIMARY) HYPERTENSION: ICD-10-CM

## 2024-07-22 RX ORDER — DONEPEZIL HYDROCHLORIDE 10 MG/1
10 TABLET, FILM COATED ORAL NIGHTLY
Qty: 90 TABLET | Refills: 1 | Status: SHIPPED | OUTPATIENT
Start: 2024-07-22

## 2024-07-22 RX ORDER — CARVEDILOL 25 MG/1
25 TABLET ORAL 2 TIMES DAILY
Qty: 180 TABLET | Refills: 0 | Status: SHIPPED | OUTPATIENT
Start: 2024-07-22

## 2024-07-22 RX ORDER — PANTOPRAZOLE SODIUM 20 MG/1
20 TABLET, DELAYED RELEASE ORAL
Qty: 30 TABLET | Refills: 1 | Status: SHIPPED | OUTPATIENT
Start: 2024-07-22

## 2024-07-22 NOTE — TELEPHONE ENCOUNTER
Lov 6/26/2024    Future Appointments   Date Time Provider Department Center   7/26/2024  3:20 PM Janet Live APRN - CNP [facility] [place] [facility]   10/3/2024  3:00 PM Janet Live APRN - CNP [facility] [place] [facility]   12/3/2024  1:45 PM Tomas Rowe,  [facility]

## 2024-07-26 ENCOUNTER — OFFICE VISIT (OUTPATIENT)
Dept: FAMILY MEDICINE CLINIC | Age: 74
End: 2024-07-26
Payer: MEDICARE

## 2024-07-26 VITALS
WEIGHT: 147 LBS | DIASTOLIC BLOOD PRESSURE: 88 MMHG | BODY MASS INDEX: 28.71 KG/M2 | SYSTOLIC BLOOD PRESSURE: 182 MMHG | HEART RATE: 91 BPM | TEMPERATURE: 97.3 F | RESPIRATION RATE: 16 BRPM | OXYGEN SATURATION: 98 %

## 2024-07-26 DIAGNOSIS — Z91.148 NONCOMPLIANCE WITH MEDICATION REGIMEN: ICD-10-CM

## 2024-07-26 DIAGNOSIS — I10 UNCONTROLLED HYPERTENSION: Primary | ICD-10-CM

## 2024-07-26 PROCEDURE — 3079F DIAST BP 80-89 MM HG: CPT | Performed by: NURSE PRACTITIONER

## 2024-07-26 PROCEDURE — 99214 OFFICE O/P EST MOD 30 MIN: CPT | Performed by: NURSE PRACTITIONER

## 2024-07-26 PROCEDURE — 1124F ACP DISCUSS-NO DSCNMKR DOCD: CPT | Performed by: NURSE PRACTITIONER

## 2024-07-26 PROCEDURE — 3077F SYST BP >= 140 MM HG: CPT | Performed by: NURSE PRACTITIONER

## 2024-07-26 ASSESSMENT — ENCOUNTER SYMPTOMS
ALLERGIC/IMMUNOLOGIC NEGATIVE: 1
EYES NEGATIVE: 1
RESPIRATORY NEGATIVE: 1
DIARRHEA: 0
WHEEZING: 0
VOMITING: 0
CHEST TIGHTNESS: 0
SHORTNESS OF BREATH: 0
SORE THROAT: 0
NAUSEA: 1

## 2024-07-26 NOTE — PROGRESS NOTES
7/26/2024    This is a 73 y.o. female   Chief Complaint   Patient presents with    Hypertension     1 week f/u   .    Siobhan presents today for HTN follow up. She was seen last week and had blood pressures over 200/100s . She was prescribed amlodipine, however has not started taking it. She is not checking her blood pressures at home and admits that she is still not adherant with her medications. She has not taken anything today as of 430pm.  Denies any current CP, SOB, dizziness, nausea, vomiting, diarrhea. States taking NTG a least 1x per week for chest pain/discomfort. She does state that she has headaches almost daily. States that she usually takes ibuprofen or Asprin for the headaches. she is reliant on her  to remind and give them to her to take.  He states that they are still working on this and are afraid of side effects/interactions or nausea. They again state that they do not want any outside help in managing medications         Patient Active Problem List   Diagnosis    Hyperlipidemia    Hypothyroidism    Anxiety and depression    Osteopenia    Vitamin D deficiency    Fibromyalgia    Elevated LFTs    Hypertensive emergency    Chest pain    Hypokalemia    Elevated TSH    Hypertensive urgency    Alzheimer's disease, unspecified    Current mild episode of major depressive disorder, unspecified whether recurrent (HCC)    Coronary artery disease involving native coronary artery of native heart    Chronic renal disease, stage III ([facility]) [[id number]]    NSTEMI (non-ST elevated myocardial infarction) ([facility])    Noncompliance with medication regimen    Gastroesophageal reflux disease    Cervical pain       Current Outpatient Medications   Medication Sig Dispense Refill    carvedilol (COREG) 25 MG tablet TAKE 1 TABLET BY MOUTH TWICE A  tablet 0    donepezil (ARICEPT) 10 MG tablet TAKE 1 TABLET BY MOUTH EVERY DAY AT NIGHT 90 tablet 1    pantoprazole (PROTONIX) 20 MG tablet TAKE 1 TABLET BY MOUTH EVERY DAY

## 2024-07-29 RX ORDER — CLOPIDOGREL BISULFATE 75 MG/1
TABLET ORAL
Qty: 90 TABLET | Refills: 1 | Status: SHIPPED | OUTPATIENT
Start: 2024-07-29

## 2024-07-29 RX ORDER — EZETIMIBE 10 MG/1
10 TABLET ORAL NIGHTLY
Qty: 90 TABLET | Refills: 1 | Status: SHIPPED | OUTPATIENT
Start: 2024-07-29

## 2024-07-29 NOTE — TELEPHONE ENCOUNTER
Lov 1/17/2024    Future Appointments   Date Time Provider Department Center   10/3/2024  3:00 PM Janet Live, APRN - CNP [facility] [place] - [facility]   12/3/2024  1:45 PM Tomas Rowe, DO [facility]

## 2024-09-11 DIAGNOSIS — K21.9 GASTROESOPHAGEAL REFLUX DISEASE, UNSPECIFIED WHETHER ESOPHAGITIS PRESENT: ICD-10-CM

## 2024-09-11 RX ORDER — PANTOPRAZOLE SODIUM 20 MG/1
TABLET, DELAYED RELEASE ORAL
Qty: 180 TABLET | Refills: 1 | Status: SHIPPED | OUTPATIENT
Start: 2024-09-11

## 2024-09-12 DIAGNOSIS — R05.8 PRODUCTIVE COUGH: ICD-10-CM

## 2024-09-12 DIAGNOSIS — R09.81 NASAL CONGESTION: ICD-10-CM

## 2024-09-12 RX ORDER — FLUTICASONE PROPIONATE 50 MCG
SPRAY, SUSPENSION (ML) NASAL
Qty: 1 EACH | Refills: 1 | OUTPATIENT
Start: 2024-09-12

## 2024-09-12 NOTE — TELEPHONE ENCOUNTER
Future Appointments   Date Time Provider Department Center   10/3/2024  3:00 PM [facility] Live[facility]   12/3/2024  1:45 PM Tomas Rowe,  [facility] 7/19/2024

## 2024-09-15 DIAGNOSIS — K21.9 GASTROESOPHAGEAL REFLUX DISEASE, UNSPECIFIED WHETHER ESOPHAGITIS PRESENT: ICD-10-CM

## 2024-09-16 RX ORDER — PANTOPRAZOLE SODIUM 20 MG/1
20 TABLET, DELAYED RELEASE ORAL
Qty: 30 TABLET | Refills: 1 | OUTPATIENT
Start: 2024-09-16

## 2024-09-16 NOTE — TELEPHONE ENCOUNTER
Future Appointments   Date Time Provider Department Center   10/3/2024  3:00 PM [facility] Live[facility]   12/3/2024  1:45 PM Tomas Rowe,  [facility] 7/26/2024

## 2024-09-29 ENCOUNTER — APPOINTMENT (OUTPATIENT)
Dept: GENERAL RADIOLOGY | Age: 74
End: 2024-09-29
Payer: MEDICARE

## 2024-09-29 ENCOUNTER — HOSPITAL ENCOUNTER (INPATIENT)
Age: 74
LOS: 6 days | Discharge: HOME | End: 2024-10-05
Attending: EMERGENCY MEDICINE | Admitting: STUDENT IN AN ORGANIZED HEALTH CARE EDUCATION/TRAINING PROGRAM
Payer: MEDICARE

## 2024-09-29 ENCOUNTER — APPOINTMENT (OUTPATIENT)
Dept: CT IMAGING | Age: 74
End: 2024-09-29
Payer: MEDICARE

## 2024-09-29 DIAGNOSIS — E87.6 HYPOKALEMIA: ICD-10-CM

## 2024-09-29 DIAGNOSIS — E83.42 HYPOMAGNESEMIA: ICD-10-CM

## 2024-09-29 DIAGNOSIS — K21.9 GASTROESOPHAGEAL REFLUX DISEASE, UNSPECIFIED WHETHER ESOPHAGITIS PRESENT: ICD-10-CM

## 2024-09-29 DIAGNOSIS — F41.9 ANXIETY: ICD-10-CM

## 2024-09-29 DIAGNOSIS — R41.82 ALTERED MENTAL STATUS, UNSPECIFIED ALTERED MENTAL STATUS TYPE: Primary | ICD-10-CM

## 2024-09-29 DIAGNOSIS — E87.1 HYPONATREMIA: ICD-10-CM

## 2024-09-29 DIAGNOSIS — I10 UNCONTROLLED HYPERTENSION: ICD-10-CM

## 2024-09-29 DIAGNOSIS — E03.5 MYXEDEMA COMA (HCC): ICD-10-CM

## 2024-09-29 DIAGNOSIS — R74.01 TRANSAMINITIS: ICD-10-CM

## 2024-09-29 LAB
ALBUMIN SERPL-MCNC: 4.1 G/DL (ref 3.4–5)
ALBUMIN/GLOB SERPL: 1.3 {RATIO} (ref 1.1–2.2)
ALP SERPL-CCNC: 167 U/L (ref 40–129)
ALT SERPL-CCNC: 72 U/L (ref 10–40)
AMMONIA PLAS-SCNC: 26 UMOL/L (ref 11–51)
ANION GAP SERPL CALCULATED.3IONS-SCNC: 12 MMOL/L (ref 3–16)
AST SERPL-CCNC: 48 U/L (ref 15–37)
BASOPHILS # BLD: 0 K/UL (ref 0–0.2)
BASOPHILS NFR BLD: 0.2 %
BILIRUB SERPL-MCNC: 1.9 MG/DL (ref 0–1)
BUN SERPL-MCNC: 15 MG/DL (ref 7–20)
CALCIUM SERPL-MCNC: 9.6 MG/DL (ref 8.3–10.6)
CHLORIDE SERPL-SCNC: 93 MMOL/L (ref 99–110)
CO2 SERPL-SCNC: 26 MMOL/L (ref 21–32)
CREAT SERPL-MCNC: 1.4 MG/DL (ref 0.6–1.2)
DEPRECATED RDW RBC AUTO: 17 % (ref 12.4–15.4)
EOSINOPHIL # BLD: 0.1 K/UL (ref 0–0.6)
EOSINOPHIL NFR BLD: 1 %
ETHANOLAMINE SERPL-MCNC: NORMAL MG/DL (ref 0–0.08)
GFR SERPLBLD CREATININE-BSD FMLA CKD-EPI: 39 ML/MIN/{1.73_M2}
GLUCOSE SERPL-MCNC: 91 MG/DL (ref 70–99)
HCT VFR BLD AUTO: 36.4 % (ref 36–48)
HGB BLD-MCNC: 12.5 G/DL (ref 12–16)
LACTATE BLDV-SCNC: 1.4 MMOL/L (ref 0.4–2)
LIPASE SERPL-CCNC: 37 U/L (ref 13–60)
LYMPHOCYTES # BLD: 1.3 K/UL (ref 1–5.1)
LYMPHOCYTES NFR BLD: 18.8 %
MAGNESIUM SERPL-MCNC: 1.5 MG/DL (ref 1.8–2.4)
MCH RBC QN AUTO: 34.1 PG (ref 26–34)
MCHC RBC AUTO-ENTMCNC: 34.3 G/DL (ref 31–36)
MCV RBC AUTO: 99.4 FL (ref 80–100)
MONOCYTES # BLD: 0.4 K/UL (ref 0–1.3)
MONOCYTES NFR BLD: 6 %
NEUTROPHILS # BLD: 5 K/UL (ref 1.7–7.7)
NEUTROPHILS NFR BLD: 74 %
PLATELET # BLD AUTO: 162 K/UL (ref 135–450)
PMV BLD AUTO: 8.7 FL (ref 5–10.5)
POTASSIUM SERPL-SCNC: 2.7 MMOL/L (ref 3.5–5.1)
PROT SERPL-MCNC: 7.3 G/DL (ref 6.4–8.2)
RBC # BLD AUTO: 3.66 M/UL (ref 4–5.2)
SODIUM SERPL-SCNC: 131 MMOL/L (ref 136–145)
TSH SERPL DL<=0.005 MIU/L-ACNC: 76.65 UIU/ML (ref 0.27–4.2)
WBC # BLD AUTO: 6.7 K/UL (ref 4–11)

## 2024-09-29 PROCEDURE — 84439 ASSAY OF FREE THYROXINE: CPT

## 2024-09-29 PROCEDURE — 71045 X-RAY EXAM CHEST 1 VIEW: CPT

## 2024-09-29 PROCEDURE — 85025 COMPLETE CBC W/AUTO DIFF WBC: CPT

## 2024-09-29 PROCEDURE — 96365 THER/PROPH/DIAG IV INF INIT: CPT

## 2024-09-29 PROCEDURE — 6370000000 HC RX 637 (ALT 250 FOR IP): Performed by: EMERGENCY MEDICINE

## 2024-09-29 PROCEDURE — 83690 ASSAY OF LIPASE: CPT

## 2024-09-29 PROCEDURE — 36415 COLL VENOUS BLD VENIPUNCTURE: CPT

## 2024-09-29 PROCEDURE — 84443 ASSAY THYROID STIM HORMONE: CPT

## 2024-09-29 PROCEDURE — 99285 EMERGENCY DEPT VISIT HI MDM: CPT

## 2024-09-29 PROCEDURE — 83605 ASSAY OF LACTIC ACID: CPT

## 2024-09-29 PROCEDURE — 70450 CT HEAD/BRAIN W/O DYE: CPT

## 2024-09-29 PROCEDURE — 2060000000 HC ICU INTERMEDIATE R&B

## 2024-09-29 PROCEDURE — 80053 COMPREHEN METABOLIC PANEL: CPT

## 2024-09-29 PROCEDURE — 2580000003 HC RX 258: Performed by: EMERGENCY MEDICINE

## 2024-09-29 PROCEDURE — 93005 ELECTROCARDIOGRAM TRACING: CPT | Performed by: EMERGENCY MEDICINE

## 2024-09-29 PROCEDURE — 96375 TX/PRO/DX INJ NEW DRUG ADDON: CPT

## 2024-09-29 PROCEDURE — 82140 ASSAY OF AMMONIA: CPT

## 2024-09-29 PROCEDURE — 83735 ASSAY OF MAGNESIUM: CPT

## 2024-09-29 PROCEDURE — 74176 CT ABD & PELVIS W/O CONTRAST: CPT

## 2024-09-29 PROCEDURE — 82077 ASSAY SPEC XCP UR&BREATH IA: CPT

## 2024-09-29 PROCEDURE — 6360000002 HC RX W HCPCS: Performed by: EMERGENCY MEDICINE

## 2024-09-29 RX ORDER — SODIUM CHLORIDE 9 MG/ML
INJECTION, SOLUTION INTRAMUSCULAR; INTRAVENOUS; SUBCUTANEOUS
Status: DISPENSED
Start: 2024-09-29 | End: 2024-09-30

## 2024-09-29 RX ORDER — POTASSIUM CHLORIDE 7.45 MG/ML
10 INJECTION INTRAVENOUS PRN
Status: DISCONTINUED | OUTPATIENT
Start: 2024-09-29 | End: 2024-10-05 | Stop reason: HOSPADM

## 2024-09-29 RX ORDER — ONDANSETRON 2 MG/ML
4 INJECTION INTRAMUSCULAR; INTRAVENOUS EVERY 6 HOURS PRN
Status: DISCONTINUED | OUTPATIENT
Start: 2024-09-29 | End: 2024-10-05 | Stop reason: HOSPADM

## 2024-09-29 RX ORDER — MAGNESIUM SULFATE IN WATER 40 MG/ML
2000 INJECTION, SOLUTION INTRAVENOUS PRN
Status: DISCONTINUED | OUTPATIENT
Start: 2024-09-29 | End: 2024-10-05 | Stop reason: HOSPADM

## 2024-09-29 RX ORDER — POLYETHYLENE GLYCOL 3350 17 G/17G
17 POWDER, FOR SOLUTION ORAL DAILY PRN
Status: DISCONTINUED | OUTPATIENT
Start: 2024-09-29 | End: 2024-10-05 | Stop reason: HOSPADM

## 2024-09-29 RX ORDER — SODIUM CHLORIDE 9 MG/ML
INJECTION, SOLUTION INTRAVENOUS PRN
Status: DISCONTINUED | OUTPATIENT
Start: 2024-09-29 | End: 2024-10-05 | Stop reason: HOSPADM

## 2024-09-29 RX ORDER — SODIUM CHLORIDE 0.9 % (FLUSH) 0.9 %
5-40 SYRINGE (ML) INJECTION EVERY 12 HOURS SCHEDULED
Status: DISCONTINUED | OUTPATIENT
Start: 2024-09-29 | End: 2024-10-05 | Stop reason: HOSPADM

## 2024-09-29 RX ORDER — ENOXAPARIN SODIUM 100 MG/ML
40 INJECTION SUBCUTANEOUS DAILY
Status: DISCONTINUED | OUTPATIENT
Start: 2024-09-30 | End: 2024-10-05 | Stop reason: HOSPADM

## 2024-09-29 RX ORDER — LEVOTHYROXINE SODIUM ANHYDROUS 100 UG/5ML
200 INJECTION, POWDER, LYOPHILIZED, FOR SOLUTION INTRAVENOUS DAILY
Status: DISCONTINUED | OUTPATIENT
Start: 2024-09-30 | End: 2024-09-29

## 2024-09-29 RX ORDER — SODIUM CHLORIDE 0.9 % (FLUSH) 0.9 %
5-40 SYRINGE (ML) INJECTION PRN
Status: DISCONTINUED | OUTPATIENT
Start: 2024-09-29 | End: 2024-10-05 | Stop reason: HOSPADM

## 2024-09-29 RX ORDER — SODIUM CHLORIDE, SODIUM LACTATE, POTASSIUM CHLORIDE, AND CALCIUM CHLORIDE .6; .31; .03; .02 G/100ML; G/100ML; G/100ML; G/100ML
1000 INJECTION, SOLUTION INTRAVENOUS ONCE
Status: COMPLETED | OUTPATIENT
Start: 2024-09-29 | End: 2024-09-29

## 2024-09-29 RX ORDER — ACETAMINOPHEN 650 MG/1
650 SUPPOSITORY RECTAL EVERY 6 HOURS PRN
Status: DISCONTINUED | OUTPATIENT
Start: 2024-09-29 | End: 2024-10-05 | Stop reason: HOSPADM

## 2024-09-29 RX ORDER — ONDANSETRON 4 MG/1
4 TABLET, ORALLY DISINTEGRATING ORAL EVERY 8 HOURS PRN
Status: DISCONTINUED | OUTPATIENT
Start: 2024-09-29 | End: 2024-10-05 | Stop reason: HOSPADM

## 2024-09-29 RX ORDER — MAGNESIUM SULFATE IN WATER 40 MG/ML
2000 INJECTION, SOLUTION INTRAVENOUS ONCE
Status: COMPLETED | OUTPATIENT
Start: 2024-09-29 | End: 2024-09-30

## 2024-09-29 RX ORDER — ACETAMINOPHEN 325 MG/1
650 TABLET ORAL EVERY 6 HOURS PRN
Status: DISCONTINUED | OUTPATIENT
Start: 2024-09-29 | End: 2024-10-05 | Stop reason: HOSPADM

## 2024-09-29 RX ORDER — LEVOTHYROXINE SODIUM ANHYDROUS 100 UG/5ML
50 INJECTION, POWDER, LYOPHILIZED, FOR SOLUTION INTRAVENOUS DAILY
Status: DISCONTINUED | OUTPATIENT
Start: 2024-09-30 | End: 2024-10-02

## 2024-09-29 RX ORDER — POTASSIUM CHLORIDE 1500 MG/1
40 TABLET, EXTENDED RELEASE ORAL PRN
Status: DISCONTINUED | OUTPATIENT
Start: 2024-09-29 | End: 2024-10-05 | Stop reason: HOSPADM

## 2024-09-29 RX ORDER — LEVOTHYROXINE SODIUM ANHYDROUS 100 UG/5ML
200 INJECTION, POWDER, LYOPHILIZED, FOR SOLUTION INTRAVENOUS ONCE
Status: COMPLETED | OUTPATIENT
Start: 2024-09-29 | End: 2024-09-29

## 2024-09-29 RX ADMIN — MAGNESIUM SULFATE HEPTAHYDRATE 2000 MG: 40 INJECTION, SOLUTION INTRAVENOUS at 22:51

## 2024-09-29 RX ADMIN — HYDROCORTISONE SODIUM SUCCINATE 100 MG: 100 INJECTION, POWDER, FOR SOLUTION INTRAMUSCULAR; INTRAVENOUS at 22:46

## 2024-09-29 RX ADMIN — POTASSIUM BICARBONATE 40 MEQ: 782 TABLET, EFFERVESCENT ORAL at 22:50

## 2024-09-29 RX ADMIN — LEVOTHYROXINE SODIUM ANHYDROUS 200 MCG: 100 INJECTION, POWDER, LYOPHILIZED, FOR SOLUTION INTRAVENOUS at 23:33

## 2024-09-29 RX ADMIN — SODIUM CHLORIDE, POTASSIUM CHLORIDE, SODIUM LACTATE AND CALCIUM CHLORIDE 1000 ML: 600; 310; 30; 20 INJECTION, SOLUTION INTRAVENOUS at 22:46

## 2024-09-29 ASSESSMENT — LIFESTYLE VARIABLES
HOW MANY STANDARD DRINKS CONTAINING ALCOHOL DO YOU HAVE ON A TYPICAL DAY: PATIENT DECLINED
HOW OFTEN DO YOU HAVE A DRINK CONTAINING ALCOHOL: PATIENT UNABLE TO ANSWER

## 2024-09-29 ASSESSMENT — PAIN - FUNCTIONAL ASSESSMENT: PAIN_FUNCTIONAL_ASSESSMENT: NONE - DENIES PAIN

## 2024-09-30 PROBLEM — F03.90 DEMENTIA (HCC): Status: ACTIVE | Noted: 2022-10-19

## 2024-09-30 PROBLEM — G92.8 TOXIC METABOLIC ENCEPHALOPATHY: Status: ACTIVE | Noted: 2024-09-30

## 2024-09-30 LAB
25(OH)D3 SERPL-MCNC: 22 NG/ML
ALBUMIN SERPL-MCNC: 3.6 G/DL (ref 3.4–5)
ALBUMIN SERPL-MCNC: 3.7 G/DL (ref 3.4–5)
ALBUMIN/GLOB SERPL: 1.1 {RATIO} (ref 1.1–2.2)
ALBUMIN/GLOB SERPL: 1.2 {RATIO} (ref 1.1–2.2)
ALP SERPL-CCNC: 151 U/L (ref 40–129)
ALP SERPL-CCNC: 156 U/L (ref 40–129)
ALT SERPL-CCNC: 54 U/L (ref 10–40)
ALT SERPL-CCNC: 61 U/L (ref 10–40)
AMPHETAMINES UR QL SCN>1000 NG/ML: NORMAL
ANION GAP SERPL CALCULATED.3IONS-SCNC: 12 MMOL/L (ref 3–16)
ANION GAP SERPL CALCULATED.3IONS-SCNC: 13 MMOL/L (ref 3–16)
AST SERPL-CCNC: 35 U/L (ref 15–37)
AST SERPL-CCNC: 39 U/L (ref 15–37)
BACTERIA URNS QL MICRO: ABNORMAL /HPF
BARBITURATES UR QL SCN>200 NG/ML: NORMAL
BASOPHILS # BLD: 0 K/UL (ref 0–0.2)
BASOPHILS NFR BLD: 0.2 %
BENZODIAZ UR QL SCN>200 NG/ML: NORMAL
BILIRUB SERPL-MCNC: 1.5 MG/DL (ref 0–1)
BILIRUB SERPL-MCNC: 1.5 MG/DL (ref 0–1)
BILIRUB UR QL STRIP.AUTO: NEGATIVE
BUN SERPL-MCNC: 11 MG/DL (ref 7–20)
BUN SERPL-MCNC: 14 MG/DL (ref 7–20)
CALCIUM SERPL-MCNC: 9.2 MG/DL (ref 8.3–10.6)
CALCIUM SERPL-MCNC: 9.3 MG/DL (ref 8.3–10.6)
CANNABINOIDS UR QL SCN>50 NG/ML: NORMAL
CHLORIDE SERPL-SCNC: 95 MMOL/L (ref 99–110)
CHLORIDE SERPL-SCNC: 95 MMOL/L (ref 99–110)
CLARITY UR: CLEAR
CO2 SERPL-SCNC: 22 MMOL/L (ref 21–32)
CO2 SERPL-SCNC: 23 MMOL/L (ref 21–32)
COCAINE UR QL SCN: NORMAL
COLOR UR: YELLOW
CORTIS AM PEAK SERPL-MCNC: 139 UG/DL (ref 4.3–22.4)
CREAT SERPL-MCNC: 1.2 MG/DL (ref 0.6–1.2)
CREAT SERPL-MCNC: 1.3 MG/DL (ref 0.6–1.2)
CREAT UR-MCNC: 103 MG/DL (ref 28–259)
CREAT UR-MCNC: 108 MG/DL (ref 28–259)
DEPRECATED RDW RBC AUTO: 17.3 % (ref 12.4–15.4)
DRUG SCREEN COMMENT UR-IMP: NORMAL
EKG ATRIAL RATE: 52 BPM
EKG DIAGNOSIS: NORMAL
EKG P AXIS: 51 DEGREES
EKG P-R INTERVAL: 146 MS
EKG Q-T INTERVAL: 364 MS
EKG QRS DURATION: 92 MS
EKG QTC CALCULATION (BAZETT): 338 MS
EKG R AXIS: -5 DEGREES
EKG T AXIS: 158 DEGREES
EKG VENTRICULAR RATE: 52 BPM
EOSINOPHIL # BLD: 0 K/UL (ref 0–0.6)
EOSINOPHIL NFR BLD: 0.3 %
EPI CELLS #/AREA URNS HPF: ABNORMAL /HPF (ref 0–5)
FENTANYL SCREEN, URINE: NORMAL
FOLATE SERPL-MCNC: 6.11 NG/ML (ref 4.78–24.2)
GFR SERPLBLD CREATININE-BSD FMLA CKD-EPI: 43 ML/MIN/{1.73_M2}
GFR SERPLBLD CREATININE-BSD FMLA CKD-EPI: 47 ML/MIN/{1.73_M2}
GLUCOSE SERPL-MCNC: 123 MG/DL (ref 70–99)
GLUCOSE SERPL-MCNC: 136 MG/DL (ref 70–99)
GLUCOSE UR STRIP.AUTO-MCNC: NEGATIVE MG/DL
HCT VFR BLD AUTO: 36.8 % (ref 36–48)
HGB BLD-MCNC: 12.5 G/DL (ref 12–16)
HGB UR QL STRIP.AUTO: ABNORMAL
KETONES UR STRIP.AUTO-MCNC: NEGATIVE MG/DL
LEUKOCYTE ESTERASE UR QL STRIP.AUTO: NEGATIVE
LYMPHOCYTES # BLD: 1.1 K/UL (ref 1–5.1)
LYMPHOCYTES NFR BLD: 16.6 %
MAGNESIUM SERPL-MCNC: 2.4 MG/DL (ref 1.8–2.4)
MCH RBC QN AUTO: 34.2 PG (ref 26–34)
MCHC RBC AUTO-ENTMCNC: 33.9 G/DL (ref 31–36)
MCV RBC AUTO: 100.9 FL (ref 80–100)
METHADONE UR QL SCN>300 NG/ML: NORMAL
MONOCYTES # BLD: 0.2 K/UL (ref 0–1.3)
MONOCYTES NFR BLD: 3.5 %
NEUTROPHILS # BLD: 5.4 K/UL (ref 1.7–7.7)
NEUTROPHILS NFR BLD: 79.4 %
NITRITE UR QL STRIP.AUTO: NEGATIVE
OPIATES UR QL SCN>300 NG/ML: NORMAL
OSMOLALITY UR: 322 MOSM/KG (ref 390–1070)
OXYCODONE UR QL SCN: NORMAL
PCP UR QL SCN>25 NG/ML: NORMAL
PH UR STRIP.AUTO: 6 [PH] (ref 5–8)
PH UR STRIP: 6 [PH]
PLATELET # BLD AUTO: 150 K/UL (ref 135–450)
PMV BLD AUTO: 8.6 FL (ref 5–10.5)
POTASSIUM SERPL-SCNC: 3.2 MMOL/L (ref 3.5–5.1)
POTASSIUM SERPL-SCNC: 3.9 MMOL/L (ref 3.5–5.1)
PROT SERPL-MCNC: 6.8 G/DL (ref 6.4–8.2)
PROT SERPL-MCNC: 6.8 G/DL (ref 6.4–8.2)
PROT UR STRIP.AUTO-MCNC: NEGATIVE MG/DL
PROT UR-MCNC: 21.4 MG/DL
PROT UR-MCNC: 21.5 MG/DL
PROT/CREAT UR-RTO: 0.2 MG/DL
RBC # BLD AUTO: 3.65 M/UL (ref 4–5.2)
RBC #/AREA URNS HPF: ABNORMAL /HPF (ref 0–4)
REASON FOR REJECTION: NORMAL
REJECTED TEST: NORMAL
SODIUM SERPL-SCNC: 130 MMOL/L (ref 136–145)
SODIUM SERPL-SCNC: 130 MMOL/L (ref 136–145)
SODIUM UR-SCNC: 21 MMOL/L
SP GR UR STRIP.AUTO: 1.01 (ref 1–1.03)
T4 FREE SERPL-MCNC: 0.2 NG/DL (ref 0.9–1.8)
UA COMPLETE W REFLEX CULTURE PNL UR: ABNORMAL
UA DIPSTICK W REFLEX MICRO PNL UR: YES
URN SPEC COLLECT METH UR: ABNORMAL
UROBILINOGEN UR STRIP-ACNC: 1 E.U./DL
VIT B12 SERPL-MCNC: 475 PG/ML (ref 211–911)
WBC # BLD AUTO: 6.7 K/UL (ref 4–11)
WBC #/AREA URNS HPF: ABNORMAL /HPF (ref 0–5)

## 2024-09-30 PROCEDURE — 2580000003 HC RX 258: Performed by: NURSE PRACTITIONER

## 2024-09-30 PROCEDURE — 2580000003 HC RX 258: Performed by: STUDENT IN AN ORGANIZED HEALTH CARE EDUCATION/TRAINING PROGRAM

## 2024-09-30 PROCEDURE — 83935 ASSAY OF URINE OSMOLALITY: CPT

## 2024-09-30 PROCEDURE — 99222 1ST HOSP IP/OBS MODERATE 55: CPT | Performed by: STUDENT IN AN ORGANIZED HEALTH CARE EDUCATION/TRAINING PROGRAM

## 2024-09-30 PROCEDURE — 82607 VITAMIN B-12: CPT

## 2024-09-30 PROCEDURE — 6370000000 HC RX 637 (ALT 250 FOR IP): Performed by: STUDENT IN AN ORGANIZED HEALTH CARE EDUCATION/TRAINING PROGRAM

## 2024-09-30 PROCEDURE — 85025 COMPLETE CBC W/AUTO DIFF WBC: CPT

## 2024-09-30 PROCEDURE — 97162 PT EVAL MOD COMPLEX 30 MIN: CPT

## 2024-09-30 PROCEDURE — 97166 OT EVAL MOD COMPLEX 45 MIN: CPT

## 2024-09-30 PROCEDURE — 82570 ASSAY OF URINE CREATININE: CPT

## 2024-09-30 PROCEDURE — 6360000002 HC RX W HCPCS: Performed by: STUDENT IN AN ORGANIZED HEALTH CARE EDUCATION/TRAINING PROGRAM

## 2024-09-30 PROCEDURE — 80307 DRUG TEST PRSMV CHEM ANLYZR: CPT

## 2024-09-30 PROCEDURE — 2060000000 HC ICU INTERMEDIATE R&B

## 2024-09-30 PROCEDURE — 82533 TOTAL CORTISOL: CPT

## 2024-09-30 PROCEDURE — 83735 ASSAY OF MAGNESIUM: CPT

## 2024-09-30 PROCEDURE — 97530 THERAPEUTIC ACTIVITIES: CPT

## 2024-09-30 PROCEDURE — 6370000000 HC RX 637 (ALT 250 FOR IP): Performed by: INTERNAL MEDICINE

## 2024-09-30 PROCEDURE — 93010 ELECTROCARDIOGRAM REPORT: CPT | Performed by: STUDENT IN AN ORGANIZED HEALTH CARE EDUCATION/TRAINING PROGRAM

## 2024-09-30 PROCEDURE — 81001 URINALYSIS AUTO W/SCOPE: CPT

## 2024-09-30 PROCEDURE — 82306 VITAMIN D 25 HYDROXY: CPT

## 2024-09-30 PROCEDURE — 80053 COMPREHEN METABOLIC PANEL: CPT

## 2024-09-30 PROCEDURE — 97110 THERAPEUTIC EXERCISES: CPT

## 2024-09-30 PROCEDURE — 84156 ASSAY OF PROTEIN URINE: CPT

## 2024-09-30 PROCEDURE — APPSS45 APP SPLIT SHARED TIME 31-45 MINUTES

## 2024-09-30 PROCEDURE — 82746 ASSAY OF FOLIC ACID SERUM: CPT

## 2024-09-30 PROCEDURE — 84300 ASSAY OF URINE SODIUM: CPT

## 2024-09-30 PROCEDURE — 36415 COLL VENOUS BLD VENIPUNCTURE: CPT

## 2024-09-30 RX ORDER — CARVEDILOL 6.25 MG/1
12.5 TABLET ORAL 2 TIMES DAILY WITH MEALS
Status: DISCONTINUED | OUTPATIENT
Start: 2024-09-30 | End: 2024-10-04

## 2024-09-30 RX ORDER — ATORVASTATIN CALCIUM 40 MG/1
80 TABLET, FILM COATED ORAL DAILY
Status: DISCONTINUED | OUTPATIENT
Start: 2024-09-30 | End: 2024-10-05 | Stop reason: HOSPADM

## 2024-09-30 RX ORDER — CARVEDILOL 25 MG/1
25 TABLET ORAL 2 TIMES DAILY WITH MEALS
Status: DISCONTINUED | OUTPATIENT
Start: 2024-09-30 | End: 2024-09-30

## 2024-09-30 RX ORDER — PANTOPRAZOLE SODIUM 20 MG/1
20 TABLET, DELAYED RELEASE ORAL
Status: DISCONTINUED | OUTPATIENT
Start: 2024-09-30 | End: 2024-10-05 | Stop reason: HOSPADM

## 2024-09-30 RX ORDER — AMLODIPINE BESYLATE 5 MG/1
5 TABLET ORAL DAILY
Status: DISCONTINUED | OUTPATIENT
Start: 2024-09-30 | End: 2024-10-05 | Stop reason: HOSPADM

## 2024-09-30 RX ORDER — SPIRONOLACTONE 25 MG/1
25 TABLET ORAL DAILY
Status: DISCONTINUED | OUTPATIENT
Start: 2024-09-30 | End: 2024-10-03

## 2024-09-30 RX ORDER — EZETIMIBE 10 MG/1
10 TABLET ORAL NIGHTLY
Status: DISCONTINUED | OUTPATIENT
Start: 2024-09-30 | End: 2024-10-05 | Stop reason: HOSPADM

## 2024-09-30 RX ORDER — CLOPIDOGREL BISULFATE 75 MG/1
75 TABLET ORAL ONCE
Status: COMPLETED | OUTPATIENT
Start: 2024-09-30 | End: 2024-09-30

## 2024-09-30 RX ORDER — LOSARTAN POTASSIUM 100 MG/1
100 TABLET ORAL DAILY
Status: DISCONTINUED | OUTPATIENT
Start: 2024-09-30 | End: 2024-10-05 | Stop reason: HOSPADM

## 2024-09-30 RX ORDER — ISOSORBIDE MONONITRATE 30 MG/1
30 TABLET, EXTENDED RELEASE ORAL DAILY
Status: DISCONTINUED | OUTPATIENT
Start: 2024-09-30 | End: 2024-10-05 | Stop reason: HOSPADM

## 2024-09-30 RX ORDER — CLOPIDOGREL BISULFATE 75 MG/1
75 TABLET ORAL DAILY
Status: DISCONTINUED | OUTPATIENT
Start: 2024-10-01 | End: 2024-10-05 | Stop reason: HOSPADM

## 2024-09-30 RX ORDER — VENLAFAXINE HYDROCHLORIDE 75 MG/1
150 CAPSULE, EXTENDED RELEASE ORAL DAILY
Status: DISCONTINUED | OUTPATIENT
Start: 2024-09-30 | End: 2024-10-05 | Stop reason: HOSPADM

## 2024-09-30 RX ORDER — SODIUM CHLORIDE 9 MG/ML
INJECTION, SOLUTION INTRAVENOUS CONTINUOUS
Status: DISCONTINUED | OUTPATIENT
Start: 2024-09-30 | End: 2024-10-02

## 2024-09-30 RX ADMIN — PANTOPRAZOLE SODIUM 20 MG: 20 TABLET, DELAYED RELEASE ORAL at 15:56

## 2024-09-30 RX ADMIN — HYDROCORTISONE SODIUM SUCCINATE 100 MG: 100 INJECTION, POWDER, FOR SOLUTION INTRAMUSCULAR; INTRAVENOUS at 22:22

## 2024-09-30 RX ADMIN — CLOPIDOGREL BISULFATE 75 MG: 75 TABLET ORAL at 01:02

## 2024-09-30 RX ADMIN — ENOXAPARIN SODIUM 40 MG: 100 INJECTION SUBCUTANEOUS at 10:24

## 2024-09-30 RX ADMIN — EZETIMIBE 10 MG: 10 TABLET ORAL at 01:02

## 2024-09-30 RX ADMIN — LOSARTAN POTASSIUM 100 MG: 100 TABLET, FILM COATED ORAL at 01:02

## 2024-09-30 RX ADMIN — SODIUM CHLORIDE: 9 INJECTION, SOLUTION INTRAVENOUS at 10:46

## 2024-09-30 RX ADMIN — ISOSORBIDE MONONITRATE 30 MG: 30 TABLET, EXTENDED RELEASE ORAL at 12:33

## 2024-09-30 RX ADMIN — PIPERACILLIN AND TAZOBACTAM 3375 MG: 3; .375 INJECTION, POWDER, LYOPHILIZED, FOR SOLUTION INTRAVENOUS at 22:25

## 2024-09-30 RX ADMIN — SODIUM CHLORIDE, PRESERVATIVE FREE 10 ML: 5 INJECTION INTRAVENOUS at 22:20

## 2024-09-30 RX ADMIN — ATORVASTATIN CALCIUM 80 MG: 40 TABLET, FILM COATED ORAL at 10:24

## 2024-09-30 RX ADMIN — VENLAFAXINE HYDROCHLORIDE 150 MG: 75 CAPSULE, EXTENDED RELEASE ORAL at 10:24

## 2024-09-30 RX ADMIN — EZETIMIBE 10 MG: 10 TABLET ORAL at 22:18

## 2024-09-30 RX ADMIN — HYDROCORTISONE SODIUM SUCCINATE 100 MG: 100 INJECTION, POWDER, FOR SOLUTION INTRAMUSCULAR; INTRAVENOUS at 06:09

## 2024-09-30 RX ADMIN — PANTOPRAZOLE SODIUM 20 MG: 20 TABLET, DELAYED RELEASE ORAL at 06:09

## 2024-09-30 RX ADMIN — LEVOTHYROXINE SODIUM ANHYDROUS 50 MCG: 100 INJECTION, POWDER, LYOPHILIZED, FOR SOLUTION INTRAVENOUS at 06:09

## 2024-09-30 RX ADMIN — AMLODIPINE BESYLATE 5 MG: 5 TABLET ORAL at 10:51

## 2024-09-30 RX ADMIN — PIPERACILLIN AND TAZOBACTAM 3375 MG: 3; .375 INJECTION, POWDER, LYOPHILIZED, FOR SOLUTION INTRAVENOUS at 06:06

## 2024-09-30 RX ADMIN — HYDROCORTISONE SODIUM SUCCINATE 100 MG: 100 INJECTION, POWDER, FOR SOLUTION INTRAMUSCULAR; INTRAVENOUS at 14:20

## 2024-09-30 RX ADMIN — POTASSIUM CHLORIDE 40 MEQ: 1500 TABLET, EXTENDED RELEASE ORAL at 15:56

## 2024-09-30 RX ADMIN — PIPERACILLIN AND TAZOBACTAM 4500 MG: 4; .5 INJECTION, POWDER, LYOPHILIZED, FOR SOLUTION INTRAVENOUS at 00:53

## 2024-09-30 RX ADMIN — SODIUM CHLORIDE, PRESERVATIVE FREE 10 ML: 5 INJECTION INTRAVENOUS at 10:26

## 2024-09-30 RX ADMIN — PIPERACILLIN AND TAZOBACTAM 3375 MG: 3; .375 INJECTION, POWDER, LYOPHILIZED, FOR SOLUTION INTRAVENOUS at 14:24

## 2024-09-30 ASSESSMENT — PAIN SCALES - GENERAL
PAINLEVEL_OUTOF10: 0
PAINLEVEL_OUTOF10: 0

## 2024-09-30 NOTE — PROGRESS NOTES
Inpatient Occupational Therapy Evaluation and Treatment    Unit: PCU  Date:  9/30/2024  Patient Name:    Siobhan Flores  Admitting diagnosis:  Hypokalemia [E87.6]  Hypomagnesemia [E83.42]  Hyponatremia [E87.1]  Transaminitis [R74.01]  Myxedema coma (HCC) [E03.5]  Altered mental status, unspecified altered mental status type [R41.82]  AMS (altered mental status) [R41.82]  Admit Date:  9/29/2024  Precautions/Restrictions/WB Status/ Lines/ Wounds/ Oxygen: Fall risk, Bed/chair alarm, Lines (IV and external catheter), Confusion, and Telemetry        Pt seen for cotreatment this date due to patient safety, patient endurance, complexity of condition, acute illness/injury, and limited functional status information    Treatment Time:  11:55-12:28  Treatment Number:  1  Timed Code Treatment Minutes: 23 minutes  Total Treatment Minutes:  33  minutes    Patient Goals for Therapy: none stated          Discharge Recommendations: SNF  DME needs for discharge: Defer to facility       Therapy recommendations for staff:   Assist of 1 for repositioning in bed    History of Present Illness: 73 y.o. female with past medical history of hypertension, hyperlipidemia, hypothyroidism, depression presented to emergency department with chief complaint of altered mental status.  HPI is obtained from ED attending as patient is alert and oriented x 0 on my exam.  Patient has had significant decline in mentation for the last several days.  She is normally alert and oriented x 4 and on day of presentation was only oriented to self prior to coming into the hospital.  Patient does not have any complaint.       Preadmission Environment:   Pt lives with                                         with Significant Other  Home environment:                            two story home; bed on 2nd floor; couch on the first floor; able to stay on first floor where bathroom is located  Steps to enter first floor:                     3-4 steps to enter; unstable

## 2024-09-30 NOTE — CARE COORDINATION
Case Management Assessment  Initial Evaluation    Date/Time of Evaluation: 9/30/2024 10:51 AM  Assessment Completed by: Shannon Jeffrey RN    If patient is discharged prior to next notation, then this note serves as note for discharge by case management.    Patient Name: Siobhan Flores                   YOB: 1950  Diagnosis: Hypokalemia [E87.6]  Hypomagnesemia [E83.42]  Hyponatremia [E87.1]  Transaminitis [R74.01]  Myxedema coma (HCC) [E03.5]  Altered mental status, unspecified altered mental status type [R41.82]  AMS (altered mental status) [R41.82]                   Date / Time: 9/29/2024  8:19 PM    Patient Admission Status: Inpatient   Readmission Risk (Low < 19, Mod (19-27), High > 27): Readmission Risk Score: 15.1    Current PCP: Janet Live, JAMSHID - CNP  PCP verified by CM? Yes    Chart Reviewed: Yes      History Provided by: Patient  Patient Orientation: Alert and Oriented    Patient Cognition: Alert    Hospitalization in the last 30 days (Readmission):  No    If yes, Readmission Assessment in CM Navigator will be completed.    Advance Directives:      Code Status: Full Code   Patient's Primary Decision Maker is: Patient Declined (Legal Next of Kin Remains as Decision Maker)    Primary Decision Maker: Candido Flores - Spouse - [phone]    Discharge Planning:    Patient lives with: Spouse/Significant Other Type of Home: House  Primary Care Giver: Self  Patient Support Systems include: Spouse/Significant Other   Current Financial resources: Medicare  Current community resources: None  Current services prior to admission: None            Current DME:              Type of Home Care services:  None    ADLS  Prior functional level: Independent in ADLs/IADLs  Current functional level: Independent in ADLs/IADLs    PT AM-PAC:   /24  OT AM-PAC:   /24    Family can provide assistance at DC: Yes  Would you like Case Management to discuss the discharge plan with any other family

## 2024-09-30 NOTE — PROGRESS NOTES
Shift assessment complete. Call light and bedside table within reach. Perfect serve sent to  about BP being 180/107. Awaiting her response. Electronically signed by Sarah Aguirre RN on 9/30/2024 at 10:29 AM

## 2024-09-30 NOTE — PLAN OF CARE
Problem: Discharge Planning  Goal: Discharge to home or other facility with appropriate resources  9/30/2024 1602 by Harriett Rodriguez RN  Outcome: Progressing  Flowsheets (Taken 9/30/2024 1559)  Discharge to home or other facility with appropriate resources:   Identify barriers to discharge with patient and caregiver   Arrange for needed discharge resources and transportation as appropriate   Identify discharge learning needs (meds, wound care, etc)  9/30/2024 1256 by Sarah Aguirre, RN  Outcome: Progressing  9/30/2024 0308 by Areli Bennett RN  Outcome: Progressing     Problem: Skin/Tissue Integrity  Goal: Absence of new skin breakdown  Description: 1.  Monitor for areas of redness and/or skin breakdown  2.  Assess vascular access sites hourly  3.  Every 4-6 hours minimum:  Change oxygen saturation probe site  4.  Every 4-6 hours:  If on nasal continuous positive airway pressure, respiratory therapy assess nares and determine need for appliance change or resting period.  9/30/2024 1602 by Harriett Rodriguez RN  Outcome: Progressing  9/30/2024 0308 by Areli Bennett RN  Outcome: Progressing     Problem: Safety - Adult  Goal: Free from fall injury  9/30/2024 1602 by Harriett Rodriguez RN  Outcome: Progressing  9/30/2024 1256 by Sarah Aguirre, RN  Outcome: Progressing  9/30/2024 0308 by Areli Bennett RN  Outcome: Progressing     Problem: ABCDS Injury Assessment  Goal: Absence of physical injury  9/30/2024 1602 by Harriett Rodriguez RN  Outcome: Progressing  9/30/2024 0308 by Areli Bennett RN  Outcome: Progressing     Problem: Confusion  Goal: Confusion, delirium, dementia, or psychosis is improved or at baseline  Description: INTERVENTIONS:  1. Assess for possible contributors to thought disturbance, including medications, impaired vision or hearing, underlying metabolic abnormalities, dehydration, psychiatric diagnoses, and notify attending LIP  2. [place] high risk fall  precautions, as indicated  3. Provide frequent short contacts to provide reality reorientation, refocusing and direction  4. Decrease environmental stimuli, including noise as appropriate  5. Monitor and intervene to maintain adequate nutrition, hydration, elimination, sleep and activity  6. If unable to ensure safety without constant attention obtain sitter and review sitter guidelines with assigned personnel  7. Initiate Psychosocial CNS and Spiritual Care consult, as indicated  9/30/2024 1602 by Harriett Rodriguez, RN  Outcome: Progressing  Flowsheets (Taken 9/30/2024 1559)  Effect of thought disturbance (confusion, delirium, dementia, or psychosis) are managed with adequate functional status:   Assess for contributors to thought disturbance, including medications, impaired vision or hearing, underlying metabolic abnormalities, dehydration, psychiatric diagnoses, notify LIP   Provide frequent short contacts to provide reality reorientation, refocusing and direction   Decrease environmental stimuli, including noise as appropriate   [place] high risk fall precautions, as indicated  9/30/2024 1256 by Sarah Aguirre, RN  Outcome: Progressing     Problem: Pain  Goal: Verbalizes/displays adequate comfort level or baseline comfort level  Outcome: Progressing

## 2024-09-30 NOTE — ACP (ADVANCE CARE PLANNING)
Advance Care Planning   General Advance Care Planning (ACP) Conversation    Date of Conversation: 9/29/2024  Conducted with: Patient with Decision Making Capacity  Other persons present: None    Healthcare Decision Maker:    Primary Decision Maker: Candido Flores - Spouse - [phone]      Content/Action Overview:    Pt elects Full Code    Shannon Jeffrey RN

## 2024-09-30 NOTE — PROGRESS NOTES
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]  Altered mental status, unspecified altered mental status type [R41.82]  AMS (altered mental status) [R41.82]  Admit Date:  9/29/2024  Precautions/Restrictions/WB Status/ Lines/ Wounds/ Oxygen: Fall risk, Bed/chair alarm, Lines (IV and external catheter), Confusion, and Telemetry      Pt seen for cotreatment this date due to patient safety, patient endurance, complexity of condition, acute illness/injury, and limited functional status information    Treatment Time:  6200-3407  Treatment Number:  1   Timed Code Treatment Minutes: 23 minutes  Total Treatment Minutes:  33  minutes    Patient Stated Goals for Therapy: \" No goals stated today. \"          Discharge Recommendations: SNF; will continue to assess for home  DME needs for discharge: Defer to facility       Therapy recommendation for EMS Transport: requires transport by cot due to pt with poor sitting balance/tolerance    Therapy recommendations for staff:   Bedrest until BP can improve per RN    History of Present Illness:   73 y.o. female with past medical history of hypertension, hyperlipidemia, hypothyroidism, depression presented to emergency department with chief complaint of altered mental status. HPI is obtained from ED attending as patient is alert and oriented x 0 on my exam. Patient has had significant decline in mentation for the last several days. She is normally alert and oriented x 4 and on day of presentation was only oriented to self prior to coming into the hospital. Patient does not have any complaint.     Preadmission Environment:   Pt lives with    with Significant Other  Home environment:    two story home; bed on 2nd floor; couch on the first floor; able to stay on first floor where  TESTED  Finger to Nose:        [x]WNL  []Impaired    Comment:   Alternating pronation/supination:   [x]WNL  []Impaired    Comment:   Finger/Thumb opposition:  [x]WNL  []Impaired    Comment:   Heel to shin (sitting or supine):      []WNL  []Impaired    Comment:   Alternating Toe Tapping:       []WNL  []Impaired    Comment:     Vision Testing  Blank box below indicates item is NOT TESTED  Visual Tracking:    []WNL  []Impaired    Comment:   Peripheral visual field:  [x]WNL  []Impaired    Comment:   Divergence:       []WNL  []Impaired    Comment:   Convergence:       []WNL  []Impaired    Comment:    Tone  WNL    Balance  Static Sitting:  Not tested; Not Tested  Dynamic Sitting:  Not tested; Not Tested   Comments:     Static Standing: Not tested; Not Tested  Dynamic Standing: Not tested; Not Tested  Comments:     Posture  Seated: Not Tested  Standing: Not Tested    Bed Mobility   Supine to Sit:    Not Tested  Sit to Supine:   Not Tested  Rolling:   SBA   Scooting in sitting: Not Tested   Scooting in supine:  Total A ; hercules bed; SBA with HOB slightly elevated   Bridging:  Not Tested  Comments: BP checked in supine (see chart) RN hold transfers due to high BP    Transfer Training     Sit to stand:   Not Tested   Stand to sit:   Not Tested   Bed to/from Chair:  Not Tested with use of N/A  Comments: BP checked in supine (see chart) RN hold transfers due to high BP    Gait gait deferred due to high BP; pt ambulated 0 ft.   Comment: BP checked in supine (see chart) RN hold transfers due to high BP    Stair Training deferred, pt unsafe/ not appropriate to complete stairs at this time      Therapeutic Exercises Initiated  deferred secondary to high BP    Positioning Needs   Pt in bed, alarm set, positioned in proper neutral alignment and pressure relief provided.   Call light provided and all needs within reach  RN aware of pt position/status  Pt set up for lunch. Pt's  with pt at end of session.    Other

## 2024-09-30 NOTE — FLOWSHEET NOTE
09/30/24 0000   Vital Signs   Temp 97.8 °F (36.6 °C)   Temp Source Oral   Pulse 54   Heart Rate Source Monitor   Respirations 18   BP (!) 183/96   MAP (Calculated) 125   BP Location Left upper arm   BP Method Automatic   Patient Position Semi fowlers   Pain Assessment   Pain Assessment None - Denies Pain   Opioid-Induced Sedation   POSS Score 1   Oxygen Therapy   SpO2 95 %   O2 Device None (Room air)   Height and Weight   Height 1.499 m (4' 11\")   Weight - Scale 71.1 kg (156 lb 12.8 oz)   BSA (Calculated - sq m) 1.72 sq meters   BMI (Calculated) 31.7      Admission questions complete with .  states patient has been unable to get out of bed and walk for a few weeks.  also states he gives meds to wife when he can because they make her sick and sleepy. Med rec completed with  and Dr. Mcgee notified of that and pt BP.

## 2024-09-30 NOTE — PROGRESS NOTES
at bedside, attempted to review home medications with him. He did not have the list with him and without it he wasn't sure. Requested he bring the list in. He states she takes about 6 pills once a day and mostly for her BP. Electronically signed by Sarah Aguirre RN on 9/30/2024 at 12:55 PM

## 2024-09-30 NOTE — PROGRESS NOTES
Patient admitted to room 325 from ER. Patient oriented to room, call light, bed rails, phone, lights and bathroom. Patient instructed about the schedule of the day including: vital sign frequency, lab draws, possible tests, frequency of MD and staff rounds, daily weights, I &O's and prescribed diet. PCU Telemetry box in place, patient aware of placement and reason. Bed locked, in lowest position, side rails up 2/4, call light within reach.        Recliner Assessment  Patient is not able to demonstrate the ability to move from a reclining position to an upright position within the recliner due to weakness.       4 Eyes Skin Assessment     NAME:  Siobhan Flores  YOB: 1950  MEDICAL RECORD NUMBER:  [id number]    The patient is being assessed for  Admission    I agree that at least one RN has performed a thorough Head to Toe Skin Assessment on the patient. ALL assessment sites listed below have been assessed.      Areas assessed by both nurses:    Head, Face, Ears, Shoulders, Back, Chest, Arms, Elbows, Hands, Sacrum. Buttock, Coccyx, Ischium, Legs. Feet and Heels, Under Medical Devices , and Other          Does the Patient have a Wound? No noted wound(s)       [place] Prevention initiated by RN: Yes  Wound Care Orders initiated by RN: No    Pressure Injury (Stage 3,4, Unstageable, DTI, NWPT, and Complex wounds) if present, place Wound referral order by RN under : No    New Ostomies, if present place, Ostomy referral order under : No     Nurse 1 eSignature: Electronically signed by Areli Bennett RN on 9/30/24 at 1:25 AM EDT    **SHARE this note so that the co-signing nurse can place an eSignature**    Nurse 2 eSignature: Electronically signed by Jessie Santo RN on 9/30/24 at 1:26 AM EDT

## 2024-09-30 NOTE — H&P
Hospital Medicine History & Physical      Date of Admission: 9/29/2024    Date of Service:  Pt seen/examined on 9/29/2024    [x]Admitted to Inpatient with expected LOS greater than two midnights due to medical therapy.  []Placed in Observation status.    Chief Admission Complaint: Altered mental status    Presenting Admission History:      73 y.o. female with past medical history of hypertension, hyperlipidemia, hypothyroidism, depression presented to emergency department with chief complaint of altered mental status.  HPI is obtained from ED attending as patient is alert and oriented x 0 on my exam.  Patient has had significant decline in mentation for the last several days.  She is normally alert and oriented x 4 and on day of presentation was only oriented to self prior to coming into the hospital.  Patient does not have any complaint.    Assessment/Plan:      Current Principal Problem:  AMS (altered mental status)    Altered mental status  - Multifactorial; patient's sister passed last week, secondary to acute depression?;  TSH elevated at 76  - Will order hydrocortisone Q8, IV Synthroid daily, monitor for improvement in mentation    HUEY  - Unclear etiology  - HUEY labs pending  - IV fluids, monitor for resolution    Coronary artery disease  - Lipitor, Coreg, Zetia, Aldactone    Hypertension  - Losartan, Coreg, Aldactone    GERD  - Protonix    Depression  - Venlafaxine    Discussed management and the need for Hospitalization of the patient w/ the Emergency Department Provider: Toño    CXR: I have reviewed the CXR with the following interpretation:   EKG:  I have reviewed the EKG with the following interpretation:     Physical Exam Performed:      BP (!) 144/84   Pulse 57   Temp 97.4 °F (36.3 °C) (Oral)   Resp 18   Ht 1.499 m (4' 11\")   Wt 71.1 kg (156 lb 12.8 oz)   SpO2 95%   BMI 31.67 kg/m²     General appearance: Appears confused  HEENT:  Pupils equal, round, and reactive to light.  Conjunctivae/corneas clear.  Respiratory:  Normal respiratory effort. Clear to auscultation, bilaterally without Rales/Wheezes/Rhonchi.  Cardiovascular:  Regular rate and rhythm with normal S1/S2 without murmurs, rubs or gallops.  Abdomen:  Soft, non-tender, non-distended with normal bowel sounds.  Musculoskeletal:  No clubbing, cyanosis or edema bilaterally.  Full range of motion without deformity.  Neurologic:  Neurovascularly intact without any focal sensory/motor deficits. Cranial nerves: II-XII intact, grossly non-focal.  Psychiatric: Alert and oriented x 0   skin: Cool, clammy  Capillary Refill:  Brisk,< 3 seconds   Peripheral Pulses:  +2 palpable, equal bilaterally     Diet: [x]Adult/General  []Cardiac  []Diabetic  []Low Fat  []NPO  []NPO after Midnight  []Other   DVT Prophylaxis: []PPx LMWH  [x]SQ Heparin  []IPC/SCDs  []Eliquis  []Xarelto  []Coumadin     Code status: [x]Full  []DNR/CCA  []Limited (DNR/CCA with Do Not Intubate)  []DNRCC  Surrogate Decision Maker:   PT/OT Eval Status:   []NOT yet ordered  []Ordered and Pending   []Seen with Recommendations for:  []Home independently  []Home w/ assist  []HHC  []SNF  []Acute Rehab    Anticipated Discharge Day/Date: 3-4 days    Anticipated Discharge Location: [x]Home  []HHC  []SNF  []Acute Rehab  []ECF  []LTAC  []Hospice    Consults:      None    I personally have obtained, updated and/or reviewed the patient’s medication list on 9/30/2024   --------------------------------------------------------------------------------------------------------------------------------------------------------------------    Imaging:     CT ABDOMEN PELVIS WO CONTRAST Additional Contrast? None    Result Date: 9/29/2024  EXAMINATION: CT OF THE ABDOMEN AND PELVIS WITHOUT CONTRAST 9/29/2024 10:35 pm TECHNIQUE: CT of the abdomen and pelvis was performed without the administration of intravenous contrast. Multiplanar reformatted images are provided for review. Automated exposure control,

## 2024-09-30 NOTE — PROGRESS NOTES
Perfect serve sent to  updating her that BP is 186/104 HR 66. Awaiting her response. Electronically signed by Sarah Aguirre RN on 9/30/2024 at 12:16 PM

## 2024-09-30 NOTE — PLAN OF CARE
Problem: Confusion  Goal: Confusion, delirium, dementia, or psychosis is improved or at baseline  Description: INTERVENTIONS:  1. Assess for possible contributors to thought disturbance, including medications, impaired vision or hearing, underlying metabolic abnormalities, dehydration, psychiatric diagnoses, and notify attending LIP  2. [place] high risk fall precautions, as indicated  3. Provide frequent short contacts to provide reality reorientation, refocusing and direction  4. Decrease environmental stimuli, including noise as appropriate  5. Monitor and intervene to maintain adequate nutrition, hydration, elimination, sleep and activity  6. If unable to ensure safety without constant attention obtain sitter and review sitter guidelines with assigned personnel  7. Initiate Psychosocial CNS and Spiritual Care consult, as indicated  Outcome: Progressing     Problem: Safety - Adult  Goal: Free from fall injury  9/30/2024 1256 by Sarah Aguirre, RN  Outcome: Progressing  9/30/2024 0308 by Areli Bennett, RN  Outcome: Progressing

## 2024-09-30 NOTE — CONSULTS
Inpatient Neurology consult        [facility]            Siobhan Flores  1950    Date of Service: 9/30/2024    Referring Physician: Charlotte Oreilly MD      Reason for the consult and CC: AMS/ history of Alzheimer's disease     HPI:   The patient is a 73 y.o. female with a past medical history of Alzheimer disease, anxiety, CAD, fibromyalgia, hyperlipidemia, hypertension, and TIA originally presenting to the hospital for concerns of altered mental status. Patient  at bedside states patient has been confused for 2-3 months but has worsened over the last 7 days after her sister's passing. Patient has not been taking her home medications as intended and states they both have been having difficulties with multiple family deaths. Neurology has been consulted for further evaluation and management of AMS with history of Alzheimer's.        Constitutional:   Vitals:    09/30/24 1051 09/30/24 1212 09/30/24 1233 09/30/24 1419   BP: (!) 180/107 (!) 186/104 (!) 181/101 (!) 149/83   Pulse:  66  69   Resp:    16   Temp:    97.8 °F (36.6 °C)   TempSrc:    Oral   SpO2:    92%   Weight:       Height:             I personally reviewed and updated social history, past medical history, medications, allergy, surgical history, and family history as documented in the patient's electronic health records.       ROS: 10-14 ROS reviewed with the patient/nurse/family which were unremarkable except mentioned in H&P.    General appearance:  Chronically-ill appearing    Mental Status:   Oriented to person, place, problem, and time.    Memory: Fair immediate recall.  Intact remote memory  Normal attention span and concentration.  Language: intact naming, repeating and fluency   Fair fund of Knowledge. Aware of current events and vocabulary   Cranial Nerves:   II: Visual fields: Full. Pupils: equal, round, reactive to light, bilaterally  III,IV,VI: Extra Ocular Movements are intact. No nystagmus  V: Facial sensation is    Fluent speech. Follows complex commands. Names common items. Error with repetition.   No hemineglect.     Cranial Nerves:  II: Visual fields were normal to confrontation and visual acuity was good for face to face and TV distance. PERRL.   III, IV, VI: Ocular motility was full and there was no nystagmus.    V: Facial sensation was normal. Jaw opening was symmetric.    VII: Facial strength symmetrically intact and the speech was clear.   VIII: Hearing acuity was normal.    IX, X: Palate and cough were normal.  XI: Shoulder shrug and head turning strength were normal.    XII: Tongue protrusion normal. No atrophy or fasciculations.     Motor: Normal muscle bulk and tone. Strength 5/5 symmetric distal and proximal bilateral upper and lower extremities.     Sensation: normal to light touch in bilateral upper and lower extremities.     Deep tendon reflexes: 2/4 symmetric distal and proximal bilateral upper and lower extremities.Toes down going bilateral.      Coordination: No tremor, myoclonus observed. Finger nose finger testing accurate. No bradykinesia. Station and gait not tested due to safety concerns.      Data Reviewed:   I independently interpreted 9/29 NCHCT which revealed cerebral atrophy and moderate white matter disease.     Lab Results   Component Value Date     (L) 09/30/2024    K 3.2 (L) 09/30/2024    CL 95 (L) 09/30/2024    CO2 22 09/30/2024    BUN 14 09/30/2024    CREATININE 1.3 (H) 09/30/2024    GLUCOSE 123 (H) 09/30/2024    CALCIUM 9.3 09/30/2024    BILITOT 1.5 (H) 09/30/2024    ALKPHOS 156 (H) 09/30/2024    AST 39 (H) 09/30/2024    ALT 61 (H) 09/30/2024    LABGLOM 43 (A) 09/30/2024    GFRAA 53 (A) 04/18/2022    AGRATIO 1.1 09/30/2024    GLOB 3.0 11/12/2020       Lab Results   Component Value Date    WBC 6.7 09/30/2024    HGB 12.5 09/30/2024    HCT 36.8 09/30/2024    .9 (H) 09/30/2024     09/30/2024       Other labs/trends: Scr 1.4->1.3 (baseline 1.0), hypokalemia improved,

## 2024-09-30 NOTE — PROGRESS NOTES
Bedside report and transfer of care given to Yazmin Smith. Pt currently resting in bed with the call light within reach. Pt denies any other care needs at this time. Pt stable at this time.

## 2024-09-30 NOTE — ED PROVIDER NOTES
[facility] ED  EMERGENCY DEPARTMENT ENCOUNTER        Patient Name: Siobhan Flores  MRN: [id number]  Birthdate 1950  Date of evaluation: 9/29/2024  Provider: Rc Lundberg MD  PCP: Janet Live, APRN - CNP  Note Started: 8:20 PM EDT 9/29/24    CHIEF COMPLAINT       Altered Mental Status (Per family pt has had progressive mental decline for last 7 days from A&O x's 4 walking and talking to A&O x's 1, does not recognize home she has lived in for 15 years, sister passed last week. )      HISTORY OF PRESENT ILLNESS: 1 or more Elements     History from : Family   and EMS    Limitations to history : None    Siobhan Flores is a 73 y.o. female who presents for evaluation of altered mental status.  Patient was brought in by EMS.  According to EMS, patient has had mental status decline over the past 7 days.  She is typically ANO x 4 however now she is ANO x 1.  She apparently did not recognize her home.  She did have a sister that passed away last week at the start of the symptoms.  Patient denies any complaints upon arrival.  No chest pain or abdominal pain.  She states that she knows that she is at the hospital however cannot name the hospital.    Nursing Notes were all reviewed and agreed with or any disagreements were addressed in the HPI.    REVIEW OF SYSTEMS :      Review of Systems    Positives and Pertinent negatives as per HPI.     SURGICAL HISTORY     Past Surgical History:   Procedure Laterality Date    APPENDECTOMY      COLONOSCOPY      CORONARY STENT PLACEMENT  03/30/2023    EYE SURGERY      HYSTERECTOMY (CERVIX STATUS UNKNOWN)  1989       CURRENTMEDICATIONS       Previous Medications    AMLODIPINE (NORVASC) 5 MG TABLET    Take 1 tablet by mouth daily    ATORVASTATIN (LIPITOR) 80 MG TABLET    TAKE 1 TABLET BY MOUTH EVERY DAY    CARVEDILOL (COREG) 25 MG TABLET    TAKE 1 TABLET BY MOUTH TWICE A DAY    CLOPIDOGREL (PLAVIX) 75 MG TABLET    TAKE 1 TABLET BY MOUTH EVERY DAY    DONEPEZIL  (EFFER-K) effervescent tablet 40 mEq (has no administration in time range)     Or   potassium chloride 10 mEq/100 mL IVPB (Peripheral Line) (has no administration in time range)   magnesium sulfate 2000 mg in 50 mL IVPB premix (has no administration in time range)   enoxaparin (LOVENOX) injection 40 mg (has no administration in time range)   ondansetron (ZOFRAN-ODT) disintegrating tablet 4 mg (has no administration in time range)     Or   ondansetron (ZOFRAN) injection 4 mg (has no administration in time range)   polyethylene glycol (GLYCOLAX) packet 17 g (has no administration in time range)   acetaminophen (TYLENOL) tablet 650 mg (has no administration in time range)     Or   acetaminophen (TYLENOL) suppository 650 mg (has no administration in time range)   piperacillin-tazobactam (ZOSYN) 3,375 mg in sodium chloride 0.9 % 100 mL extended IVPB (mini-bag) (has no administration in time range)   lactated ringers bolus 1,000 mL (1,000 mLs IntraVENous New Bag 9/29/24 2246)   potassium bicarb-citric acid (EFFER-K) effervescent tablet 40 mEq (40 mEq Oral Given 9/29/24 2250)   hydrocortisone sodium succinate PF (SOLU-CORTEF) injection 100 mg (100 mg IntraVENous Given 9/29/24 2246)             Is this patient to be included in the SEP-1 Core Measure due to severe sepsis or septic shock?   No   Exclusion criteria - the patient is NOT to be included for SEP-1 Core Measure due to:  Alternative explanation for abnormal labs/vitals that do not relate to sepsis, see MDM for further explanation    CC/HPI Summary, DDx, ED Course, and Reassessment:     73-year-old female presenting for evaluation of altered mental status, gradual mental status decline for the past week or so.  On exam, she is alert however not oriented.  She has no focal neurologic deficits.  Will obtain broad laboratory evaluation for altered mental status, CT head for further evaluation    The differential diagnosis associated with the patient's presentation  includes, but is not limited to: Electrolyte abnormality, renal insufficiency, thyroid disease, myxedema coma, stroke, abnormal grief reaction, cholecystitis, colitis, pancreatitis    There is no significant leukocytosis.  Patient has hyponatremia to 131, hypokalemia to 2.7.  Oral potassium is ordered for replacement.  She has evidence of acute kidney injury with creatinine elevated to 1.4 from 1.0.  IV fluids initiated.  Hypomagnesemia to 1.6.  TSH is also significant elevated to 76.  Her symptoms could be consistent with myxedema coma, She will be administered 200 mcg IV thyroxine 100mg hydrocortisone.  She also has new transaminitis.  Unclear etiology, CT abdomen pelvis obtained which does show gallbladder distention.  Patient will be administered Zosyn to cover for the possibility of a send cholangitis to explain her presentation, she will require admission for continued management of the multiple electrolyte abnormalities, concern for myxedema coma, and GI evaluation.  CT head without acute intracranial process does show small vessel ischemic disease.    CONSULTS: (Who and What was discussed)  None    Discussion with Other Professionals : Admitting Team      Management of the patient was discussed with admitting hospitalist    Social Determinants : None    Patient's care impacted by chronic condition(s):   Past Medical History:   Diagnosis Date    Anxiety     CAD (coronary artery disease)     Fibromyalgia     Hyperlipidemia     Hypertension     Hypothyroidism     Osteopenia     Dexa 7/08    Thyroid disease     Transient ischemic attack 2003         Records Reviewed : None    Clinical information obtained from an independent historian. History obtained from or confirmed by EMS    Disposition Considerations (include 1 Tests not done, Shared Decision Making, Pt Expectation of Test or Tx.):     I am the Primary Clinician of Record.    FINAL IMPRESSION      1. Altered mental status, unspecified altered mental status  type    2. Myxedema coma (HCC)    3. Hypokalemia    4. Hypomagnesemia    5. Hyponatremia    6. Transaminitis          DISPOSITION/PLAN     DISPOSITION Admitted 09/29/2024 11:22:00 PM  Condition at Disposition: Data Unavailable      PATIENT REFERRED TO:  No follow-up provider specified.    DISCHARGE MEDICATIONS:  Patient was given scripts for the following medications. I counseled patient how to take these medications:  New Prescriptions    No medications on file       DISCONTINUED MEDICATIONS:  Discontinued Medications    No medications on file              (This chart was generated in part by using Dragon Dictation system and may contain errors related to that system including errors in grammar, punctuation, and spelling, as well as words and phrases that may be inappropriate. If there are any questions or concerns please feel free to contact the dictating provider for clarification.)    MD Toño Robles Benjamin, MD  09/29/24 3700

## 2024-09-30 NOTE — PROGRESS NOTES
Brief Progress Note    Date: 09/30/24    Subjective: Patient was evaluated by nocturnist early this AM. Current plan of care below. I have reviewed vitals, labs and orders and have briefly seen the patient.     Pt will wake up to verbal stimuli.  She is alert to self and place only.  Stated she is feeling a little better today, stated under a lot of stress at home.     Objective:  Vitals:    09/29/24 2132 09/29/24 2205 09/30/24 0000 09/30/24 0600   BP: (!) 160/83 (!) 160/83 (!) 183/96 (!) 144/84   Pulse: 52 52 54 57   Resp: 18  18 18   Temp:   97.8 °F (36.6 °C) 97.4 °F (36.3 °C)   TempSrc:   Oral Oral   SpO2: 99%  95% 95%   Weight:   71.1 kg (156 lb 12.8 oz)    Height:   1.499 m (4' 11\")          Labs:  CBC:   Recent Labs     09/29/24 2032 09/30/24  0424   WBC 6.7 6.7   HGB 12.5 12.5   HCT 36.4 36.8   MCV 99.4 100.9*    150     BMP:   Recent Labs     09/29/24 2032 09/30/24  0424   * 130*   K 2.7* 3.2*   CL 93* 95*   CO2 26 22   BUN 15 14   CREATININE 1.4* 1.3*     LIVER PROFILE:   Recent Labs     09/29/24 2032 09/30/24  0424   AST 48* 39*   ALT 72* 61*   LIPASE 37.0  --    BILITOT 1.9* 1.5*   ALKPHOS 167* 156*     PT/INR: No results for input(s): \"PROTIME\", \"INR\" in the last 72 hours.  APTT: No results for input(s): \"APTT\" in the last 72 hours.  UA:  Recent Labs     09/30/24  0100   COLORU Yellow   PHUR 6.0  6.0   WBCUA 3-5   RBCUA 0-2   BACTERIA Rare*   CLARITYU Clear   LEUKOCYTESUR Negative   UROBILINOGEN 1.0   BILIRUBINUR Negative   BLOODU TRACE-INTACT*   GLUCOSEU Negative        CT ABDOMEN PELVIS WO CONTRAST Additional Contrast? None   Final Result   No acute process.         CT HEAD WO CONTRAST   Final Result   Chest: Left basilar atelectasis or scar.      Head: Atrophy and moderate small vessel ischemic disease.         XR CHEST PORTABLE   Final Result   Chest: Left basilar atelectasis or scar.      Head: Atrophy and moderate small vessel ischemic disease.                Assessment &  Plan:    AMS  Concern for Myoedema coma  - TSH 76  - appears patient has been noncompliant with medication regimen  - CT of head showed atrophy and moderate small vessel ischemic disease   - given IV Synthroid daily  - started on hydrocortisone every 8 hours  - started on Zosyn   - added Vitamin B12, folate and Vitamin D level      Alzheimer's disease  - noted per PCP note  - possibly worsening and contributing to above  - was on Aricept per PCC and not taking  - consider resuming Aricept vs neurology consult although pt is bradycardic which Aricept may make worse      Hyponatremia  - likely 2/2 poor po intake  - start IVF  - monitor BMP      Elevated creatinine- CKD3  - baseline creatinine 1.0  - creatinine on admission 1.4  - likely 2/2 poor po intake  - IVF  - on losartan, continue monitor       Bradycardia  - likely 2/2 above  - hold coreg  - monitor on telemetry     HTN- uncontrolled  - pt stated she has quynh compliant with home regimen although appears recent noncompliance per PC note  - resumed Losartan  - hold aldactone with hyponatremia   - appears supposed to be on Norvasc and Imdur, will monitor and resume if needed      Hypokalemia  Hypomagnesemia  - replacement ordered  - monitor       Transaminitis   - CT of abdomen as above shows no acute process  - ALT 72--trending down to 61  - AST 48--trending down to 39  - denies any abdominal pain  - monitor      HTN- uncontrolled  - pt stated she has quynh compliant with home regimen although appears recent noncompliance per PC note  - resumed Losartan  - hold aldactone with hyponatremia       CAD: S/p MARQUITA LAD 3/2023  - follows with cardiology outpatient   -Medical management for severe diagonal disease due to small vessel size  - continue plavix, zetia   - not taking Imdur, monitor and consider restarting if needed  - denies any chest pain  - monitor on telemetry         Noncompliance with mediation regimen     Attempted to call  for collateral information.

## 2024-09-30 NOTE — PROGRESS NOTES
Transfer shift report given to Harriett NELSON. Pt in stable condition ,care transferred at this time. Electronically signed by Sarah Aguirre RN on 9/30/2024 at 3:44 PM

## 2024-10-01 LAB
ALBUMIN SERPL-MCNC: 3.5 G/DL (ref 3.4–5)
ALBUMIN SERPL-MCNC: 3.9 G/DL (ref 3.4–5)
ALBUMIN/GLOB SERPL: 1.2 {RATIO} (ref 1.1–2.2)
ALBUMIN/GLOB SERPL: 1.3 {RATIO} (ref 1.1–2.2)
ALP SERPL-CCNC: 132 U/L (ref 40–129)
ALP SERPL-CCNC: 141 U/L (ref 40–129)
ALT SERPL-CCNC: 45 U/L (ref 10–40)
ALT SERPL-CCNC: 45 U/L (ref 10–40)
ANION GAP SERPL CALCULATED.3IONS-SCNC: 11 MMOL/L (ref 3–16)
ANION GAP SERPL CALCULATED.3IONS-SCNC: 12 MMOL/L (ref 3–16)
AST SERPL-CCNC: 24 U/L (ref 15–37)
AST SERPL-CCNC: 27 U/L (ref 15–37)
BASOPHILS # BLD: 0 K/UL (ref 0–0.2)
BASOPHILS NFR BLD: 0.1 %
BILIRUB SERPL-MCNC: 1 MG/DL (ref 0–1)
BILIRUB SERPL-MCNC: 1.1 MG/DL (ref 0–1)
BUN SERPL-MCNC: 11 MG/DL (ref 7–20)
BUN SERPL-MCNC: 12 MG/DL (ref 7–20)
CALCIUM SERPL-MCNC: 8.7 MG/DL (ref 8.3–10.6)
CALCIUM SERPL-MCNC: 8.8 MG/DL (ref 8.3–10.6)
CHLORIDE SERPL-SCNC: 98 MMOL/L (ref 99–110)
CHLORIDE SERPL-SCNC: 99 MMOL/L (ref 99–110)
CO2 SERPL-SCNC: 24 MMOL/L (ref 21–32)
CO2 SERPL-SCNC: 25 MMOL/L (ref 21–32)
CREAT SERPL-MCNC: 1.3 MG/DL (ref 0.6–1.2)
CREAT SERPL-MCNC: 1.4 MG/DL (ref 0.6–1.2)
DEPRECATED RDW RBC AUTO: 17.2 % (ref 12.4–15.4)
EOSINOPHIL # BLD: 0 K/UL (ref 0–0.6)
EOSINOPHIL NFR BLD: 0 %
GFR SERPLBLD CREATININE-BSD FMLA CKD-EPI: 39 ML/MIN/{1.73_M2}
GFR SERPLBLD CREATININE-BSD FMLA CKD-EPI: 43 ML/MIN/{1.73_M2}
GLUCOSE SERPL-MCNC: 158 MG/DL (ref 70–99)
GLUCOSE SERPL-MCNC: 171 MG/DL (ref 70–99)
HCT VFR BLD AUTO: 34.5 % (ref 36–48)
HGB BLD-MCNC: 11.9 G/DL (ref 12–16)
LYMPHOCYTES # BLD: 1.1 K/UL (ref 1–5.1)
LYMPHOCYTES NFR BLD: 14.5 %
MAGNESIUM SERPL-MCNC: 1.9 MG/DL (ref 1.8–2.4)
MCH RBC QN AUTO: 35.1 PG (ref 26–34)
MCHC RBC AUTO-ENTMCNC: 34.6 G/DL (ref 31–36)
MCV RBC AUTO: 101.5 FL (ref 80–100)
MONOCYTES # BLD: 0.3 K/UL (ref 0–1.3)
MONOCYTES NFR BLD: 4.3 %
NEUTROPHILS # BLD: 6.3 K/UL (ref 1.7–7.7)
NEUTROPHILS NFR BLD: 81.1 %
PLATELET # BLD AUTO: 175 K/UL (ref 135–450)
PMV BLD AUTO: 8.3 FL (ref 5–10.5)
POTASSIUM SERPL-SCNC: 3.5 MMOL/L (ref 3.5–5.1)
POTASSIUM SERPL-SCNC: 3.8 MMOL/L (ref 3.5–5.1)
PROT SERPL-MCNC: 6.5 G/DL (ref 6.4–8.2)
PROT SERPL-MCNC: 6.9 G/DL (ref 6.4–8.2)
RBC # BLD AUTO: 3.4 M/UL (ref 4–5.2)
SODIUM SERPL-SCNC: 134 MMOL/L (ref 136–145)
SODIUM SERPL-SCNC: 135 MMOL/L (ref 136–145)
WBC # BLD AUTO: 7.8 K/UL (ref 4–11)

## 2024-10-01 PROCEDURE — 6360000002 HC RX W HCPCS: Performed by: INTERNAL MEDICINE

## 2024-10-01 PROCEDURE — 83735 ASSAY OF MAGNESIUM: CPT

## 2024-10-01 PROCEDURE — 6360000002 HC RX W HCPCS: Performed by: STUDENT IN AN ORGANIZED HEALTH CARE EDUCATION/TRAINING PROGRAM

## 2024-10-01 PROCEDURE — 6370000000 HC RX 637 (ALT 250 FOR IP): Performed by: INTERNAL MEDICINE

## 2024-10-01 PROCEDURE — 97530 THERAPEUTIC ACTIVITIES: CPT

## 2024-10-01 PROCEDURE — 6370000000 HC RX 637 (ALT 250 FOR IP): Performed by: NURSE PRACTITIONER

## 2024-10-01 PROCEDURE — 2060000000 HC ICU INTERMEDIATE R&B

## 2024-10-01 PROCEDURE — 80053 COMPREHEN METABOLIC PANEL: CPT

## 2024-10-01 PROCEDURE — 36415 COLL VENOUS BLD VENIPUNCTURE: CPT

## 2024-10-01 PROCEDURE — 2580000003 HC RX 258: Performed by: NURSE PRACTITIONER

## 2024-10-01 PROCEDURE — 2580000003 HC RX 258: Performed by: STUDENT IN AN ORGANIZED HEALTH CARE EDUCATION/TRAINING PROGRAM

## 2024-10-01 PROCEDURE — 6370000000 HC RX 637 (ALT 250 FOR IP): Performed by: STUDENT IN AN ORGANIZED HEALTH CARE EDUCATION/TRAINING PROGRAM

## 2024-10-01 PROCEDURE — 85025 COMPLETE CBC W/AUTO DIFF WBC: CPT

## 2024-10-01 RX ORDER — HYDRALAZINE HYDROCHLORIDE 25 MG/1
25 TABLET, FILM COATED ORAL EVERY 8 HOURS SCHEDULED
Status: DISCONTINUED | OUTPATIENT
Start: 2024-10-01 | End: 2024-10-05 | Stop reason: HOSPADM

## 2024-10-01 RX ADMIN — PIPERACILLIN AND TAZOBACTAM 3375 MG: 3; .375 INJECTION, POWDER, LYOPHILIZED, FOR SOLUTION INTRAVENOUS at 21:59

## 2024-10-01 RX ADMIN — HYDROCORTISONE SODIUM SUCCINATE 100 MG: 100 INJECTION, POWDER, FOR SOLUTION INTRAMUSCULAR; INTRAVENOUS at 05:42

## 2024-10-01 RX ADMIN — HYDROCORTISONE SODIUM SUCCINATE 50 MG: 100 INJECTION, POWDER, FOR SOLUTION INTRAMUSCULAR; INTRAVENOUS at 14:09

## 2024-10-01 RX ADMIN — CLOPIDOGREL BISULFATE 75 MG: 75 TABLET ORAL at 09:07

## 2024-10-01 RX ADMIN — HYDRALAZINE HYDROCHLORIDE 25 MG: 25 TABLET ORAL at 16:29

## 2024-10-01 RX ADMIN — SODIUM CHLORIDE: 9 INJECTION, SOLUTION INTRAVENOUS at 11:42

## 2024-10-01 RX ADMIN — EZETIMIBE 10 MG: 10 TABLET ORAL at 21:43

## 2024-10-01 RX ADMIN — HYDROCORTISONE SODIUM SUCCINATE 50 MG: 100 INJECTION, POWDER, FOR SOLUTION INTRAMUSCULAR; INTRAVENOUS at 21:46

## 2024-10-01 RX ADMIN — VENLAFAXINE HYDROCHLORIDE 150 MG: 75 CAPSULE, EXTENDED RELEASE ORAL at 09:07

## 2024-10-01 RX ADMIN — ATORVASTATIN CALCIUM 80 MG: 40 TABLET, FILM COATED ORAL at 09:07

## 2024-10-01 RX ADMIN — PIPERACILLIN AND TAZOBACTAM 3375 MG: 3; .375 INJECTION, POWDER, LYOPHILIZED, FOR SOLUTION INTRAVENOUS at 14:10

## 2024-10-01 RX ADMIN — SODIUM CHLORIDE, PRESERVATIVE FREE 5 ML: 5 INJECTION INTRAVENOUS at 09:04

## 2024-10-01 RX ADMIN — LOSARTAN POTASSIUM 100 MG: 100 TABLET, FILM COATED ORAL at 09:07

## 2024-10-01 RX ADMIN — PANTOPRAZOLE SODIUM 20 MG: 20 TABLET, DELAYED RELEASE ORAL at 05:36

## 2024-10-01 RX ADMIN — LEVOTHYROXINE SODIUM ANHYDROUS 50 MCG: 100 INJECTION, POWDER, LYOPHILIZED, FOR SOLUTION INTRAVENOUS at 05:40

## 2024-10-01 RX ADMIN — POTASSIUM BICARBONATE 40 MEQ: 782 TABLET, EFFERVESCENT ORAL at 12:23

## 2024-10-01 RX ADMIN — PANTOPRAZOLE SODIUM 20 MG: 20 TABLET, DELAYED RELEASE ORAL at 16:25

## 2024-10-01 RX ADMIN — ENOXAPARIN SODIUM 40 MG: 100 INJECTION SUBCUTANEOUS at 09:07

## 2024-10-01 RX ADMIN — SODIUM CHLORIDE, PRESERVATIVE FREE 10 ML: 5 INJECTION INTRAVENOUS at 21:48

## 2024-10-01 RX ADMIN — HYDRALAZINE HYDROCHLORIDE 25 MG: 25 TABLET ORAL at 21:43

## 2024-10-01 RX ADMIN — ISOSORBIDE MONONITRATE 30 MG: 30 TABLET, EXTENDED RELEASE ORAL at 09:07

## 2024-10-01 RX ADMIN — PIPERACILLIN AND TAZOBACTAM 3375 MG: 3; .375 INJECTION, POWDER, LYOPHILIZED, FOR SOLUTION INTRAVENOUS at 05:35

## 2024-10-01 RX ADMIN — AMLODIPINE BESYLATE 5 MG: 5 TABLET ORAL at 09:07

## 2024-10-01 NOTE — PROGRESS NOTES
10/01/24 1421   Encounter Summary   Encounter Overview/Reason Spiritual/Emotional Needs   Service Provided For Patient and family together   Support System Spouse   Last Encounter  10/01/24  (No spiritual needs.  PRN follow-up.)   Complexity of Encounter Low   Begin Time 1415   End Time  1422   Total Time Calculated 7 min   Spiritual/Emotional needs   Type Spiritual Support   Assessment/Intervention/Outcome   Assessment Calm;Coping   Intervention Active listening;Discussed illness injury and it’s impact;Nurtured Hope   Outcome Receptive   Plan and Referrals   Plan/Referrals No future visits requested

## 2024-10-01 NOTE — PROGRESS NOTES
Nursing Shift Summary 7 AM- 7 PM    Shift Events  Hypertensive  Hydralazine PO added TID  Confusion improved from prior RN report.      Neuro  OX: Person, Place,   Disoriented to time and situation     I/O & Drains  I/O this shift:  In: 1509 [P.O.:960; I.V.:498.1; IV Piggyback:50.9]  Out: 600       Continuous Medications    sodium chloride Last Rate: 75 mL/hr at 10/01/24 1400    sodium chloride     Bedside Shift Handoff given to: Rosina NELSON 1911    Juanita Kim RN

## 2024-10-01 NOTE — FLOWSHEET NOTE
09/30/24 2013   Vital Signs   Temp 97.8 °F (36.6 °C)   Temp Source Oral   Pulse 65   Heart Rate Source Monitor   Respirations 18   BP (!) 152/80   MAP (Calculated) 104   BP Location Left upper arm   BP Method Automatic   Patient Position Semi cornelwlers   Pain Assessment   Pain Assessment 0-10   Pain Level 0   Patient's Stated Pain Goal 0 - No pain   Opioid-Induced Sedation   POSS Score 1   RASS   Brooks Agitation Sedation Scale (RASS) +1   Oxygen Therapy   SpO2 92 %   O2 Device None (Room air)     Assessment done - in flowsheet.  Patient is alert to self, knows she is in the hospital but disoriented x2.  On room air with normal breathing pattern.  Telesitter inside the room.

## 2024-10-01 NOTE — PROGRESS NOTES
Inpatient Physical Therapy Treatment    Unit: PCU  Date:  10/1/2024  Patient Name:    Siobhan Flores  Admitting diagnosis:  Hypokalemia [E87.6]  Hypomagnesemia [E83.42]  Hyponatremia [E87.1]  Transaminitis [R74.01]  Myxedema coma (HCC) [E03.5]  Altered mental status, unspecified altered mental status type [R41.82]  AMS (altered mental status) [R41.82]  Admit Date:  9/29/2024  Precautions/Restrictions/WB Status/ Lines/ Wounds/ Oxygen: Fall risk, Bed/chair alarm, Lines (IV and external catheter), Confusion, and Telemetry       Pt seen for cotreatment this date due to patient safety, complexity of condition, acute illness/injury, staff recommendation, and limited functional status information    Treatment Time:  410-1132  Treatment Number:  2  Timed Code Treatment Minutes: 24 minutes  Total Treatment Minutes:  24  minutes    Patient Stated Goals for Therapy: \" I want my house coat.\"          Discharge Recommendations: Home with 24hr assistance and Home PT  DME needs for discharge: RW       Therapy recommendation for EMS Transport: requires transport by cot due to pt with poor sitting balance/tolerance    Therapy recommendations for staff:   Assist of 1 for stand-pivot transfers with rolling walker (RW) and gait belt to/from BSC  to/from chair    History of Present Illness:   Per Dr Mcgee H&P 9/30/24: \"73 y.o. female with past medical history of hypertension, hyperlipidemia, hypothyroidism, depression presented to emergency department with chief complaint of altered mental status.  HPI is obtained from ED attending as patient is alert and oriented x 0 on my exam.  Patient has had significant decline in mentation for the last several days.  She is normally alert and oriented x 4 and on day of presentation was only oriented to self prior to coming into the hospital.  Patient does not have any complaint.\"     Per Dr Franco Neurology consult 9/30/24:  \"The patient is a 73 y.o. female with a past medical history of

## 2024-10-01 NOTE — PROGRESS NOTES
Inpatient Occupational Therapy Treatment    Unit: PCU  Date:  10/1/2024  Patient Name:    Siobhan Flores  Admitting diagnosis:  Hypokalemia [E87.6]  Hypomagnesemia [E83.42]  Hyponatremia [E87.1]  Transaminitis [R74.01]  Myxedema coma (HCC) [E03.5]  Altered mental status, unspecified altered mental status type [R41.82]  AMS (altered mental status) [R41.82]  Admit Date:  9/29/2024  Precautions/Restrictions/WB Status/ Lines/ Wounds/ Oxygen: Fall risk, Bed/chair alarm, Lines (IV and external catheter), Confusion, and Telemetry    Pt seen for cotreatment this date due to complexity of condition and limited functional status information    Treatment Time:  [id number]  Treatment Number:  2  Timed Code Treatment Minutes: 30 minutes  Total Treatment Minutes:  30  minutes    Patient Goals for Therapy: none stated          Discharge Recommendations: Home with 24/7 assist  and Home OT  DME needs for discharge:  consider RW       Therapy recommendations for staff:   Assist of 1 for transfers with use of rolling walker (RW) and gait belt to/from BSC  to/from chair, watch BP    History of Present Illness: Per Dr Mcgee H&P 9/30/24: \"73 y.o. female with past medical history of hypertension, hyperlipidemia, hypothyroidism, depression presented to emergency department with chief complaint of altered mental status.  HPI is obtained from ED attending as patient is alert and oriented x 0 on my exam.  Patient has had significant decline in mentation for the last several days.  She is normally alert and oriented x 4 and on day of presentation was only oriented to self prior to coming into the hospital.  Patient does not have any complaint.\"    Per Dr Franco Neurology consult 9/30/24:  \"The patient is a 73 y.o. female with a past medical history of Alzheimer disease, anxiety, CAD, fibromyalgia, hyperlipidemia, hypertension, and TIA originally presenting to the hospital for concerns of altered mental status. Patient  at bedside states  fully independent with ADLs: Yes  Pt receives assistance from family for: Bathing, Dressing, Cleaning, Cooking, Laundry , and grocery shopping  Pt supervision for functional transfers and utilized Quad Cane for mobility in home and Quad Cane out in community  History of falls:            Unknown  Home Health Services:None    AM-PAC Score: AM-PAC Inpatient Daily Activity Raw Score: 17     Subjective:  Patient sitting up in chair with no family present.   Pt agreeable to this OT session.     Cognition:    A&O Person and \"hospital\"-unsure which one, stated \"coming up on October\" for month  Able to follow 1 step commands    Pain:   No  Location:   Rating: NA /10  Pain Medicine Status: No request made     BP (mmHg) HR (bpm) SpO2 (%) on RA Comments   Supine at rest       Seated in chair 177/83, rechecked when upright in chair 162/92 66, 71     Standing 164/96, 180/105      End of session 169/96 68       Objective:  Does this pt have an acute or acute on chronic diagnosis of CHF? No    Balance:  Sitting:    Supervision  Standing:    SBA and With RW and gait belt    Bed Mobility:   NT-reclined in chair at beginning and end of session    Transfers:    Sit to stand:    SBA  Stand to sit:    SBA  Bed to chair:     Not Tested  Bed/ chair to standard toilet:  Not Tested  Bed/chair to BSC:   Not Tested  Functional Mobility:   Not Tested    Deferred further mobility due to elevated BP in standing. RN notified of status.    ADLs:  Declined this am.    Activity Tolerance:   Pt completed therapy session with see vitals chart for details    Positioning Needs:   Pt reclined in chair, alarm set, call light provided and all needs within reach .     Patient/Family Education:   Pt educated on role of inpatient OT, plan of care, importance of continued activity, Functional transfer/mobility safety and Calling for assist with mobility.    CHF Education  N/A    Assessment:  Pt seen for Occupational therapy followup session in acute care

## 2024-10-01 NOTE — PROGRESS NOTES
Medicine Progress Note    Date: 10/01/24    Subjective:    Patient is feeling well today. No complaints.     Objective:  Vitals:    10/01/24 0500 10/01/24 0900 10/01/24 1100 10/01/24 1215   BP: 138/80 (!) 176/110 (!) 175/93 (!) 161/90   Pulse: 61 66 63 63   Resp: 18 18  18   Temp: 97.6 °F (36.4 °C) 97.7 °F (36.5 °C)  97.5 °F (36.4 °C)   TempSrc: Oral Oral  Oral   SpO2: 95% 96%  93%   Weight:       Height:             Labs:  CBC:   Recent Labs     09/29/24 2032 09/30/24  0424 10/01/24  0502   WBC 6.7 6.7 7.8   HGB 12.5 12.5 11.9*   HCT 36.4 36.8 34.5*   MCV 99.4 100.9* 101.5*    150 175     BMP:   Recent Labs     09/30/24 0424 09/30/24  1803 10/01/24  0502   * 130* 134*   K 3.2* 3.9 3.5   CL 95* 95* 99   CO2 22 23 24   BUN 14 11 12   CREATININE 1.3* 1.2 1.4*     LIVER PROFILE:   Recent Labs     09/29/24 2032 09/30/24 0424 09/30/24  1803 10/01/24  0502   AST 48* 39* 35 24   ALT 72* 61* 54* 45*   LIPASE 37.0  --   --   --    BILITOT 1.9* 1.5* 1.5* 1.0   ALKPHOS 167* 156* 151* 132*     PT/INR: No results for input(s): \"PROTIME\", \"INR\" in the last 72 hours.  APTT: No results for input(s): \"APTT\" in the last 72 hours.  UA:  Recent Labs     09/30/24  0100   COLORU Yellow   PHUR 6.0  6.0   WBCUA 3-5   RBCUA 0-2   BACTERIA Rare*   CLARITYU Clear   LEUKOCYTESUR Negative   UROBILINOGEN 1.0   BILIRUBINUR Negative   BLOODU TRACE-INTACT*   GLUCOSEU Negative        CT ABDOMEN PELVIS WO CONTRAST Additional Contrast? None   Final Result   No acute process.         CT HEAD WO CONTRAST   Final Result   Chest: Left basilar atelectasis or scar.      Head: Atrophy and moderate small vessel ischemic disease.         XR CHEST PORTABLE   Final Result   Chest: Left basilar atelectasis or scar.      Head: Atrophy and moderate small vessel ischemic disease.                Assessment & Plan:    AMS  Concern for Myoedema coma  - TSH 76  - appears patient has been noncompliant with medication regimen  - CT of head showed atrophy  and moderate small vessel ischemic disease   - given IV Synthroid daily - plan to switch to PO  - started on hydrocortisone every 8 hours - start to wean  - started on Zosyn   - added Vitamin B12, folate and Vitamin D level  - D level low    Alzheimer's disease  - noted per PCP note  - possibly worsening and contributing to above  - was on Aricept per PCC and not taking  - consider resuming Aricept vs neurology consult although pt is bradycardic which Aricept may make worse      Hyponatremia  - likely 2/2 poor po intake  - start IVF  - monitor BMP      Elevated creatinine- CKD3  - baseline creatinine 1.0  - creatinine on admission 1.4  - likely 2/2 poor po intake  - IVF  - on losartan, continue monitor       Bradycardia  - likely 2/2 above  - hold coreg  - monitor on telemetry   - HR better    HTN- uncontrolled  - pt stated she has quynh compliant with home regimen although appears recent noncompliance per PC note  - resumed Losartan  - hold aldactone with hyponatremia   - appears supposed to be on Norvasc and Imdur, will monitor and resume if needed  - resume BP meds except Coreg    Hypokalemia  Hypomagnesemia  - replacement ordered  - monitor       Transaminitis   - CT of abdomen as above shows no acute process  - ALT 72--trending down to 61  - AST 48--trending down to 39  - denies any abdominal pain  - monitor      HTN- uncontrolled  - pt stated she has quynh compliant with home regimen although appears recent noncompliance per PC note  - resumed Losartan  - hold aldactone with hyponatremia       CAD: S/p MARQUITA LAD 3/2023  - follows with cardiology outpatient   -Medical management for severe diagonal disease due to small vessel size  - continue plavix, zetia   - not taking Imdur, monitor and consider restarting if needed  - denies any chest pain  - monitor on telemetry         Noncompliance with mediation regimen     Attempted to call  for collateral information.  Nursing stated he reported she is not taking all  medications because they make her sick and her memory has been worse recently.      Note above makes patient higher risk for morbidity and mortality requiring testing and treatment.      DVT Prophylaxis: Lovenox   Diet: ADULT DIET; Regular   Code Status: Full Code     Cinthia Harley DO   10/1/2024  3:13 PM

## 2024-10-01 NOTE — PLAN OF CARE
Problem: Discharge Planning  Goal: Discharge to home or other facility with appropriate resources  10/1/2024 0036 by Rosina Burt RN  Outcome: Progressing  Flowsheets  Taken 10/1/2024 0036  Discharge to home or other facility with appropriate resources: Identify barriers to discharge with patient and caregiver  Taken 9/30/2024 2015  Discharge to home or other facility with appropriate resources: Identify barriers to discharge with patient and caregiver     Problem: Skin/Tissue Integrity  Goal: Absence of new skin breakdown  Description: 1.  Monitor for areas of redness and/or skin breakdown  2.  Assess vascular access sites hourly  3.  Every 4-6 hours minimum:  Change oxygen saturation probe site  4.  Every 4-6 hours:  If on nasal continuous positive airway pressure, respiratory therapy assess nares and determine need for appliance change or resting period.  10/1/2024 0036 by Rosina Burt RN  Outcome: Progressing     Problem: Safety - Adult  Goal: Free from fall injury  10/1/2024 0036 by Rosina Burt RN  Outcome: Progressing  Flowsheets (Taken 10/1/2024 0036)  Free From Fall Injury: Instruct family/caregiver on patient safety     Problem: ABCDS Injury Assessment  Goal: Absence of physical injury  10/1/2024 0036 by Rosina Burt RN  Outcome: Progressing  Flowsheets (Taken 10/1/2024 0036)  Absence of Physical Injury: Implement safety measures based on patient assessment     Problem: Confusion  Goal: Confusion, delirium, dementia, or psychosis is improved or at baseline  Description: INTERVENTIONS:  1. Assess for possible contributors to thought disturbance, including medications, impaired vision or hearing, underlying metabolic abnormalities, dehydration, psychiatric diagnoses, and notify attending LIP  2. [place] high risk fall precautions, as indicated  3. Provide frequent short contacts to provide reality reorientation, refocusing and direction  4. Decrease

## 2024-10-01 NOTE — PLAN OF CARE
Problem: Discharge Planning  Goal: Discharge to home or other facility with appropriate resources  10/1/2024 1101 by Juanita Kim RN  Outcome: Progressing  Flowsheets (Taken 10/1/2024 0900)  Discharge to home or other facility with appropriate resources:   Identify barriers to discharge with patient and caregiver   Identify discharge learning needs (meds, wound care, etc)   Arrange for needed discharge resources and transportation as appropriate     Problem: Safety - Adult  Goal: Free from fall injury  10/1/2024 1101 by Juanita Kim RN  Outcome: Progressing  Flowsheets (Taken 10/1/2024 0036 by Rosina Burt, RN)  Free From Fall Injury: Instruct family/caregiver on patient safety     Problem: Confusion  Goal: Confusion, delirium, dementia, or psychosis is improved or at baseline  Description: INTERVENTIONS:  1. Assess for possible contributors to thought disturbance, including medications, impaired vision or hearing, underlying metabolic abnormalities, dehydration, psychiatric diagnoses, and notify attending LIP  2. [place] high risk fall precautions, as indicated  3. Provide frequent short contacts to provide reality reorientation, refocusing and direction  4. Decrease environmental stimuli, including noise as appropriate  5. Monitor and intervene to maintain adequate nutrition, hydration, elimination, sleep and activity  6. If unable to ensure safety without constant attention obtain sitter and review sitter guidelines with assigned personnel  7. Initiate Psychosocial CNS and Spiritual Care consult, as indicated  10/1/2024 1101 by Juanita Kim RN  Outcome: Progressing  Flowsheets (Taken 10/1/2024 0900)  Effect of thought disturbance (confusion, delirium, dementia, or psychosis) are managed with adequate functional status:   [place] high risk fall precautions, as indicated   Decrease environmental stimuli, including noise as appropriate     Problem: Cardiovascular - Adult  Goal:  Maintains optimal cardiac output and hemodynamic stability  Outcome: Progressing  Flowsheets (Taken 10/1/2024 1101)  Maintains optimal cardiac output and hemodynamic stability:   Monitor blood pressure and heart rate   Assess for signs of decreased cardiac output

## 2024-10-01 NOTE — PROGRESS NOTES
Bedside Multidisciplinary Rounding Communication    With Cinthia Harley, DO     Situation/ Action  Elevated /93, HR 63 after morning medications  Continue IV fluids?  Review panic result of cortisol of 139.0    Awaiting further orders    11:20 AM

## 2024-10-01 NOTE — PROGRESS NOTES
Physician Progress Note      PATIENT:               ALLYN PACHECO  CSN #:                  [id number]  :                       1950  ADMIT DATE:       2024 8:19 PM  DISCH DATE:  RESPONDING  PROVIDER #:        LUIS Davila CNP          QUERY TEXT:    Pt admitted with AMS.  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 If possible, please document in progress notes and discharge   summary the relationship, if any, between Severe hypothyroidism and Myxedema   coma.    The medical record reflects the following:  Risk Factors: AMS, Hypothyroidism, Non-compliance with medication   administration    Clinical Indicators:  ED notes: A&Ox1, H&P: Patient has had significant   decline in mentation for the last several days.  She is  normally alert and oriented x 4 and on day of presentation was only oriented   to self prior to coming into the hospital, PN  : Concern for Myxedema coma- TSH 76, Bradycardia at 52 , b/p 180/107,   Meaningful improvement after receiving IV  Synthroid.    Treatment: Neuro PN: given IV Synthroid daily,- started on hydrocortisone   every 8 hours,- started on Zosyn,- added Vitamin  B12, folate and Vitamin D level, IV Synthroid, CT head and chest, CXR, CT   abdomen, Neuro consulted  Options provided:  -- Myxedema coma due to severe hypothyroidism  -- severe hypothyroidism unrelated to myxedema coma  -- Other - I will add my own diagnosis  -- Disagree - Not applicable / Not valid  -- Disagree - Clinically unable to determine / Unknown  -- Refer to Clinical Documentation Reviewer    PROVIDER RESPONSE TEXT:    Provider is clinically unable to determine a response to this query.    Query created by: Neeta Campbell on 10/1/2024 12:23 PM      Electronically signed by:  LUIS Davila CNP 10/1/2024 1:12 PM

## 2024-10-01 NOTE — PROGRESS NOTES
Vitals:    10/01/24 0500   BP: 138/80   Pulse: 61   Resp: 18   Temp: 97.6 °F (36.4 °C)   SpO2: 95%        Patient is asleep but easy to wake up.  Calm and cooperative.  Room air with normal breathing pattern.  Meds given as ordered.  Telesitter inside the room for safety.

## 2024-10-02 ENCOUNTER — TELEPHONE (OUTPATIENT)
Dept: FAMILY MEDICINE CLINIC | Age: 74
End: 2024-10-02

## 2024-10-02 PROBLEM — E03.5 MYXEDEMA COMA (HCC): Status: ACTIVE | Noted: 2024-10-02

## 2024-10-02 PROBLEM — E87.1 HYPONATREMIA: Status: ACTIVE | Noted: 2024-10-02

## 2024-10-02 PROBLEM — R74.01 TRANSAMINITIS: Status: ACTIVE | Noted: 2024-10-02

## 2024-10-02 PROBLEM — E83.42 HYPOMAGNESEMIA: Status: ACTIVE | Noted: 2024-10-02

## 2024-10-02 LAB
ALBUMIN SERPL-MCNC: 3.6 G/DL (ref 3.4–5)
ALBUMIN SERPL-MCNC: 3.7 G/DL (ref 3.4–5)
ALBUMIN/GLOB SERPL: 1.2 {RATIO} (ref 1.1–2.2)
ALBUMIN/GLOB SERPL: 1.3 {RATIO} (ref 1.1–2.2)
ALP SERPL-CCNC: 126 U/L (ref 40–129)
ALP SERPL-CCNC: 132 U/L (ref 40–129)
ALT SERPL-CCNC: 36 U/L (ref 10–40)
ALT SERPL-CCNC: 37 U/L (ref 10–40)
ANION GAP SERPL CALCULATED.3IONS-SCNC: 11 MMOL/L (ref 3–16)
ANION GAP SERPL CALCULATED.3IONS-SCNC: 13 MMOL/L (ref 3–16)
AST SERPL-CCNC: 22 U/L (ref 15–37)
AST SERPL-CCNC: 23 U/L (ref 15–37)
BASOPHILS # BLD: 0 K/UL (ref 0–0.2)
BASOPHILS NFR BLD: 0.1 %
BILIRUB SERPL-MCNC: 0.8 MG/DL (ref 0–1)
BILIRUB SERPL-MCNC: 0.9 MG/DL (ref 0–1)
BUN SERPL-MCNC: 9 MG/DL (ref 7–20)
BUN SERPL-MCNC: 9 MG/DL (ref 7–20)
CALCIUM SERPL-MCNC: 8 MG/DL (ref 8.3–10.6)
CALCIUM SERPL-MCNC: 8 MG/DL (ref 8.3–10.6)
CHLORIDE SERPL-SCNC: 96 MMOL/L (ref 99–110)
CHLORIDE SERPL-SCNC: 99 MMOL/L (ref 99–110)
CO2 SERPL-SCNC: 25 MMOL/L (ref 21–32)
CO2 SERPL-SCNC: 27 MMOL/L (ref 21–32)
CREAT SERPL-MCNC: 1.2 MG/DL (ref 0.6–1.2)
CREAT SERPL-MCNC: 1.2 MG/DL (ref 0.6–1.2)
DEPRECATED RDW RBC AUTO: 17.1 % (ref 12.4–15.4)
EOSINOPHIL # BLD: 0 K/UL (ref 0–0.6)
EOSINOPHIL NFR BLD: 0 %
GFR SERPLBLD CREATININE-BSD FMLA CKD-EPI: 47 ML/MIN/{1.73_M2}
GFR SERPLBLD CREATININE-BSD FMLA CKD-EPI: 47 ML/MIN/{1.73_M2}
GLUCOSE SERPL-MCNC: 129 MG/DL (ref 70–99)
GLUCOSE SERPL-MCNC: 151 MG/DL (ref 70–99)
HCT VFR BLD AUTO: 33.7 % (ref 36–48)
HGB BLD-MCNC: 11.6 G/DL (ref 12–16)
LYMPHOCYTES # BLD: 1.3 K/UL (ref 1–5.1)
LYMPHOCYTES NFR BLD: 14.8 %
MAGNESIUM SERPL-MCNC: 1.5 MG/DL (ref 1.8–2.4)
MCH RBC QN AUTO: 34.4 PG (ref 26–34)
MCHC RBC AUTO-ENTMCNC: 34.4 G/DL (ref 31–36)
MCV RBC AUTO: 100.1 FL (ref 80–100)
MONOCYTES # BLD: 0.3 K/UL (ref 0–1.3)
MONOCYTES NFR BLD: 3.8 %
NEUTROPHILS # BLD: 7 K/UL (ref 1.7–7.7)
NEUTROPHILS NFR BLD: 81.3 %
PLATELET # BLD AUTO: 188 K/UL (ref 135–450)
PMV BLD AUTO: 8.1 FL (ref 5–10.5)
POTASSIUM SERPL-SCNC: 2.8 MMOL/L (ref 3.5–5.1)
POTASSIUM SERPL-SCNC: 3.8 MMOL/L (ref 3.5–5.1)
PROT SERPL-MCNC: 6.3 G/DL (ref 6.4–8.2)
PROT SERPL-MCNC: 6.7 G/DL (ref 6.4–8.2)
RBC # BLD AUTO: 3.36 M/UL (ref 4–5.2)
SODIUM SERPL-SCNC: 134 MMOL/L (ref 136–145)
SODIUM SERPL-SCNC: 137 MMOL/L (ref 136–145)
WBC # BLD AUTO: 8.6 K/UL (ref 4–11)

## 2024-10-02 PROCEDURE — 6360000002 HC RX W HCPCS: Performed by: INTERNAL MEDICINE

## 2024-10-02 PROCEDURE — 97530 THERAPEUTIC ACTIVITIES: CPT

## 2024-10-02 PROCEDURE — 6360000002 HC RX W HCPCS: Performed by: STUDENT IN AN ORGANIZED HEALTH CARE EDUCATION/TRAINING PROGRAM

## 2024-10-02 PROCEDURE — 2580000003 HC RX 258: Performed by: NURSE PRACTITIONER

## 2024-10-02 PROCEDURE — 6370000000 HC RX 637 (ALT 250 FOR IP): Performed by: STUDENT IN AN ORGANIZED HEALTH CARE EDUCATION/TRAINING PROGRAM

## 2024-10-02 PROCEDURE — 2060000000 HC ICU INTERMEDIATE R&B

## 2024-10-02 PROCEDURE — 2580000003 HC RX 258: Performed by: STUDENT IN AN ORGANIZED HEALTH CARE EDUCATION/TRAINING PROGRAM

## 2024-10-02 PROCEDURE — 36415 COLL VENOUS BLD VENIPUNCTURE: CPT

## 2024-10-02 PROCEDURE — 6370000000 HC RX 637 (ALT 250 FOR IP): Performed by: NURSE PRACTITIONER

## 2024-10-02 PROCEDURE — 83735 ASSAY OF MAGNESIUM: CPT

## 2024-10-02 PROCEDURE — 6370000000 HC RX 637 (ALT 250 FOR IP): Performed by: INTERNAL MEDICINE

## 2024-10-02 PROCEDURE — 85025 COMPLETE CBC W/AUTO DIFF WBC: CPT

## 2024-10-02 PROCEDURE — 97535 SELF CARE MNGMENT TRAINING: CPT

## 2024-10-02 PROCEDURE — 80053 COMPREHEN METABOLIC PANEL: CPT

## 2024-10-02 PROCEDURE — 99232 SBSQ HOSP IP/OBS MODERATE 35: CPT | Performed by: INTERNAL MEDICINE

## 2024-10-02 PROCEDURE — 2580000003 HC RX 258

## 2024-10-02 RX ORDER — WATER 10 ML/10ML
INJECTION INTRAMUSCULAR; INTRAVENOUS; SUBCUTANEOUS
Status: COMPLETED
Start: 2024-10-02 | End: 2024-10-02

## 2024-10-02 RX ORDER — ERGOCALCIFEROL 1.25 MG/1
50000 CAPSULE, LIQUID FILLED ORAL WEEKLY
Status: DISCONTINUED | OUTPATIENT
Start: 2024-10-02 | End: 2024-10-05 | Stop reason: HOSPADM

## 2024-10-02 RX ORDER — LEVOTHYROXINE SODIUM 150 UG/1
150 TABLET ORAL DAILY
Status: DISCONTINUED | OUTPATIENT
Start: 2024-10-03 | End: 2024-10-05 | Stop reason: HOSPADM

## 2024-10-02 RX ADMIN — EZETIMIBE 10 MG: 10 TABLET ORAL at 21:22

## 2024-10-02 RX ADMIN — POTASSIUM CHLORIDE 10 MEQ: 7.46 INJECTION, SOLUTION INTRAVENOUS at 21:29

## 2024-10-02 RX ADMIN — PANTOPRAZOLE SODIUM 20 MG: 20 TABLET, DELAYED RELEASE ORAL at 15:27

## 2024-10-02 RX ADMIN — ERGOCALCIFEROL 50000 UNITS: 1.25 CAPSULE ORAL at 17:02

## 2024-10-02 RX ADMIN — POTASSIUM CHLORIDE 10 MEQ: 7.46 INJECTION, SOLUTION INTRAVENOUS at 23:02

## 2024-10-02 RX ADMIN — CLOPIDOGREL BISULFATE 75 MG: 75 TABLET ORAL at 08:04

## 2024-10-02 RX ADMIN — ISOSORBIDE MONONITRATE 30 MG: 30 TABLET, EXTENDED RELEASE ORAL at 08:03

## 2024-10-02 RX ADMIN — HYDROCORTISONE SODIUM SUCCINATE 50 MG: 100 INJECTION, POWDER, FOR SOLUTION INTRAMUSCULAR; INTRAVENOUS at 05:09

## 2024-10-02 RX ADMIN — PIPERACILLIN AND TAZOBACTAM 3375 MG: 3; .375 INJECTION, POWDER, LYOPHILIZED, FOR SOLUTION INTRAVENOUS at 05:21

## 2024-10-02 RX ADMIN — LOSARTAN POTASSIUM 100 MG: 100 TABLET, FILM COATED ORAL at 08:04

## 2024-10-02 RX ADMIN — WATER 20 ML: 1 INJECTION INTRAMUSCULAR; INTRAVENOUS; SUBCUTANEOUS at 05:36

## 2024-10-02 RX ADMIN — ENOXAPARIN SODIUM 40 MG: 100 INJECTION SUBCUTANEOUS at 08:03

## 2024-10-02 RX ADMIN — HYDROCORTISONE SODIUM SUCCINATE 25 MG: 100 INJECTION, POWDER, FOR SOLUTION INTRAMUSCULAR; INTRAVENOUS at 13:36

## 2024-10-02 RX ADMIN — ATORVASTATIN CALCIUM 80 MG: 40 TABLET, FILM COATED ORAL at 08:04

## 2024-10-02 RX ADMIN — PANTOPRAZOLE SODIUM 20 MG: 20 TABLET, DELAYED RELEASE ORAL at 05:08

## 2024-10-02 RX ADMIN — VENLAFAXINE HYDROCHLORIDE 150 MG: 75 CAPSULE, EXTENDED RELEASE ORAL at 08:04

## 2024-10-02 RX ADMIN — HYDRALAZINE HYDROCHLORIDE 25 MG: 25 TABLET ORAL at 21:22

## 2024-10-02 RX ADMIN — HYDROCORTISONE SODIUM SUCCINATE 25 MG: 100 INJECTION, POWDER, FOR SOLUTION INTRAMUSCULAR; INTRAVENOUS at 21:23

## 2024-10-02 RX ADMIN — POTASSIUM CHLORIDE 10 MEQ: 7.46 INJECTION, SOLUTION INTRAVENOUS at 18:34

## 2024-10-02 RX ADMIN — SODIUM CHLORIDE: 9 INJECTION, SOLUTION INTRAVENOUS at 00:28

## 2024-10-02 RX ADMIN — LEVOTHYROXINE SODIUM ANHYDROUS 50 MCG: 100 INJECTION, POWDER, LYOPHILIZED, FOR SOLUTION INTRAVENOUS at 05:36

## 2024-10-02 RX ADMIN — AMLODIPINE BESYLATE 5 MG: 5 TABLET ORAL at 08:04

## 2024-10-02 RX ADMIN — SODIUM CHLORIDE: 9 INJECTION, SOLUTION INTRAVENOUS at 13:42

## 2024-10-02 RX ADMIN — HYDRALAZINE HYDROCHLORIDE 25 MG: 25 TABLET ORAL at 05:08

## 2024-10-02 RX ADMIN — HYDRALAZINE HYDROCHLORIDE 25 MG: 25 TABLET ORAL at 13:37

## 2024-10-02 NOTE — PROGRESS NOTES
Blood pressure 135/75, pulse 67, temperature 98.2 °F (36.8 °C), temperature source Oral, resp. rate 17, height 1.499 m (4' 11\"), weight 71.1 kg (156 lb 12.8 oz), SpO2 98%, not currently breastfeeding.    Shift assessment completed see flow sheet. Patient in bed alert and oriented x3 Patient on RA, showing no signs of distress. Morning medications given per order. Patient has no other needs at this time. Standard safety measures in place.telesitter at bedside.

## 2024-10-02 NOTE — PROGRESS NOTES
Hand off report given to OMAR El.   Patient is stable showing no signs of distress and has no current needs at this time.   Call light is in reach and bed is in lowest position.    Care is transferred at this time.

## 2024-10-02 NOTE — TELEPHONE ENCOUNTER
Elias with [facility] called to see if Janet would follow for SN, PT, & OT once pt is d/c from [facility].     Okay to LM voice mail is secure.     Please advise.

## 2024-10-02 NOTE — FLOWSHEET NOTE
Vitals:    10/02/24 0423   BP: 125/73   Pulse: 65   Resp: 18   Temp: 97.7 °F (36.5 °C)   SpO2: 96%        Patient asleep but easy to wake up.  On room air with normal breathing pattern.  Telesitter inside the room for safety.

## 2024-10-02 NOTE — PLAN OF CARE
Problem: Discharge Planning  Goal: Discharge to home or other facility with appropriate resources  10/1/2024 2223 by Rosina Burt RN  Outcome: Progressing  Flowsheets (Taken 10/1/2024 2223)  Discharge to home or other facility with appropriate resources: Identify barriers to discharge with patient and caregiver     Problem: Safety - Adult  Goal: Free from fall injury  10/1/2024 2223 by Rosina Burt RN  Outcome: Progressing  Flowsheets (Taken 10/1/2024 2223)  Free From Fall Injury: Instruct family/caregiver on patient safety     Problem: ABCDS Injury Assessment  Goal: Absence of physical injury  Outcome: Progressing  Flowsheets (Taken 10/1/2024 2223)  Absence of Physical Injury: Implement safety measures based on patient assessment     Problem: Confusion  Goal: Confusion, delirium, dementia, or psychosis is improved or at baseline  Description: INTERVENTIONS:  1. Assess for possible contributors to thought disturbance, including medications, impaired vision or hearing, underlying metabolic abnormalities, dehydration, psychiatric diagnoses, and notify attending LIP  2. [place] high risk fall precautions, as indicated  3. Provide frequent short contacts to provide reality reorientation, refocusing and direction  4. Decrease environmental stimuli, including noise as appropriate  5. Monitor and intervene to maintain adequate nutrition, hydration, elimination, sleep and activity  6. If unable to ensure safety without constant attention obtain sitter and review sitter guidelines with assigned personnel  7. Initiate Psychosocial CNS and Spiritual Care consult, as indicated  10/1/2024 2223 by Rosina Burt RN  Outcome: Progressing  Flowsheets (Taken 10/1/2024 2223)  Effect of thought disturbance (confusion, delirium, dementia, or psychosis) are managed with adequate functional status:   Assess for contributors to thought disturbance, including medications, impaired vision or hearing,

## 2024-10-02 NOTE — PLAN OF CARE
Problem: Confusion  Goal: Confusion, delirium, dementia, or psychosis is improved or at baseline  Description: INTERVENTIONS:  1. Assess for possible contributors to thought disturbance, including medications, impaired vision or hearing, underlying metabolic abnormalities, dehydration, psychiatric diagnoses, and notify attending LIP  2. [place] high risk fall precautions, as indicated  3. Provide frequent short contacts to provide reality reorientation, refocusing and direction  4. Decrease environmental stimuli, including noise as appropriate  5. Monitor and intervene to maintain adequate nutrition, hydration, elimination, sleep and activity  6. If unable to ensure safety without constant attention obtain sitter and review sitter guidelines with assigned personnel  7. Initiate Psychosocial CNS and Spiritual Care consult, as indicated  10/2/2024 1013 by Celina Underwood RN  Outcome: Progressing  Flowsheets (Taken 10/1/2024 2223 by Rosina Burt, RN)  Effect of thought disturbance (confusion, delirium, dementia, or psychosis) are managed with adequate functional status:   Assess for contributors to thought disturbance, including medications, impaired vision or hearing, underlying metabolic abnormalities, dehydration, psychiatric diagnoses, notify LIP   [place] high risk fall precautions, as indicated   Provide frequent short contacts to provide reality reorientation, refocusing and direction   Decrease environmental stimuli, including noise as appropriate  10/1/2024 2223 by Rosina Burt, RN  Outcome: Progressing  Flowsheets (Taken 10/1/2024 2223)  Effect of thought disturbance (confusion, delirium, dementia, or psychosis) are managed with adequate functional status:   Assess for contributors to thought disturbance, including medications, impaired vision or hearing, underlying metabolic abnormalities, dehydration, psychiatric diagnoses, notify LIP   [place] high risk fall precautions, as  indicated   Provide frequent short contacts to provide reality reorientation, refocusing and direction   Decrease environmental stimuli, including noise as appropriate     Problem: Safety - Adult  Goal: Free from fall injury  10/2/2024 1013 by Celina Underwood RN  Outcome: Progressing  Flowsheets (Taken 10/1/2024 2223 by Rosina Burt, RN)  Free From Fall Injury: Instruct family/caregiver on patient safety  10/1/2024 2223 by Rosina Burt RN  Outcome: Progressing  Flowsheets (Taken 10/1/2024 2223)  Free From Fall Injury: Instruct family/caregiver on patient safety     Problem: Discharge Planning  Goal: Discharge to home or other facility with appropriate resources  10/2/2024 1013 by Celina Underwood RN  Outcome: Progressing  Flowsheets (Taken 10/1/2024 2223 by Rosina Burt, RN)  Discharge to home or other facility with appropriate resources: Identify barriers to discharge with patient and caregiver  10/1/2024 2223 by Rosina Burt RN  Outcome: Progressing  Flowsheets (Taken 10/1/2024 2223)  Discharge to home or other facility with appropriate resources: Identify barriers to discharge with patient and caregiver

## 2024-10-02 NOTE — PROGRESS NOTES
Medicine Progress Note    Date: 10/02/24    Subjective:    Patient is feeling better, mental status is improved.    Objective:  Vitals:    10/02/24 0711 10/02/24 1207 10/02/24 1337 10/02/24 1530   BP: 135/75 131/76 (!) 146/82 131/62   Pulse: 67 66  68   Resp: 17 20  18   Temp: 98.2 °F (36.8 °C) 97.3 °F (36.3 °C)  97.4 °F (36.3 °C)   TempSrc: Oral Oral  Oral   SpO2: 98% 97%  97%   Weight:       Height:             Labs:  CBC:   Recent Labs     09/30/24  0424 10/01/24  0502 10/02/24  0529   WBC 6.7 7.8 8.6   HGB 12.5 11.9* 11.6*   HCT 36.8 34.5* 33.7*   .9* 101.5* 100.1*    175 188     BMP:   Recent Labs     10/01/24  0502 10/01/24  1809 10/02/24  0530   * 135* 137   K 3.5 3.8 3.8   CL 99 98* 99   CO2 24 25 27   BUN 12 11 9   CREATININE 1.4* 1.3* 1.2     LIVER PROFILE:   Recent Labs     09/29/24  2032 09/30/24  0424 10/01/24  0502 10/01/24  1809 10/02/24  0530   AST 48*   < > 24 27 22   ALT 72*   < > 45* 45* 37   LIPASE 37.0  --   --   --   --    BILITOT 1.9*   < > 1.0 1.1* 0.9   ALKPHOS 167*   < > 132* 141* 126    < > = values in this interval not displayed.     PT/INR: No results for input(s): \"PROTIME\", \"INR\" in the last 72 hours.  APTT: No results for input(s): \"APTT\" in the last 72 hours.  UA:  Recent Labs     09/30/24  0100   COLORU Yellow   PHUR 6.0  6.0   WBCUA 3-5   RBCUA 0-2   BACTERIA Rare*   CLARITYU Clear   LEUKOCYTESUR Negative   UROBILINOGEN 1.0   BILIRUBINUR Negative   BLOODU TRACE-INTACT*   GLUCOSEU Negative        CT ABDOMEN PELVIS WO CONTRAST Additional Contrast? None   Final Result   No acute process.         CT HEAD WO CONTRAST   Final Result   Chest: Left basilar atelectasis or scar.      Head: Atrophy and moderate small vessel ischemic disease.         XR CHEST PORTABLE   Final Result   Chest: Left basilar atelectasis or scar.      Head: Atrophy and moderate small vessel ischemic disease.                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

## 2024-10-02 NOTE — CARE COORDINATION
Call to pt's spouse (as pt is confused). Candido is agreeable for pt to [place] home with home care. Candido did not have a preference for home care companies as long as it is covered by their insurance.     Call placed to Elias with [facility] who states they will service pt at [place]. Pt will need SN, PT, OT.

## 2024-10-02 NOTE — DISCHARGE INSTR - COC
Continuity of Care Form    Patient Name: Siobhan Flores   :  1950  MRN:  [id number]    Admit date:  2024  Discharge date:  ***    Code Status Order: Full Code   Advance Directives:   Advance Care Flowsheet Documentation             Admitting Physician:  Harinder Mcgee DO  PCP: Janet Live, APRN - CNP    Discharging Nurse: ***  Discharging Hospital Unit/Room#: /0325-02  Discharging Unit Phone Number: ***    Emergency Contact:   Extended Emergency Contact Information  Primary Emergency Contact: Candido Flores  Address: [address] [place] [address]  Home Phone: [phone]  Mobile Phone: [phone]  Relation: Spouse    Past Surgical History:  Past Surgical History:   Procedure Laterality Date    APPENDECTOMY      COLONOSCOPY      CORONARY STENT PLACEMENT  2023    EYE SURGERY      HYSTERECTOMY (CERVIX STATUS UNKNOWN)         Immunization History:   Immunization History   Administered Date(s) Administered    Influenza 10/25/2013    Influenza Virus Vaccine 2014, 10/09/2015    Influenza, AFLURIA (age 3 y+), FLUZONE, (age 6 mo+), Quadv MDV, 0.5mL 10/27/2016    Pneumococcal, PCV-13, PREVNAR 13, (age 6w+), IM, 0.5mL 2016    Pneumococcal, PPSV23, PNEUMOVAX 23, (age 2y+), SC/IM, 0.5mL 2014    Zoster Live (Zostavax) 2014       Active Problems:  Patient Active Problem List   Diagnosis Code    Hyperlipidemia E78.5    Hypothyroidism E03.9    Anxiety and depression F41.9, F32.A    Osteopenia M85.80    Vitamin D deficiency E55.9    Fibromyalgia M79.7    Elevated LFTs R79.89    Hypertensive emergency I16.1    Chest pain R07.9    Hypokalemia E87.6    Elevated TSH R79.89    Hypertensive urgency I16.0    Dementia (HCC) F03.90    Current mild episode of major depressive disorder, unspecified whether recurrent (HCC) F32.0    Coronary artery disease involving native coronary artery of native heart I25.10    Chronic renal disease, stage III (HCC)  Other Feedings:909585374}  Liquids: {Slp liquid thickness:97692}  Daily Fluid Restriction: {CHP DME Yes amt example:103546053}  Last Modified Barium Swallow with Video (Video Swallowing Test): {Done Not Done Date:}    Treatments at the Time of Hospital Discharge:   Respiratory Treatments: ***  Oxygen Therapy:  {Therapy; copd oxygen:68227}  Ventilator:    {[facility] Vent List:239813281}    Rehab Therapies: {THERAPEUTIC INTERVENTION:3324087279}  Weight Bearing Status/Restrictions: {[facility] Weight Bearin}  Other Medical Equipment (for information only, NOT a DME order):  {EQUIPMENT:969673495}  Other Treatments: ***    Patient's personal belongings (please select all that are sent with patient):  {[facility] DME Belongings:230115360}    RN SIGNATURE:  {Esignature:900697368}    CASE MANAGEMENT/SOCIAL WORK SECTION    Inpatient Status Date:     Readmission Risk Assessment Score:  Readmission Risk              Risk of Unplanned Readmission:  24           Discharging to Facility/ Agency   Name: [facility]  Address:  Phone:[phone]  Fax:        / signature: Electronically signed by Crissy Mejia RN on 10/5/24 at 10:21 AM EDT    PHYSICIAN SECTION    Prognosis: {Prognosis:0237132550}    Condition at Discharge: { Patient Condition:857900291}    Rehab Potential (if transferring to Rehab): {Prognosis:7938381639}    Recommended Labs or Other Treatments After Discharge: ***    Physician Certification: I certify the above information and transfer of Siobhan Flores  is necessary for the continuing treatment of the diagnosis listed and that she requires {Admit to Appropriate Level of Care:13601} for {GREATER/LESS:282347920} 30 days.     Update Admission H&P: {CHP DME Changes in HandP:102256330}    PHYSICIAN SIGNATURE:  {Esignature:054893252}

## 2024-10-02 NOTE — TELEPHONE ENCOUNTER
Yes I will sign orders but she will also need a  to help with home care and needs. She has been non compliant with medications

## 2024-10-02 NOTE — PROGRESS NOTES
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]  Altered mental status, unspecified altered mental status type [R41.82]  AMS (altered mental status) [R41.82]  Admit Date:  9/29/2024  Precautions/Restrictions/WB Status/ Lines/ Wounds/ Oxygen: Fall risk, Bed/chair alarm, Lines (IV and external catheter), Confusion, and Telemetry      Treatment Time:  10:15-10:55  Treatment Number:  2  Timed Code Treatment Minutes: 40 minutes  Total Treatment Minutes:  40  minutes    Patient Goals for Therapy: none stated          Discharge Recommendations: Home with 24/7 assist  and Home OT  DME needs for discharge:  consider RW       Therapy recommendations for staff:   Assist of 1 for transfers with use of rolling walker (RW) and gait belt to/from BSC  to/from chair, watch BP    History of Present Illness: Per Dr Mcgee H&P 9/30/24: \"73 y.o. female with past medical history of hypertension, hyperlipidemia, hypothyroidism, depression presented to emergency department with chief complaint of altered mental status.  HPI is obtained from ED attending as patient is alert and oriented x 0 on my exam.  Patient has had significant decline in mentation for the last several days.  She is normally alert and oriented x 4 and on day of presentation was only oriented to self prior to coming into the hospital.  Patient does not have any complaint.\"    Per Dr Franco Neurology consult 9/30/24:  \"The patient is a 73 y.o. female with a past medical history of Alzheimer disease, anxiety, CAD, fibromyalgia, hyperlipidemia, hypertension, and TIA originally presenting to the hospital for concerns of altered mental status. Patient  at bedside states patient has been confused for 2-3 months but has worsened over the last 7 days after her sister's

## 2024-10-02 NOTE — FLOWSHEET NOTE
10/01/24 2137   Vital Signs   Temp 97.7 °F (36.5 °C)   Temp Source Oral   Pulse 68   Heart Rate Source Monitor   Respirations 18   /72   MAP (Calculated) 92   BP Location Left upper arm   BP Method Automatic   Patient Position Semi fowlers     Assessment done at [facility].  Patient is alert and oriented x3.  On room air with normal breathing pattern.  Call light within reach.

## 2024-10-03 LAB
ALBUMIN SERPL-MCNC: 4.2 G/DL (ref 3.4–5)
ALBUMIN/GLOB SERPL: 1.4 {RATIO} (ref 1.1–2.2)
ALP SERPL-CCNC: 152 U/L (ref 40–129)
ALT SERPL-CCNC: 44 U/L (ref 10–40)
ANION GAP SERPL CALCULATED.3IONS-SCNC: 12 MMOL/L (ref 3–16)
AST SERPL-CCNC: 37 U/L (ref 15–37)
BILIRUB SERPL-MCNC: 0.7 MG/DL (ref 0–1)
BUN SERPL-MCNC: 7 MG/DL (ref 7–20)
CALCIUM SERPL-MCNC: 8.2 MG/DL (ref 8.3–10.6)
CHLORIDE SERPL-SCNC: 95 MMOL/L (ref 99–110)
CO2 SERPL-SCNC: 29 MMOL/L (ref 21–32)
CREAT SERPL-MCNC: 1 MG/DL (ref 0.6–1.2)
GFR SERPLBLD CREATININE-BSD FMLA CKD-EPI: 59 ML/MIN/{1.73_M2}
GLUCOSE SERPL-MCNC: 94 MG/DL (ref 70–99)
POTASSIUM SERPL-SCNC: 3.7 MMOL/L (ref 3.5–5.1)
PROT SERPL-MCNC: 7.3 G/DL (ref 6.4–8.2)
SODIUM SERPL-SCNC: 136 MMOL/L (ref 136–145)

## 2024-10-03 PROCEDURE — 6370000000 HC RX 637 (ALT 250 FOR IP): Performed by: STUDENT IN AN ORGANIZED HEALTH CARE EDUCATION/TRAINING PROGRAM

## 2024-10-03 PROCEDURE — 80053 COMPREHEN METABOLIC PANEL: CPT

## 2024-10-03 PROCEDURE — 36415 COLL VENOUS BLD VENIPUNCTURE: CPT

## 2024-10-03 PROCEDURE — 2060000000 HC ICU INTERMEDIATE R&B

## 2024-10-03 PROCEDURE — 6360000002 HC RX W HCPCS: Performed by: INTERNAL MEDICINE

## 2024-10-03 PROCEDURE — 99232 SBSQ HOSP IP/OBS MODERATE 35: CPT | Performed by: INTERNAL MEDICINE

## 2024-10-03 PROCEDURE — 6370000000 HC RX 637 (ALT 250 FOR IP): Performed by: INTERNAL MEDICINE

## 2024-10-03 PROCEDURE — 6360000002 HC RX W HCPCS: Performed by: STUDENT IN AN ORGANIZED HEALTH CARE EDUCATION/TRAINING PROGRAM

## 2024-10-03 PROCEDURE — 6370000000 HC RX 637 (ALT 250 FOR IP): Performed by: NURSE PRACTITIONER

## 2024-10-03 PROCEDURE — 2580000003 HC RX 258: Performed by: STUDENT IN AN ORGANIZED HEALTH CARE EDUCATION/TRAINING PROGRAM

## 2024-10-03 RX ORDER — SPIRONOLACTONE 25 MG/1
12.5 TABLET ORAL DAILY
Status: DISCONTINUED | OUTPATIENT
Start: 2024-10-03 | End: 2024-10-04

## 2024-10-03 RX ADMIN — ISOSORBIDE MONONITRATE 30 MG: 30 TABLET, EXTENDED RELEASE ORAL at 08:11

## 2024-10-03 RX ADMIN — CLOPIDOGREL BISULFATE 75 MG: 75 TABLET ORAL at 08:11

## 2024-10-03 RX ADMIN — HYDRALAZINE HYDROCHLORIDE 25 MG: 25 TABLET ORAL at 14:01

## 2024-10-03 RX ADMIN — ENOXAPARIN SODIUM 40 MG: 100 INJECTION SUBCUTANEOUS at 08:11

## 2024-10-03 RX ADMIN — SODIUM CHLORIDE, PRESERVATIVE FREE 10 ML: 5 INJECTION INTRAVENOUS at 08:12

## 2024-10-03 RX ADMIN — HYDRALAZINE HYDROCHLORIDE 25 MG: 25 TABLET ORAL at 20:08

## 2024-10-03 RX ADMIN — AMLODIPINE BESYLATE 5 MG: 5 TABLET ORAL at 08:11

## 2024-10-03 RX ADMIN — SPIRONOLACTONE 12.5 MG: 25 TABLET ORAL at 08:23

## 2024-10-03 RX ADMIN — POTASSIUM CHLORIDE 10 MEQ: 7.46 INJECTION, SOLUTION INTRAVENOUS at 00:20

## 2024-10-03 RX ADMIN — LEVOTHYROXINE SODIUM 150 MCG: 0.15 TABLET ORAL at 06:50

## 2024-10-03 RX ADMIN — LOSARTAN POTASSIUM 100 MG: 100 TABLET, FILM COATED ORAL at 08:11

## 2024-10-03 RX ADMIN — ATORVASTATIN CALCIUM 80 MG: 40 TABLET, FILM COATED ORAL at 08:11

## 2024-10-03 RX ADMIN — EZETIMIBE 10 MG: 10 TABLET ORAL at 20:08

## 2024-10-03 RX ADMIN — HYDRALAZINE HYDROCHLORIDE 25 MG: 25 TABLET ORAL at 05:23

## 2024-10-03 RX ADMIN — PANTOPRAZOLE SODIUM 20 MG: 20 TABLET, DELAYED RELEASE ORAL at 06:50

## 2024-10-03 RX ADMIN — HYDROCORTISONE SODIUM SUCCINATE 25 MG: 100 INJECTION, POWDER, FOR SOLUTION INTRAMUSCULAR; INTRAVENOUS at 05:23

## 2024-10-03 RX ADMIN — VENLAFAXINE HYDROCHLORIDE 150 MG: 75 CAPSULE, EXTENDED RELEASE ORAL at 08:11

## 2024-10-03 RX ADMIN — SODIUM CHLORIDE, PRESERVATIVE FREE 10 ML: 5 INJECTION INTRAVENOUS at 20:09

## 2024-10-03 RX ADMIN — PANTOPRAZOLE SODIUM 20 MG: 20 TABLET, DELAYED RELEASE ORAL at 16:27

## 2024-10-03 RX ADMIN — POTASSIUM CHLORIDE 10 MEQ: 7.46 INJECTION, SOLUTION INTRAVENOUS at 02:01

## 2024-10-03 ASSESSMENT — PAIN SCALES - GENERAL: PAINLEVEL_OUTOF10: 0

## 2024-10-03 NOTE — PROGRESS NOTES
Hand off report given to  OMAR Almaguer.   Patient is stable showing no signs of distress and has no current needs at this time.   Call light is in reach and bed is in lowest position.    Care is transferred at this time.

## 2024-10-03 NOTE — FLOWSHEET NOTE
10/03/24 1100   Vital Signs   Pulse 71   Heart Rate Source Monitor   Respirations 20   /80   MAP (Calculated) 97   BP Location Right upper arm   BP Method Automatic   Patient Position Semi fowlers   Oxygen Therapy   SpO2 97 %   O2 Device None (Room air)     Patient in bed resting. Spouse at bedside.

## 2024-10-03 NOTE — CARE COORDINATION
Reviewed chart, met with pt bedside. Plan remains to DC home with  when stable. In process of changing IV meds to PO meds. Denies any current DC/CM needs at this time. CM following.

## 2024-10-03 NOTE — PLAN OF CARE
Problem: Discharge Planning  Goal: Discharge to home or other facility with appropriate resources  10/2/2024 2154 by Nikko Hendrickson RN  Outcome: Progressing  10/2/2024 1013 by Celina Underwood RN  Outcome: Progressing  Flowsheets (Taken 10/1/2024 2223 by Rosina Burt, RN)  Discharge to home or other facility with appropriate resources: Identify barriers to discharge with patient and caregiver     Problem: Skin/Tissue Integrity  Goal: Absence of new skin breakdown  Description: 1.  Monitor for areas of redness and/or skin breakdown  2.  Assess vascular access sites hourly  3.  Every 4-6 hours minimum:  Change oxygen saturation probe site  4.  Every 4-6 hours:  If on nasal continuous positive airway pressure, respiratory therapy assess nares and determine need for appliance change or resting period.  Outcome: Progressing     Problem: Safety - Adult  Goal: Free from fall injury  10/2/2024 2154 by Nikko Hendrickson RN  Outcome: Progressing  10/2/2024 1013 by Celina Underwood RN  Outcome: Progressing  Flowsheets (Taken 10/1/2024 2223 by Rosina Burt RN)  Free From Fall Injury: Instruct family/caregiver on patient safety     Problem: ABCDS Injury Assessment  Goal: Absence of physical injury  Outcome: Progressing     Problem: Confusion  Goal: Confusion, delirium, dementia, or psychosis is improved or at baseline  Description: INTERVENTIONS:  1. Assess for possible contributors to thought disturbance, including medications, impaired vision or hearing, underlying metabolic abnormalities, dehydration, psychiatric diagnoses, and notify attending LIP  2. [place] high risk fall precautions, as indicated  3. Provide frequent short contacts to provide reality reorientation, refocusing and direction  4. Decrease environmental stimuli, including noise as appropriate  5. Monitor and intervene to maintain adequate nutrition, hydration, elimination, sleep and activity  6. If unable to ensure safety without

## 2024-10-03 NOTE — FLOWSHEET NOTE
10/02/24 2033   Vital Signs   Temp 97.4 °F (36.3 °C)   Temp Source Oral   Pulse 67   Heart Rate Source Monitor   Respirations 18   BP (!) 162/91   MAP (Calculated) 115   BP Location Right upper arm   BP Method Automatic   Patient Position Semi fowlers   Pain Assessment   Pain Assessment None - Denies Pain   Oxygen Therapy   SpO2 95 %   O2 Device None (Room air)     Shift assessment done   Vitals sf  Rx given per MAR  Pt confused using call light as a telephone  Assisted in reaching out to the   On telle sitter  Bed in lowest position  Pt resting without any care needs

## 2024-10-03 NOTE — FLOWSHEET NOTE
10/02/24 2330   Vital Signs   Temp 97.5 °F (36.4 °C)   Temp Source Oral   Pulse 74   Heart Rate Source Monitor   Respirations 18   BP (!) 142/87   MAP (Calculated) 105   BP Location Right upper arm   BP Method Automatic   Patient Position Semi fowlers   Pain Assessment   Pain Assessment None - Denies Pain   Oxygen Therapy   SpO2 96 %   O2 Device None (Room air)     Pt asleep,easy to wake up  On room air  Telle sitter for safety in place  Will ct monitor progress

## 2024-10-03 NOTE — PROGRESS NOTES
Medicine Progress Note    Date: 10/03/24    Subjective:    Patient is feeling just fine. Had some confusion last night. Blood pressure stable, she has been taking all her meds PO today.     Objective:  Vitals:    10/02/24 2033 10/02/24 2330 10/03/24 0400 10/03/24 0750   BP: (!) 162/91 (!) 142/87 (!) 140/78 (!) 168/92   Pulse: 67 74  73   Resp: 18 18  16   Temp: 97.4 °F (36.3 °C) 97.5 °F (36.4 °C) 97.6 °F (36.4 °C) 98.2 °F (36.8 °C)   TempSrc: Oral Oral Axillary Oral   SpO2: 95% 96%  96%   Weight:       Height:         Labs:  CBC:   Recent Labs     10/01/24  0502 10/02/24  0529   WBC 7.8 8.6   HGB 11.9* 11.6*   HCT 34.5* 33.7*   .5* 100.1*    188     BMP:   Recent Labs     10/02/24  0530 10/02/24  1751 10/03/24  0438    134* 136   K 3.8 2.8* 3.7   CL 99 96* 95*   CO2 27 25 29   BUN 9 9 7   CREATININE 1.2 1.2 1.0     LIVER PROFILE:   Recent Labs     10/02/24  0530 10/02/24  1751 10/03/24  0438   AST 22 23 37   ALT 37 36 44*   BILITOT 0.9 0.8 0.7   ALKPHOS 126 132* 152*     PT/INR: No results for input(s): \"PROTIME\", \"INR\" in the last 72 hours.  APTT: No results for input(s): \"APTT\" in the last 72 hours.  UA:  No results for input(s): \"NITRITE\", \"COLORU\", \"PHUR\", \"LABCAST\", \"WBCUA\", \"RBCUA\", \"MUCUS\", \"TRICHOMONAS\", \"YEAST\", \"BACTERIA\", \"CLARITYU\", \"SPECGRAV\", \"LEUKOCYTESUR\", \"UROBILINOGEN\", \"BILIRUBINUR\", \"BLOODU\", \"GLUCOSEU\", \"AMORPHOUS\" in the last 72 hours.    Invalid input(s): \"KETONESU\"       CT ABDOMEN PELVIS WO CONTRAST Additional Contrast? None   Final Result   No acute process.         CT HEAD WO CONTRAST   Final Result   Chest: Left basilar atelectasis or scar.      Head: Atrophy and moderate small vessel ischemic disease.         XR CHEST PORTABLE   Final Result   Chest: Left basilar atelectasis or scar.      Head: Atrophy and moderate small vessel ischemic disease.                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

## 2024-10-03 NOTE — PROGRESS NOTES
Blood pressure (!) 168/92, pulse 73, temperature 98.2 °F (36.8 °C), temperature source Oral, resp. rate 16, height 1.499 m (4' 11\"), weight 71.1 kg (156 lb 12.8 oz), SpO2 96%, not currently breastfeeding.    Shift assessment completed see flow sheet. Patient in bed alert and oriented x3. Patient on RA, showing no signs of distress. Morning medications given per order. Patient has no other needs at this time. Standard safety measures in place.telesitter at bedside. Wrist restraints removed.

## 2024-10-04 LAB
ALBUMIN SERPL-MCNC: 4.4 G/DL (ref 3.4–5)
ANION GAP SERPL CALCULATED.3IONS-SCNC: 12 MMOL/L (ref 3–16)
ANION GAP SERPL CALCULATED.3IONS-SCNC: 14 MMOL/L (ref 3–16)
BUN SERPL-MCNC: 8 MG/DL (ref 7–20)
BUN SERPL-MCNC: 9 MG/DL (ref 7–20)
CALCIUM SERPL-MCNC: 8.5 MG/DL (ref 8.3–10.6)
CALCIUM SERPL-MCNC: 9.5 MG/DL (ref 8.3–10.6)
CHLORIDE SERPL-SCNC: 89 MMOL/L (ref 99–110)
CHLORIDE SERPL-SCNC: 90 MMOL/L (ref 99–110)
CO2 SERPL-SCNC: 27 MMOL/L (ref 21–32)
CO2 SERPL-SCNC: 29 MMOL/L (ref 21–32)
CREAT SERPL-MCNC: 1 MG/DL (ref 0.6–1.2)
CREAT SERPL-MCNC: 1.1 MG/DL (ref 0.6–1.2)
GFR SERPLBLD CREATININE-BSD FMLA CKD-EPI: 52 ML/MIN/{1.73_M2}
GFR SERPLBLD CREATININE-BSD FMLA CKD-EPI: 59 ML/MIN/{1.73_M2}
GLUCOSE SERPL-MCNC: 109 MG/DL (ref 70–99)
GLUCOSE SERPL-MCNC: 79 MG/DL (ref 70–99)
MAGNESIUM SERPL-MCNC: 1.4 MG/DL (ref 1.8–2.4)
PHOSPHATE SERPL-MCNC: 2 MG/DL (ref 2.5–4.9)
POTASSIUM SERPL-SCNC: 2.4 MMOL/L (ref 3.5–5.1)
POTASSIUM SERPL-SCNC: 3.5 MMOL/L (ref 3.5–5.1)
SODIUM SERPL-SCNC: 130 MMOL/L (ref 136–145)
SODIUM SERPL-SCNC: 131 MMOL/L (ref 136–145)

## 2024-10-04 PROCEDURE — 2060000000 HC ICU INTERMEDIATE R&B

## 2024-10-04 PROCEDURE — 97110 THERAPEUTIC EXERCISES: CPT

## 2024-10-04 PROCEDURE — 99232 SBSQ HOSP IP/OBS MODERATE 35: CPT | Performed by: INTERNAL MEDICINE

## 2024-10-04 PROCEDURE — 6370000000 HC RX 637 (ALT 250 FOR IP): Performed by: INTERNAL MEDICINE

## 2024-10-04 PROCEDURE — 83735 ASSAY OF MAGNESIUM: CPT

## 2024-10-04 PROCEDURE — 6370000000 HC RX 637 (ALT 250 FOR IP): Performed by: STUDENT IN AN ORGANIZED HEALTH CARE EDUCATION/TRAINING PROGRAM

## 2024-10-04 PROCEDURE — 36415 COLL VENOUS BLD VENIPUNCTURE: CPT

## 2024-10-04 PROCEDURE — 97530 THERAPEUTIC ACTIVITIES: CPT

## 2024-10-04 PROCEDURE — 6360000002 HC RX W HCPCS: Performed by: STUDENT IN AN ORGANIZED HEALTH CARE EDUCATION/TRAINING PROGRAM

## 2024-10-04 PROCEDURE — 6370000000 HC RX 637 (ALT 250 FOR IP): Performed by: NURSE PRACTITIONER

## 2024-10-04 PROCEDURE — 2580000003 HC RX 258: Performed by: STUDENT IN AN ORGANIZED HEALTH CARE EDUCATION/TRAINING PROGRAM

## 2024-10-04 PROCEDURE — 80069 RENAL FUNCTION PANEL: CPT

## 2024-10-04 RX ORDER — SPIRONOLACTONE 25 MG/1
25 TABLET ORAL DAILY
Status: DISCONTINUED | OUTPATIENT
Start: 2024-10-05 | End: 2024-10-05 | Stop reason: HOSPADM

## 2024-10-04 RX ORDER — SPIRONOLACTONE 25 MG/1
12.5 TABLET ORAL ONCE
Status: COMPLETED | OUTPATIENT
Start: 2024-10-04 | End: 2024-10-04

## 2024-10-04 RX ADMIN — SODIUM CHLORIDE, PRESERVATIVE FREE 10 ML: 5 INJECTION INTRAVENOUS at 21:20

## 2024-10-04 RX ADMIN — PANTOPRAZOLE SODIUM 20 MG: 20 TABLET, DELAYED RELEASE ORAL at 05:23

## 2024-10-04 RX ADMIN — VENLAFAXINE HYDROCHLORIDE 150 MG: 75 CAPSULE, EXTENDED RELEASE ORAL at 09:44

## 2024-10-04 RX ADMIN — POTASSIUM CHLORIDE 10 MEQ: 7.46 INJECTION, SOLUTION INTRAVENOUS at 06:12

## 2024-10-04 RX ADMIN — PANTOPRAZOLE SODIUM 20 MG: 20 TABLET, DELAYED RELEASE ORAL at 14:40

## 2024-10-04 RX ADMIN — ATORVASTATIN CALCIUM 80 MG: 40 TABLET, FILM COATED ORAL at 09:44

## 2024-10-04 RX ADMIN — LOSARTAN POTASSIUM 100 MG: 100 TABLET, FILM COATED ORAL at 09:44

## 2024-10-04 RX ADMIN — HYDRALAZINE HYDROCHLORIDE 25 MG: 25 TABLET ORAL at 05:23

## 2024-10-04 RX ADMIN — SPIRONOLACTONE 12.5 MG: 25 TABLET ORAL at 09:44

## 2024-10-04 RX ADMIN — HYDRALAZINE HYDROCHLORIDE 25 MG: 25 TABLET ORAL at 14:40

## 2024-10-04 RX ADMIN — MAGNESIUM SULFATE HEPTAHYDRATE 2000 MG: 40 INJECTION, SOLUTION INTRAVENOUS at 06:17

## 2024-10-04 RX ADMIN — POTASSIUM CHLORIDE 10 MEQ: 7.46 INJECTION, SOLUTION INTRAVENOUS at 07:25

## 2024-10-04 RX ADMIN — ENOXAPARIN SODIUM 40 MG: 100 INJECTION SUBCUTANEOUS at 09:44

## 2024-10-04 RX ADMIN — CLOPIDOGREL BISULFATE 75 MG: 75 TABLET ORAL at 09:44

## 2024-10-04 RX ADMIN — AMLODIPINE BESYLATE 5 MG: 5 TABLET ORAL at 09:44

## 2024-10-04 RX ADMIN — ISOSORBIDE MONONITRATE 30 MG: 30 TABLET, EXTENDED RELEASE ORAL at 09:44

## 2024-10-04 RX ADMIN — LEVOTHYROXINE SODIUM 150 MCG: 0.15 TABLET ORAL at 05:23

## 2024-10-04 RX ADMIN — POTASSIUM CHLORIDE 10 MEQ: 7.46 INJECTION, SOLUTION INTRAVENOUS at 09:47

## 2024-10-04 RX ADMIN — EZETIMIBE 10 MG: 10 TABLET ORAL at 19:56

## 2024-10-04 RX ADMIN — HYDRALAZINE HYDROCHLORIDE 25 MG: 25 TABLET ORAL at 21:21

## 2024-10-04 RX ADMIN — POTASSIUM CHLORIDE 10 MEQ: 7.46 INJECTION, SOLUTION INTRAVENOUS at 14:38

## 2024-10-04 ASSESSMENT — PAIN SCALES - GENERAL: PAINLEVEL_OUTOF10: 0

## 2024-10-04 NOTE — PROGRESS NOTES
Bedside report and transfer of care given to OMAR Almaguer. Pt currently resting in bed with the call light within reach. Pt denies any other care needs at this time. Pt stable at this time.

## 2024-10-04 NOTE — FLOWSHEET NOTE
10/04/24 1941   Vital Signs   Temp 97.5 °F (36.4 °C)   Temp Source Oral   Pulse 91   Heart Rate Source Monitor   Respirations 18   /88   MAP (Calculated) 101   BP Location Right upper arm   BP Method Automatic   Patient Position Sitting   Pain Assessment   Pain Assessment None - Denies Pain   Opioid-Induced Sedation   POSS Score 1   RASS   Brooks Agitation Sedation Scale (RASS) 0   Oxygen Therapy   SpO2 97 %   O2 Device None (Room air)     Patient is alert and oriented with intermittent confusion.  On room air with normal breathing pattern.  Call light within reach.

## 2024-10-04 NOTE — PLAN OF CARE
Problem: Discharge Planning  Goal: Discharge to home or other facility with appropriate resources  Outcome: Progressing  Flowsheets (Taken 10/3/2024 2011)  Discharge to home or other facility with appropriate resources: Identify barriers to discharge with patient and caregiver     Problem: Skin/Tissue Integrity  Goal: Absence of new skin breakdown  Description: 1.  Monitor for areas of redness and/or skin breakdown  2.  Assess vascular access sites hourly  3.  Every 4-6 hours minimum:  Change oxygen saturation probe site  4.  Every 4-6 hours:  If on nasal continuous positive airway pressure, respiratory therapy assess nares and determine need for appliance change or resting period.  Outcome: Progressing     Problem: Safety - Adult  Goal: Free from fall injury  Outcome: Progressing  Flowsheets (Taken 10/3/2024 2011)  Free From Fall Injury: Instruct family/caregiver on patient safety     Problem: ABCDS Injury Assessment  Goal: Absence of physical injury  Outcome: Progressing  Flowsheets (Taken 10/3/2024 2011)  Absence of Physical Injury: Implement safety measures based on patient assessment     Problem: Cardiovascular - Adult  Goal: Maintains optimal cardiac output and hemodynamic stability  Outcome: Progressing  Flowsheets (Taken 10/3/2024 2011)  Maintains optimal cardiac output and hemodynamic stability:   Monitor blood pressure and heart rate   Assess for signs of decreased cardiac output     Problem: Confusion  Goal: Confusion, delirium, dementia, or psychosis is improved or at baseline  Description: INTERVENTIONS:  1. Assess for possible contributors to thought disturbance, including medications, impaired vision or hearing, underlying metabolic abnormalities, dehydration, psychiatric diagnoses, and notify attending LIP  2. [place] high risk fall precautions, as indicated  3. Provide frequent short contacts to provide reality reorientation, refocusing and direction  4. Decrease environmental stimuli, including

## 2024-10-04 NOTE — PROGRESS NOTES
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.5]  Altered mental status, unspecified altered mental status type [R41.82]  AMS (altered mental status) [R41.82]  Admit Date:  9/29/2024  Precautions/Restrictions/WB Status/ Lines/ Wounds/ Oxygen: Fall risk, Bed/chair alarm, Lines (IV and external catheter), Confusion, Telemetry, and Telesitter    Treatment Time:  16:35-17:15  Treatment Number:  4  Timed Code Treatment Minutes: 40 minutes  Total Treatment Minutes: 40  minutes    Patient Goals for Therapy: \"to go home\"          Discharge Recommendations: Home with 24/7 assist  and Home OT  DME needs for discharge: RW       Therapy recommendations for staff:   Assist of 1 for ambulation with use of rolling walker (RW) and gait belt to/from chair  to/from bathroom  within room    History of Present Illness: Per Dr Mcgee H&P 9/30/24: \"73 y.o. female with past medical history of hypertension, hyperlipidemia, hypothyroidism, depression presented to emergency department with chief complaint of altered mental status.  HPI is obtained from ED attending as patient is alert and oriented x 0 on my exam.  Patient has had significant decline in mentation for the last several days.  She is normally alert and oriented x 4 and on day of presentation was only oriented to self prior to coming into the hospital.  Patient does not have any complaint.\"     Per Dr Franco Neurology consult 9/30/24:  \"The patient is a 73 y.o. female with a past medical history of Alzheimer disease, anxiety, CAD, fibromyalgia, hyperlipidemia, hypertension, and TIA originally presenting to the hospital for concerns of altered mental status. Patient  at bedside states patient has been confused for 2-3 months but has worsened over the last 7 days after her  Dressing, Cleaning, Cooking, Laundry , and grocery shopping  Pt supervision for functional transfers and utilized Quad Cane for mobility in home and Quad Cane out in community  History of falls:            Unknown  Home Health Services:None    AM-PAC Score: AM-PAC Inpatient Daily Activity Raw Score: 19     Subjective:  Patient lying reclined in bed with no family present.   Pt agreeable to this OT session.     Cognition:    A&O Person, Place, and Time   Able to follow 1 step commands    Pain:   No  Location: n/a  Rating: NA /10  Pain Medicine Status: No request made     BP (mmHg) HR (bpm) SpO2 (%) on RA Comments   Supine at rest 125/78 79 94%    Seated at EOB       Standing       End of session         Objective:  Does this pt have an acute or acute on chronic diagnosis of CHF? No    Balance:  Sitting:    SBA  for static and dynamic sitting  Standing:    SBA for static and dynamic standing    Bed Mobility:   Supine to Sit:    CGA  Sit to Supine:   Not Tested  Rolling:   Not Tested  Scooting in sitting: SBA to scoot hips EOB and in the recliner  Scooting in supine:  Not Tested    Transfers:    Sit to stand:    SBA  Stand to sit:    SBA  Bed to chair:     SBA with the use of a gait belt and RW  Bed/ chair to standard toilet:  Not Tested  Bed/chair to BSC:   Not Tested  Functional Mobility:   SBA to ambulate within room with the use of a gait belt and RW    ADLs:  Dressing:      UE:   Not Tested  LE:    SBA to armand/doff socks seated EOB    Bathing:    UE:  Not Tested  LE:  Not Tested    Eating:   Not Tested    Toileting:  Not Tested    Grooming/hygiene: SBA to brush hair seated EOB    Ther Ex / Activities Initiated:   Wrist flex/ext:  x15  Elbow flex/ext x15  Shoulder flex/ext:  x15  Scapular protraction:  x15  Ankle pumps: x15  LAQ: x15  Marches: x15  Abduction/adduction: x15    Activity Tolerance:   Pt completed therapy session with no adverse symptoms    Positioning Needs:   Pt up in chair, alarm set, call light

## 2024-10-04 NOTE — FLOWSHEET NOTE
10/04/24 0359   Vital Signs   Temp 97.5 °F (36.4 °C)   Temp Source Oral   Pulse 72   Heart Rate Source Monitor   Respirations 18   BP (!) 145/93   MAP (Calculated) 110   BP Location Right upper arm   BP Method Automatic   Patient Position Semi fowlers   Opioid-Induced Sedation   POSS Score S   RASS   Brooks Agitation Sedation Scale (RASS) -1   Oxygen Therapy   SpO2 98 %   O2 Device None (Room air)     Patient is asleep but easy to wake up.  On room air with normal breathing pattern.  Telesitter inside for safety.

## 2024-10-04 NOTE — PROGRESS NOTES
Medicine Progress Note    Date: 10/04/24    Subjective:    Patient is feeling just fine today however electrolytes are low again. She feels like she is eating ok. No diarrhea.     Objective:  Vitals:    10/04/24 0010 10/04/24 0359 10/04/24 0523 10/04/24 0800   BP: (!) 161/91 (!) 145/93 (!) 165/82 (!) 153/91   Pulse: 77 72  89   Resp: 16 18  18   Temp: 97.7 °F (36.5 °C) 97.5 °F (36.4 °C)  97.8 °F (36.6 °C)   TempSrc: Oral Oral  Oral   SpO2: 97% 98%  96%   Weight:       Height:         Labs:  CBC:   Recent Labs     10/02/24  0529   WBC 8.6   HGB 11.6*   HCT 33.7*   .1*        BMP:   Recent Labs     10/02/24  1751 10/03/24  0438 10/04/24  0509   * 136 131*   K 2.8* 3.7 2.4*   CL 96* 95* 90*   CO2 25 29 29   BUN 9 7 8   CREATININE 1.2 1.0 1.0     LIVER PROFILE:   Recent Labs     10/02/24  0530 10/02/24  1751 10/03/24  0438   AST 22 23 37   ALT 37 36 44*   BILITOT 0.9 0.8 0.7   ALKPHOS 126 132* 152*     PT/INR: No results for input(s): \"PROTIME\", \"INR\" in the last 72 hours.  APTT: No results for input(s): \"APTT\" in the last 72 hours.  UA:  No results for input(s): \"NITRITE\", \"COLORU\", \"PHUR\", \"LABCAST\", \"WBCUA\", \"RBCUA\", \"MUCUS\", \"TRICHOMONAS\", \"YEAST\", \"BACTERIA\", \"CLARITYU\", \"SPECGRAV\", \"LEUKOCYTESUR\", \"UROBILINOGEN\", \"BILIRUBINUR\", \"BLOODU\", \"GLUCOSEU\", \"AMORPHOUS\" in the last 72 hours.    Invalid input(s): \"KETONESU\"       CT ABDOMEN PELVIS WO CONTRAST Additional Contrast? None   Final Result   No acute process.         CT HEAD WO CONTRAST   Final Result   Chest: Left basilar atelectasis or scar.      Head: Atrophy and moderate small vessel ischemic disease.         XR CHEST PORTABLE   Final Result   Chest: Left basilar atelectasis or scar.      Head: Atrophy and moderate small vessel ischemic disease.              Assessment & Plan:    AMS - resolved she is back to her baseline  Concern for developing/early Myxedema coma  - TSH 76  - appears patient has been noncompliant with medication    Diet: ADULT DIET; Regular   Code Status: Full Code     Cinthia Harley DO   10/4/2024  8:42 AM

## 2024-10-04 NOTE — FLOWSHEET NOTE
Vitals:    10/03/24 2000   BP: (!) 152/84   Pulse: 81   Resp: 18   Temp: 97.2 °F (36.2 °C)   SpO2: 98%     Patient is alert and oriented x2  On room air with normal breathing pattern.  External cathete with yellowish urine.  Virtual  inside the room for safety.

## 2024-10-05 VITALS
TEMPERATURE: 98.2 F | HEART RATE: 87 BPM | OXYGEN SATURATION: 95 % | RESPIRATION RATE: 18 BRPM | HEIGHT: 59 IN | BODY MASS INDEX: 31.61 KG/M2 | WEIGHT: 156.8 LBS | SYSTOLIC BLOOD PRESSURE: 171 MMHG | DIASTOLIC BLOOD PRESSURE: 98 MMHG

## 2024-10-05 DIAGNOSIS — I10 UNCONTROLLED HYPERTENSION: ICD-10-CM

## 2024-10-05 LAB
ALBUMIN SERPL-MCNC: 4 G/DL (ref 3.4–5)
ANION GAP SERPL CALCULATED.3IONS-SCNC: 14 MMOL/L (ref 3–16)
BUN SERPL-MCNC: 9 MG/DL (ref 7–20)
CALCIUM SERPL-MCNC: 9.2 MG/DL (ref 8.3–10.6)
CHLORIDE SERPL-SCNC: 92 MMOL/L (ref 99–110)
CO2 SERPL-SCNC: 26 MMOL/L (ref 21–32)
CREAT SERPL-MCNC: 1.1 MG/DL (ref 0.6–1.2)
GFR SERPLBLD CREATININE-BSD FMLA CKD-EPI: 52 ML/MIN/{1.73_M2}
GLUCOSE SERPL-MCNC: 77 MG/DL (ref 70–99)
MAGNESIUM SERPL-MCNC: 2.2 MG/DL (ref 1.8–2.4)
PHOSPHATE SERPL-MCNC: 2.6 MG/DL (ref 2.5–4.9)
POTASSIUM SERPL-SCNC: 3.1 MMOL/L (ref 3.5–5.1)
SODIUM SERPL-SCNC: 132 MMOL/L (ref 136–145)

## 2024-10-05 PROCEDURE — 80069 RENAL FUNCTION PANEL: CPT

## 2024-10-05 PROCEDURE — 83735 ASSAY OF MAGNESIUM: CPT

## 2024-10-05 PROCEDURE — 99238 HOSP IP/OBS DSCHRG MGMT 30/<: CPT | Performed by: INTERNAL MEDICINE

## 2024-10-05 PROCEDURE — 6370000000 HC RX 637 (ALT 250 FOR IP): Performed by: STUDENT IN AN ORGANIZED HEALTH CARE EDUCATION/TRAINING PROGRAM

## 2024-10-05 PROCEDURE — 2580000003 HC RX 258: Performed by: STUDENT IN AN ORGANIZED HEALTH CARE EDUCATION/TRAINING PROGRAM

## 2024-10-05 PROCEDURE — 6370000000 HC RX 637 (ALT 250 FOR IP): Performed by: INTERNAL MEDICINE

## 2024-10-05 PROCEDURE — 6370000000 HC RX 637 (ALT 250 FOR IP): Performed by: NURSE PRACTITIONER

## 2024-10-05 PROCEDURE — 6360000002 HC RX W HCPCS: Performed by: STUDENT IN AN ORGANIZED HEALTH CARE EDUCATION/TRAINING PROGRAM

## 2024-10-05 PROCEDURE — 36415 COLL VENOUS BLD VENIPUNCTURE: CPT

## 2024-10-05 RX ORDER — AMLODIPINE BESYLATE 5 MG/1
5 TABLET ORAL DAILY
Qty: 30 TABLET | Refills: 1 | Status: SHIPPED | OUTPATIENT
Start: 2024-10-05

## 2024-10-05 RX ORDER — VENLAFAXINE HYDROCHLORIDE 150 MG/1
150 CAPSULE, EXTENDED RELEASE ORAL DAILY
Qty: 30 CAPSULE | Refills: 1 | Status: SHIPPED | OUTPATIENT
Start: 2024-10-05

## 2024-10-05 RX ORDER — PANTOPRAZOLE SODIUM 20 MG/1
20 TABLET, DELAYED RELEASE ORAL 2 TIMES DAILY
Qty: 60 TABLET | Refills: 1 | Status: SHIPPED | OUTPATIENT
Start: 2024-10-05

## 2024-10-05 RX ORDER — LEVOTHYROXINE SODIUM 150 UG/1
150 TABLET ORAL DAILY
Qty: 90 TABLET | Refills: 1 | Status: SHIPPED | OUTPATIENT
Start: 2024-10-05

## 2024-10-05 RX ORDER — ISOSORBIDE MONONITRATE 30 MG/1
30 TABLET, EXTENDED RELEASE ORAL DAILY
Qty: 90 TABLET | Refills: 1 | Status: SHIPPED | OUTPATIENT
Start: 2024-10-05

## 2024-10-05 RX ORDER — HYDRALAZINE HYDROCHLORIDE 25 MG/1
25 TABLET, FILM COATED ORAL EVERY 8 HOURS SCHEDULED
Qty: 90 TABLET | Refills: 1 | Status: SHIPPED | OUTPATIENT
Start: 2024-10-05

## 2024-10-05 RX ORDER — ATORVASTATIN CALCIUM 80 MG/1
80 TABLET, FILM COATED ORAL DAILY
Qty: 90 TABLET | Refills: 1 | Status: SHIPPED | OUTPATIENT
Start: 2024-10-05

## 2024-10-05 RX ORDER — POTASSIUM CHLORIDE 1500 MG/1
20 TABLET, EXTENDED RELEASE ORAL DAILY
Qty: 7 TABLET | Refills: 0 | Status: SHIPPED | OUTPATIENT
Start: 2024-10-05 | End: 2024-10-12

## 2024-10-05 RX ORDER — LOSARTAN POTASSIUM 100 MG/1
TABLET ORAL
Qty: 30 TABLET | Refills: 1 | Status: SHIPPED | OUTPATIENT
Start: 2024-10-05

## 2024-10-05 RX ORDER — SPIRONOLACTONE 50 MG/1
25 TABLET, FILM COATED ORAL DAILY
Qty: 30 TABLET | Refills: 1 | Status: SHIPPED | OUTPATIENT
Start: 2024-10-05

## 2024-10-05 RX ORDER — EZETIMIBE 10 MG/1
10 TABLET ORAL NIGHTLY
Qty: 30 TABLET | Refills: 0 | Status: SHIPPED | OUTPATIENT
Start: 2024-10-05 | End: 2024-11-04

## 2024-10-05 RX ORDER — ERGOCALCIFEROL 1.25 MG/1
50000 CAPSULE ORAL WEEKLY
Qty: 4 CAPSULE | Refills: 0 | Status: SHIPPED | OUTPATIENT
Start: 2024-10-09

## 2024-10-05 RX ADMIN — ATORVASTATIN CALCIUM 80 MG: 40 TABLET, FILM COATED ORAL at 09:14

## 2024-10-05 RX ADMIN — AMLODIPINE BESYLATE 5 MG: 5 TABLET ORAL at 09:14

## 2024-10-05 RX ADMIN — Medication 10 ML: at 09:20

## 2024-10-05 RX ADMIN — LOSARTAN POTASSIUM 100 MG: 100 TABLET, FILM COATED ORAL at 09:14

## 2024-10-05 RX ADMIN — LEVOTHYROXINE SODIUM 150 MCG: 0.15 TABLET ORAL at 05:03

## 2024-10-05 RX ADMIN — ISOSORBIDE MONONITRATE 30 MG: 30 TABLET, EXTENDED RELEASE ORAL at 09:14

## 2024-10-05 RX ADMIN — POTASSIUM CHLORIDE 40 MEQ: 1500 TABLET, EXTENDED RELEASE ORAL at 06:13

## 2024-10-05 RX ADMIN — SPIRONOLACTONE 25 MG: 25 TABLET ORAL at 09:14

## 2024-10-05 RX ADMIN — VENLAFAXINE HYDROCHLORIDE 150 MG: 75 CAPSULE, EXTENDED RELEASE ORAL at 09:14

## 2024-10-05 RX ADMIN — HYDRALAZINE HYDROCHLORIDE 25 MG: 25 TABLET ORAL at 05:03

## 2024-10-05 RX ADMIN — SODIUM CHLORIDE, PRESERVATIVE FREE 10 ML: 5 INJECTION INTRAVENOUS at 09:20

## 2024-10-05 RX ADMIN — ENOXAPARIN SODIUM 40 MG: 100 INJECTION SUBCUTANEOUS at 09:15

## 2024-10-05 RX ADMIN — PANTOPRAZOLE SODIUM 20 MG: 20 TABLET, DELAYED RELEASE ORAL at 05:03

## 2024-10-05 RX ADMIN — CLOPIDOGREL BISULFATE 75 MG: 75 TABLET ORAL at 09:14

## 2024-10-05 NOTE — PLAN OF CARE
Problem: Discharge Planning  Goal: Discharge to home or other facility with appropriate resources  Outcome: Progressing  Flowsheets (Taken 10/5/2024 0358)  Discharge to home or other facility with appropriate resources: Identify barriers to discharge with patient and caregiver     Problem: Skin/Tissue Integrity  Goal: Absence of new skin breakdown  Description: 1.  Monitor for areas of redness and/or skin breakdown  2.  Assess vascular access sites hourly  3.  Every 4-6 hours minimum:  Change oxygen saturation probe site  4.  Every 4-6 hours:  If on nasal continuous positive airway pressure, respiratory therapy assess nares and determine need for appliance change or resting period.  Outcome: Progressing     Problem: Safety - Adult  Goal: Free from fall injury  Outcome: Progressing  Flowsheets (Taken 10/5/2024 0358)  Free From Fall Injury: Instruct family/caregiver on patient safety     Problem: ABCDS Injury Assessment  Goal: Absence of physical injury  Outcome: Progressing  Flowsheets (Taken 10/5/2024 0358)  Absence of Physical Injury: Implement safety measures based on patient assessment     Problem: Confusion  Goal: Confusion, delirium, dementia, or psychosis is improved or at baseline  Description: INTERVENTIONS:  1. Assess for possible contributors to thought disturbance, including medications, impaired vision or hearing, underlying metabolic abnormalities, dehydration, psychiatric diagnoses, and notify attending LIP  2. [place] high risk fall precautions, as indicated  3. Provide frequent short contacts to provide reality reorientation, refocusing and direction  4. Decrease environmental stimuli, including noise as appropriate  5. Monitor and intervene to maintain adequate nutrition, hydration, elimination, sleep and activity  6. If unable to ensure safety without constant attention obtain sitter and review sitter guidelines with assigned personnel  7. Initiate Psychosocial CNS and Spiritual Care consult,  as indicated  Outcome: Progressing  Flowsheets (Taken 10/5/2024 5869)  Effect of thought disturbance (confusion, delirium, dementia, or psychosis) are managed with adequate functional status:   Assess for contributors to thought disturbance, including medications, impaired vision or hearing, underlying metabolic abnormalities, dehydration, psychiatric diagnoses, notify LIP   [place] high risk fall precautions, as indicated

## 2024-10-05 NOTE — CARE COORDINATION
Met with pt at bedside. Pt to DC home with [facility]. Called and LVM at [facility] to update on pt DC today and will need SOC. Family to transport pt home. Order for rolling walker with wheels. Pt sent home with rolling wallker.  No further DME or DC needs identified.    IMM- 10/5    O2- 95% RA

## 2024-10-05 NOTE — PROGRESS NOTES
Vitals:    10/05/24 0448   BP: (!) 150/88   Pulse: 86   Resp: 18   Temp: 98.1 °F (36.7 °C)   SpO2: 96%     Patient is awake, alert, and oriented.  On room air with normal breathing pattern.  Telesitter inside for safety.

## 2024-10-05 NOTE — DISCHARGE SUMMARY
Hospital Medicine Discharge Summary    Patient: Siobhan Flores     Gender: female  : 1950   Age: 73 y.o.  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] 10/02/2024    Transaminitis [R74.01] 10/02/2024    Toxic metabolic encephalopathy [G92.8] 2024    AMS (altered mental status) [R41.82] 2024     Condition at Discharge: Stable    Hospital Course:     AMS - resolved she is back to her baseline  Concern for developing/early Myxedema coma  - TSH 76  - appears patient has been noncompliant with medication regimen  - CT of head showed atrophy and moderate small vessel ischemic disease   - given IV Synthroid daily - plan to switch to PO she had IV already today, switched to PO - tolerating  - started on hydrocortisone every 8 hours - continue to wean, stopped  - started on Zosyn - stopped, no evidence of infection  - added Vitamin B12, folate and Vitamin D level  - D level low, replacement ordered weekly     Alzheimer's disease  - noted per PCP note  - possibly worsening and contributing to above  - was on Aricept per PCC but not taking  - neurology consulted  - STOP Aricept for now due to bradycardia, neurology will consider memantine outpatient     Hyponatremia, mild  - likely 2/2 poor po intake  - start IVF - stop today, monitor  - monitor BMP     Elevated creatinine- CKD3  - baseline creatinine 1.0  - creatinine on admission 1.4  - likely 2/2 poor po intake  - IVF given  - started on nutrition supplements     Bradycardia  - likely 2/2 above  - hold coreg  - monitor on telemetry   - HR better, continue to hold BB  - HRN has been normal  demonstrated.  Calcified nodule at the right base, compatible with granuloma.  No pleural effusion.  No evidence of pneumothorax.  Cardiac and mediastinal silhouettes are similar to prior. Head: Generalized atrophy.  Moderate periventricular white matter hypodensity is seen bilaterally.  Prominence of the ventricular system, likely a consequence of atrophy.  No mass effect or midline shift.  No gross acute hemorrhage.  Sinuses are aerated.  Globes are symmetric in size.  Smoothly marginated defect at the posterior C1 ring, likely developmental.  No acute appearing osseous abnormality.     Chest: Left basilar atelectasis or scar. Head: Atrophy and moderate small vessel ischemic disease.     CT HEAD WO CONTRAST    Result Date: 9/29/2024  EXAMINATION: ONE XRAY VIEW OF THE CHEST; CT OF THE HEAD WITHOUT CONTRAST 9/29/2024 8:21 pm; 9/29/2024 9:00 pm COMPARISON: Chest radiograph dated 05/21/2024, head CT dated 05/21/2024 HISTORY: ORDERING SYSTEM PROVIDED HISTORY: Altered Mental Status TECHNOLOGIST PROVIDED HISTORY: Reason for exam:->Altered Mental Status Reason for Exam: AMS; ORDERING SYSTEM PROVIDED HISTORY: AMS TECHNOLOGIST PROVIDED HISTORY: Reason for exam:->AMS Has a \"code stroke\" or \"stroke alert\" been called?->No Decision Support Exception - unselect if not a suspected or confirmed emergency medical condition->Emergency Medical Condition (MA) Reason for Exam: AMS FINDINGS: Chest: Linear left basilar opacities are demonstrated.  Calcified nodule at the right base, compatible with granuloma.  No pleural effusion.  No evidence of pneumothorax.  Cardiac and mediastinal silhouettes are similar to prior. Head: Generalized atrophy.  Moderate periventricular white matter hypodensity is seen bilaterally.  Prominence of the ventricular system, likely a consequence of atrophy.  No mass effect or midline shift.  No gross acute hemorrhage.  Sinuses are aerated.  Globes are symmetric in size.  Smoothly marginated defect at the

## 2024-10-07 RX ORDER — AMLODIPINE BESYLATE 5 MG/1
5 TABLET ORAL DAILY
Qty: 90 TABLET | Refills: 0 | OUTPATIENT
Start: 2024-10-07

## 2024-10-07 NOTE — TELEPHONE ENCOUNTER
Future Appointments   Date Time Provider Department Center   12/3/2024  1:45 PM Tomas Rowe DO P [facility] 7/19/2024

## 2024-10-09 ENCOUNTER — TELEPHONE (OUTPATIENT)
Dept: FAMILY MEDICINE CLINIC | Age: 74
End: 2024-10-09

## 2024-10-09 NOTE — TELEPHONE ENCOUNTER
Care Transitions Initial Follow Up Call    Outreach made within 2 business days of discharge: Yes    Patient: Siobhan Flores Patient : 1950   MRN: [id number]  Reason for Admission: altered mental status  Discharge Date: 10/5/24       Spoke with: pt's Candido    Discharge department/facility: [facility] Patient Contact:  Was patient able to fill all prescriptions: Yes  Was patient instructed to bring all medications to the follow-up visit: Yes  Is patient taking all medications as directed in the discharge summary? Yes  Does patient understand their discharge instructions: Yes  Does patient have questions or concerns that need addressed prior to 7-14 day follow up office visit: no    Additional needs identified to be addressed with provider  No needs identified             Scheduled appointment with PCP within 7-14 days    Follow Up  Future Appointments   Date Time Provider Department Center   10/10/2024  2:00 PM Janet Live, APRN - CNP [facility]   12/3/2024  1:45 PM Tomas Rowe DO P [facility] MA

## 2024-10-10 ENCOUNTER — OFFICE VISIT (OUTPATIENT)
Dept: FAMILY MEDICINE CLINIC | Age: 74
End: 2024-10-10

## 2024-10-10 VITALS
WEIGHT: 146 LBS | DIASTOLIC BLOOD PRESSURE: 80 MMHG | SYSTOLIC BLOOD PRESSURE: 124 MMHG | BODY MASS INDEX: 29.49 KG/M2 | OXYGEN SATURATION: 96 % | HEART RATE: 76 BPM | RESPIRATION RATE: 16 BRPM | TEMPERATURE: 98.2 F

## 2024-10-10 DIAGNOSIS — I10 UNCONTROLLED HYPERTENSION: ICD-10-CM

## 2024-10-10 DIAGNOSIS — Z09 HOSPITAL DISCHARGE FOLLOW-UP: ICD-10-CM

## 2024-10-10 DIAGNOSIS — E03.9 HYPOTHYROIDISM, UNSPECIFIED TYPE: ICD-10-CM

## 2024-10-10 DIAGNOSIS — N18.30 STAGE 3 CHRONIC KIDNEY DISEASE, UNSPECIFIED WHETHER STAGE 3A OR 3B CKD (HCC): ICD-10-CM

## 2024-10-10 DIAGNOSIS — G30.9 ALZHEIMER DISEASE (HCC): ICD-10-CM

## 2024-10-10 DIAGNOSIS — F02.80 ALZHEIMER DISEASE (HCC): ICD-10-CM

## 2024-10-10 DIAGNOSIS — Z91.148 NONCOMPLIANCE WITH MEDICATION REGIMEN: ICD-10-CM

## 2024-10-10 DIAGNOSIS — F32.0 CURRENT MILD EPISODE OF MAJOR DEPRESSIVE DISORDER, UNSPECIFIED WHETHER RECURRENT (HCC): ICD-10-CM

## 2024-10-10 DIAGNOSIS — E03.9 HYPOTHYROIDISM, UNSPECIFIED TYPE: Primary | ICD-10-CM

## 2024-10-10 NOTE — PATIENT INSTRUCTIONS
Schedule with neurology for follow up changes to medication  Take levothyroxine on empty stomach 30 minutes prior to other medications  [place] of three ensures daily  Try eating meals together  Call [facility] services to see if you can get meals on wheels

## 2024-10-10 NOTE — PROGRESS NOTES
note reviewed.   Constitutional:       General: She is not in acute distress.     Appearance: She is well-developed. She is obese. She is not ill-appearing or diaphoretic.   HENT:      Head: Normocephalic and atraumatic.      Right Ear: External ear normal.      Left Ear: External ear normal.      Nose: Nose normal.      Mouth/Throat:      Pharynx: No oropharyngeal exudate.   Eyes:      General:         Right eye: No discharge.         Left eye: No discharge.      Conjunctiva/sclera: Conjunctivae normal.   Cardiovascular:      Rate and Rhythm: Normal rate and regular rhythm.      Heart sounds: Normal heart sounds. No murmur heard.     No friction rub. No gallop.   Pulmonary:      Effort: Pulmonary effort is normal. No respiratory distress.      Breath sounds: Normal breath sounds. No wheezing or rales.   Abdominal:      General: Bowel sounds are normal. There is no distension.      Palpations: Abdomen is soft.      Tenderness: There is no abdominal tenderness.   Musculoskeletal:         General: No tenderness. Normal range of motion.      Cervical back: Normal range of motion and neck supple.   Lymphadenopathy:      Cervical: No cervical adenopathy.   Skin:     General: Skin is warm and dry.      Coloration: Skin is not pale.      Findings: No erythema or rash.   Neurological:      Mental Status: She is alert and oriented to person, place, and time. Mental status is at baseline.      Motor: No abnormal muscle tone.      Coordination: Coordination normal.      Comments: Forgetful/currently oriented to person and place season   Psychiatric:         Behavior: Behavior normal.         Thought Content: Thought content normal.         Judgment: Judgment normal.      Comments: Poor eye contact. Flat affect            An electronic signature was used to authenticate this note.  --Janet Live, APRN - CNP

## 2024-10-11 DIAGNOSIS — N18.32 ACUTE RENAL FAILURE SUPERIMPOSED ON STAGE 3B CHRONIC KIDNEY DISEASE, UNSPECIFIED ACUTE RENAL FAILURE TYPE (HCC): Primary | ICD-10-CM

## 2024-10-11 DIAGNOSIS — N17.9 ACUTE RENAL FAILURE SUPERIMPOSED ON STAGE 3B CHRONIC KIDNEY DISEASE, UNSPECIFIED ACUTE RENAL FAILURE TYPE (HCC): Primary | ICD-10-CM

## 2024-10-11 LAB
ALBUMIN SERPL-MCNC: 4.7 G/DL (ref 3.4–5)
ALBUMIN/GLOB SERPL: 1.7 {RATIO} (ref 1.1–2.2)
ALP SERPL-CCNC: 136 U/L (ref 40–129)
ALT SERPL-CCNC: 33 U/L (ref 10–40)
ANION GAP SERPL CALCULATED.3IONS-SCNC: 15 MMOL/L (ref 3–16)
AST SERPL-CCNC: 35 U/L (ref 15–37)
BILIRUB SERPL-MCNC: 0.8 MG/DL (ref 0–1)
BUN SERPL-MCNC: 12 MG/DL (ref 7–20)
CALCIUM SERPL-MCNC: 10.8 MG/DL (ref 8.3–10.6)
CHLORIDE SERPL-SCNC: 96 MMOL/L (ref 99–110)
CO2 SERPL-SCNC: 24 MMOL/L (ref 21–32)
CREAT SERPL-MCNC: 1.4 MG/DL (ref 0.6–1.2)
GFR SERPLBLD CREATININE-BSD FMLA CKD-EPI: 39 ML/MIN/{1.73_M2}
GLUCOSE SERPL-MCNC: 75 MG/DL (ref 70–99)
POTASSIUM SERPL-SCNC: 3.8 MMOL/L (ref 3.5–5.1)
PROT SERPL-MCNC: 7.5 G/DL (ref 6.4–8.2)
SODIUM SERPL-SCNC: 135 MMOL/L (ref 136–145)
T4 FREE SERPL-MCNC: 2.2 NG/DL (ref 0.9–1.8)
TSH SERPL DL<=0.005 MIU/L-ACNC: 6.45 UIU/ML (ref 0.27–4.2)

## 2024-10-22 ENCOUNTER — APPOINTMENT (OUTPATIENT)
Dept: CT IMAGING | Age: 74
DRG: 101 | End: 2024-10-22
Payer: MEDICARE

## 2024-10-22 ENCOUNTER — APPOINTMENT (OUTPATIENT)
Dept: GENERAL RADIOLOGY | Age: 74
DRG: 101 | End: 2024-10-22
Payer: MEDICARE

## 2024-10-22 ENCOUNTER — HOSPITAL ENCOUNTER (INPATIENT)
Age: 74
LOS: 8 days | Discharge: NURSING, REHAB OR LONG-TERM CARE | DRG: 101 | End: 2024-10-30
Attending: STUDENT IN AN ORGANIZED HEALTH CARE EDUCATION/TRAINING PROGRAM | Admitting: STUDENT IN AN ORGANIZED HEALTH CARE EDUCATION/TRAINING PROGRAM
Payer: MEDICARE

## 2024-10-22 DIAGNOSIS — R29.90 STROKE-LIKE SYMPTOMS: Primary | ICD-10-CM

## 2024-10-22 DIAGNOSIS — G93.40 ENCEPHALOPATHY ACUTE: ICD-10-CM

## 2024-10-22 DIAGNOSIS — K21.9 GASTROESOPHAGEAL REFLUX DISEASE, UNSPECIFIED WHETHER ESOPHAGITIS PRESENT: ICD-10-CM

## 2024-10-22 DIAGNOSIS — F41.9 ANXIETY: ICD-10-CM

## 2024-10-22 PROBLEM — I10 HYPERTENSION: Status: ACTIVE | Noted: 2024-10-22

## 2024-10-22 PROBLEM — F32.0 CURRENT MILD EPISODE OF MAJOR DEPRESSIVE DISORDER, UNSPECIFIED WHETHER RECURRENT (HCC): Status: RESOLVED | Noted: 2023-04-06 | Resolved: 2024-10-22

## 2024-10-22 PROBLEM — E83.42 HYPOMAGNESEMIA: Status: RESOLVED | Noted: 2024-10-02 | Resolved: 2024-10-22

## 2024-10-22 PROBLEM — R41.82 AMS (ALTERED MENTAL STATUS): Status: RESOLVED | Noted: 2024-09-29 | Resolved: 2024-10-22

## 2024-10-22 PROBLEM — I21.4 NSTEMI (NON-ST ELEVATED MYOCARDIAL INFARCTION) (HCC): Status: RESOLVED | Noted: 2024-04-29 | Resolved: 2024-10-22

## 2024-10-22 PROBLEM — G92.8 TOXIC METABOLIC ENCEPHALOPATHY: Status: RESOLVED | Noted: 2024-09-30 | Resolved: 2024-10-22

## 2024-10-22 PROBLEM — E87.1 HYPONATREMIA: Status: RESOLVED | Noted: 2024-10-02 | Resolved: 2024-10-22

## 2024-10-22 PROBLEM — E03.5 MYXEDEMA COMA (HCC): Status: RESOLVED | Noted: 2024-10-02 | Resolved: 2024-10-22

## 2024-10-22 PROBLEM — I16.1 HYPERTENSIVE EMERGENCY: Status: RESOLVED | Noted: 2020-11-12 | Resolved: 2024-10-22

## 2024-10-22 PROBLEM — Z91.148 NONCOMPLIANCE WITH MEDICATION REGIMEN: Status: RESOLVED | Noted: 2024-05-23 | Resolved: 2024-10-22

## 2024-10-22 PROBLEM — R74.01 TRANSAMINITIS: Status: RESOLVED | Noted: 2024-10-02 | Resolved: 2024-10-22

## 2024-10-22 LAB
ALBUMIN SERPL-MCNC: 4.6 G/DL (ref 3.4–5)
ALBUMIN/GLOB SERPL: 1.3 {RATIO} (ref 1.1–2.2)
ALP SERPL-CCNC: 115 U/L (ref 40–129)
ALT SERPL-CCNC: 34 U/L (ref 10–40)
ANION GAP SERPL CALCULATED.3IONS-SCNC: 13 MMOL/L (ref 3–16)
AST SERPL-CCNC: 38 U/L (ref 15–37)
BASOPHILS # BLD: 0.1 K/UL (ref 0–0.2)
BASOPHILS NFR BLD: 1.1 %
BILIRUB SERPL-MCNC: 0.8 MG/DL (ref 0–1)
BILIRUB UR QL STRIP.AUTO: NEGATIVE
BUN SERPL-MCNC: 17 MG/DL (ref 7–20)
CALCIUM SERPL-MCNC: 10.6 MG/DL (ref 8.3–10.6)
CHLORIDE SERPL-SCNC: 102 MMOL/L (ref 99–110)
CLARITY UR: CLEAR
CO2 SERPL-SCNC: 24 MMOL/L (ref 21–32)
COLOR UR: YELLOW
CREAT SERPL-MCNC: 1.3 MG/DL (ref 0.6–1.2)
DEPRECATED RDW RBC AUTO: 15.5 % (ref 12.4–15.4)
EOSINOPHIL # BLD: 0.1 K/UL (ref 0–0.6)
EOSINOPHIL NFR BLD: 1.6 %
EPI CELLS #/AREA URNS HPF: ABNORMAL /HPF (ref 0–5)
GFR SERPLBLD CREATININE-BSD FMLA CKD-EPI: 43 ML/MIN/{1.73_M2}
GLUCOSE SERPL-MCNC: 97 MG/DL (ref 70–99)
GLUCOSE UR STRIP.AUTO-MCNC: NEGATIVE MG/DL
HCT VFR BLD AUTO: 36.2 % (ref 36–48)
HGB BLD-MCNC: 12.2 G/DL (ref 12–16)
HGB UR QL STRIP.AUTO: NEGATIVE
HYALINE CASTS #/AREA URNS LPF: ABNORMAL /LPF (ref 0–2)
KETONES UR STRIP.AUTO-MCNC: NEGATIVE MG/DL
LEUKOCYTE ESTERASE UR QL STRIP.AUTO: NEGATIVE
LYMPHOCYTES # BLD: 1.7 K/UL (ref 1–5.1)
LYMPHOCYTES NFR BLD: 22.8 %
MAGNESIUM SERPL-MCNC: 1.9 MG/DL (ref 1.8–2.4)
MCH RBC QN AUTO: 33.7 PG (ref 26–34)
MCHC RBC AUTO-ENTMCNC: 33.6 G/DL (ref 31–36)
MCV RBC AUTO: 100.3 FL (ref 80–100)
MONOCYTES # BLD: 0.6 K/UL (ref 0–1.3)
MONOCYTES NFR BLD: 8.1 %
MUCOUS THREADS #/AREA URNS LPF: ABNORMAL /LPF
NEUTROPHILS # BLD: 4.8 K/UL (ref 1.7–7.7)
NEUTROPHILS NFR BLD: 66.4 %
NITRITE UR QL STRIP.AUTO: NEGATIVE
PH UR STRIP.AUTO: 6 [PH] (ref 5–8)
PLATELET # BLD AUTO: 234 K/UL (ref 135–450)
PMV BLD AUTO: 8.4 FL (ref 5–10.5)
POTASSIUM SERPL-SCNC: 3.5 MMOL/L (ref 3.5–5.1)
PROT SERPL-MCNC: 8.2 G/DL (ref 6.4–8.2)
PROT UR STRIP.AUTO-MCNC: ABNORMAL MG/DL
RBC # BLD AUTO: 3.61 M/UL (ref 4–5.2)
RBC #/AREA URNS HPF: ABNORMAL /HPF (ref 0–4)
SODIUM SERPL-SCNC: 139 MMOL/L (ref 136–145)
SP GR UR STRIP.AUTO: 1.02 (ref 1–1.03)
TSH SERPL DL<=0.005 MIU/L-ACNC: 0.19 UIU/ML (ref 0.27–4.2)
UA COMPLETE W REFLEX CULTURE PNL UR: ABNORMAL
UA DIPSTICK W REFLEX MICRO PNL UR: YES
URN SPEC COLLECT METH UR: ABNORMAL
UROBILINOGEN UR STRIP-ACNC: 4 E.U./DL
WBC # BLD AUTO: 7.3 K/UL (ref 4–11)
WBC #/AREA URNS HPF: ABNORMAL /HPF (ref 0–5)

## 2024-10-22 PROCEDURE — 84443 ASSAY THYROID STIM HORMONE: CPT

## 2024-10-22 PROCEDURE — 2060000000 HC ICU INTERMEDIATE R&B

## 2024-10-22 PROCEDURE — 85025 COMPLETE CBC W/AUTO DIFF WBC: CPT

## 2024-10-22 PROCEDURE — 99285 EMERGENCY DEPT VISIT HI MDM: CPT

## 2024-10-22 PROCEDURE — 84439 ASSAY OF FREE THYROXINE: CPT

## 2024-10-22 PROCEDURE — 81001 URINALYSIS AUTO W/SCOPE: CPT

## 2024-10-22 PROCEDURE — 70450 CT HEAD/BRAIN W/O DYE: CPT

## 2024-10-22 PROCEDURE — 80053 COMPREHEN METABOLIC PANEL: CPT

## 2024-10-22 PROCEDURE — 83735 ASSAY OF MAGNESIUM: CPT

## 2024-10-22 PROCEDURE — 71045 X-RAY EXAM CHEST 1 VIEW: CPT

## 2024-10-22 RX ORDER — AMLODIPINE BESYLATE 5 MG/1
5 TABLET ORAL DAILY
Status: DISCONTINUED | OUTPATIENT
Start: 2024-10-23 | End: 2024-10-24

## 2024-10-22 RX ORDER — MAGNESIUM SULFATE IN WATER 40 MG/ML
2000 INJECTION, SOLUTION INTRAVENOUS PRN
Status: DISCONTINUED | OUTPATIENT
Start: 2024-10-22 | End: 2024-10-30 | Stop reason: HOSPADM

## 2024-10-22 RX ORDER — SODIUM CHLORIDE 0.9 % (FLUSH) 0.9 %
5-40 SYRINGE (ML) INJECTION EVERY 12 HOURS SCHEDULED
Status: DISCONTINUED | OUTPATIENT
Start: 2024-10-22 | End: 2024-10-30 | Stop reason: HOSPADM

## 2024-10-22 RX ORDER — ACETAMINOPHEN 650 MG/1
650 SUPPOSITORY RECTAL EVERY 6 HOURS PRN
Status: DISCONTINUED | OUTPATIENT
Start: 2024-10-22 | End: 2024-10-30 | Stop reason: HOSPADM

## 2024-10-22 RX ORDER — CLOPIDOGREL BISULFATE 75 MG/1
75 TABLET ORAL DAILY
Status: DISCONTINUED | OUTPATIENT
Start: 2024-10-23 | End: 2024-10-30 | Stop reason: HOSPADM

## 2024-10-22 RX ORDER — ISOSORBIDE MONONITRATE 30 MG/1
30 TABLET, EXTENDED RELEASE ORAL DAILY
Status: DISCONTINUED | OUTPATIENT
Start: 2024-10-23 | End: 2024-10-30 | Stop reason: HOSPADM

## 2024-10-22 RX ORDER — LEVOTHYROXINE SODIUM 150 UG/1
150 TABLET ORAL DAILY
Status: DISCONTINUED | OUTPATIENT
Start: 2024-10-23 | End: 2024-10-23

## 2024-10-22 RX ORDER — ACETAMINOPHEN 325 MG/1
650 TABLET ORAL EVERY 6 HOURS PRN
Status: DISCONTINUED | OUTPATIENT
Start: 2024-10-22 | End: 2024-10-30 | Stop reason: HOSPADM

## 2024-10-22 RX ORDER — ATORVASTATIN CALCIUM 40 MG/1
80 TABLET, FILM COATED ORAL DAILY
Status: DISCONTINUED | OUTPATIENT
Start: 2024-10-23 | End: 2024-10-30 | Stop reason: HOSPADM

## 2024-10-22 RX ORDER — HYDRALAZINE HYDROCHLORIDE 25 MG/1
25 TABLET, FILM COATED ORAL EVERY 8 HOURS SCHEDULED
Status: DISCONTINUED | OUTPATIENT
Start: 2024-10-22 | End: 2024-10-24

## 2024-10-22 RX ORDER — POLYETHYLENE GLYCOL 3350 17 G/17G
17 POWDER, FOR SOLUTION ORAL DAILY PRN
Status: DISCONTINUED | OUTPATIENT
Start: 2024-10-22 | End: 2024-10-30 | Stop reason: HOSPADM

## 2024-10-22 RX ORDER — PANTOPRAZOLE SODIUM 20 MG/1
20 TABLET, DELAYED RELEASE ORAL 2 TIMES DAILY
Status: DISCONTINUED | OUTPATIENT
Start: 2024-10-22 | End: 2024-10-30 | Stop reason: HOSPADM

## 2024-10-22 RX ORDER — LEVETIRACETAM 500 MG/5ML
1000 INJECTION, SOLUTION, CONCENTRATE INTRAVENOUS ONCE
Status: COMPLETED | OUTPATIENT
Start: 2024-10-23 | End: 2024-10-23

## 2024-10-22 RX ORDER — VENLAFAXINE HYDROCHLORIDE 75 MG/1
150 CAPSULE, EXTENDED RELEASE ORAL DAILY
Status: DISCONTINUED | OUTPATIENT
Start: 2024-10-23 | End: 2024-10-30 | Stop reason: HOSPADM

## 2024-10-22 RX ORDER — SODIUM CHLORIDE 9 MG/ML
INJECTION, SOLUTION INTRAVENOUS PRN
Status: DISCONTINUED | OUTPATIENT
Start: 2024-10-22 | End: 2024-10-30 | Stop reason: HOSPADM

## 2024-10-22 RX ORDER — POTASSIUM CHLORIDE 1500 MG/1
40 TABLET, EXTENDED RELEASE ORAL PRN
Status: DISCONTINUED | OUTPATIENT
Start: 2024-10-22 | End: 2024-10-30 | Stop reason: HOSPADM

## 2024-10-22 RX ORDER — LOSARTAN POTASSIUM 100 MG/1
100 TABLET ORAL DAILY
Status: DISCONTINUED | OUTPATIENT
Start: 2024-10-23 | End: 2024-10-30 | Stop reason: HOSPADM

## 2024-10-22 RX ORDER — POTASSIUM CHLORIDE 1500 MG/1
20 TABLET, EXTENDED RELEASE ORAL DAILY
Status: DISCONTINUED | OUTPATIENT
Start: 2024-10-23 | End: 2024-10-26

## 2024-10-22 RX ORDER — SPIRONOLACTONE 25 MG/1
25 TABLET ORAL DAILY
Status: DISCONTINUED | OUTPATIENT
Start: 2024-10-23 | End: 2024-10-30 | Stop reason: HOSPADM

## 2024-10-22 RX ORDER — EZETIMIBE 10 MG/1
10 TABLET ORAL NIGHTLY
Status: DISCONTINUED | OUTPATIENT
Start: 2024-10-22 | End: 2024-10-30 | Stop reason: HOSPADM

## 2024-10-22 RX ORDER — SODIUM CHLORIDE 0.9 % (FLUSH) 0.9 %
5-40 SYRINGE (ML) INJECTION PRN
Status: DISCONTINUED | OUTPATIENT
Start: 2024-10-22 | End: 2024-10-30 | Stop reason: HOSPADM

## 2024-10-22 RX ORDER — ONDANSETRON 2 MG/ML
4 INJECTION INTRAMUSCULAR; INTRAVENOUS EVERY 6 HOURS PRN
Status: DISCONTINUED | OUTPATIENT
Start: 2024-10-22 | End: 2024-10-30 | Stop reason: HOSPADM

## 2024-10-22 RX ORDER — ONDANSETRON 4 MG/1
4 TABLET, ORALLY DISINTEGRATING ORAL EVERY 8 HOURS PRN
Status: DISCONTINUED | OUTPATIENT
Start: 2024-10-22 | End: 2024-10-30 | Stop reason: HOSPADM

## 2024-10-22 RX ORDER — ENOXAPARIN SODIUM 100 MG/ML
40 INJECTION SUBCUTANEOUS DAILY
Status: DISCONTINUED | OUTPATIENT
Start: 2024-10-23 | End: 2024-10-30 | Stop reason: HOSPADM

## 2024-10-22 ASSESSMENT — PAIN - FUNCTIONAL ASSESSMENT: PAIN_FUNCTIONAL_ASSESSMENT: NONE - DENIES PAIN

## 2024-10-22 ASSESSMENT — LIFESTYLE VARIABLES
HOW OFTEN DO YOU HAVE A DRINK CONTAINING ALCOHOL: MONTHLY OR LESS
HOW MANY STANDARD DRINKS CONTAINING ALCOHOL DO YOU HAVE ON A TYPICAL DAY: 1 OR 2

## 2024-10-23 ENCOUNTER — APPOINTMENT (OUTPATIENT)
Dept: MRI IMAGING | Age: 74
DRG: 101 | End: 2024-10-23
Payer: MEDICARE

## 2024-10-23 ENCOUNTER — APPOINTMENT (OUTPATIENT)
Age: 74
DRG: 101 | End: 2024-10-23
Attending: STUDENT IN AN ORGANIZED HEALTH CARE EDUCATION/TRAINING PROGRAM
Payer: MEDICARE

## 2024-10-23 LAB
ANION GAP SERPL CALCULATED.3IONS-SCNC: 15 MMOL/L (ref 3–16)
BASOPHILS # BLD: 0.1 K/UL (ref 0–0.2)
BASOPHILS NFR BLD: 1 %
BUN SERPL-MCNC: 17 MG/DL (ref 7–20)
CALCIUM SERPL-MCNC: 10.4 MG/DL (ref 8.3–10.6)
CHLORIDE SERPL-SCNC: 103 MMOL/L (ref 99–110)
CO2 SERPL-SCNC: 19 MMOL/L (ref 21–32)
CREAT SERPL-MCNC: 1 MG/DL (ref 0.6–1.2)
DEPRECATED RDW RBC AUTO: 15.9 % (ref 12.4–15.4)
ECHO AO ROOT DIAM: 3.1 CM
ECHO AO ROOT INDEX: 1.96 CM/M2
ECHO BSA: 1.61 M2
ECHO IVC EXP: 0.8 CM
ECHO IVC INSP: 0.4 CM
ECHO LV EF PHYSICIAN: 65 %
ECHO LV FRACTIONAL SHORTENING: 38 % (ref 28–44)
ECHO LV INTERNAL DIMENSION DIASTOLE INDEX: 2.03 CM/M2
ECHO LV INTERNAL DIMENSION DIASTOLIC: 3.2 CM (ref 3.9–5.3)
ECHO LV INTERNAL DIMENSION SYSTOLIC INDEX: 1.27 CM/M2
ECHO LV INTERNAL DIMENSION SYSTOLIC: 2 CM
ECHO LV IVSD: 1.2 CM (ref 0.6–0.9)
ECHO LV MASS 2D: 104.3 G (ref 67–162)
ECHO LV MASS INDEX 2D: 66 G/M2 (ref 43–95)
ECHO LV POSTERIOR WALL DIASTOLIC: 1 CM (ref 0.6–0.9)
ECHO LV RELATIVE WALL THICKNESS RATIO: 0.63
EOSINOPHIL # BLD: 0.2 K/UL (ref 0–0.6)
EOSINOPHIL NFR BLD: 2.8 %
GFR SERPLBLD CREATININE-BSD FMLA CKD-EPI: 59 ML/MIN/{1.73_M2}
GLUCOSE BLD-MCNC: 188 MG/DL (ref 70–99)
GLUCOSE BLD-MCNC: 75 MG/DL (ref 70–99)
GLUCOSE SERPL-MCNC: 77 MG/DL (ref 70–99)
HCT VFR BLD AUTO: 35.5 % (ref 36–48)
HGB BLD-MCNC: 12 G/DL (ref 12–16)
LYMPHOCYTES # BLD: 2 K/UL (ref 1–5.1)
LYMPHOCYTES NFR BLD: 26.7 %
MAGNESIUM SERPL-MCNC: 2 MG/DL (ref 1.8–2.4)
MCH RBC QN AUTO: 34.4 PG (ref 26–34)
MCHC RBC AUTO-ENTMCNC: 33.8 G/DL (ref 31–36)
MCV RBC AUTO: 101.6 FL (ref 80–100)
MONOCYTES # BLD: 0.6 K/UL (ref 0–1.3)
MONOCYTES NFR BLD: 8.4 %
NEUTROPHILS # BLD: 4.5 K/UL (ref 1.7–7.7)
NEUTROPHILS NFR BLD: 61.1 %
PERFORMED ON: ABNORMAL
PERFORMED ON: NORMAL
PLATELET # BLD AUTO: 203 K/UL (ref 135–450)
PMV BLD AUTO: 8.3 FL (ref 5–10.5)
POTASSIUM SERPL-SCNC: 3.5 MMOL/L (ref 3.5–5.1)
RBC # BLD AUTO: 3.49 M/UL (ref 4–5.2)
SODIUM SERPL-SCNC: 137 MMOL/L (ref 136–145)
T4 FREE SERPL-MCNC: 3.6 NG/DL (ref 0.9–1.8)
WBC # BLD AUTO: 7.5 K/UL (ref 4–11)

## 2024-10-23 PROCEDURE — 97116 GAIT TRAINING THERAPY: CPT

## 2024-10-23 PROCEDURE — 2580000003 HC RX 258: Performed by: STUDENT IN AN ORGANIZED HEALTH CARE EDUCATION/TRAINING PROGRAM

## 2024-10-23 PROCEDURE — 93308 TTE F-UP OR LMTD: CPT | Performed by: STUDENT IN AN ORGANIZED HEALTH CARE EDUCATION/TRAINING PROGRAM

## 2024-10-23 PROCEDURE — 70551 MRI BRAIN STEM W/O DYE: CPT

## 2024-10-23 PROCEDURE — 2060000000 HC ICU INTERMEDIATE R&B

## 2024-10-23 PROCEDURE — 93308 TTE F-UP OR LMTD: CPT

## 2024-10-23 PROCEDURE — 97162 PT EVAL MOD COMPLEX 30 MIN: CPT

## 2024-10-23 PROCEDURE — 6360000002 HC RX W HCPCS: Performed by: STUDENT IN AN ORGANIZED HEALTH CARE EDUCATION/TRAINING PROGRAM

## 2024-10-23 PROCEDURE — 97166 OT EVAL MOD COMPLEX 45 MIN: CPT

## 2024-10-23 PROCEDURE — 83735 ASSAY OF MAGNESIUM: CPT

## 2024-10-23 PROCEDURE — 97530 THERAPEUTIC ACTIVITIES: CPT

## 2024-10-23 PROCEDURE — 99223 1ST HOSP IP/OBS HIGH 75: CPT | Performed by: PSYCHIATRY & NEUROLOGY

## 2024-10-23 PROCEDURE — 95816 EEG AWAKE AND DROWSY: CPT

## 2024-10-23 PROCEDURE — 93325 DOPPLER ECHO COLOR FLOW MAPG: CPT | Performed by: STUDENT IN AN ORGANIZED HEALTH CARE EDUCATION/TRAINING PROGRAM

## 2024-10-23 PROCEDURE — 97535 SELF CARE MNGMENT TRAINING: CPT

## 2024-10-23 PROCEDURE — 95816 EEG AWAKE AND DROWSY: CPT | Performed by: PSYCHIATRY & NEUROLOGY

## 2024-10-23 PROCEDURE — 6360000002 HC RX W HCPCS: Performed by: PSYCHIATRY & NEUROLOGY

## 2024-10-23 PROCEDURE — 36415 COLL VENOUS BLD VENIPUNCTURE: CPT

## 2024-10-23 PROCEDURE — 85025 COMPLETE CBC W/AUTO DIFF WBC: CPT

## 2024-10-23 PROCEDURE — 80048 BASIC METABOLIC PNL TOTAL CA: CPT

## 2024-10-23 PROCEDURE — 99232 SBSQ HOSP IP/OBS MODERATE 35: CPT | Performed by: INTERNAL MEDICINE

## 2024-10-23 PROCEDURE — 6370000000 HC RX 637 (ALT 250 FOR IP): Performed by: STUDENT IN AN ORGANIZED HEALTH CARE EDUCATION/TRAINING PROGRAM

## 2024-10-23 RX ORDER — LEVETIRACETAM 500 MG/5ML
750 INJECTION, SOLUTION, CONCENTRATE INTRAVENOUS EVERY 12 HOURS
Status: DISCONTINUED | OUTPATIENT
Start: 2024-10-23 | End: 2024-10-24

## 2024-10-23 RX ORDER — MECOBALAMIN 5000 MCG
10 TABLET,DISINTEGRATING ORAL NIGHTLY
Status: DISCONTINUED | OUTPATIENT
Start: 2024-10-23 | End: 2024-10-25

## 2024-10-23 RX ORDER — HALOPERIDOL 5 MG/ML
5 INJECTION INTRAMUSCULAR EVERY 6 HOURS PRN
Status: DISCONTINUED | OUTPATIENT
Start: 2024-10-23 | End: 2024-10-30 | Stop reason: HOSPADM

## 2024-10-23 RX ADMIN — POTASSIUM CHLORIDE 20 MEQ: 1500 TABLET, EXTENDED RELEASE ORAL at 09:54

## 2024-10-23 RX ADMIN — ENOXAPARIN SODIUM 40 MG: 100 INJECTION SUBCUTANEOUS at 09:54

## 2024-10-23 RX ADMIN — PANTOPRAZOLE SODIUM 20 MG: 20 TABLET, DELAYED RELEASE ORAL at 00:34

## 2024-10-23 RX ADMIN — Medication 10 ML: at 00:33

## 2024-10-23 RX ADMIN — VENLAFAXINE HYDROCHLORIDE 150 MG: 75 CAPSULE, EXTENDED RELEASE ORAL at 09:54

## 2024-10-23 RX ADMIN — EZETIMIBE 10 MG: 10 TABLET ORAL at 00:32

## 2024-10-23 RX ADMIN — EZETIMIBE 10 MG: 10 TABLET ORAL at 20:32

## 2024-10-23 RX ADMIN — Medication 10 ML: at 09:53

## 2024-10-23 RX ADMIN — ISOSORBIDE MONONITRATE 30 MG: 30 TABLET, EXTENDED RELEASE ORAL at 09:54

## 2024-10-23 RX ADMIN — PANTOPRAZOLE SODIUM 20 MG: 20 TABLET, DELAYED RELEASE ORAL at 09:54

## 2024-10-23 RX ADMIN — PANTOPRAZOLE SODIUM 20 MG: 20 TABLET, DELAYED RELEASE ORAL at 20:32

## 2024-10-23 RX ADMIN — LEVOTHYROXINE SODIUM 150 MCG: 0.15 TABLET ORAL at 05:53

## 2024-10-23 RX ADMIN — HALOPERIDOL LACTATE 5 MG: 5 INJECTION, SOLUTION INTRAMUSCULAR at 23:00

## 2024-10-23 RX ADMIN — CLOPIDOGREL BISULFATE 75 MG: 75 TABLET ORAL at 09:54

## 2024-10-23 RX ADMIN — SPIRONOLACTONE 25 MG: 25 TABLET ORAL at 09:54

## 2024-10-23 RX ADMIN — HYDRALAZINE HYDROCHLORIDE 25 MG: 25 TABLET ORAL at 00:32

## 2024-10-23 RX ADMIN — ATORVASTATIN CALCIUM 80 MG: 40 TABLET, FILM COATED ORAL at 09:54

## 2024-10-23 RX ADMIN — LEVETIRACETAM 1000 MG: 100 INJECTION INTRAVENOUS at 00:34

## 2024-10-23 RX ADMIN — LEVETIRACETAM 750 MG: 100 INJECTION, SOLUTION INTRAVENOUS at 17:25

## 2024-10-23 RX ADMIN — LOSARTAN POTASSIUM 100 MG: 100 TABLET, FILM COATED ORAL at 09:54

## 2024-10-23 ASSESSMENT — PAIN SCALES - GENERAL
PAINLEVEL_OUTOF10: 0

## 2024-10-23 NOTE — H&P
Hospitalist History and Physical      PCP: Janet Live, APRN - CNP    Date of Admission: 10/22/2024     Anticipated Discharge Day/Date -10/24/2024    Anticipated Discharge Location:  [x]Home  []HHC  []SNF  []Acute Rehab  []Hospice    Assessment/Plan:      Stroke-like symptoms    Sudden onset confusion, strokelike symptoms.  Patient takes home Plavix.  CT head nonacute.  Had recent CTA in July which showed no LVO.  No MRI on file, obtain.  Status post aspirin in the ED, likely on DAPT aspirin and Plavix for 3 weeks followed by alternative antiplatelet.  Appreciate neurology in consult  Hypertension.  Continue Norvasc, Imdur, ARB, Aldactone, hydralazine.  EF 60-65 on echo 2021, I do not see associated bubble study  CKD 3A.  Appears to have progressive renal disease since earlier in the month.  Potentially underlying HUEY.  SCR is 1.3 on admission, GFR 40s.  Suspect hypertensive renal disease.  Treat with BP control  Hypothyroid.  Patient takes home Synthroid 150 mcg.  Labs on admission 10/22 actually appear hyperthyroid TSH is 0.19, pending FT4.  Prior to this 10/10 TSH was 6.45.  Continue current dosing for now pending further FT4  Mood disorder-continue Effexor    Other chronic medical conditions reviewed and managed.    DVT Prophylaxis:  [x]PPx LMWH  []SQ Heparin  []IPC/SCDs  []Eliquis  []Xarelto  []Coumadin  []Other -        Diet: ADULT DIET; Regular  Code Status: Full Code      78 Minutes were spent solely for medical decision making for this patient including documentation, ordering and interpreting labs imaging and studies, independently reviewing the chart as well as discussing plan of care with the family patient ancillary staff and coordinating their care.    Chief Complaint, History of Present Illness, Review of Systems       Chief Complaint   sudden onset confusion    History of Present Illness   Siobhan Flores is a 73 y.o. female with a history of dementia, hypertension, CKD 3,

## 2024-10-23 NOTE — ED PROVIDER NOTES
[facility] PCU TELEMETRY      CHIEF COMPLAINT  Altered Mental Status (This morning started with worsening confusion. Hx dementia )       HISTORY OF PRESENT ILLNESS  Siobhan Flores is a 73 y.o. female  who presents to the ED with her  complaining of confusion.  Has not reports that patient was her usual self this morning, however he ran some errands and when he came back about an hour and a half later she was very confused and was packing up her things to leave.  States that she was very disoriented and not making sense.  States that she had a similar episode about a month ago and her \"medications were out of whack.\"  On review of that hospitalization, patient had severe hypothyroidism due to noncompliance.  He is uncertain if she took extra of her medications, but feels that they are having trouble managing her medications at home.    No other complaints, modifying factors or associated symptoms.     I have reviewed the following from the nursing documentation.    Past Medical History:   Diagnosis Date    Alzheimer disease (HCC)     Anxiety     CAD (coronary artery disease)     Dementia (HCC)     Fibromyalgia     Hyperlipidemia     Hypertension     Hypothyroidism     Osteopenia     Dexa 7/08    Thyroid disease     Transient ischemic attack 2003     Past Surgical History:   Procedure Laterality Date    APPENDECTOMY      COLONOSCOPY      CORONARY STENT PLACEMENT  03/30/2023    EYE SURGERY      HYSTERECTOMY (CERVIX STATUS UNKNOWN)  1989     Family History   Problem Relation Age of Onset    Ovarian Cancer Mother     High Blood Pressure Father      Social History     Socioeconomic History    Marital status:      Spouse name: Not on file    Number of children: Not on file    Years of education: Not on file    Highest education level: Not on file   Occupational History    Not on file   Tobacco Use    Smoking status: Former     Current packs/day: 0.50     Types: Cigarettes    Smokeless tobacco: Never    Tobacco

## 2024-10-23 NOTE — FLOWSHEET NOTE
10/22/24 2330   Vital Signs   Temp 98 °F (36.7 °C)   Temp Source Oral   Pulse 95   Heart Rate Source Monitor   Respirations 17   BP (!) 155/84   MAP (Calculated) 108   BP Location Left upper arm   BP Method Automatic   Patient Position Semi fowlers   Pain Assessment   Pain Assessment None - Denies Pain   Care Plan - Pain Goals   Verbalizes/displays adequate comfort level or baseline comfort level Assess pain using appropriate pain scale   Opioid-Induced Sedation   POSS Score 1   RASS   Brooks Agitation Sedation Scale (RASS) 0   Oxygen Therapy   SpO2 96 %   O2 Device None (Room air)   Height and Weight   Height 1.524 m (5')   Weight - Scale 61.4 kg (135 lb 4.8 oz)   Weight Method Actual;Bed scale   BSA (Calculated - sq m) 1.61 sq meters   BMI (Calculated) 26.5     Patient admitted to room 317 from ED. Patient is alert and oriented to self only. Telemetry box in place, patient aware of placement and reason.     Patient instructed about the schedule of the day including: vital sign frequency, lab draws, possible tests, frequency of MD and staff rounds, daily weights, I &O's and prescribed diet. Bed locked, in lowest position, side rails up 2/4, call light within reach.  Care plans and education updated, CHG wipes done at time of admission.     BP (!) 155/84   Pulse 95   Temp 98 °F (36.7 °C) (Oral)   Resp 17   Ht 1.524 m (5')   Wt 61.4 kg (135 lb 4.8 oz)   SpO2 96%   BMI 26.42 kg/m²     Most recent set of vitals as shown.     Patient has noLDA that require CHG wipes, including possible a surgery incision, hooker catheter, or a central line.       Bedside Mobility Assessment Tool (BMAT):     Assessment Level 1- Sit and Shake    1. From a semi-reclined position, ask patient to sit up and rotate to a seated position at the side of the bed. Can use the bedrail.    2. Ask patient to reach out and grab your hand and shake making sure patient reaches across his/her midline.   Pass- Patient is able to come to a

## 2024-10-23 NOTE — ACP (ADVANCE CARE PLANNING)
Advance Care Planning     General Advance Care Planning (ACP) Conversation    Date of Conversation: 10/23/2024  Conducted with: Patient with Decision Making Capacity and Legal next of kin  Other persons present: None    Healthcare Decision Maker:   Primary Decision Maker: Candido Flores - Spouse - [phone]       Content/Action Overview:  DECLINED ACP Conversation - will revisit periodically  Reviewed DNR/DNI and patient elects Full Code (Attempt Resuscitation)        Length of Voluntary ACP Conversation in minutes:  <16 minutes (Non-Billable)    Michele Garza RN

## 2024-10-23 NOTE — FLOWSHEET NOTE
10/23/24 0720   Handoff   Communication Given Shift Handoff   Handoff Given To Cosmo. RN   Handoff Received From Ji. RN   Handoff Communication Face to Face   Time Handoff Given 0720   End of Shift Check Performed Yes     Bedside handoff NIHSS done with incoming RN. Patient denies needs at this time.

## 2024-10-23 NOTE — CARE COORDINATION
Case Management Assessment  Initial Evaluation    Date/Time of Evaluation: 10/23/2024 8:12 AM  Assessment Completed by: Michele Garza RN    If patient is discharged prior to next notation, then this note serves as note for discharge by case management.    Patient Name: Siobhan Flores                   YOB: 1950  Diagnosis: Stroke-like symptoms [R29.90]                   Date / Time: 10/22/2024  4:19 PM    Patient Admission Status: Inpatient   Readmission Risk (Low < 19, Mod (19-27), High > 27): Readmission Risk Score: 17.2    Current PCP: Janet Live, JAMSHID - CNP  PCP verified by CM? Yes    Chart Reviewed: Yes      History Provided by: Medical Record  Patient Orientation: Person    Patient Cognition: Alert    Hospitalization in the last 30 days (Readmission):  Yes    If yes, Readmission Assessment in CM Navigator will be completed.    Advance Directives:      Code Status: Full Code   Patient's Primary Decision Maker is: Legal Next of Kin    Primary Decision Maker: Candido Flores - Spouse - [phone]    Discharge Planning:    Patient lives with: Spouse/Significant Other Type of Home: House  Primary Care Giver: Self  Patient Support Systems include: Spouse/Significant Other   Current Financial resources: Medicare  Current community resources: None  Current services prior to admission: Durable Medical Equipment            Current DME: Cane, Shower Chair, Walker (RW)            Type of Home Care services:  OT, PT, Nursing Services    ADLS  Prior functional level: Independent in ADLs/IADLs  Current functional level: Independent in ADLs/IADLs    PT AM-PAC:   /24  OT AM-PAC:   /24    Family can provide assistance at DC: Yes  Would you like Case Management to discuss the discharge plan with any other family members/significant others, and if so, who? Yes (spouse)  Plans to Return to Present Housing: Unknown at present  Other Identified Issues/Barriers to RETURNING to current housing: na  Potential

## 2024-10-23 NOTE — PROCEDURES
PROCEDURE NOTE  Date: 10/23/2024   Name: Siobhan Flores  YOB: 1950    Bedside EEG attempted at 0900. Pt was out of room. Pt's nurse informed me that pt is in MRI and likely will be for aprox. another 30 mins. Will re-attempt EEG at that time.

## 2024-10-23 NOTE — PROCEDURES
PROCEDURE NOTE  Date: 10/23/2024   Name: Siobhan Flores  YOB: 1950    EEG attempted once again at 0945. Pt is back from MRI, but was eating breakfast (which she apparently did not get to do prior to MRI). Will attempt again in 20 mins.

## 2024-10-23 NOTE — PROCEDURES
PROCEDURE NOTE  Date: 10/23/2024   Name: Siobhan Flores  YOB: 1950    30 minute bedside EEG now complete. Awaiting interpretation from Neuro.

## 2024-10-23 NOTE — PROCEDURES
INTERPRETATION:  This 30-minute, computer-assisted video EEG recording is abnormal.    1.  Frequent frontally predominant rhythmic delta activity (AKA FIRDA).  2.  Mild to moderate degree of generalized slowing background activity.       The EEG findings were consistent with mild to moderate degree of generalized non-specific cerebral dysfunction.  The findings of FIRDA were commonly seen in nonspecific encephalopathy.  However, a small portion of this pattern showed seizure tendency.     CLASSIFICATION:  Dysrhythmia grade 3, FIRDA  Sleep - unsuccessful.  EKG channel.     DESCRIPTION:     BACKGROUND:  The awake recording revealed 9 Hz alpha activity over the posterior head region.  There were increased 2 to 7 Hz theta/delta activity into the EEG background.  It also showed frequent frontally predominant rhythmic delta activity.  The pattern was between 0.5 to 1 Hz, lasting less than 10 seconds without clinical correlate.  Given the extensive study, the patient still did not sleep.  The EEG showed normal V waves during drowsiness.  There was no significant change on the EEG background with photic stimulation.  Hyperventilation was omitted due to old age.     INTERICTAL DISCHARGES: None     CLINICAL EVENTS:  None     The EKG channel was unremarkable.

## 2024-10-23 NOTE — PROCEDURES
PROCEDURE NOTE  Date: 10/23/2024   Name: Siobhan Flores  YOB: 1950    30 min bedside EEG attempted at 0810. Pt had breakfast tray but had not eaten yet. Pt stated that she would like to eat breakfast before the test. Will attempt test again around [id number].

## 2024-10-24 LAB
GLUCOSE BLD-MCNC: 85 MG/DL (ref 70–99)
PERFORMED ON: NORMAL

## 2024-10-24 PROCEDURE — 99232 SBSQ HOSP IP/OBS MODERATE 35: CPT | Performed by: INTERNAL MEDICINE

## 2024-10-24 PROCEDURE — 6360000002 HC RX W HCPCS: Performed by: STUDENT IN AN ORGANIZED HEALTH CARE EDUCATION/TRAINING PROGRAM

## 2024-10-24 PROCEDURE — 99233 SBSQ HOSP IP/OBS HIGH 50: CPT | Performed by: PSYCHIATRY & NEUROLOGY

## 2024-10-24 PROCEDURE — 95718 EEG PHYS/QHP 2-12 HR W/VEEG: CPT | Performed by: PSYCHIATRY & NEUROLOGY

## 2024-10-24 PROCEDURE — 6370000000 HC RX 637 (ALT 250 FOR IP): Performed by: INTERNAL MEDICINE

## 2024-10-24 PROCEDURE — 6370000000 HC RX 637 (ALT 250 FOR IP): Performed by: PSYCHIATRY & NEUROLOGY

## 2024-10-24 PROCEDURE — 2060000000 HC ICU INTERMEDIATE R&B

## 2024-10-24 PROCEDURE — 95813 EEG EXTND MNTR 61-119 MIN: CPT

## 2024-10-24 PROCEDURE — 6370000000 HC RX 637 (ALT 250 FOR IP): Performed by: STUDENT IN AN ORGANIZED HEALTH CARE EDUCATION/TRAINING PROGRAM

## 2024-10-24 RX ORDER — LEVETIRACETAM 500 MG/1
500 TABLET ORAL 2 TIMES DAILY
Status: DISCONTINUED | OUTPATIENT
Start: 2024-10-24 | End: 2024-10-30 | Stop reason: HOSPADM

## 2024-10-24 RX ORDER — QUETIAPINE FUMARATE 25 MG/1
12.5 TABLET, FILM COATED ORAL 2 TIMES DAILY
Status: DISCONTINUED | OUTPATIENT
Start: 2024-10-24 | End: 2024-10-25

## 2024-10-24 RX ADMIN — EZETIMIBE 10 MG: 10 TABLET ORAL at 19:51

## 2024-10-24 RX ADMIN — CLOPIDOGREL BISULFATE 75 MG: 75 TABLET ORAL at 09:58

## 2024-10-24 RX ADMIN — ATORVASTATIN CALCIUM 80 MG: 40 TABLET, FILM COATED ORAL at 09:58

## 2024-10-24 RX ADMIN — QUETIAPINE FUMARATE 12.5 MG: 25 TABLET ORAL at 19:51

## 2024-10-24 RX ADMIN — PANTOPRAZOLE SODIUM 20 MG: 20 TABLET, DELAYED RELEASE ORAL at 19:52

## 2024-10-24 RX ADMIN — QUETIAPINE FUMARATE 12.5 MG: 25 TABLET ORAL at 09:59

## 2024-10-24 RX ADMIN — PANTOPRAZOLE SODIUM 20 MG: 20 TABLET, DELAYED RELEASE ORAL at 09:58

## 2024-10-24 RX ADMIN — SPIRONOLACTONE 25 MG: 25 TABLET ORAL at 09:58

## 2024-10-24 RX ADMIN — ISOSORBIDE MONONITRATE 30 MG: 30 TABLET, EXTENDED RELEASE ORAL at 09:58

## 2024-10-24 RX ADMIN — LOSARTAN POTASSIUM 100 MG: 100 TABLET, FILM COATED ORAL at 09:57

## 2024-10-24 RX ADMIN — POTASSIUM CHLORIDE 20 MEQ: 1500 TABLET, EXTENDED RELEASE ORAL at 09:58

## 2024-10-24 RX ADMIN — ENOXAPARIN SODIUM 40 MG: 100 INJECTION SUBCUTANEOUS at 09:57

## 2024-10-24 RX ADMIN — VENLAFAXINE HYDROCHLORIDE 150 MG: 75 CAPSULE, EXTENDED RELEASE ORAL at 10:04

## 2024-10-24 RX ADMIN — LEVETIRACETAM 500 MG: 500 TABLET, FILM COATED ORAL at 20:17

## 2024-10-24 RX ADMIN — Medication 10 MG: at 19:52

## 2024-10-24 NOTE — FLOWSHEET NOTE
10/24/24 1140   Vital Signs   Temp 97.6 °F (36.4 °C)   Temp Source Axillary   Pulse 84   Heart Rate Source Monitor   Respirations 16   /79   MAP (Calculated) 96   BP Location Right upper arm   BP Method Automatic   Patient Position Semi fowlers   Pain Assessment   Pain Assessment None - Denies Pain   Oxygen Therapy   SpO2 97 %   O2 Device None (Room air)     EEG tech at bedside. They are unable to get pt to cooperate enough to roll over to apply EEG electrodes. It took writer multiple times of rousing pt to get her to roll over for vitals and for techs to place electrodes. Pt able to tell writer her name, confused to time and place. Will continue to monitor.

## 2024-10-24 NOTE — FLOWSHEET NOTE
10/24/24 0950   Vital Signs   Temp 97.5 °F (36.4 °C)   Temp Source Oral   Pulse 79   Heart Rate Source Monitor   Respirations 16   BP (!) 160/92   MAP (Calculated) 115   BP Location Right upper arm   BP Method Automatic   Patient Position Semi fowlers   Pain Assessment   Pain Assessment  Pain, Agitation, and Sedation Scale (N-PASS)   Oxygen Therapy   SpO2 99 %   O2 Device None (Room air)     AM assessment completed. MD at bedside @ 0900 and d/c posey belt and bilateral soft wrist restraints. Pt is alert to self, lethargic, calm, and able to follow commands. PT denies any pain or needs, pt refusing breakfast tray. Will continue to monitor.

## 2024-10-24 NOTE — PROCEDURES
INTERPRETATION:  This 121-minute, computer-assisted video EEG recording is mildly abnormal.  It showed mild degree of generalized slowing background activity.  No potentially epileptiform activity was present during the recording.      The EEG findings were consistent with mild degree of generalized non-specific cerebral dysfunction.    CLASSIFICATION:  Dysrhythmia grade 1, generalized.  Sleep - unsuccessful.  EKG channel.    DESCRIPTION:    BACKGROUND:  The awake recording revealed 9 Hz alpha activity over the posterior head region.  There were increased 4 to 7 Hz theta activity into the EEG background.  Given the extensive study, the patient still did not sleep.  The EEG showed normal V waves during drowsiness.  There was no significant change on the EEG background with photic stimulation.  Hyperventilation was omitted due to old age.      INTERICTAL DISCHARGES: None    CLINICAL EVENTS:  None    The EKG channel was unremarkable.

## 2024-10-25 LAB
GLUCOSE BLD-MCNC: 89 MG/DL (ref 70–99)
PERFORMED ON: NORMAL

## 2024-10-25 PROCEDURE — 99232 SBSQ HOSP IP/OBS MODERATE 35: CPT | Performed by: INTERNAL MEDICINE

## 2024-10-25 PROCEDURE — 6370000000 HC RX 637 (ALT 250 FOR IP): Performed by: INTERNAL MEDICINE

## 2024-10-25 PROCEDURE — 92610 EVALUATE SWALLOWING FUNCTION: CPT

## 2024-10-25 PROCEDURE — 2060000000 HC ICU INTERMEDIATE R&B

## 2024-10-25 PROCEDURE — 6360000002 HC RX W HCPCS: Performed by: STUDENT IN AN ORGANIZED HEALTH CARE EDUCATION/TRAINING PROGRAM

## 2024-10-25 PROCEDURE — 6370000000 HC RX 637 (ALT 250 FOR IP): Performed by: PSYCHIATRY & NEUROLOGY

## 2024-10-25 PROCEDURE — 99233 SBSQ HOSP IP/OBS HIGH 50: CPT | Performed by: PSYCHIATRY & NEUROLOGY

## 2024-10-25 PROCEDURE — 6370000000 HC RX 637 (ALT 250 FOR IP): Performed by: STUDENT IN AN ORGANIZED HEALTH CARE EDUCATION/TRAINING PROGRAM

## 2024-10-25 RX ORDER — QUETIAPINE FUMARATE 25 MG/1
12.5 TABLET, FILM COATED ORAL NIGHTLY
Status: DISCONTINUED | OUTPATIENT
Start: 2024-10-25 | End: 2024-10-29

## 2024-10-25 RX ORDER — MECOBALAMIN 5000 MCG
5 TABLET,DISINTEGRATING ORAL NIGHTLY
Qty: 30 TABLET | Refills: 0
Start: 2024-10-25 | End: 2024-10-30

## 2024-10-25 RX ORDER — LEVOTHYROXINE SODIUM 100 UG/1
100 TABLET ORAL DAILY
Qty: 30 TABLET | Refills: 0 | Status: SHIPPED | OUTPATIENT
Start: 2024-10-25 | End: 2024-10-30 | Stop reason: HOSPADM

## 2024-10-25 RX ORDER — QUETIAPINE FUMARATE 25 MG/1
12.5 TABLET, FILM COATED ORAL NIGHTLY
Qty: 30 TABLET | Refills: 0 | Status: SHIPPED | OUTPATIENT
Start: 2024-10-25 | End: 2024-10-30 | Stop reason: HOSPADM

## 2024-10-25 RX ORDER — LEVETIRACETAM 500 MG/1
500 TABLET ORAL 2 TIMES DAILY
Qty: 60 TABLET | Refills: 0 | Status: SHIPPED | OUTPATIENT
Start: 2024-10-25 | End: 2024-10-30

## 2024-10-25 RX ORDER — MECOBALAMIN 5000 MCG
5 TABLET,DISINTEGRATING ORAL NIGHTLY
Status: DISCONTINUED | OUTPATIENT
Start: 2024-10-25 | End: 2024-10-30 | Stop reason: HOSPADM

## 2024-10-25 RX ADMIN — ENOXAPARIN SODIUM 40 MG: 100 INJECTION SUBCUTANEOUS at 09:52

## 2024-10-25 RX ADMIN — CLOPIDOGREL BISULFATE 75 MG: 75 TABLET ORAL at 09:52

## 2024-10-25 RX ADMIN — PANTOPRAZOLE SODIUM 20 MG: 20 TABLET, DELAYED RELEASE ORAL at 20:12

## 2024-10-25 RX ADMIN — LEVETIRACETAM 500 MG: 500 TABLET, FILM COATED ORAL at 09:52

## 2024-10-25 RX ADMIN — EZETIMIBE 10 MG: 10 TABLET ORAL at 20:12

## 2024-10-25 RX ADMIN — Medication 5 MG: at 20:11

## 2024-10-25 RX ADMIN — POTASSIUM CHLORIDE 20 MEQ: 1500 TABLET, EXTENDED RELEASE ORAL at 09:52

## 2024-10-25 RX ADMIN — LEVETIRACETAM 500 MG: 500 TABLET, FILM COATED ORAL at 20:11

## 2024-10-25 RX ADMIN — ATORVASTATIN CALCIUM 80 MG: 40 TABLET, FILM COATED ORAL at 09:52

## 2024-10-25 RX ADMIN — PANTOPRAZOLE SODIUM 20 MG: 20 TABLET, DELAYED RELEASE ORAL at 09:52

## 2024-10-25 RX ADMIN — LOSARTAN POTASSIUM 100 MG: 100 TABLET, FILM COATED ORAL at 09:52

## 2024-10-25 RX ADMIN — QUETIAPINE FUMARATE 12.5 MG: 25 TABLET ORAL at 20:11

## 2024-10-25 RX ADMIN — VENLAFAXINE HYDROCHLORIDE 150 MG: 75 CAPSULE, EXTENDED RELEASE ORAL at 09:52

## 2024-10-25 NOTE — FLOWSHEET NOTE
10/25/24 0800   Vital Signs   Temp 97.9 °F (36.6 °C)   Temp Source Oral   Pulse 97   Heart Rate Source Monitor   Respirations 16   /74   MAP (Calculated) 87   BP Location Right upper arm   BP Method Automatic   Patient Position Supine   Oxygen Therapy   SpO2 95 %   O2 Device None (Room air)     Shift assessment completed. See flow sheet. Medications given, however, Patient unable/unwilling to swallow pills with water or pudding. Patient keeps attempting to chew medications. MD made aware. Patient is Alert to self only. Vitals are stable.  present. Call light within reach.    MD made aware that Patient has no IV access. Per MD no IV required.

## 2024-10-25 NOTE — CARE COORDINATION
Met with pt and spouse at bedside. Pt is alert to person only. Spouse is unable to provide care that pt requires at home by himself. Spouse would like pt to go LTC if possible. Medicaid application completed on 10/24. Spoke to Micheline Egan at the [facility] who states pt may be able to DC to [facility] with medicaid pending. Awaiting call with determination      1421: accepted by [facility]. Per Tiffany with [facility], she believes they are able to get pt approved for facility. Precert started

## 2024-10-25 NOTE — FLOWSHEET NOTE
10/25/24 1040   Vital Signs   Temp 97.6 °F (36.4 °C)   Temp Source Oral   Pulse 89   Heart Rate Source Monitor   Respirations 16   /77   MAP (Calculated) 90   BP Location Right upper arm   BP Method Automatic   Patient Position Up in chair   Pain Assessment   Pain Assessment None - Denies Pain   Oxygen Therapy   SpO2 98 %   O2 Device None (Room air)     Patient remains awake and alert to self. Vitals are stable. Call light within reach.

## 2024-10-25 NOTE — FLOWSHEET NOTE
10/24/24 1905   Vital Signs   Temp 97.3 °F (36.3 °C)   Temp Source Axillary   Pulse 94   Heart Rate Source Monitor   Respirations 15   /64   MAP (Calculated) 77   BP Location Right upper arm   BP Method Automatic   Patient Position Semi fowlers   Pain Assessment   Pain Assessment None - Denies Pain   Oxygen Therapy   SpO2 94 %   O2 Device None (Room air)     Shift assessment done   Vitals sf  Rx given per ,AR  Pt still confused,re orientation done  On tele sitter  Call light within reach  Bed in lowest position

## 2024-10-25 NOTE — FLOWSHEET NOTE
10/25/24 1500   Vital Signs   Temp 97.9 °F (36.6 °C)   Temp Source Oral   Pulse 92   Heart Rate Source Monitor   Respirations 16   /72   MAP (Calculated) 84   BP Location Right upper arm   BP Method Automatic   Patient Position Up in chair   Oxygen Therapy   SpO2 99 %   O2 Device None (Room air)     Vitals remain stable. Patient remains oriented to self only. Call light within reach.

## 2024-10-26 LAB
ANION GAP SERPL CALCULATED.3IONS-SCNC: 11 MMOL/L (ref 3–16)
BUN SERPL-MCNC: 17 MG/DL (ref 7–20)
CALCIUM SERPL-MCNC: 10.4 MG/DL (ref 8.3–10.6)
CHLORIDE SERPL-SCNC: 103 MMOL/L (ref 99–110)
CO2 SERPL-SCNC: 25 MMOL/L (ref 21–32)
CREAT SERPL-MCNC: 1.1 MG/DL (ref 0.6–1.2)
GFR SERPLBLD CREATININE-BSD FMLA CKD-EPI: 52 ML/MIN/{1.73_M2}
GLUCOSE SERPL-MCNC: 91 MG/DL (ref 70–99)
POTASSIUM SERPL-SCNC: 3.7 MMOL/L (ref 3.5–5.1)
SODIUM SERPL-SCNC: 139 MMOL/L (ref 136–145)

## 2024-10-26 PROCEDURE — 2060000000 HC ICU INTERMEDIATE R&B

## 2024-10-26 PROCEDURE — 6370000000 HC RX 637 (ALT 250 FOR IP): Performed by: STUDENT IN AN ORGANIZED HEALTH CARE EDUCATION/TRAINING PROGRAM

## 2024-10-26 PROCEDURE — 6370000000 HC RX 637 (ALT 250 FOR IP): Performed by: PSYCHIATRY & NEUROLOGY

## 2024-10-26 PROCEDURE — 6360000002 HC RX W HCPCS: Performed by: STUDENT IN AN ORGANIZED HEALTH CARE EDUCATION/TRAINING PROGRAM

## 2024-10-26 PROCEDURE — 36415 COLL VENOUS BLD VENIPUNCTURE: CPT

## 2024-10-26 PROCEDURE — 6370000000 HC RX 637 (ALT 250 FOR IP): Performed by: INTERNAL MEDICINE

## 2024-10-26 PROCEDURE — 80048 BASIC METABOLIC PNL TOTAL CA: CPT

## 2024-10-26 PROCEDURE — 99232 SBSQ HOSP IP/OBS MODERATE 35: CPT | Performed by: INTERNAL MEDICINE

## 2024-10-26 RX ADMIN — ATORVASTATIN CALCIUM 80 MG: 40 TABLET, FILM COATED ORAL at 09:09

## 2024-10-26 RX ADMIN — Medication 5 MG: at 21:26

## 2024-10-26 RX ADMIN — CLOPIDOGREL BISULFATE 75 MG: 75 TABLET ORAL at 09:09

## 2024-10-26 RX ADMIN — LEVETIRACETAM 500 MG: 500 TABLET, FILM COATED ORAL at 10:33

## 2024-10-26 RX ADMIN — ENOXAPARIN SODIUM 40 MG: 100 INJECTION SUBCUTANEOUS at 09:09

## 2024-10-26 RX ADMIN — PANTOPRAZOLE SODIUM 20 MG: 20 TABLET, DELAYED RELEASE ORAL at 09:09

## 2024-10-26 RX ADMIN — EZETIMIBE 10 MG: 10 TABLET ORAL at 21:28

## 2024-10-26 RX ADMIN — LEVETIRACETAM 500 MG: 500 TABLET, FILM COATED ORAL at 21:26

## 2024-10-26 RX ADMIN — PANTOPRAZOLE SODIUM 20 MG: 20 TABLET, DELAYED RELEASE ORAL at 21:28

## 2024-10-26 RX ADMIN — VENLAFAXINE HYDROCHLORIDE 150 MG: 75 CAPSULE, EXTENDED RELEASE ORAL at 09:09

## 2024-10-26 RX ADMIN — QUETIAPINE FUMARATE 12.5 MG: 25 TABLET ORAL at 21:27

## 2024-10-26 RX ADMIN — LOSARTAN POTASSIUM 100 MG: 100 TABLET, FILM COATED ORAL at 09:10

## 2024-10-26 NOTE — FLOWSHEET NOTE
10/25/24 2000   Vital Signs   Temp 98.9 °F (37.2 °C)   Temp Source Oral   Pulse 94   Heart Rate Source Monitor   Respirations 16   /78   MAP (Calculated) 91   BP Location Right upper arm   BP Method Automatic   Patient Position Semi fowlers   Pain Assessment   Pain Assessment None - Denies Pain   Oxygen Therapy   SpO2 98 %   O2 Device None (Room air)     Shift assessment done  Vitals sf  Rx given per MAR  Pt oriented to self,disoriented to time and place  Easy breathing  Call light within reach  Bed in lowest position  Pt resting ,awake watching tv   show

## 2024-10-26 NOTE — FLOWSHEET NOTE
10/26/24 1120   Vitals   Temp 97.4 °F (36.3 °C)   Temp Source Oral   Pulse 95   Respirations 16   /82   MAP (Calculated) 96   BP Location Right upper arm   BP Method Automatic   Cardiac Rhythm Sinus rhythm   Oxygen Therapy   SpO2 95 %   O2 Device None (Room air)     Patient rests in bed with  at the bedside; eyes closed. Eyes open spontaneously to her name or touch.  She denies any pain or need. She follows commands. Disoriented to place, time, situation and .

## 2024-10-27 PROCEDURE — 1200000000 HC SEMI PRIVATE

## 2024-10-27 PROCEDURE — 6360000002 HC RX W HCPCS: Performed by: STUDENT IN AN ORGANIZED HEALTH CARE EDUCATION/TRAINING PROGRAM

## 2024-10-27 PROCEDURE — 6370000000 HC RX 637 (ALT 250 FOR IP): Performed by: PSYCHIATRY & NEUROLOGY

## 2024-10-27 PROCEDURE — 6370000000 HC RX 637 (ALT 250 FOR IP): Performed by: STUDENT IN AN ORGANIZED HEALTH CARE EDUCATION/TRAINING PROGRAM

## 2024-10-27 PROCEDURE — 99232 SBSQ HOSP IP/OBS MODERATE 35: CPT | Performed by: INTERNAL MEDICINE

## 2024-10-27 PROCEDURE — 6370000000 HC RX 637 (ALT 250 FOR IP): Performed by: NURSE PRACTITIONER

## 2024-10-27 PROCEDURE — 6370000000 HC RX 637 (ALT 250 FOR IP): Performed by: INTERNAL MEDICINE

## 2024-10-27 RX ORDER — LORAZEPAM 0.5 MG/1
0.5 TABLET ORAL ONCE
Status: COMPLETED | OUTPATIENT
Start: 2024-10-27 | End: 2024-10-27

## 2024-10-27 RX ADMIN — LEVETIRACETAM 500 MG: 500 TABLET, FILM COATED ORAL at 20:25

## 2024-10-27 RX ADMIN — LORAZEPAM 0.5 MG: 0.5 TABLET ORAL at 17:32

## 2024-10-27 RX ADMIN — QUETIAPINE FUMARATE 12.5 MG: 25 TABLET ORAL at 20:25

## 2024-10-27 RX ADMIN — PANTOPRAZOLE SODIUM 20 MG: 20 TABLET, DELAYED RELEASE ORAL at 20:25

## 2024-10-27 RX ADMIN — SPIRONOLACTONE 25 MG: 25 TABLET ORAL at 09:26

## 2024-10-27 RX ADMIN — VENLAFAXINE HYDROCHLORIDE 150 MG: 75 CAPSULE, EXTENDED RELEASE ORAL at 09:27

## 2024-10-27 RX ADMIN — EZETIMIBE 10 MG: 10 TABLET ORAL at 20:25

## 2024-10-27 RX ADMIN — LEVETIRACETAM 500 MG: 500 TABLET, FILM COATED ORAL at 09:26

## 2024-10-27 RX ADMIN — ENOXAPARIN SODIUM 40 MG: 100 INJECTION SUBCUTANEOUS at 09:27

## 2024-10-27 RX ADMIN — PANTOPRAZOLE SODIUM 20 MG: 20 TABLET, DELAYED RELEASE ORAL at 09:26

## 2024-10-27 RX ADMIN — LOSARTAN POTASSIUM 100 MG: 100 TABLET, FILM COATED ORAL at 09:26

## 2024-10-27 RX ADMIN — ATORVASTATIN CALCIUM 80 MG: 40 TABLET, FILM COATED ORAL at 09:27

## 2024-10-27 RX ADMIN — CLOPIDOGREL BISULFATE 75 MG: 75 TABLET ORAL at 09:27

## 2024-10-27 RX ADMIN — Medication 5 MG: at 20:25

## 2024-10-27 NOTE — FLOWSHEET NOTE
10/27/24 1728   Vitals   Temp 98 °F (36.7 °C)   Temp Source Oral   Pulse (!) 121   Respirations 16   BP (!) 164/101   MAP (Calculated) 122   BP Location Right upper arm   BP Method Automatic   Pain Assessment   Pain Assessment None - Denies Pain   Oxygen Therapy   SpO2 96 %   O2 Device None (Room air)     Spoke with ANDREA BREEN who is on the floor re: agitation and VS.    New order for Ativan 0.5mg PO.

## 2024-10-28 PROBLEM — R56.9 SEIZURE (HCC): Status: ACTIVE | Noted: 2024-10-28

## 2024-10-28 PROCEDURE — 6370000000 HC RX 637 (ALT 250 FOR IP): Performed by: INTERNAL MEDICINE

## 2024-10-28 PROCEDURE — 1200000000 HC SEMI PRIVATE

## 2024-10-28 PROCEDURE — 6360000002 HC RX W HCPCS: Performed by: STUDENT IN AN ORGANIZED HEALTH CARE EDUCATION/TRAINING PROGRAM

## 2024-10-28 PROCEDURE — 6370000000 HC RX 637 (ALT 250 FOR IP): Performed by: STUDENT IN AN ORGANIZED HEALTH CARE EDUCATION/TRAINING PROGRAM

## 2024-10-28 PROCEDURE — 97530 THERAPEUTIC ACTIVITIES: CPT

## 2024-10-28 PROCEDURE — 99232 SBSQ HOSP IP/OBS MODERATE 35: CPT | Performed by: STUDENT IN AN ORGANIZED HEALTH CARE EDUCATION/TRAINING PROGRAM

## 2024-10-28 PROCEDURE — 97110 THERAPEUTIC EXERCISES: CPT

## 2024-10-28 PROCEDURE — APPSS30 APP SPLIT SHARED TIME 16-30 MINUTES

## 2024-10-28 PROCEDURE — 6370000000 HC RX 637 (ALT 250 FOR IP): Performed by: PSYCHIATRY & NEUROLOGY

## 2024-10-28 RX ADMIN — VENLAFAXINE HYDROCHLORIDE 150 MG: 75 CAPSULE, EXTENDED RELEASE ORAL at 08:58

## 2024-10-28 RX ADMIN — ENOXAPARIN SODIUM 40 MG: 100 INJECTION SUBCUTANEOUS at 08:59

## 2024-10-28 RX ADMIN — PANTOPRAZOLE SODIUM 20 MG: 20 TABLET, DELAYED RELEASE ORAL at 21:23

## 2024-10-28 RX ADMIN — CLOPIDOGREL BISULFATE 75 MG: 75 TABLET ORAL at 08:58

## 2024-10-28 RX ADMIN — PANTOPRAZOLE SODIUM 20 MG: 20 TABLET, DELAYED RELEASE ORAL at 08:58

## 2024-10-28 RX ADMIN — LEVETIRACETAM 500 MG: 500 TABLET, FILM COATED ORAL at 08:58

## 2024-10-28 RX ADMIN — EZETIMIBE 10 MG: 10 TABLET ORAL at 21:23

## 2024-10-28 RX ADMIN — HALOPERIDOL LACTATE 5 MG: 5 INJECTION, SOLUTION INTRAMUSCULAR at 16:49

## 2024-10-28 RX ADMIN — SPIRONOLACTONE 25 MG: 25 TABLET ORAL at 08:58

## 2024-10-28 RX ADMIN — LOSARTAN POTASSIUM 100 MG: 100 TABLET, FILM COATED ORAL at 08:58

## 2024-10-28 RX ADMIN — Medication 5 MG: at 21:23

## 2024-10-28 RX ADMIN — ATORVASTATIN CALCIUM 80 MG: 40 TABLET, FILM COATED ORAL at 08:58

## 2024-10-28 RX ADMIN — QUETIAPINE FUMARATE 12.5 MG: 25 TABLET ORAL at 21:23

## 2024-10-28 RX ADMIN — LEVETIRACETAM 500 MG: 500 TABLET, FILM COATED ORAL at 21:23

## 2024-10-29 PROCEDURE — 6360000002 HC RX W HCPCS: Performed by: STUDENT IN AN ORGANIZED HEALTH CARE EDUCATION/TRAINING PROGRAM

## 2024-10-29 PROCEDURE — 1200000000 HC SEMI PRIVATE

## 2024-10-29 PROCEDURE — 99232 SBSQ HOSP IP/OBS MODERATE 35: CPT | Performed by: INTERNAL MEDICINE

## 2024-10-29 PROCEDURE — 6370000000 HC RX 637 (ALT 250 FOR IP): Performed by: INTERNAL MEDICINE

## 2024-10-29 PROCEDURE — 6370000000 HC RX 637 (ALT 250 FOR IP): Performed by: STUDENT IN AN ORGANIZED HEALTH CARE EDUCATION/TRAINING PROGRAM

## 2024-10-29 PROCEDURE — 6370000000 HC RX 637 (ALT 250 FOR IP): Performed by: PSYCHIATRY & NEUROLOGY

## 2024-10-29 RX ORDER — QUETIAPINE FUMARATE 25 MG/1
25 TABLET, FILM COATED ORAL NIGHTLY
Status: DISCONTINUED | OUTPATIENT
Start: 2024-10-29 | End: 2024-10-30 | Stop reason: HOSPADM

## 2024-10-29 RX ORDER — QUETIAPINE FUMARATE 25 MG/1
12.5 TABLET, FILM COATED ORAL DAILY
Status: DISCONTINUED | OUTPATIENT
Start: 2024-10-30 | End: 2024-10-30 | Stop reason: HOSPADM

## 2024-10-29 RX ORDER — LEVOTHYROXINE SODIUM 100 UG/1
100 TABLET ORAL DAILY
Status: DISCONTINUED | OUTPATIENT
Start: 2024-10-30 | End: 2024-10-30 | Stop reason: HOSPADM

## 2024-10-29 RX ORDER — QUETIAPINE FUMARATE 25 MG/1
12.5 TABLET, FILM COATED ORAL 2 TIMES DAILY
Status: DISCONTINUED | OUTPATIENT
Start: 2024-10-29 | End: 2024-10-29

## 2024-10-29 RX ADMIN — VENLAFAXINE HYDROCHLORIDE 150 MG: 75 CAPSULE, EXTENDED RELEASE ORAL at 08:46

## 2024-10-29 RX ADMIN — CLOPIDOGREL BISULFATE 75 MG: 75 TABLET ORAL at 08:47

## 2024-10-29 RX ADMIN — LOSARTAN POTASSIUM 100 MG: 100 TABLET, FILM COATED ORAL at 08:47

## 2024-10-29 RX ADMIN — LEVETIRACETAM 500 MG: 500 TABLET, FILM COATED ORAL at 20:06

## 2024-10-29 RX ADMIN — QUETIAPINE FUMARATE 12.5 MG: 25 TABLET ORAL at 10:02

## 2024-10-29 RX ADMIN — SPIRONOLACTONE 25 MG: 25 TABLET ORAL at 08:47

## 2024-10-29 RX ADMIN — LEVETIRACETAM 500 MG: 500 TABLET, FILM COATED ORAL at 08:46

## 2024-10-29 RX ADMIN — QUETIAPINE FUMARATE 25 MG: 25 TABLET ORAL at 20:06

## 2024-10-29 RX ADMIN — EZETIMIBE 10 MG: 10 TABLET ORAL at 20:06

## 2024-10-29 RX ADMIN — PANTOPRAZOLE SODIUM 20 MG: 20 TABLET, DELAYED RELEASE ORAL at 08:47

## 2024-10-29 RX ADMIN — PANTOPRAZOLE SODIUM 20 MG: 20 TABLET, DELAYED RELEASE ORAL at 20:06

## 2024-10-29 RX ADMIN — ENOXAPARIN SODIUM 40 MG: 100 INJECTION SUBCUTANEOUS at 08:46

## 2024-10-29 RX ADMIN — ATORVASTATIN CALCIUM 80 MG: 40 TABLET, FILM COATED ORAL at 08:46

## 2024-10-29 RX ADMIN — Medication 5 MG: at 20:06

## 2024-10-29 NOTE — DISCHARGE INSTR - COC
Continuity of Care Form    Patient Name: Siobhan Flores   :  1950  MRN:  [id number]    Admit date:  10/22/2024  Discharge date:  10/30/2024    Code Status Order: Full Code   Advance Directives:   Advance Care Flowsheet Documentation             Admitting Physician:  Melody Mike DO  PCP: Janet Live, APRN - CNP    Discharging Nurse: Naomy NELSON  Discharging Hospital Unit/Room#: 0224/0224-01  Discharging Unit Phone Number: [phone]    Emergency Contact:   Extended Emergency Contact Information  Primary Emergency Contact: Candido Flores  Address: [address] [place] [address]  Home Phone: [phone]  Mobile Phone: [phone]  Relation: Spouse    Past Surgical History:  Past Surgical History:   Procedure Laterality Date    APPENDECTOMY      COLONOSCOPY      CORONARY STENT PLACEMENT  2023    EYE SURGERY      HYSTERECTOMY (CERVIX STATUS UNKNOWN)         Immunization History:   Immunization History   Administered Date(s) Administered    Influenza 10/25/2013    Influenza Virus Vaccine 2014, 10/09/2015    Influenza, AFLURIA (age 3 y+), FLUZONE, (age 6 mo+), Quadv MDV, 0.5mL 10/27/2016    Pneumococcal, PCV-13, PREVNAR 13, (age 6w+), IM, 0.5mL 2016    Pneumococcal, PPSV23, PNEUMOVAX 23, (age 2y+), SC/IM, 0.5mL 2014    Zoster Live (Zostavax) 2014       Active Problems:  Patient Active Problem List   Diagnosis Code    Hyperlipidemia E78.5    Hypothyroidism E03.9    Anxiety and depression F41.9, F32.A    Osteopenia M85.80    Vitamin D deficiency E55.9    Fibromyalgia M79.7    Dementia (HCC) F03.90    Coronary artery disease involving native coronary artery of native heart I25.10    Chronic renal disease, stage III ([facility]) [728406] N18.30    Gastroesophageal reflux disease K21.9    Cervical pain M54.2    Stroke-like symptoms R29.90    Hypertension I10    Seizure ([facility]) R56.9       Isolation/Infection:   Isolation            No Isolation

## 2024-10-30 VITALS
HEIGHT: 60 IN | OXYGEN SATURATION: 97 % | TEMPERATURE: 97.9 F | WEIGHT: 135 LBS | RESPIRATION RATE: 18 BRPM | BODY MASS INDEX: 26.5 KG/M2 | SYSTOLIC BLOOD PRESSURE: 158 MMHG | DIASTOLIC BLOOD PRESSURE: 81 MMHG | HEART RATE: 102 BPM

## 2024-10-30 PROBLEM — G93.40 ENCEPHALOPATHY ACUTE: Status: ACTIVE | Noted: 2024-09-30

## 2024-10-30 PROCEDURE — 6370000000 HC RX 637 (ALT 250 FOR IP): Performed by: STUDENT IN AN ORGANIZED HEALTH CARE EDUCATION/TRAINING PROGRAM

## 2024-10-30 PROCEDURE — 6370000000 HC RX 637 (ALT 250 FOR IP): Performed by: PSYCHIATRY & NEUROLOGY

## 2024-10-30 PROCEDURE — 6360000002 HC RX W HCPCS: Performed by: STUDENT IN AN ORGANIZED HEALTH CARE EDUCATION/TRAINING PROGRAM

## 2024-10-30 PROCEDURE — 6370000000 HC RX 637 (ALT 250 FOR IP): Performed by: INTERNAL MEDICINE

## 2024-10-30 RX ORDER — MECOBALAMIN 5000 MCG
5 TABLET,DISINTEGRATING ORAL NIGHTLY
DISCHARGE
Start: 2024-10-30

## 2024-10-30 RX ORDER — SPIRONOLACTONE 50 MG/1
25 TABLET, FILM COATED ORAL DAILY
DISCHARGE
Start: 2024-10-30

## 2024-10-30 RX ORDER — LEVETIRACETAM 500 MG/1
500 TABLET ORAL 2 TIMES DAILY
DISCHARGE
Start: 2024-10-30

## 2024-10-30 RX ORDER — PANTOPRAZOLE SODIUM 20 MG/1
20 TABLET, DELAYED RELEASE ORAL 2 TIMES DAILY
DISCHARGE
Start: 2024-10-30

## 2024-10-30 RX ORDER — ATORVASTATIN CALCIUM 80 MG/1
80 TABLET, FILM COATED ORAL DAILY
DISCHARGE
Start: 2024-10-30

## 2024-10-30 RX ORDER — VENLAFAXINE HYDROCHLORIDE 150 MG/1
150 CAPSULE, EXTENDED RELEASE ORAL DAILY
DISCHARGE
Start: 2024-10-30

## 2024-10-30 RX ORDER — ONDANSETRON 4 MG/1
4 TABLET, ORALLY DISINTEGRATING ORAL EVERY 6 HOURS PRN
DISCHARGE
Start: 2024-10-30

## 2024-10-30 RX ORDER — CLOPIDOGREL BISULFATE 75 MG/1
TABLET ORAL
DISCHARGE
Start: 2024-10-30

## 2024-10-30 RX ORDER — QUETIAPINE FUMARATE 25 MG/1
25 TABLET, FILM COATED ORAL NIGHTLY
DISCHARGE
Start: 2024-10-30 | End: 2024-11-06

## 2024-10-30 RX ORDER — LEVOTHYROXINE SODIUM 100 UG/1
100 TABLET ORAL DAILY
DISCHARGE
Start: 2024-10-31

## 2024-10-30 RX ADMIN — QUETIAPINE FUMARATE 12.5 MG: 25 TABLET ORAL at 10:54

## 2024-10-30 RX ADMIN — SPIRONOLACTONE 25 MG: 25 TABLET ORAL at 10:54

## 2024-10-30 RX ADMIN — LOSARTAN POTASSIUM 100 MG: 100 TABLET, FILM COATED ORAL at 10:53

## 2024-10-30 RX ADMIN — LEVOTHYROXINE SODIUM 100 MCG: 100 TABLET ORAL at 06:37

## 2024-10-30 RX ADMIN — LEVETIRACETAM 500 MG: 500 TABLET, FILM COATED ORAL at 10:53

## 2024-10-30 RX ADMIN — ENOXAPARIN SODIUM 40 MG: 100 INJECTION SUBCUTANEOUS at 10:53

## 2024-10-30 RX ADMIN — VENLAFAXINE HYDROCHLORIDE 150 MG: 75 CAPSULE, EXTENDED RELEASE ORAL at 10:53

## 2024-10-30 RX ADMIN — PANTOPRAZOLE SODIUM 20 MG: 20 TABLET, DELAYED RELEASE ORAL at 10:54

## 2024-10-30 RX ADMIN — CLOPIDOGREL BISULFATE 75 MG: 75 TABLET ORAL at 10:54

## 2024-10-30 RX ADMIN — ATORVASTATIN CALCIUM 80 MG: 40 TABLET, FILM COATED ORAL at 10:54

## 2024-10-30 NOTE — CARE COORDINATION
DISCHARGE ORDER  Date/Time 10/30/2024 1:59 PM  Completed by: Crissy Mejia RN, Case Management    Patient Name: Siobhan STANTON Good    : 1950      Admit order Date and Status:10/22/24 IP  Noted discharge order. (verify MD's last order for status of admission/Traditional Medicare 3 [place] Inpatient qualifying stay required for SNF)    Confirmed discharge plan with:              Patient:  Yes              When pt confirms DC plan does any support person need to be contacted by CM Yes  spouse Candido               Discharge to Facility: [facility]   Facility phone number for staff giving report: [phone]   Pre-certification completed: yes able to accept today per [facility] Exemption Notification (HENS) completed: yes   Discharge orders and Continuity of Care faxed to facility:  [facility]      Transportation:               Medical Transport explained with choice list offered to pt/family.                Choice:(no preference)  Agency used: [facility]   time:   1500      Pt/family/Nursing/Facility aware of  time: 1500  Yes Names: Yenifer WoodwardSiobhan huff, Tiffany, Candido  Ambulance form completed:  yes:      Date Last IMM Given: 10/30    Comments:    Pt is being d/c'd to [facility] today. Pt's O2 sats are 100% on RA.    Discharge timeout done with Pt,RN,CM. All discharge needs and concerns addressed.    Discharging nurse to complete CADEN, reconcile AVS, and place final copy with patient's discharge packet. Discharging RN to ensure that written prescriptions for  Level II medications are sent with patient to the facility as per protocol.

## 2024-10-30 NOTE — DISCHARGE SUMMARY
Name:  Siobhan Flores  Room:  /0311-02  MRN:    [id number]    IM Discharge Summary    Discharging Physician:  Yadiel burgos MD    Admit: 10/22/2024    Discharge:      PCP      Janet Live, APRN - CNP    Diagnoses and hospital course  this Admission        AMS- has baseline dementia but got worse yesterday per report     Pt appears to be pleasantly confused on my exam and has agitation at night during first admission   Recent admission for similar presentation and thought to be from severe hypothyroidism and was tx with iv synthroid ( tsh - 76) and hydrocortisone.   CT head neg on arrival, MRI with severe chronic ischemic changes and chronic lacunar infarcts   EEG with small portion of seizure tendency , started keppra   TSh now very low likely from recent IV dosing - now held   Neurology consulted and following  Added seroquel at night for agitation and helping  Dc planning to home with        Alzheimer's disease with behavioral disturbance  - possibly worsening and contributing to above  - was on Aricept per PCP and not taking  - adding new meds as above  - pt doing well with these and now calm and cooperative     HTN- controlled now , on norvasc, losartan, hydralazine, imdur   Adjust as needed  Currently only needing  and losartan and aldactone      Hypothyroid - recently uncontrolled and as above given IV synthroid last month and now hyperthyroid, hold for now   Will plan to reduce dose to 100 mcg and repeat tsh in 6 weeks at [place]      CAD: S/p MARQUITA LAD 3/2023  - follows with cardiology outpatient   -Medical management for severe diagonal disease due to small vessel size  - continue plavix, zetia   - not taking Imdur, monitor and consider restarting if needed  - denies any chest pain     Noncompliance with mediation regimen          HPI   73 y.o. female with a history of dementia, hypertension, CKD 3, hypothyroid, mood disorder who presented after LKW 1300 when  left to  his vehicle 
patient. There are numerous and confluent areas of high-signal in the periventricular white matter and centrum semiovale and central marcial that are likely related to chronic small vessel ischemic disease.  There are remote lacunar infarcts in the basal ganglia and posterior limbs of the internal capsules.  There is no midline shift or mass effect. There are no areas of restricted diffusion. ORBITS: The visualized portion of the orbits demonstrate no acute abnormality. SINUSES:  The visualized paranasal sinuses and mastoid air cells are for the most part clear. BONES/SOFT TISSUES: The bone marrow signal intensity appears normal. The soft tissues demonstrate no acute abnormality.     Cerebral atrophy.  Severe chronic small vessel ischemic changes.  Remote lacunar infarcts in basal ganglia and posterior limbs of the internal capsules.  No areas of restricted diffusion to suggest an acute ischemic event.     Echo (TTE) limited (PRN contrast/bubble/strain/3D)    Result Date: 10/23/2024    Left Ventricle: Hyperdynamic left ventricular systolic function with a visually estimated EF of 65 - 70%. Left ventricle is smaller than normal. Mildly increased wall thickness. Findings consistent with mild concentric hypertrophy. No wall motion abnormalities. No apical thrombus present.   Aortic Valve: Trileaflet valve. Moderately calcified right and noncoronary cusps.  There is possible fusion of the non and right coronary cusp.  Doppler assessment was not performed to assess for stenosis.  Visually no overt stenosis. Compared to echocardiogram from 4/24/24; this appears similar visually.   Interatrial Septum: No interatrial shunt visualized with color Doppler. Grade 0 Absence of bubbles. Agitated saline study was negative with and without provocation.   IVC/SVC: IVC diameter is less than or equal to 21 mm and decreases greater than 50% during inspiration; therefore the estimated right atrial pressure is normal (~3 mmHg).   Image

## 2024-10-30 NOTE — DISCHARGE INSTR - DIET

## 2024-10-30 NOTE — PROGRESS NOTES
Siobhan STANTON Good  Neurology Follow-up  [facility] Neurology    Date of Service: 10/24/2024    Subjective:   CC: Follow up today regarding: stroke symptoms     Events noted. Chart and lab reviewed.       ROS : A 10-12 system review obtained and updated today and is unremarkable except as mentioned  in my interval history.     Past medical history, social history, medication and family history reviewed.       Objective:  Exam:   Constitutional:   Vitals:    10/23/24 1709 10/23/24 1942 10/24/24 0600 10/24/24 0950   BP:  129/83 (!) 153/90 (!) 160/92   Pulse:  (!) 105 86 79   Resp:  18 18 16   Temp:  97.9 °F (36.6 °C) 96.9 °F (36.1 °C) 97.5 °F (36.4 °C)   TempSrc:  Oral Axillary Oral   SpO2:  93% 96% 99%   Weight:       Height: 1.524 m (5')        General appearance:  Chronically-ill appearing in no acute distress.   Mental Status:   Oriented to person and place.    Memory: Fair immediate recall.  Intact remote memory  Normal attention span and concentration.  Language: intact naming, repeating and fluency   Fair fund of Knowledge. Aware of current events and vocabulary   Cranial Nerves:   II: Visual fields: Full. Pupils: equal, round, reactive to light, bilaterally  III,IV,VI: Extra Ocular Movements are intact. No nystagmus  V: Facial sensation is intact  VII: Facial strength and movements: intact and symmetric  IX: Normal palatal elevation and shoulder shrug  XII: Tongue movements are normal  Musculoskeletal: 5/5 in all 4 extremities.  Good range of motion.  No muscle atrophy.    Tone: Normal tone.   Reflexes: Symmetric 2+ in the arms and 2+ in the legs   Planters: Flexor bilaterally  Coordination: no pronator drift, no dysmetria with FNF  Sensation: normal in both arms and legs    MDM:  A. Problems (any 1)    High:    [x] Acute/Chronic Illness/injury posing threat to life or bodily function:    [] Severe exacerbation of chronic illness:      Moderate:    []     1 or more chronic illness with exacerbation, 
  4 Eyes Skin Assessment     The patient is being assess for   Admission    I agree that 2 RN's have performed a thorough Head to Toe Skin Assessment on the patient. ALL assessment sites listed below have been assessed.      Areas assessed for pressure by both nurses:   [x]   Head, Face, and Ears   [x]   Shoulders, Back, and Chest, Abdomen  [x]   Arms, Elbows, and Hands   [x]   Coccyx, Sacrum, and Ischium  [x]   Legs, Feet, and Heels    Scattered ecchymosis        Skin Assessed Under all Medical Devices by both nurses:  NA               All Mepilex Borders were peeled back and area peeked at by both nurses:  No: NA  Please list where Mepilex Borders are located:  NA             **SHARE this note so that the co-signing nurse is able to place an eSignature**    Co-signer eSignature: Electronically signed by Rosina Burt RN on 10/23/24 at 6:21 AM EDT    Does the Patient have Skin Breakdown related to pressure?   NA      (Insert Photo hereNA)         Murphy Prevention initiated:  NA   Wound Care Orders initiated:  NA      WOC nurse consulted for Pressure Injury (Stage 3,4, Unstageable, DTI, NWPT, Complex wounds)and New or Established Ostomies:  NA      Primary Nurse eSignature: Electronically signed by Verito Rojas RN on 10/23/24 at 1:10 AM EDT    
  Speech-Language Pathology  Swallowing Disorders and Dysphagia     SLP Attempt Note           Name: Siobhan Flores  : 1950  Medical Diagnosis: Stroke-like symptoms [R29.90]    Attempting to see pt for SLP eval. Pt completing EEG at this time. Will re-attempt at later time pending SLP schedule. No charges filed. Thank you,    Addendum: Re-attempted to see the patient at 11:30. Pt was working with PT/OT at time of attempt. No charges filed. Will re-attempt at later time pending SLP schedule. Thank you,    Jana Angeles M.A., CCC-SLP   Speech-Language Pathologist #[id number]  Speech Pathology and Swallowing Disorders  P: (inpatient): [phone] (speech desk): [phone]  E: [email or web address]  Certified to provide:  FEES, MBS, Vital Stim, and Howe Dysphagia Therapy Program (MDTP)         
  Speech-Language Pathology  Swallowing Disorders and Dysphagia     SLP Attempt Note           Name: Siobhan Flores  : 1950  Medical Diagnosis: Stroke-like symptoms [R29.90]    SLP attempting to see pt for SLP eval. Pt currently undergoing EEG. Attempted x3 yesterday, 10/23. Will re-attempt at later time pending SLP schedule and pt availability. No charges filed. Thank you,    Addendum: attempted to see pt following EEG. Pt sleeping deeply and would not awaken for PO trials. No charges filed. RN aware.    Jana Angeles M.A., CCC-SLP   Speech-Language Pathologist #[id number]  Speech Pathology and Swallowing Disorders  P: (inpatient): [phone] (speech desk): [phone]  E: [email or web address]  Certified to provide:  FEES, MBS, Vital Stim, and Howe Dysphagia Therapy Program (MDTP)         
 Patient transferred to room 224 from U 312. Side rails up x2. Patient doesot an order for telemetry. Bed is locked and in lowest position. Call light placed in patient reach. Patient explained the routine of the hospital, including but not limited to lab work, vital signs, hourly rounding, etc. Care plans and education updated,     BP (!) 161/90   Pulse (!) 105   Temp 98.3 °F (36.8 °C) (Oral)   Resp 18   Ht 1.524 m (5')   Wt 61.2 kg (135 lb)   SpO2 94%   BMI 26.37 kg/m²     Most recent set of vitals as shown.       4 Eyes Skin Assessment     The patient is being assess for   Transfer to New Unit    I agree that 2 RN's have performed a thorough Head to Toe Skin Assessment on the patient. ALL assessment sites listed below have been assessed.      Areas assessed for pressure by both nurses:   [x]   Head, Face, and Ears   [x]   Shoulders, Back, and Chest, Abdomen  [x]   Arms, Elbows, and Hands   [x]   Coccyx, Sacrum, and Ischium  [x]   Legs, Feet, and Heels.    No skin issues, some mild dermatitis r/t electrodes to chest                     **SHARE this note so that the co-signing nurse is able to place an eSignature**    Co-signer eSignature: Electronically signed by Tawnya Horta RN (Mandy) on 10/27/24 at 6:30 PM EDT    Does the Patient have Skin Breakdown related to pressure?  No            Murphy Prevention initiated:  No   Wound Care Orders initiated:  No      [facility] nurse consulted for Pressure Injury (Stage 3,4, Unstageable, DTI, NWPT, Complex wounds)and New or Established Ostomies:  No      Primary Nurse eSignature: Electronically signed by Toni Nunez RN on 10/27/24 at 6:15 PM EDT      Bedside Mobility Assessment Tool (BMAT):     Assessment Level 1- Sit and Shake    1. From a semi-reclined position, ask patient to sit up and rotate to a seated position at the side of the bed. Can use the bedrail.    2. Ask patient to reach out and grab your hand and shake making sure patient reaches across his/her 
Attempted to contact , Candido Flores, to inform of the need for restraints and IM medication.  Left HIPAA compliant message.   2322- Candido Flores called back, verified identity.  Informed criteria on need for restraints.  He stated that she has needed them before. Requested update in the morning if there are any changes in status.  
Bedside report and transfer of care given to OMAR El. Pt currently resting in bed with the call light within reach. Pt denies any other care needs at this time. Pt stable at this time.    
Call received from Tele-sitter, patient had pulled her IV out and telemetry monitor off. Telemetry monitor has been replaced and several IV attempts made but were unsuccessful   
Call report to sara dahl, spoke with Margie and provided report. No new concerns. Pt transported by stretcher. Vitals stable.     Vitals:    10/30/24 1530   BP: (!) 158/81   Pulse: (!) 102   Resp: 18   Temp: 97.9 °F (36.6 °C)   SpO2: 97%      
Comprehensive Nutrition Assessment    Type and Reason for Visit:  Initial, Positive Nutrition Screen (MST2)    Nutrition Recommendations/Plan:   Continue regular diet     Malnutrition Assessment:  Malnutrition Status:  No malnutrition (10/23/24 1687)    Context:  Acute Illness     Findings of the 6 clinical characteristics of malnutrition:  Energy Intake:  Mild decrease in energy intake (Comment)  Weight Loss:  5% over 1 month     Body Fat Loss:  No significant body fat loss Orbital   Muscle Mass Loss:  No significant muscle mass loss Temples (temporalis)  Fluid Accumulation:  No significant fluid accumulation Extremities   Strength:  Not Performed    Nutrition Assessment:    Pt. nutritionally compromised AEB reported reduced intake few days PT .  At risk for further nutrition compromise r/t altered mental status and h/o of  hypothyroidism and dementia  .  Will continue regular diet .     Nutrition Related Findings:    pt was Alert bu sleep with nai at bedside; wt loss form Sept to Oct appears to r/t treatment of hypothyroidism;  etas some food bit not alot ;  provide carry oot food only d/t he can not cook ; Wound Type: None       Current Nutrition Intake & Therapies:    Average Meal Intake: 26-50%  Average Supplements Intake: None Ordered  ADULT DIET; Regular    Anthropometric Measures:  Height: 152.4 cm (5')  Ideal Body Weight (IBW): 100 lbs (45 kg)    Admission Body Weight: 61.2 kg (135 lb)  Current Body Weight: 61.2 kg (135 lb), 135 % IBW. Weight Source: Bed Scale  Current BMI (kg/m2): 26.4  Usual Body Weight: 66.7 kg (147 lb)  % Weight Change (Calculated): -8.2                    BMI Categories: Overweight (BMI 25.0-29.9)    Estimated Daily Nutrient Needs:  Energy Requirements Based On: Kcal/kg  Weight Used for Energy Requirements: Current  Energy (kcal/day): 1883-5769 based ~ 20-23 kcal/kg cbw  Weight Used for Protein Requirements: Current  Protein (g/day): 61-72 based ~ 101.2 gr/kg cbw  Method 
Consult has been added to Michele Garza Rn on 10/23. 7:37 AM    Kiera Banks  10/23/2024  
EEG completed and available for interpretation on the [facility] database .   
EEG taking place at bedside now.   
IM Progress Note    Admit Date:  10/22/2024  3    Interval history:  confusion with baseline dementia     Subjective:  Ms. Flores seen confused at baseline, sleepy but arousable  Calm and cooperative, did not need any haldol last night    No new issues      Objective:   /64   Pulse 97   Temp (!) 96 °F (35.6 °C) (Axillary)   Resp 16   Ht 1.524 m (5')   Wt 61.2 kg (135 lb)   SpO2 97%   BMI 26.37 kg/m²   No intake or output data in the 24 hours ending 10/25/24 0729      Physical Exam:        General:  elderly female up in bed,   Awake, alert and disoriented. Appears to be not in any distress  Mucous Membranes:  Pink , anicteric  Neck: No JVD, no carotid bruit, no thyromegaly  Chest:  Clear to auscultation bilaterally, no added sounds  Cardiovascular:  RRR S1S2 heard, no murmurs or gallops  Abdomen:  Soft, undistended, non tender, no organomegaly, BS present  Extremities: No edema or cyanosis. Distal pulses well felt  Neurological :sleepy , non focal . Baseline confusion    Medications:   Scheduled Medications:    QUEtiapine  12.5 mg Oral BID    levETIRAcetam  500 mg Oral BID    melatonin  10 mg Oral Nightly    atorvastatin  80 mg Oral Daily    clopidogrel  75 mg Oral Daily    ezetimibe  10 mg Oral Nightly    isosorbide mononitrate  30 mg Oral Daily    losartan  100 mg Oral Daily    pantoprazole  20 mg Oral BID    potassium chloride  20 mEq Oral Daily    spironolactone  25 mg Oral Daily    venlafaxine  150 mg Oral Daily    sodium chloride flush  5-40 mL IntraVENous 2 times per day    enoxaparin  40 mg SubCUTAneous Daily     I   sodium chloride       haloperidol lactate, sodium chloride flush, sodium chloride, potassium chloride **OR** potassium alternative oral replacement, magnesium sulfate, ondansetron **OR** ondansetron, polyethylene glycol, acetaminophen **OR** acetaminophen, perflutren lipid microspheres    Lab Data:  Recent Labs     10/22/24  1631 10/23/24  0426   WBC 7.3 7.5   HGB 12.2 12.0   HCT 36.2 
IM Progress Note    Admit Date:  10/22/2024  4    Interval history:  dementia with agitation         Subjective:  Ms. Flores seen sleeping in bed, calm and cooperative  No issues overnight with seroquel   slept in room  Tolerating diet well     Plans for SNF noted    Objective:   /69   Pulse 88   Temp 97.6 °F (36.4 °C) (Axillary)   Resp 16   Ht 1.524 m (5')   Wt 61.2 kg (135 lb)   SpO2 96%   BMI 26.37 kg/m²     Intake/Output Summary (Last 24 hours) at 10/26/2024 0729  Last data filed at 10/25/2024 1708  Gross per 24 hour   Intake 360 ml   Output 400 ml   Net -40 ml       Physical Exam:     General:  elderly female up in bed,   Awake, alert and disoriented. Appears to be not in any distress  Mucous Membranes:  Pink , anicteric  Neck: No JVD, no carotid bruit, no thyromegaly  Chest:  Clear to auscultation bilaterally, no added sounds  Cardiovascular:  RRR S1S2 heard, no murmurs or gallops  Abdomen:  Soft, undistended, non tender, no organomegaly, BS present  Extremities: No edema or cyanosis. Distal pulses well felt  Neurological : at baseline, , non focal . Baseline confusion  Calm and cooperative       Medications:   Scheduled Medications:    melatonin  5 mg Oral Nightly    QUEtiapine  12.5 mg Oral Nightly    levETIRAcetam  500 mg Oral BID    atorvastatin  80 mg Oral Daily    clopidogrel  75 mg Oral Daily    ezetimibe  10 mg Oral Nightly    [Held by provider] isosorbide mononitrate  30 mg Oral Daily    losartan  100 mg Oral Daily    pantoprazole  20 mg Oral BID    potassium chloride  20 mEq Oral Daily    [Held by provider] spironolactone  25 mg Oral Daily    venlafaxine  150 mg Oral Daily    sodium chloride flush  5-40 mL IntraVENous 2 times per day    enoxaparin  40 mg SubCUTAneous Daily     I   sodium chloride       haloperidol lactate, sodium chloride flush, sodium chloride, potassium chloride **OR** potassium alternative oral replacement, magnesium sulfate, ondansetron **OR** ondansetron, 
IM Progress Note    Admit Date:  10/22/2024  5    Interval history:  dementia with agitation         Subjective:    Ms. Flores seen up in chair today . Feels ok    at bedside reports she slept well with seroquel  Tolerating diet well     Plans for SNF noted    Objective:   /77   Pulse 76   Temp 97.4 °F (36.3 °C) (Oral)   Resp 16   Ht 1.524 m (5')   Wt 61.2 kg (135 lb)   SpO2 98%   BMI 26.37 kg/m²   No intake or output data in the 24 hours ending 10/27/24 0732      Physical Exam:     General:  elderly female up in chair,   Awake, alert and disoriented. Appears to be not in any distress  Mucous Membranes:  Pink , anicteric  Neck: No JVD, no carotid bruit, no thyromegaly  Chest:  Clear to auscultation bilaterally, no added sounds  Cardiovascular:  RRR S1S2 heard, no murmurs or gallops  Abdomen:  Soft, undistended, non tender, no organomegaly, BS present  Extremities: No edema or cyanosis. Distal pulses well felt  Neurological : at baseline, , non focal . Baseline confusion  Calm and cooperative       Medications:   Scheduled Medications:    melatonin  5 mg Oral Nightly    QUEtiapine  12.5 mg Oral Nightly    levETIRAcetam  500 mg Oral BID    atorvastatin  80 mg Oral Daily    clopidogrel  75 mg Oral Daily    ezetimibe  10 mg Oral Nightly    [Held by provider] isosorbide mononitrate  30 mg Oral Daily    losartan  100 mg Oral Daily    pantoprazole  20 mg Oral BID    spironolactone  25 mg Oral Daily    venlafaxine  150 mg Oral Daily    sodium chloride flush  5-40 mL IntraVENous 2 times per day    enoxaparin  40 mg SubCUTAneous Daily     I   sodium chloride       haloperidol lactate, sodium chloride flush, sodium chloride, potassium chloride **OR** potassium alternative oral replacement, magnesium sulfate, ondansetron **OR** ondansetron, polyethylene glycol, acetaminophen **OR** acetaminophen, perflutren lipid microspheres    Lab Data:  No results for input(s): \"WBC\", \"HGB\", \"HCT\", \"MCV\", \"PLT\" in the last 
IM Progress Note    Admit Date:  10/22/2024  6    Interval history:  dementia with agitation     Subjective:    Ms. Flores seen up in bed no distress.     Plans for SNF noted    Objective:   BP (!) 141/90   Pulse 100   Temp 97.8 °F (36.6 °C) (Oral)   Resp 16   Ht 1.524 m (5')   Wt 61.2 kg (135 lb)   SpO2 96%   BMI 26.37 kg/m²     Intake/Output Summary (Last 24 hours) at 10/28/2024 1803  Last data filed at 10/28/2024 0630  Gross per 24 hour   Intake 200 ml   Output --   Net 200 ml         Physical Exam:     General:  elderly female up in chair,   Awake, alert and disoriented. Appears to be not in any distress  Mucous Membranes:  Pink , anicteric  Neck: No JVD, no carotid bruit, no thyromegaly  Chest:  Clear to auscultation bilaterally, no added sounds  Cardiovascular:  RRR S1S2 heard, no murmurs or gallops  Abdomen:  Soft, undistended, non tender, no organomegaly, BS present  Extremities: No edema or cyanosis. Distal pulses well felt  Neurological : at baseline, , non focal . Baseline confusion  Calm and cooperative       Medications:   Scheduled Medications:    melatonin  5 mg Oral Nightly    QUEtiapine  12.5 mg Oral Nightly    levETIRAcetam  500 mg Oral BID    atorvastatin  80 mg Oral Daily    clopidogrel  75 mg Oral Daily    ezetimibe  10 mg Oral Nightly    [Held by provider] isosorbide mononitrate  30 mg Oral Daily    losartan  100 mg Oral Daily    pantoprazole  20 mg Oral BID    spironolactone  25 mg Oral Daily    venlafaxine  150 mg Oral Daily    sodium chloride flush  5-40 mL IntraVENous 2 times per day    enoxaparin  40 mg SubCUTAneous Daily     I   sodium chloride       haloperidol lactate, sodium chloride flush, sodium chloride, potassium chloride **OR** potassium alternative oral replacement, magnesium sulfate, ondansetron **OR** ondansetron, polyethylene glycol, acetaminophen **OR** acetaminophen, perflutren lipid microspheres    Lab Data:  No results for input(s): \"WBC\", \"HGB\", \"HCT\", \"MCV\", \"PLT\" 
IM Progress Note    Admit Date:  10/22/2024  7    Interval history:  dementia with agitation     Subjective:    Ms. Flores seen in bed. She is sleeping this morning. Her  is at the bedside. She was agitated earlier. She took her Seroquel this am.     Plans for SNF noted    Objective:   /76   Pulse (!) 101   Temp 97.4 °F (36.3 °C) (Axillary)   Resp 18   Ht 1.524 m (5')   Wt 61.2 kg (135 lb)   SpO2 94%   BMI 26.37 kg/m²     Intake/Output Summary (Last 24 hours) at 10/29/2024 1056  Last data filed at 10/29/2024 0846  Gross per 24 hour   Intake 220 ml   Output --   Net 220 ml         Physical Exam:     General:  elderly female up in chair,   Awake, alert and disoriented. Appears to be not in any distress  Mucous Membranes:  Pink , anicteric  Neck: No JVD, no carotid bruit, no thyromegaly  Chest:  Clear to auscultation bilaterally, no added sounds  Cardiovascular:  RRR S1S2 heard, no murmurs or gallops  Abdomen:  Soft, undistended, non tender, no organomegaly, BS present  Extremities: No edema or cyanosis. Distal pulses well felt  Neurological : at baseline, , non focal . Baseline confusion  Calm and cooperative       Medications:   Scheduled Medications:    [START ON 10/30/2024] QUEtiapine  12.5 mg Oral Daily    QUEtiapine  25 mg Oral Nightly    melatonin  5 mg Oral Nightly    levETIRAcetam  500 mg Oral BID    atorvastatin  80 mg Oral Daily    clopidogrel  75 mg Oral Daily    ezetimibe  10 mg Oral Nightly    [Held by provider] isosorbide mononitrate  30 mg Oral Daily    losartan  100 mg Oral Daily    pantoprazole  20 mg Oral BID    spironolactone  25 mg Oral Daily    venlafaxine  150 mg Oral Daily    sodium chloride flush  5-40 mL IntraVENous 2 times per day    enoxaparin  40 mg SubCUTAneous Daily     I   sodium chloride       haloperidol lactate, sodium chloride flush, sodium chloride, potassium chloride **OR** potassium alternative oral replacement, magnesium sulfate, ondansetron **OR** ondansetron, 
Inpatient Occupational Therapy Treatment    Unit: PCU  Date:  10/28/2024  Patient Name:    Siobhan Flores  Admitting diagnosis:  Stroke-like symptoms [R29.90]  Admit Date:  10/22/2024  Precautions/Restrictions/WB Status/ Lines/ Wounds/ Oxygen: Fall risk, Bed/chair alarm, Lines (IV), Telemetry, and Telesitter    Treatment Time:  15:30-16:00  Treatment Number:  2  Timed Code Treatment Minutes: 30 minutes  Total Treatment Minutes:  30  minutes    Patient Goals for Therapy: none stated for therapy          Discharge Recommendations: Home with  assist  and Home OT  DME needs for discharge: Needs Met       Therapy recommendations for staff:   Assist of 1 for ambulation with use of rolling walker (RW) to/from bathroom    History of Present Illness: Per Dr. Mike H&P 10/22/24:  \"73 y.o. female with a history of dementia, hypertension, CKD 3, hypothyroid, mood disorder who presented after LKW 1300 when  left to  his vehicle for a couple hours.  Came back at 1430 and patient was confused, packing her bags, stating she was going to live with her aunt whom is  at.  Saying she was going to walk up the street and find out.  Had a similar episode of confusion prior to her recent hospitalization, but has been reports not this bad.  She has not previously been diagnosed with stroke, he is concerned and wants to know what is going on.  Patient is alert to person and place, not to year.  Denies recent infectious symptoms.  No infectious etiology found on workup.  Admitted for further evaluation and care.\"  CT head nonacute.  MRI no acute infarct.   Neurology consult pending    Preadmission Environment:   Information from previous encounter on 10/1/24  Pt lives with                                         with Significant Other  Home environment:                            two story home; bed on 2nd floor; couch on the first floor; able to stay on first floor where bathroom is located  Steps to enter first 
Inpatient Physical Therapy Treatment Attempt    Pt chart reviewed.  RN contacted and okayed for treatment.  Upon arrival, pt sleeping in bed with  at bedside.   politely asking that therapy not wake pt at this time.  Will re-attempt PT treatment as pt status and therapist schedule allows.    No charge.     Chloe Leiva, PT, DPT   #[id number]    
Monitor room called, states patient not showing on the monitor. Went to patient room and noted patient removed her telemetry leads and pulled her IV. Verbal reorientation done. Telemetry placed back. Gown changed.   
Neurology Progress Note    ID: Siobhan Flores is a 73 y.o. female    : 1950     LOS: 3 days     Impression:  Altered Mental Status  Hypothyroid.  Abnormal EEG with frequent FIRDA.    The follow-up EEG showed mild degree of encephalopathy.  There was no longer FIRDA pattern from the previous EEG.    10/25/24: The patient is more awake and alert when compared to yesterday.  The patient denies any new focal weakness or numbness.  Per her , the patient is close to her baseline.     Recommendation  Continue levetiracetam 500 mg twice daily.  Seizure precaution.  Minimize sedation and anticholinergic medication.  Treatment of hypothyroid per primary team.     The patient can be discharged if there is no other concern.  Follow-up with neurology in 3 months.    Medications:  Scheduled Meds:    melatonin  5 mg Oral Nightly    QUEtiapine  12.5 mg Oral Nightly    levETIRAcetam  500 mg Oral BID    atorvastatin  80 mg Oral Daily    clopidogrel  75 mg Oral Daily    ezetimibe  10 mg Oral Nightly    [Held by provider] isosorbide mononitrate  30 mg Oral Daily    losartan  100 mg Oral Daily    pantoprazole  20 mg Oral BID    potassium chloride  20 mEq Oral Daily    [Held by provider] spironolactone  25 mg Oral Daily    venlafaxine  150 mg Oral Daily    sodium chloride flush  5-40 mL IntraVENous 2 times per day    enoxaparin  40 mg SubCUTAneous Daily     Continuous Infusions:    sodium chloride       PRN Meds: haloperidol lactate, sodium chloride flush, sodium chloride, potassium chloride **OR** potassium alternative oral replacement, magnesium sulfate, ondansetron **OR** ondansetron, polyethylene glycol, acetaminophen **OR** acetaminophen, perflutren lipid microspheres    OBJECTIVE  Vital signs in the last 24 hours:  Vitals:    10/25/24 1040   BP: 115/77   Pulse: 89   Resp: 16   Temp: 97.6 °F (36.4 °C)   SpO2: 98%       Intake/Output last 3 shifts:  No intake/output data recorded.    Intake/Output this shift:  No 
O2 Sat at rest on room air is 97 %.  O2 Sat with activity on room air is 97 %.      
Occupational Therapy  Attempted to see pt for OT tx this AM. Pt declining despite encouragement.   Will re attempt as able    Ramona El OTR/L  
Patient arrived to [facility].    BP (!) 161/90   Pulse (!) 105   Temp 98.3 °F (36.8 °C) (Oral)   Resp 18   Ht 1.524 m (5')   Wt 61.2 kg (135 lb)   SpO2 94%   BMI 26.37 kg/m²     Pleasant and cooperative now. In bed watching TV. Bed alarm on. Denies needs currtnly  
Patient has been out of bed today to sit in the bedside chair. She has ambulated to/from the BR with walker and assistance x1.  She tolerated all of these activities well without complaints of dizziness or feeling unsteady.  She is only alert to self, has bed alarm/chair alarm and telesitter in use.  
Patient kicking bedside table and continually trying to get out of chair without assistance. Patient unable to be redirected verbally, unaware of safety awareness. Patient administered haldol IM. No adverse reactions noted and or reported. Patient in bed at this time, all safety precautions in place.   
Patient resting in bed and denies any needs at this time.   
Patient resting in bed and denies any needs at this time. Patients spouse has left for the day.   
Patient taken off the unit by transport for transfer to .  
Patients spouse at bedside and has been updated on the current plan of care. Patient and spouse deny any needs at this time.   
Pt in bed during assessment. Pt is alert and only oriented to self. Pt attempting to get out of bed multiple times. Assisted pt to bathroom and to chair. Pt continues to get up and wander around room and not following direction. Pt yelling at nursing staff to get away from her or she will kick them. Pt eventually redirected back to bed and calmed down. MD Cinthia Harley made aware of pt's behaviors. New medication orders placed. Bed alarm in place, call light with in reach. No new concerns at this time.       Bedside Mobility Assessment Tool (BMAT):     Assessment Level 1- Sit and Shake    1. From a semi-reclined position, ask patient to sit up and rotate to a seated position at the side of the bed. Can use the bedrail.    2. Ask patient to reach out and grab your hand and shake making sure patient reaches across his/her midline.   Pass- Patient is able to come to a seated position, maintain core strength. Maintains seated balance while reaching across midline. Move on to Assessment Level 2.     Assessment Level 2- Stretch and Point   1. With patient in seated position at the side of the bed, have patient place both feet on the floor (or stool) with knees no higher than hips.    2. Ask patient to stretch one leg and straighten the knee, then bend the ankle/flex and point the toes. If appropriate, repeat with the other leg.   Pass- Patient is able to demonstrate appropriate quad strength on intended weight bearing limb(s). Move onto Assessment Level 3.     Assessment Level 3- Stand   1. Ask patient to elevate off the bed or chair (seated to standing) using an assistive device (cane, bedrail).    2. Patient should be able to raise buttocks off be and hold for a count of five. May repeat once.   Pass- Patient maintains standing stability for at least 5 seconds, proceed to assessment level 4.    Assessment Level 4- Walk   1. Ask patient to march in place at bedside.    2. Then ask patient to advance step and return each 
Pt lethargic and will not open eyes but is responsive. Writer was able to feed pt 50% of dinner tray and get pt to drink. Will continue to monitor.  
Pt resting with eyes closed, easy rr noted.  at bedside and he does not want pt to be disturbed at this time. Will continue to monitor.  
Report has been given to Ramila RN and Tj RN Care transferred at this time.   
Shift assessment complete see flow sheet respirations easy and even. Patient denies any pain or needs at this time. Bed is locked in the lowest position with call light within reach.  Patient has received her MRI this morning.  
Spoke with  Candido Flores (by telephone [phone])  to communicate that the patient was to be moved to [address] [place] [address].  
Stat Consult has been perfect served to Dr. Brooks on 10/23/24. 7:38 AM    Kiera Banks  10/23/2024  
Transfer report to OMAR Muñoz.  
(L) 10/23/2024    .6 (H) 10/23/2024     10/23/2024       Basic Metabolic Panel  Lab Results   Component Value Date/Time     10/23/2024 04:26 AM     10/23/2024 04:26 AM    CO2 19 10/23/2024 04:26 AM    GLUCOSE 77 10/23/2024 04:26 AM    BUN 17 10/23/2024 04:26 AM    CREATININE 1.0 10/23/2024 04:26 AM       HEPATIC PANEL   Lab Results   Component Value Date/Time    AST 38 10/22/2024 04:31 PM    ALT 34 10/22/2024 04:31 PM       Lab Results   Component Value Date    TROPONINI <0.01 04/08/2023       Lab Results   Component Value Date/Time    INR 0.98 04/28/2024 03:49 PM    INR 0.85 12/10/2012 12:33 PM       Magnesium:    Lab Results   Component Value Date/Time    MG 2.00 10/23/2024 04:26 AM       U/A:    Lab Results   Component Value Date/Time    NITRITE n 07/08/2022 05:14 PM    COLORU Yellow 10/22/2024 06:32 PM    WBCUA 3-5 10/22/2024 06:32 PM    RBCUA 3-4 10/22/2024 06:32 PM    MUCUS 1+ 10/22/2024 06:32 PM    BACTERIA Rare 09/30/2024 01:00 AM    CLARITYU Clear 10/22/2024 06:32 PM    SPECGRAV 1.020 07/08/2022 05:14 PM    LEUKOCYTESUR Negative 10/22/2024 06:32 PM    BLOODU Negative 10/22/2024 06:32 PM    GLUCOSEU Negative 10/22/2024 06:32 PM     ABG:  No results found for: \"MST3AET\", \"BEART\", \"W8XUUETL\", \"PHART\", \"THGBART\", \"DXE2REF\", \"PO2ART\", \"PCF7BIZ\"      Results       ** No results found for the last 336 hours. **             Recently Recorded Vitals    Vitals:    10/25/24 0017 10/25/24 0500 10/25/24 0750 10/25/24 0800   BP: 107/66 100/64 104/72 113/74   Pulse: 95 97 96 97   Resp: 16 16 16 16   Temp: 98.7 °F (37.1 °C) (!) 96 °F (35.6 °C) 98 °F (36.7 °C) 97.9 °F (36.6 °C)   TempSrc: Axillary Axillary Axillary Oral   SpO2: 94% 97% 94% 95%   Weight:       Height:            Most Recent Imaging    MRI brain without contrast   Final Result   Cerebral atrophy.  Severe chronic small vessel ischemic changes.  Remote   lacunar infarcts in basal ganglia and posterior limbs of the internal   capsules.  
alternative oral replacement, magnesium sulfate, ondansetron **OR** ondansetron, polyethylene glycol, acetaminophen **OR** acetaminophen, perflutren lipid microspheres    Lab Data:  Recent Labs     10/22/24  1631 10/23/24  0426   WBC 7.3 7.5   HGB 12.2 12.0   HCT 36.2 35.5*   .3* 101.6*    203     Recent Labs     10/22/24  1631 10/23/24  0426    137   K 3.5 3.5    103   CO2 24 19*   BUN 17 17   CREATININE 1.3* 1.0     No results for input(s): \"CKTOTAL\", \"CKMB\", \"CKMBINDEX\", \"TROPONINI\" in the last 72 hours.    Coagulation:   Lab Results   Component Value Date/Time    INR 0.98 04/28/2024 03:49 PM    APTT 33.6 12/10/2012 12:33 PM     Cardiac markers:   Lab Results   Component Value Date/Time    TROPONINI <0.01 04/08/2023 11:29 AM         Lab Results   Component Value Date    ALT 34 10/22/2024    AST 38 (H) 10/22/2024    ALKPHOS 115 10/22/2024    BILITOT 0.8 10/22/2024       Lab Results   Component Value Date    INR 0.98 04/28/2024    INR 0.85 12/10/2012    PROTIME 13.2 04/28/2024    PROTIME 9.8 (L) 12/10/2012     Lab Results   Component Value Date    TSH 0.19 (L) 10/22/2024    TSHFT4 9.42 (H) 04/29/2024       Radiology      MRI brain without contrast   Preliminary Result   Cerebral atrophy.  Severe chronic small vessel ischemic changes.  Remote   lacunar infarcts in basal ganglia and posterior limbs of the internal   capsules.  No areas of restricted diffusion to suggest an acute ischemic   event.         XR CHEST PORTABLE   Final Result   No acute cardiopulmonary process.         CT Head W/O Contrast   Final Result   No acute intracranial abnormality.           EEG      The EEG findings were consistent with mild to moderate degree of generalized non-specific cerebral dysfunction.  The findings of FIRDA were commonly seen in nonspecific encephalopathy.  However, a small portion of this pattern showed seizure tendency.    Assessment & Plan:      AMS- has baseline dementia but got worse 
matter and centrum semiovale and  central marcial that are likely related to chronic small vessel ischemic  disease.  There are remote lacunar infarcts in the basal ganglia and  posterior limbs of the internal capsules.  There is no midline shift or mass  effect. There are no areas of restricted diffusion  IMPRESSION:   Cerebral atrophy.  Severe chronic small vessel ischemic changes.  Remote  lacunar infarcts in basal ganglia and posterior limbs of the internal  capsules.  No areas of restricted diffusion to suggest an acute ischemic  event.     Preadmission Environment: Information from previous encounter on 10/1/24  Pt lives with                                         with Significant Other  Home environment:                            two story home; bed on 2nd floor; couch on the first floor; able to stay on first floor where bathroom is located  Steps to enter first floor:                     3-4 steps to enter; unstable hand rail  Steps to second floor/basement:        Full flight of 12-13; single handrail  Laundry:                                              1st floor  Bathroom:                                           tub/shower unit, grab bars in shower, shower chair , and standard height toilet  Pt sleeps in a:                                     Dave lounge  Equipment owned:                              quad cane     Preadmission Status:  Pt able to drive: No;  is able to drive  Pt fully independent with ADLs: Yes  Pt receives assistance from family for: Bathing, Dressing, Cleaning, Cooking, Laundry , and grocery shopping  Pt supervision for functional transfers and utilized Quad Cane for mobility in home and Quad Cane out in community  History of falls:            Unknown  Home Health Services:None    AM-PAC Mobility Score    AM-PAC Inpatient Mobility Raw Score : 17         Subjective  Patient lying reclined in bed with no family present.  Pt agreeable to this PT session.     Cognition    A&O 
controlled now , on norvasc, losartan, hydralazine, imdur   Adjust as needed    Hypothyroid - recently uncontrolled and as above given IV synthroid last month and now hyperthyroid, hold for now        CAD: S/p MARQUITA LAD 3/2023  - follows with cardiology outpatient   -Medical management for severe diagonal disease due to small vessel size  - continue plavix, zetia   - not taking Imdur, monitor and consider restarting if needed  - denies any chest pain     Noncompliance with mediation regimen     Dvt prophylaxis  Await further testing  Full code  Fall precautions      Yadiel Rivas MD, 10/23/2024 11:22 AM            
discussed results with the patient  I reviewed blood testing and other test results and discussed results with the patient      Impression:  Altered Mental Status with underlying dementia   Possible seizures with EEG improvement over the course of hospitalization. Patient continues to be confused this morning but is alert and awake. She denies any new focal weakness, numbness, or visual changes.        Recommendation  Continue Keppra 500mg BID  Follow up with Neurology outpatient in 3 months  Neurology to sign off if no other concerns      JAMSHID Thomas NP    ===    I saw and evaluated the patient. I agree with the findings and plan of care as documented in the NP’s note. Additionally, I have included further information about my assessment and plan below:     Siobhan Flores is a 73 y.o. female who  has a past medical history of Alzheimer disease (HCC), Anxiety, CAD (coronary artery disease), Dementia (HCC), Fibromyalgia, Hyperlipidemia, Hypertension, Hypothyroidism, Osteopenia, Thyroid disease, and Transient ischemic attack. Neurology consulted for acute encephalopathy.     She has no new complaints for me today. She remains confused. She knows she is in the hospital but does not recall why. PT recommending LTC with skilled therapy. No acute events in past 24 hours. Afebrile and hemodynamically stable.      Data Reviewed:   10/24/24 EEG mild gen slow, FIRDA resolved  10/23/24 EEG mild to mod gen slow, FIRDA    Lab work and trends over past 24 hours:   Last TSH was 0.19 low  Otherwise unremarkable    Assessment:   Acute encephalopathy possibly due to seizure with post ictal encephalopathy. Evidence for this is FIRDA and mild to mod generalized slowing on EEG that improved to mild gen slowing on EEG after starting Keppra. She appears to be near previous baseline of dementia.   History of dementia presumably due to Alzheimer's disease and vascular disease. Brain MRI revealed cerebral atrophy and severe white 
indicates item is NOT TESTED  Finger to Nose:        [x]WNL  []Impaired    Comment:   Alternating pronation/supination:   [x]WNL  []Impaired    Comment: able to complete for several rotations, however easily distracted and becomes interested in other tasks, then loses correct alternating pattern  Finger/Thumb opposition:  [x]WNL  []Impaired    Comment:   Heel to shin (sitting or supine):      [x]WNL  []Impaired    Comment:   Alternating Toe Tapping:       [x]WNL  []Impaired    Comment:     Vision Testing  Blank box below indicates item is NOT TESTED  Visual Tracking:    [x]WNL  []Impaired    Comment:   Peripheral visual field:  [x]WNL  []Impaired    Comment:   Divergence:       []WNL  []Impaired    Comment:   Convergence:       []WNL  []Impaired    Comment:    Tone:  NT    Balance:  Sitting:    SBA static and dynamic at EOB and in recliner  Standing:    CGA to min A dynamic    Bed Mobility:   Supine to Sit:    Not Tested  Sit to Supine:   Not Tested  Rolling:   Not Tested  Scooting in sitting: Not Tested  Scooting in supine:  Not Tested    Transfers:    Sit to stand:    CGA  Stand to sit:    CGA  Bed to chair:     CGA with gait belt, no AD  Bed/ chair to standard toilet:  Not Tested  Bed/chair to BSC:   Not Tested  Functional Mobility:   CGA with gait belt, no AD; required min A x1 due to loss of balance    ADLs:  Pt required frequent redirection to task due to distractibility and perseveration of finding out about her 's job  Dressing:      UE:   CGA to don robe  LE:    Independent to don/doff sock while seated in chair    Bathing:    UE:  Not Tested  LE:  Not Tested    Eating:   Independent to bring food to mouth; supervision to maintain attention to task    Toileting:  Not Tested    Grooming/hygiene: Min A to brush hair thoroughly, independent to wipe face    Ther Ex / Activities Initiated:   N/A    Activity Tolerance:   Pt completed therapy session with  poor attention to task, easily distractible, and 
  []WNL  []Impaired    Comment:   Convergence:       []WNL  []Impaired    Comment:    Tone  WNL    Balance  Static Sitting:  Fair +; SBA  Dynamic Sitting:  Fair +; SBA   Comments: EOB, and recliner chair.    Static Standing: Fair +; CGA  Dynamic Standing: Fair ; CGA to Min A  Comments: 1 LOB noted during gait task requiring min A to correct.    Posture  Seated: Forward head and neck  Standing: Forward head and neck    Bed Mobility   Supine to Sit:    SBA  Sit to Supine:   Not Tested  Rolling:   Not Tested   Scooting in sitting: SBA   Scooting in supine:  Not Tested   Bridging:  Not Tested  Comments: Cues to perform task.    Transfer Training     Sit to stand:   CGA   Stand to sit:   CGA   Bed to/from Chair:  CGA with use of No AD and gait belt  Comments: Max verbal cues for direction    Gait gait completed as indicated below  Distance:      50 ft  Deviations (firm surface/linoleum):  decreased gilberto, forward flexed posture, staggers, and decreased step length bilaterally; 1 instance of stagger to the right requiring Min A to correct balance.  Assistive Device Used:    gait belt and rolling walker (RW)  Level of Assist:    CGA to Min A  Comment: max verbal cues for direction.    Stair Training deferred, pt unsafe/ not appropriate to complete stairs at this time    Therapeutic Exercises Initiated  deferred secondary to treatment focus on functional mobility    Positioning Needs   Pt up in chair and reclined in chair, alarm set, positioned in proper neutral alignment and pressure relief provided.   Call light provided and all needs within reach  RN aware of pt position/status  Telesitter present in room  Pt's lunch arrived during end of session.    Other Activities  Refer to OT note.    Patient/Family Education   Pt educated on role of inpatient PT, POC, importance of continued activity, DC recommendations, safety awareness, transfer techniques, and calling for assist with mobility.    Assessment  Pt seen today for

## 2024-10-30 NOTE — PLAN OF CARE
Problem: Discharge Planning  Goal: Discharge to home or other facility with appropriate resources  10/24/2024 2116 by Nikko Hendrickson RN  Outcome: Progressing  10/24/2024 1113 by Taryn Rausch RN  Outcome: Progressing  Flowsheets (Taken 10/23/2024 0049 by Verito Rojas, RN)  Discharge to home or other facility with appropriate resources: Identify barriers to discharge with patient and caregiver     Problem: Safety - Adult  Goal: Free from fall injury  10/24/2024 2116 by Nikko Hendrickson RN  Outcome: Progressing  10/24/2024 1113 by Taryn Rausch RN  Outcome: Progressing     Problem: Neurosensory - Adult  Goal: Achieves stable or improved neurological status  10/24/2024 2116 by Nikko Hendrickson RN  Outcome: Progressing  10/24/2024 1113 by Taryn Rausch RN  Outcome: Progressing  Flowsheets (Taken 10/24/2024 1113)  Achieves stable or improved neurological status: Assess for and report changes in neurological status  Goal: Absence of seizures  Outcome: Progressing  Goal: Remains free of injury related to seizures activity  Outcome: Progressing  Goal: Achieves maximal functionality and self care  Outcome: Progressing     Problem: Skin/Tissue Integrity - Adult  Goal: Skin integrity remains intact  10/24/2024 2116 by Nikko Hendrickson RN  Outcome: Progressing  10/24/2024 1113 by Taryn Rausch RN  Outcome: Progressing  Flowsheets (Taken 10/24/2024 0407 by Tj Stanley, RN)  Skin Integrity Remains Intact:   Monitor for areas of redness and/or skin breakdown   Assess vascular access sites hourly     Problem: Musculoskeletal - Adult  Goal: Return mobility to safest level of function  10/24/2024 2116 by Nikko Hendrickson RN  Outcome: Progressing  10/24/2024 1113 by Taryn Rausch RN  Outcome: Progressing     Problem: Metabolic/Fluid and Electrolytes - Adult  Goal: Electrolytes maintained within normal limits  Outcome: Progressing     Problem: Pain  Goal: Verbalizes/displays adequate comfort level or baseline comfort 
  Problem: Discharge Planning  Goal: Discharge to home or other facility with appropriate resources  10/28/2024 2307 by Maria C Gold RN  Outcome: Progressing  Flowsheets (Taken 10/28/2024 2000)  Discharge to home or other facility with appropriate resources: Identify barriers to discharge with patient and caregiver  10/28/2024 1045 by Marco Samano RN  Outcome: Progressing     Problem: Safety - Adult  Goal: Free from fall injury  10/28/2024 2307 by Maria C Gold RN  Outcome: Progressing  10/28/2024 1045 by Marco Samano RN  Outcome: Progressing     Problem: Skin/Tissue Integrity - Adult  Goal: Skin integrity remains intact  10/28/2024 2307 by Maria C Gold RN  Outcome: Progressing  Flowsheets (Taken 10/28/2024 2000)  Skin Integrity Remains Intact: Monitor for areas of redness and/or skin breakdown  10/28/2024 1045 by Marco Samano RN  Outcome: Progressing     Problem: Musculoskeletal - Adult  Goal: Return mobility to safest level of function  10/28/2024 2307 by Maria C Gold RN  Outcome: Progressing  Flowsheets (Taken 10/28/2024 2000)  Return Mobility to Safest Level of Function: Assist with transfers and ambulation using safe patient handling equipment as needed  10/28/2024 1045 by Marco Samano RN  Outcome: Progressing     Problem: Metabolic/Fluid and Electrolytes - Adult  Goal: Electrolytes maintained within normal limits  10/28/2024 2307 by Maria C Gold RN  Outcome: Progressing  Flowsheets (Taken 10/28/2024 2000)  Electrolytes maintained within normal limits: Monitor labs and assess patient for signs and symptoms of electrolyte imbalances  10/28/2024 1045 by Marco Samano RN  Outcome: Progressing     Problem: Pain  Goal: Verbalizes/displays adequate comfort level or baseline comfort level  10/28/2024 2307 by Maria C Gold RN  Outcome: Progressing  10/28/2024 1045 by Marco Samano RN  Outcome: Progressing     Problem: Nutrition Deficit:  Goal: Optimize nutritional status  10/28/2024 2307 
  Problem: Discharge Planning  Goal: Discharge to home or other facility with appropriate resources  10/29/2024 2216 by Lillian Benavides RN  Outcome: Progressing  10/29/2024 1328 by Naomy Guzmán RN  Outcome: Progressing  Flowsheets (Taken 10/28/2024 2000 by Maria C Gold, RN)  Discharge to home or other facility with appropriate resources: Identify barriers to discharge with patient and caregiver     Problem: Safety - Adult  Goal: Free from fall injury  10/29/2024 2216 by Lillian Benavides RN  Outcome: Progressing  10/29/2024 1328 by Naomy Guzmán RN  Outcome: Progressing  Flowsheets (Taken 10/29/2024 1328)  Free From Fall Injury: Instruct family/caregiver on patient safety     Problem: Neurosensory - Adult  Goal: Achieves stable or improved neurological status  10/29/2024 2216 by Lillian Benavides RN  Outcome: Progressing  10/29/2024 1328 by Naomy Guzmán RN  Outcome: Progressing  Flowsheets (Taken 10/28/2024 2000 by Maria C Gold, RN)  Achieves stable or improved neurological status: Assess for and report changes in neurological status  Goal: Absence of seizures  10/29/2024 2216 by Lillian Benavides RN  Outcome: Progressing  10/29/2024 1328 by Naomy Guzmán RN  Outcome: Progressing  Flowsheets (Taken 10/27/2024 1107 by Andressa Washington RN)  Absence of seizures:   Monitor for seizure activity.  If seizure occurs, document type and location of movements and any associated apnea   If seizure occurs, turn head to side and suction secretions as needed   Support airway/breathing, administer oxygen as needed  Goal: Remains free of injury related to seizures activity  10/29/2024 2216 by Lillian Benavides RN  Outcome: Progressing  10/29/2024 1328 by Naomy Guzmán RN  Outcome: Progressing  Flowsheets (Taken 10/29/2024 1328)  Remains free of injury related to seizure activity:   Maintain airway, patient safety  and administer oxygen as ordered   Monitor patient for seizure activity, 
  Problem: Discharge Planning  Goal: Discharge to home or other facility with appropriate resources  Outcome: Progressing      Problem: Neurosensory - Adult  Goal: Achieves stable or improved neurological status  Outcome: Progressing  Flowsheets (Taken 10/25/2024 1021 by Araceli Carrera, RN)  Achieves stable or improved neurological status: Assess for and report changes in neurological status     Problem: Safety - Adult  Goal: Free from fall injury  10/26/2024 1113 by Andressa Washington, RN  Outcome: Progressing    
  Problem: Discharge Planning  Goal: Discharge to home or other facility with appropriate resources  Outcome: Progressing     Problem: Safety - Adult  Goal: Free from fall injury  10/26/2024 2240 by Nikko Hendrickson RN  Outcome: Progressing  10/26/2024 1113 by Andressa Washington RN  Outcome: Progressing  Flowsheets (Taken 10/26/2024 1113)  Free From Fall Injury: Instruct family/caregiver on patient safety     Problem: Neurosensory - Adult  Goal: Achieves stable or improved neurological status  Outcome: Progressing  Goal: Absence of seizures  10/26/2024 2240 by Nikko Hendrickson RN  Outcome: Progressing  10/26/2024 1113 by Andressa Washington RN  Outcome: Progressing  Flowsheets (Taken 10/26/2024 1113)  Absence of seizures:   Monitor for seizure activity.  If seizure occurs, document type and location of movements and any associated apnea   If seizure occurs, turn head to side and suction secretions as needed   Support airway/breathing, administer oxygen as needed  Goal: Remains free of injury related to seizures activity  Outcome: Progressing  Goal: Achieves maximal functionality and self care  Outcome: Progressing     Problem: Skin/Tissue Integrity - Adult  Goal: Skin integrity remains intact  10/26/2024 2240 by Nikko Hendrickson RN  Outcome: Progressing  10/26/2024 1113 by Andressa Washington RN  Outcome: Progressing  Flowsheets (Taken 10/26/2024 1113)  Skin Integrity Remains Intact: Monitor for areas of redness and/or skin breakdown     Problem: Musculoskeletal - Adult  Goal: Return mobility to safest level of function  Outcome: Progressing     Problem: Metabolic/Fluid and Electrolytes - Adult  Goal: Electrolytes maintained within normal limits  Outcome: Progressing     Problem: Pain  Goal: Verbalizes/displays adequate comfort level or baseline comfort level  Outcome: Progressing     Problem: Nutrition Deficit:  Goal: Optimize nutritional status  Outcome: Progressing     Problem: Safety - Medical Restraint  Goal: 
  Problem: Discharge Planning  Goal: Discharge to home or other facility with appropriate resources  Outcome: Progressing     Problem: Safety - Adult  Goal: Free from fall injury  Outcome: Progressing     Problem: Neurosensory - Adult  Goal: Absence of seizures  10/27/2024 1107 by Andressa Washington RN  Outcome: Progressing  Flowsheets (Taken 10/27/2024 1107)  Absence of seizures:   Monitor for seizure activity.  If seizure occurs, document type and location of movements and any associated apnea   If seizure occurs, turn head to side and suction secretions as needed   Support airway/breathing, administer oxygen as needed     Problem: Skin/Tissue Integrity - Adult  Goal: Skin integrity remains intact  10/27/2024 1107 by Andressa Washington RN  Outcome: Progressing  Flowsheets (Taken 10/27/2024 1107)  Skin Integrity Remains Intact: Monitor for areas of redness and/or skin breakdown     Problem: Musculoskeletal - Adult  Goal: Return mobility to safest level of function  10/27/2024 1107 by Andressa Washington RN  Outcome: Progressing  Flowsheets (Taken 10/27/2024 1107)  Return Mobility to Safest Level of Function:   Assess patient stability and activity tolerance for standing, transferring and ambulating with or without assistive devices   Assist with transfers and ambulation using safe patient handling equipment as needed   Obtain physical therapy/occupational therapy consults as needed     
  Problem: Discharge Planning  Goal: Discharge to home or other facility with appropriate resources  Outcome: Progressing     Problem: Safety - Adult  Goal: Free from fall injury  Outcome: Progressing     Problem: Neurosensory - Adult  Goal: Achieves stable or improved neurological status  Outcome: Progressing  Goal: Absence of seizures  Outcome: Progressing  Goal: Remains free of injury related to seizures activity  Outcome: Progressing  Goal: Achieves maximal functionality and self care  Outcome: Progressing     Problem: Skin/Tissue Integrity - Adult  Goal: Skin integrity remains intact  Outcome: Progressing     Problem: Musculoskeletal - Adult  Goal: Return mobility to safest level of function  Outcome: Progressing     Problem: Metabolic/Fluid and Electrolytes - Adult  Goal: Electrolytes maintained within normal limits  Outcome: Progressing     Problem: Pain  Goal: Verbalizes/displays adequate comfort level or baseline comfort level  Outcome: Progressing     Problem: Nutrition Deficit:  Goal: Optimize nutritional status  Outcome: Progressing     Problem: Safety - Medical Restraint  Goal: Remains free of injury from restraints (Restraint for Interference with Medical Device)  Description: INTERVENTIONS:  1. Determine that other, less restrictive measures have been tried or would not be effective before applying the restraint  2. Evaluate the patient's condition at the time of restraint application  3. Inform patient/family regarding the reason for restraint  4. Q2H: Monitor safety, psychosocial status, comfort, nutrition and hydration  Outcome: Progressing     Problem: Skin/Tissue Integrity  Goal: Absence of new skin breakdown  Description: 1.  Monitor for areas of redness and/or skin breakdown  2.  Assess vascular access sites hourly  3.  Every 4-6 hours minimum:  Change oxygen saturation probe site  4.  Every 4-6 hours:  If on nasal continuous positive airway pressure, respiratory therapy assess nares and 
  Problem: Discharge Planning  Goal: Discharge to home or other facility with appropriate resources  Outcome: Progressing  Flowsheets  Taken 10/23/2024 0049  Discharge to home or other facility with appropriate resources: Identify barriers to discharge with patient and caregiver  Taken 10/22/2024 2351  Discharge to home or other facility with appropriate resources: Identify barriers to discharge with patient and caregiver     Problem: Safety - Adult  Goal: Free from fall injury  Outcome: Progressing     
  Problem: Discharge Planning  Goal: Discharge to home or other facility with appropriate resources  Outcome: Progressing  Flowsheets (Taken 10/25/2024 1021 by Araceli Carrera RN)  Discharge to home or other facility with appropriate resources: Identify barriers to discharge with patient and caregiver     Problem: Safety - Adult  Goal: Free from fall injury  Outcome: Progressing     Problem: Neurosensory - Adult  Goal: Achieves stable or improved neurological status  Outcome: Progressing  Flowsheets (Taken 10/25/2024 1021 by Araceli Carrera RN)  Achieves stable or improved neurological status: Assess for and report changes in neurological status  Goal: Absence of seizures  Outcome: Progressing  Flowsheets (Taken 10/25/2024 1021 by Araceli Carrera RN)  Absence of seizures: Monitor for seizure activity.  If seizure occurs, document type and location of movements and any associated apnea  Goal: Remains free of injury related to seizures activity  Outcome: Progressing  Goal: Achieves maximal functionality and self care  Outcome: Progressing  Flowsheets (Taken 10/25/2024 1021 by Araceli Carrera RN)  Achieves maximal functionality and self care: Monitor swallowing and airway patency with patient fatigue and changes in neurological status     Problem: Skin/Tissue Integrity - Adult  Goal: Skin integrity remains intact  Outcome: Progressing  Flowsheets  Taken 10/25/2024 1021 by Araceli Carrera RN  Skin Integrity Remains Intact: Monitor for areas of redness and/or skin breakdown  Taken 10/25/2024 1017 by Araceli Carrera RN  Skin Integrity Remains Intact:   Monitor for areas of redness and/or skin breakdown   Every 4-6 hours minimum: Change oxygen saturation probe site     Problem: Musculoskeletal - Adult  Goal: Return mobility to safest level of function  Outcome: Progressing  Flowsheets (Taken 10/25/2024 1021 by Araceli Carrera RN)  Return Mobility to Safest Level of Function: Assess patient stability and 
  Problem: Discharge Planning  Goal: Discharge to home or other facility with appropriate resources  Outcome: Progressing  Flowsheets (Taken 10/28/2024 2000 by Maria C Gold RN)  Discharge to home or other facility with appropriate resources: Identify barriers to discharge with patient and caregiver     Problem: Safety - Adult  Goal: Free from fall injury  Outcome: Progressing  Flowsheets (Taken 10/29/2024 1328)  Free From Fall Injury: Instruct family/caregiver on patient safety     Problem: Neurosensory - Adult  Goal: Achieves stable or improved neurological status  Outcome: Progressing  Goal: Absence of seizures  Outcome: Progressing  Goal: Remains free of injury related to seizures activity  Outcome: Progressing  Goal: Achieves maximal functionality and self care  Outcome: Progressing     Problem: Skin/Tissue Integrity - Adult  Goal: Skin integrity remains intact  Outcome: Progressing     Problem: Musculoskeletal - Adult  Goal: Return mobility to safest level of function  Outcome: Progressing     Problem: Metabolic/Fluid and Electrolytes - Adult  Goal: Electrolytes maintained within normal limits  Outcome: Progressing     Problem: Pain  Goal: Verbalizes/displays adequate comfort level or baseline comfort level  Outcome: Progressing     Problem: Nutrition Deficit:  Goal: Optimize nutritional status  Outcome: Progressing     Problem: Safety - Medical Restraint  Goal: Remains free of injury from restraints (Restraint for Interference with Medical Device)  Description: INTERVENTIONS:  1. Determine that other, less restrictive measures have been tried or would not be effective before applying the restraint  2. Evaluate the patient's condition at the time of restraint application  3. Inform patient/family regarding the reason for restraint  4. Q2H: Monitor safety, psychosocial status, comfort, nutrition and hydration  Outcome: Progressing     Problem: Skin/Tissue Integrity  Goal: Absence of new skin 
  Problem: Discharge Planning  Goal: Discharge to home or other facility with appropriate resources  Outcome: Progressing  Flowsheets (Taken 10/28/2024 2000 by Maria C Gold, RN)  Discharge to home or other facility with appropriate resources: Identify barriers to discharge with patient and caregiver     Problem: Safety - Adult  Goal: Free from fall injury  Outcome: Progressing  Flowsheets (Taken 10/29/2024 1328)  Free From Fall Injury: Instruct family/caregiver on patient safety     Problem: Neurosensory - Adult  Goal: Achieves stable or improved neurological status  Outcome: Progressing  Flowsheets (Taken 10/28/2024 2000 by Maria C Gold, RN)  Achieves stable or improved neurological status: Assess for and report changes in neurological status     Problem: Neurosensory - Adult  Goal: Absence of seizures  Outcome: Progressing  Flowsheets (Taken 10/27/2024 1107 by Andressa Washington, RN)  Absence of seizures:   Monitor for seizure activity.  If seizure occurs, document type and location of movements and any associated apnea   If seizure occurs, turn head to side and suction secretions as needed   Support airway/breathing, administer oxygen as needed     Problem: Neurosensory - Adult  Goal: Remains free of injury related to seizures activity  Outcome: Progressing  Flowsheets (Taken 10/29/2024 1328)  Remains free of injury related to seizure activity:   Maintain airway, patient safety  and administer oxygen as ordered   Monitor patient for seizure activity, document and report duration and description of seizure to Licensed Independent Practitioner   If seizure occurs, turn patient to side and suction secretions as needed   Reorient patient post seizure   Instruct patient/family to notify RN of any seizure activity   Seizure pads on all 4 side rails   Instruct patient/family to call for assistance with activity based on assessment     Problem: Skin/Tissue Integrity - Adult  Goal: Skin integrity remains 
  Problem: Neurosensory - Adult  Goal: Absence of seizures  10/27/2024 1107 by Andressa Washington, RN  Outcome: Progressing  Flowsheets (Taken 10/27/2024 1107)  Absence of seizures:   Monitor for seizure activity.  If seizure occurs, document type and location of movements and any associated apnea   If seizure occurs, turn head to side and suction secretions as needed   Support airway/breathing, administer oxygen as needed     Problem: Skin/Tissue Integrity - Adult  Goal: Skin integrity remains intact  10/27/2024 1107 by Andressa Washington, RN  Outcome: Progressing  Flowsheets (Taken 10/27/2024 1107)  Skin Integrity Remains Intact: Monitor for areas of redness and/or skin breakdown     Problem: Musculoskeletal - Adult  Goal: Return mobility to safest level of function  10/27/2024 1107 by Andressa Washington, RN  Outcome: Progressing  Flowsheets (Taken 10/27/2024 1107)  Return Mobility to Safest Level of Function:   Assess patient stability and activity tolerance for standing, transferring and ambulating with or without assistive devices   Assist with transfers and ambulation using safe patient handling equipment as needed   Obtain physical therapy/occupational therapy consults as needed     
Bedside swallow evaluation completed this date.    Andressa Prather M.S. CCC-SLP  Speech-language pathologist  [id number]      
TODAY'S DATE:  10/23/2024      Current NIHSS 2      Discussed personal risk factors for Stroke/TIA with patient/family, and ways to reduce the risk for a recurrent stroke.     Patient's personal risk factors which were identified are:   []   Alcohol Abuse: check with your physician before any alcohol consumption.   []   Atrial fibrillation: may cause blood clots  []   Drug Abuse: Seek help, talk with your doctor  []   Clotting Disorder  []   Diabetes  []   Family history of stroke or heart disease  [x]   High Blood Pressure/Hypertension: work with your physician  [x]   High cholesterol: monitor cholesterol levels with your physician  []   Overweight/Obesity: work with your physician for your ideal body weight  []   Physical Inactivity: get regular exercise as directed by your physician  [x]   Personal history of previous TIA or stroke  []   Poor Diet: decrease salt (sodium) in your diet, follow diet directed by physician  []   Smoking: stop smoking      Reviewed the Following Education with Patient and/or Family:   - Signs and Symptoms of Stroke  - Personal risk factors   - How to activate EMS (911)   - Importance of Follow Up Appointments at Discharge   - Importance of Compliance with Medications Prescribed at Discharge  - Available community resources and stroke advocacy groups if needed    Patient and/or family member: with no evidence of learning.     Stroke Education booklet given to patient/family (or verified, if given already), which reviews above information. yes         Electronically signed by Verito Rojas RN on 10/23/2024 at 3:53 AM       
tried or would not be effective before applying the restraint   Evaluate the patient's condition at the time of restraint application   Inform patient/family regarding the reason for restraint   Every 2 hours: Monitor safety, psychosocial status, comfort, nutrition and hydration  Taken 10/24/2024 0235 by Ramila Morrow RN  Remains free of injury from restraints (restraint for interference with medical device):   Determine that other, less restrictive measures have been tried or would not be effective before applying the restraint   Evaluate the patient's condition at the time of restraint application   Inform patient/family regarding the reason for restraint   Every 2 hours: Monitor safety, psychosocial status, comfort, nutrition and hydration  Taken 10/24/2024 0000 by Tj Stanley RN  Remains free of injury from restraints (restraint for interference with medical device):   Determine that other, less restrictive measures have been tried or would not be effective before applying the restraint   Evaluate the patient's condition at the time of restraint application   Inform patient/family regarding the reason for restraint   Every 2 hours: Monitor safety, psychosocial status, comfort, nutrition and hydration  Taken 10/23/2024 2233 by Tj Stanley RN  Remains free of injury from restraints (restraint for interference with medical device):   Determine that other, less restrictive measures have been tried or would not be effective before applying the restraint   Evaluate the patient's condition at the time of restraint application   Inform patient/family regarding the reason for restraint   Every 2 hours: Monitor safety, psychosocial status, comfort, nutrition and hydration     Problem: Skin/Tissue Integrity  Goal: Absence of new skin breakdown  Description: 1.  Monitor for areas of redness and/or skin breakdown  2.  Assess vascular access sites hourly  3.  Every 4-6 hours minimum:  Change oxygen saturation probe 
nutrition and hydration  10/24/2024 0414 by Tj Stanley RN  Outcome: Progressing  Flowsheets  Taken 10/24/2024 0400 by Tj Stanley RN  Remains free of injury from restraints (restraint for interference with medical device):   Determine that other, less restrictive measures have been tried or would not be effective before applying the restraint   Evaluate the patient's condition at the time of restraint application   Inform patient/family regarding the reason for restraint   Every 2 hours: Monitor safety, psychosocial status, comfort, nutrition and hydration  Taken 10/24/2024 0235 by Ramila Morrow RN  Remains free of injury from restraints (restraint for interference with medical device):   Determine that other, less restrictive measures have been tried or would not be effective before applying the restraint   Evaluate the patient's condition at the time of restraint application   Inform patient/family regarding the reason for restraint   Every 2 hours: Monitor safety, psychosocial status, comfort, nutrition and hydration  Taken 10/24/2024 0000 by Tj Stanley RN  Remains free of injury from restraints (restraint for interference with medical device):   Determine that other, less restrictive measures have been tried or would not be effective before applying the restraint   Evaluate the patient's condition at the time of restraint application   Inform patient/family regarding the reason for restraint   Every 2 hours: Monitor safety, psychosocial status, comfort, nutrition and hydration  Taken 10/23/2024 2233 by Tj Stanley RN  Remains free of injury from restraints (restraint for interference with medical device):   Determine that other, less restrictive measures have been tried or would not be effective before applying the restraint   Evaluate the patient's condition at the time of restraint application   Inform patient/family regarding the reason for restraint   Every 2 hours: Monitor safety, psychosocial

## 2024-10-30 NOTE — DISCHARGE INSTRUCTIONS
Your information:  Name: Siobhan Flores  : 1950    Your instructions:    Follow up with Neurology in 3 months.     Follow up with PCP with in     What to do after you leave the hospital:    Recommended diet: {diet:72515}    Recommended activity: {discharge activity:88815}        The following personal items were collected during your admission and were returned to you:    Belongings  Dental Appliances: None  Vision - Corrective Lenses: Eyeglasses  Hearing Aid: None  Clothing: Footwear, Pajamas, At bedside  Jewelry: Ring  Body Piercings Removed: No  Electronic Devices: None  Weapons (Notify Protective Services/Security): None  Other Valuables: Sent home  Home Medications: None  Valuables Given To: Patient  Provide Name(s) of Who Valuable(s) Were Given To: patient  Responsible person(s) in the waiting room: n/a  Patient approves for provider to speak to responsible person post operatively: Yes    Information obtained by:  By signing below, I understand that if any problems occur once I leave the hospital I am to contact ***.  I understand and acknowledge receipt of the instructions indicated above.

## 2024-11-03 ENCOUNTER — APPOINTMENT (OUTPATIENT)
Dept: CT IMAGING | Age: 74
End: 2024-11-03
Payer: MEDICARE

## 2024-11-03 ENCOUNTER — HOSPITAL ENCOUNTER (EMERGENCY)
Age: 74
Discharge: HOME | End: 2024-11-03
Attending: EMERGENCY MEDICINE
Payer: MEDICARE

## 2024-11-03 VITALS
OXYGEN SATURATION: 98 % | RESPIRATION RATE: 20 BRPM | DIASTOLIC BLOOD PRESSURE: 89 MMHG | SYSTOLIC BLOOD PRESSURE: 136 MMHG | TEMPERATURE: 98.2 F | HEART RATE: 97 BPM

## 2024-11-03 DIAGNOSIS — S09.90XA INJURY OF HEAD, INITIAL ENCOUNTER: Primary | ICD-10-CM

## 2024-11-03 DIAGNOSIS — S09.90XA CLOSED HEAD INJURY, INITIAL ENCOUNTER: ICD-10-CM

## 2024-11-03 PROCEDURE — 72125 CT NECK SPINE W/O DYE: CPT

## 2024-11-03 PROCEDURE — 99284 EMERGENCY DEPT VISIT MOD MDM: CPT

## 2024-11-03 PROCEDURE — 70450 CT HEAD/BRAIN W/O DYE: CPT

## 2024-11-03 ASSESSMENT — PAIN - FUNCTIONAL ASSESSMENT: PAIN_FUNCTIONAL_ASSESSMENT: NONE - DENIES PAIN

## 2024-11-04 NOTE — ED PROVIDER NOTES
[facility] ED  EMERGENCY DEPARTMENT ENCOUNTER        Patient Name: Siobhan Flores  MRN: [id number]  Birthdate 1950  Date of evaluation: 11/3/2024  Provider: Rc Lundberg MD  PCP: Janet Live, APRN - CNP  Note Started: 9:55 PM EST 11/3/24    CHIEF COMPLAINT       Fall (From [facility]. Pt fell and hit head. On plavix. Hx of dementia. Per facility pt acting her normal. )      HISTORY OF PRESENT ILLNESS: 1 or more Elements     History from : Patient    Limitations to history : dementia    Siobhan Flores is a 73 y.o. female who presents for evaluation of fall.  Patient is from local nursing facility.  Patient fell and hit her head.  According to the facility, she is at her mental status baseline.  She is on Plavix.    Nursing Notes were all reviewed and agreed with or any disagreements were addressed in the HPI.    REVIEW OF SYSTEMS :      Review of Systems    Able to complete secondary to dementia    SURGICAL HISTORY     Past Surgical History:   Procedure Laterality Date    APPENDECTOMY      COLONOSCOPY      CORONARY STENT PLACEMENT  03/30/2023    EYE SURGERY      HYSTERECTOMY (CERVIX STATUS UNKNOWN)  1989       CURRENTMEDICATIONS       Previous Medications    ATORVASTATIN (LIPITOR) 80 MG TABLET    Take 1 tablet by mouth daily    CLOPIDOGREL (PLAVIX) 75 MG TABLET    TAKE 1 TABLET BY MOUTH EVERY DAY    ERGOCALCIFEROL (VITAMIN D) 87188 UNITS CAPS    Take 50,000 Units by mouth once a week Take every Wednesday.    EZETIMIBE (ZETIA) 10 MG TABLET    Take 1 tablet by mouth nightly    ISOSORBIDE MONONITRATE (IMDUR) 30 MG EXTENDED RELEASE TABLET    Take 1 tablet by mouth daily    LEVETIRACETAM (KEPPRA) 500 MG TABLET    Take 1 tablet by mouth 2 times daily    LEVOTHYROXINE (SYNTHROID) 100 MCG TABLET    Take 1 tablet by mouth Daily    LOSARTAN (COZAAR) 100 MG TABLET    TAKE 1 TABLET BY MOUTH EVERY DAY    MELATONIN 5 MG TBDP DISINTEGRATING TABLET    Take 1 tablet by mouth at bedtime

## 2024-11-11 NOTE — PROGRESS NOTES
Physician Progress Note      PATIENT:               ALLYN PACHECO  CSN #:                  [id number]  :                       1950  ADMIT DATE:       2024 8:19 PM  DISCH DATE:        10/5/2024 11:55 AM  RESPONDING  PROVIDER #:        Cinthia Harley DO          QUERY TEXT:    Patient admitted with AMS.  Noted documentation of Toxic Metabolic   Encephalopathy from Acute Kidney Injury in PN .  In order to support the   diagnosis of HUEY, please include additional clinical indicators in your   documentation.? Or please document if the diagnosis of HUEY has been ruled out   after further study.  The medical record reflects the following:  Risk Factors: AMS, CKD3  Clinical Indicators: Creatine on admission 1.4-1.0  Treatment:  IVF, Losartan, Labs,    Defined by Kidney Disease Improving Global Outcomes (KDIGO) clinical practice   guideline for acute kidney injury:  -Increase in SCr by greater than or equal to 0.3 mg/dl within 48 hours; or  -Increase or decrease in SCr to greater than or equal to 1.5 times baseline,   which is known or presumed to have occurred within the prior 7 days; or  -Urine volume < 0.5ml/kg/h for 6 hours.  Options provided:  -- Toxic metabolic encephalopathy and HUEY ruled out and CDK 3 only.  -- Toxic metabolic encephalopathy due to HUEY still suspected, HUEY  as   evidenced by, Please document evidence as well as a numerical baseline   creatinine, if known.  -- Other - I will add my own diagnosis  -- Disagree - Not applicable / Not valid  -- Disagree - Clinically unable to determine / Unknown  -- Refer to Clinical Documentation Reviewer    PROVIDER RESPONSE TEXT:    Toxic metabolic encephalopathy and HUEY ruled out and CDK 3 only.    Query created by: Kelly Fofana on 2024 12:50 PM      Electronically signed by:  Cinthia Harley DO 2024 1:08 PM

## 2024-11-13 ENCOUNTER — HOSPITAL ENCOUNTER (INPATIENT)
Age: 74
LOS: 5 days | Discharge: HOME | DRG: 683 | End: 2024-11-18
Attending: EMERGENCY MEDICINE | Admitting: INTERNAL MEDICINE
Payer: MEDICARE

## 2024-11-13 DIAGNOSIS — N30.00 ACUTE CYSTITIS WITHOUT HEMATURIA: Primary | ICD-10-CM

## 2024-11-13 DIAGNOSIS — N17.9 AKI (ACUTE KIDNEY INJURY) (HCC): ICD-10-CM

## 2024-11-13 LAB
ALBUMIN SERPL-MCNC: 3.8 G/DL (ref 3.4–5)
ALBUMIN/GLOB SERPL: 1.1 {RATIO} (ref 1.1–2.2)
ALP SERPL-CCNC: 107 U/L (ref 40–129)
ALT SERPL-CCNC: 23 U/L (ref 10–40)
AMPHETAMINES UR QL SCN>1000 NG/ML: NORMAL
ANION GAP SERPL CALCULATED.3IONS-SCNC: 17 MMOL/L (ref 3–16)
AST SERPL-CCNC: 25 U/L (ref 15–37)
BACTERIA URNS QL MICRO: ABNORMAL /HPF
BARBITURATES UR QL SCN>200 NG/ML: NORMAL
BASOPHILS # BLD: 0.2 K/UL (ref 0–0.2)
BASOPHILS NFR BLD: 1.4 %
BENZODIAZ UR QL SCN>200 NG/ML: NORMAL
BILIRUB SERPL-MCNC: 0.7 MG/DL (ref 0–1)
BILIRUB UR QL STRIP.AUTO: NEGATIVE
BUN SERPL-MCNC: 29 MG/DL (ref 7–20)
CALCIUM SERPL-MCNC: 9.6 MG/DL (ref 8.3–10.6)
CANNABINOIDS UR QL SCN>50 NG/ML: NORMAL
CHLORIDE SERPL-SCNC: 101 MMOL/L (ref 99–110)
CLARITY UR: ABNORMAL
CO2 SERPL-SCNC: 19 MMOL/L (ref 21–32)
COCAINE UR QL SCN: NORMAL
COLOR UR: YELLOW
CREAT SERPL-MCNC: 2.2 MG/DL (ref 0.6–1.2)
DEPRECATED RDW RBC AUTO: 14.8 % (ref 12.4–15.4)
DRUG SCREEN COMMENT UR-IMP: NORMAL
EOSINOPHIL # BLD: 0.2 K/UL (ref 0–0.6)
EOSINOPHIL NFR BLD: 1.9 %
EPI CELLS #/AREA URNS HPF: ABNORMAL /HPF (ref 0–5)
ETHANOLAMINE SERPL-MCNC: NORMAL MG/DL (ref 0–0.08)
FENTANYL SCREEN, URINE: NORMAL
GFR SERPLBLD CREATININE-BSD FMLA CKD-EPI: 23 ML/MIN/{1.73_M2}
GLUCOSE SERPL-MCNC: 74 MG/DL (ref 70–99)
GLUCOSE UR STRIP.AUTO-MCNC: NEGATIVE MG/DL
HCT VFR BLD AUTO: 33.1 % (ref 36–48)
HGB BLD-MCNC: 11.6 G/DL (ref 12–16)
HGB UR QL STRIP.AUTO: ABNORMAL
KETONES UR STRIP.AUTO-MCNC: ABNORMAL MG/DL
LEUKOCYTE ESTERASE UR QL STRIP.AUTO: ABNORMAL
LYMPHOCYTES # BLD: 1.3 K/UL (ref 1–5.1)
LYMPHOCYTES NFR BLD: 11.3 %
MCH RBC QN AUTO: 34.1 PG (ref 26–34)
MCHC RBC AUTO-ENTMCNC: 35 G/DL (ref 31–36)
MCV RBC AUTO: 97.4 FL (ref 80–100)
METHADONE UR QL SCN>300 NG/ML: NORMAL
MONOCYTES # BLD: 0.7 K/UL (ref 0–1.3)
MONOCYTES NFR BLD: 6.5 %
MUCOUS THREADS #/AREA URNS LPF: ABNORMAL /LPF
NEUTROPHILS # BLD: 9 K/UL (ref 1.7–7.7)
NEUTROPHILS NFR BLD: 78.9 %
NITRITE UR QL STRIP.AUTO: POSITIVE
OPIATES UR QL SCN>300 NG/ML: NORMAL
OXYCODONE UR QL SCN: NORMAL
PCP UR QL SCN>25 NG/ML: NORMAL
PH UR STRIP.AUTO: 6 [PH] (ref 5–8)
PH UR STRIP: 6 [PH]
PLATELET # BLD AUTO: 240 K/UL (ref 135–450)
PLATELET BLD QL SMEAR: ADEQUATE
PMV BLD AUTO: 8.9 FL (ref 5–10.5)
POTASSIUM SERPL-SCNC: 4 MMOL/L (ref 3.5–5.1)
PROT SERPL-MCNC: 7.3 G/DL (ref 6.4–8.2)
PROT UR STRIP.AUTO-MCNC: 100 MG/DL
RBC # BLD AUTO: 3.4 M/UL (ref 4–5.2)
RBC #/AREA URNS HPF: ABNORMAL /HPF (ref 0–4)
SLIDE REVIEW: ABNORMAL
SODIUM SERPL-SCNC: 137 MMOL/L (ref 136–145)
SP GR UR STRIP.AUTO: 1.02 (ref 1–1.03)
TSH SERPL DL<=0.005 MIU/L-ACNC: 0.15 UIU/ML (ref 0.27–4.2)
UA COMPLETE W REFLEX CULTURE PNL UR: YES
UA DIPSTICK W REFLEX MICRO PNL UR: YES
URN SPEC COLLECT METH UR: ABNORMAL
UROBILINOGEN UR STRIP-ACNC: 1 E.U./DL
WBC # BLD AUTO: 11.4 K/UL (ref 4–11)
WBC #/AREA URNS HPF: >100 /HPF (ref 0–5)

## 2024-11-13 PROCEDURE — 96375 TX/PRO/DX INJ NEW DRUG ADDON: CPT

## 2024-11-13 PROCEDURE — 96365 THER/PROPH/DIAG IV INF INIT: CPT

## 2024-11-13 PROCEDURE — 80307 DRUG TEST PRSMV CHEM ANLYZR: CPT

## 2024-11-13 PROCEDURE — 84443 ASSAY THYROID STIM HORMONE: CPT

## 2024-11-13 PROCEDURE — 6370000000 HC RX 637 (ALT 250 FOR IP): Performed by: NURSE PRACTITIONER

## 2024-11-13 PROCEDURE — 80053 COMPREHEN METABOLIC PANEL: CPT

## 2024-11-13 PROCEDURE — 87088 URINE BACTERIA CULTURE: CPT

## 2024-11-13 PROCEDURE — 6360000002 HC RX W HCPCS

## 2024-11-13 PROCEDURE — 2580000003 HC RX 258: Performed by: NURSE PRACTITIONER

## 2024-11-13 PROCEDURE — 81001 URINALYSIS AUTO W/SCOPE: CPT

## 2024-11-13 PROCEDURE — 6360000002 HC RX W HCPCS: Performed by: EMERGENCY MEDICINE

## 2024-11-13 PROCEDURE — 82077 ASSAY SPEC XCP UR&BREATH IA: CPT

## 2024-11-13 PROCEDURE — 87086 URINE CULTURE/COLONY COUNT: CPT

## 2024-11-13 PROCEDURE — 1200000000 HC SEMI PRIVATE

## 2024-11-13 PROCEDURE — 85025 COMPLETE CBC W/AUTO DIFF WBC: CPT

## 2024-11-13 PROCEDURE — 99285 EMERGENCY DEPT VISIT HI MDM: CPT

## 2024-11-13 PROCEDURE — 2580000003 HC RX 258: Performed by: EMERGENCY MEDICINE

## 2024-11-13 PROCEDURE — 87186 SC STD MICRODIL/AGAR DIL: CPT

## 2024-11-13 PROCEDURE — 36415 COLL VENOUS BLD VENIPUNCTURE: CPT

## 2024-11-13 RX ORDER — LANOLIN ALCOHOL/MO/W.PET/CERES
1000 CREAM (GRAM) TOPICAL DAILY
COMMUNITY

## 2024-11-13 RX ORDER — ONDANSETRON 2 MG/ML
INJECTION INTRAMUSCULAR; INTRAVENOUS
Status: COMPLETED
Start: 2024-11-13 | End: 2024-11-13

## 2024-11-13 RX ORDER — SODIUM CHLORIDE 0.9 % (FLUSH) 0.9 %
5-40 SYRINGE (ML) INJECTION PRN
Status: DISCONTINUED | OUTPATIENT
Start: 2024-11-13 | End: 2024-11-18 | Stop reason: HOSPADM

## 2024-11-13 RX ORDER — ONDANSETRON 4 MG/1
4 TABLET, ORALLY DISINTEGRATING ORAL EVERY 8 HOURS PRN
Status: DISCONTINUED | OUTPATIENT
Start: 2024-11-13 | End: 2024-11-18 | Stop reason: HOSPADM

## 2024-11-13 RX ORDER — SODIUM CHLORIDE 9 MG/ML
INJECTION, SOLUTION INTRAVENOUS CONTINUOUS
Status: DISCONTINUED | OUTPATIENT
Start: 2024-11-13 | End: 2024-11-14

## 2024-11-13 RX ORDER — ACETAMINOPHEN 650 MG/1
650 SUPPOSITORY RECTAL EVERY 6 HOURS PRN
Status: DISCONTINUED | OUTPATIENT
Start: 2024-11-13 | End: 2024-11-18 | Stop reason: HOSPADM

## 2024-11-13 RX ORDER — EZETIMIBE 10 MG/1
10 TABLET ORAL NIGHTLY
Status: DISCONTINUED | OUTPATIENT
Start: 2024-11-13 | End: 2024-11-18 | Stop reason: HOSPADM

## 2024-11-13 RX ORDER — 0.9 % SODIUM CHLORIDE 0.9 %
1000 INTRAVENOUS SOLUTION INTRAVENOUS ONCE
Status: COMPLETED | OUTPATIENT
Start: 2024-11-13 | End: 2024-11-13

## 2024-11-13 RX ORDER — LOSARTAN POTASSIUM 100 MG/1
100 TABLET ORAL DAILY
Status: DISCONTINUED | OUTPATIENT
Start: 2024-11-13 | End: 2024-11-14

## 2024-11-13 RX ORDER — MECOBALAMIN 5000 MCG
5 TABLET,DISINTEGRATING ORAL NIGHTLY
Status: DISCONTINUED | OUTPATIENT
Start: 2024-11-13 | End: 2024-11-18 | Stop reason: HOSPADM

## 2024-11-13 RX ORDER — ACETAMINOPHEN 500 MG
1000 TABLET ORAL ONCE
Status: DISCONTINUED | OUTPATIENT
Start: 2024-11-13 | End: 2024-11-13

## 2024-11-13 RX ORDER — HEPARIN SODIUM 5000 [USP'U]/ML
5000 INJECTION, SOLUTION INTRAVENOUS; SUBCUTANEOUS EVERY 8 HOURS SCHEDULED
Status: DISCONTINUED | OUTPATIENT
Start: 2024-11-14 | End: 2024-11-18 | Stop reason: HOSPADM

## 2024-11-13 RX ORDER — SODIUM CHLORIDE 9 MG/ML
INJECTION, SOLUTION INTRAVENOUS PRN
Status: DISCONTINUED | OUTPATIENT
Start: 2024-11-13 | End: 2024-11-18 | Stop reason: HOSPADM

## 2024-11-13 RX ORDER — ISOSORBIDE MONONITRATE 30 MG/1
30 TABLET, EXTENDED RELEASE ORAL DAILY
Status: DISCONTINUED | OUTPATIENT
Start: 2024-11-13 | End: 2024-11-18 | Stop reason: HOSPADM

## 2024-11-13 RX ORDER — SODIUM CHLORIDE 0.9 % (FLUSH) 0.9 %
5-40 SYRINGE (ML) INJECTION EVERY 12 HOURS SCHEDULED
Status: DISCONTINUED | OUTPATIENT
Start: 2024-11-13 | End: 2024-11-18 | Stop reason: HOSPADM

## 2024-11-13 RX ORDER — LEVETIRACETAM 500 MG/1
500 TABLET ORAL 2 TIMES DAILY
Status: DISCONTINUED | OUTPATIENT
Start: 2024-11-13 | End: 2024-11-18 | Stop reason: HOSPADM

## 2024-11-13 RX ORDER — PANTOPRAZOLE SODIUM 20 MG/1
20 TABLET, DELAYED RELEASE ORAL 2 TIMES DAILY
Status: DISCONTINUED | OUTPATIENT
Start: 2024-11-13 | End: 2024-11-18 | Stop reason: HOSPADM

## 2024-11-13 RX ORDER — QUETIAPINE FUMARATE 25 MG/1
25 TABLET, FILM COATED ORAL NIGHTLY
Status: DISCONTINUED | OUTPATIENT
Start: 2024-11-13 | End: 2024-11-17

## 2024-11-13 RX ORDER — ENOXAPARIN SODIUM 100 MG/ML
40 INJECTION SUBCUTANEOUS DAILY
Status: DISCONTINUED | OUTPATIENT
Start: 2024-11-13 | End: 2024-11-13

## 2024-11-13 RX ORDER — FENTANYL CITRATE 50 UG/ML
25 INJECTION, SOLUTION INTRAMUSCULAR; INTRAVENOUS ONCE
Status: COMPLETED | OUTPATIENT
Start: 2024-11-13 | End: 2024-11-13

## 2024-11-13 RX ORDER — ONDANSETRON 2 MG/ML
4 INJECTION INTRAMUSCULAR; INTRAVENOUS EVERY 6 HOURS PRN
Status: DISCONTINUED | OUTPATIENT
Start: 2024-11-13 | End: 2024-11-13

## 2024-11-13 RX ORDER — ATORVASTATIN CALCIUM 40 MG/1
80 TABLET, FILM COATED ORAL DAILY
Status: DISCONTINUED | OUTPATIENT
Start: 2024-11-13 | End: 2024-11-18 | Stop reason: HOSPADM

## 2024-11-13 RX ORDER — SPIRONOLACTONE 25 MG/1
25 TABLET ORAL DAILY
Status: DISCONTINUED | OUTPATIENT
Start: 2024-11-13 | End: 2024-11-14

## 2024-11-13 RX ORDER — ACETAMINOPHEN 325 MG/1
650 TABLET ORAL EVERY 6 HOURS PRN
Status: DISCONTINUED | OUTPATIENT
Start: 2024-11-13 | End: 2024-11-18 | Stop reason: HOSPADM

## 2024-11-13 RX ORDER — CLOPIDOGREL BISULFATE 75 MG/1
75 TABLET ORAL DAILY
Status: DISCONTINUED | OUTPATIENT
Start: 2024-11-13 | End: 2024-11-18 | Stop reason: HOSPADM

## 2024-11-13 RX ORDER — UREA 10 %
1000 LOTION (ML) TOPICAL DAILY
Status: DISCONTINUED | OUTPATIENT
Start: 2024-11-13 | End: 2024-11-18 | Stop reason: HOSPADM

## 2024-11-13 RX ORDER — ONDANSETRON 2 MG/ML
4 INJECTION INTRAMUSCULAR; INTRAVENOUS EVERY 6 HOURS PRN
Status: DISCONTINUED | OUTPATIENT
Start: 2024-11-13 | End: 2024-11-18 | Stop reason: HOSPADM

## 2024-11-13 RX ORDER — VENLAFAXINE HYDROCHLORIDE 75 MG/1
150 CAPSULE, EXTENDED RELEASE ORAL DAILY
Status: DISCONTINUED | OUTPATIENT
Start: 2024-11-13 | End: 2024-11-18 | Stop reason: HOSPADM

## 2024-11-13 RX ADMIN — QUETIAPINE FUMARATE 25 MG: 25 TABLET ORAL at 20:58

## 2024-11-13 RX ADMIN — ONDANSETRON 4 MG: 2 INJECTION INTRAMUSCULAR; INTRAVENOUS at 16:01

## 2024-11-13 RX ADMIN — SODIUM CHLORIDE 1000 ML: 9 INJECTION, SOLUTION INTRAVENOUS at 16:34

## 2024-11-13 RX ADMIN — SODIUM CHLORIDE, PRESERVATIVE FREE 10 ML: 5 INJECTION INTRAVENOUS at 20:58

## 2024-11-13 RX ADMIN — EZETIMIBE 10 MG: 10 TABLET ORAL at 20:58

## 2024-11-13 RX ADMIN — FENTANYL CITRATE 25 MCG: 50 INJECTION, SOLUTION INTRAMUSCULAR; INTRAVENOUS at 16:17

## 2024-11-13 RX ADMIN — SODIUM CHLORIDE: 9 INJECTION, SOLUTION INTRAVENOUS at 20:53

## 2024-11-13 RX ADMIN — PANTOPRAZOLE SODIUM 20 MG: 20 TABLET, DELAYED RELEASE ORAL at 20:58

## 2024-11-13 RX ADMIN — SODIUM CHLORIDE 1000 MG: 900 INJECTION INTRAVENOUS at 15:42

## 2024-11-13 RX ADMIN — LEVETIRACETAM 500 MG: 500 TABLET, FILM COATED ORAL at 20:58

## 2024-11-13 RX ADMIN — Medication 5 MG: at 20:58

## 2024-11-13 ASSESSMENT — PAIN SCALES - PAIN ASSESSMENT IN ADVANCED DEMENTIA (PAINAD)
BODYLANGUAGE: RELAXED
BREATHING: OCCASIONAL LABORED BREATHING, SHORT PERIOD OF HYPERVENTILATION
CONSOLABILITY: NO NEED TO CONSOLE
FACIALEXPRESSION: SAD, FRIGHTENED, FROWN
FACIALEXPRESSION: SMILING OR INEXPRESSIVE
BREATHING: NORMAL
TOTALSCORE: 0
TOTALSCORE: 4
BODYLANGUAGE: TENSE, DISTRESSED PACING, FIDGETING
FACIALEXPRESSION: SMILING OR INEXPRESSIVE
CONSOLABILITY: NO NEED TO CONSOLE
NEGVOCALIZATION: OCCASIONAL MOAN/GROAN, LOW SPEECH, NEGATIVE/DISAPPROVING QUALITY
BODYLANGUAGE: RELAXED
CONSOLABILITY: NO NEED TO CONSOLE
TOTALSCORE: 0
BREATHING: NORMAL

## 2024-11-13 NOTE — PLAN OF CARE
Admit to 2w    Alzheimer's disease with behaviors, agitated at SNF  HUEY  UTI  TSH low, recently elevated. Reduced levothyroxine to 75  IVF  Bladder scan   Hx seizures on Keppra   Hold Losartan and aldactone       Donna Wolfe, APRN - CNP

## 2024-11-13 NOTE — ED NOTES
Patient resting in bed, calm and cooperative. Patient has no needs at this time. Will continue to monitor.

## 2024-11-13 NOTE — ED NOTES
Patient resting in bed, pure wick placed to aid in urination. Patient has no needs at this time. Will continue to monitor.
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
## 2024-11-13 NOTE — VIRTUAL HEALTH
Reason for Cancel: TelePsych Reason for Cancel: Patient unable to participate    Writer attempted to complete assessment with pt however she was confused, had difficulty responding to questions, and needed frequent redirection. RN reports suspected UTI based on urine specimen provided. Writer spoke to provider, Dr. So, and they plan to wait for pt's labs to result to determine if pt has a UTI and then possibly reconsult later if needed.     [place] Consult to Tele-Psych  Consult performed by: Ramona Ireland LISW  Consult ordered by: Luciano So MD         Total time spent on this encounter: Not billed by time    --ZEESHAN Lora on 11/13/2024 at 3:14 PM    An electronic signature was used to authenticate this note.

## 2024-11-14 PROBLEM — I25.83 CORONARY ARTERY DISEASE DUE TO LIPID RICH PLAQUE: Status: ACTIVE | Noted: 2023-04-06

## 2024-11-14 PROBLEM — N30.00 ACUTE CYSTITIS WITHOUT HEMATURIA: Status: ACTIVE | Noted: 2024-11-14

## 2024-11-14 PROBLEM — N18.9 ACUTE KIDNEY INJURY SUPERIMPOSED ON CKD (HCC): Status: ACTIVE | Noted: 2024-11-13

## 2024-11-14 LAB
ALBUMIN SERPL-MCNC: 3.3 G/DL (ref 3.4–5)
ALBUMIN/GLOB SERPL: 1.4 {RATIO} (ref 1.1–2.2)
ALP SERPL-CCNC: 93 U/L (ref 40–129)
ALT SERPL-CCNC: 16 U/L (ref 10–40)
ANION GAP SERPL CALCULATED.3IONS-SCNC: 11 MMOL/L (ref 3–16)
AST SERPL-CCNC: 19 U/L (ref 15–37)
BASOPHILS # BLD: 0 K/UL (ref 0–0.2)
BASOPHILS NFR BLD: 0.6 %
BILIRUB SERPL-MCNC: 0.5 MG/DL (ref 0–1)
BUN SERPL-MCNC: 22 MG/DL (ref 7–20)
CALCIUM SERPL-MCNC: 8.7 MG/DL (ref 8.3–10.6)
CHLORIDE SERPL-SCNC: 109 MMOL/L (ref 99–110)
CO2 SERPL-SCNC: 20 MMOL/L (ref 21–32)
CREAT SERPL-MCNC: 1.7 MG/DL (ref 0.6–1.2)
CREAT UR-MCNC: 96.6 MG/DL (ref 28–259)
DEPRECATED RDW RBC AUTO: 14.5 % (ref 12.4–15.4)
EOSINOPHIL # BLD: 0.2 K/UL (ref 0–0.6)
EOSINOPHIL NFR BLD: 3.5 %
GFR SERPLBLD CREATININE-BSD FMLA CKD-EPI: 31 ML/MIN/{1.73_M2}
GLUCOSE SERPL-MCNC: 67 MG/DL (ref 70–99)
HCT VFR BLD AUTO: 29.7 % (ref 36–48)
HGB BLD-MCNC: 10.4 G/DL (ref 12–16)
LYMPHOCYTES # BLD: 1.1 K/UL (ref 1–5.1)
LYMPHOCYTES NFR BLD: 16.1 %
MCH RBC QN AUTO: 34.6 PG (ref 26–34)
MCHC RBC AUTO-ENTMCNC: 35.2 G/DL (ref 31–36)
MCV RBC AUTO: 98.2 FL (ref 80–100)
MONOCYTES # BLD: 0.6 K/UL (ref 0–1.3)
MONOCYTES NFR BLD: 9 %
NEUTROPHILS # BLD: 5 K/UL (ref 1.7–7.7)
NEUTROPHILS NFR BLD: 70.8 %
OSMOLALITY UR: 346 MOSM/KG (ref 390–1070)
PLATELET # BLD AUTO: 193 K/UL (ref 135–450)
PMV BLD AUTO: 8.3 FL (ref 5–10.5)
POTASSIUM SERPL-SCNC: 4 MMOL/L (ref 3.5–5.1)
PROT SERPL-MCNC: 5.7 G/DL (ref 6.4–8.2)
RBC # BLD AUTO: 3.02 M/UL (ref 4–5.2)
SODIUM SERPL-SCNC: 140 MMOL/L (ref 136–145)
SODIUM UR-SCNC: 59 MMOL/L
WBC # BLD AUTO: 7.1 K/UL (ref 4–11)

## 2024-11-14 PROCEDURE — 85025 COMPLETE CBC W/AUTO DIFF WBC: CPT

## 2024-11-14 PROCEDURE — 82570 ASSAY OF URINE CREATININE: CPT

## 2024-11-14 PROCEDURE — 6360000002 HC RX W HCPCS: Performed by: NURSE PRACTITIONER

## 2024-11-14 PROCEDURE — 6370000000 HC RX 637 (ALT 250 FOR IP): Performed by: INTERNAL MEDICINE

## 2024-11-14 PROCEDURE — 84300 ASSAY OF URINE SODIUM: CPT

## 2024-11-14 PROCEDURE — 83935 ASSAY OF URINE OSMOLALITY: CPT

## 2024-11-14 PROCEDURE — 6360000002 HC RX W HCPCS: Performed by: INTERNAL MEDICINE

## 2024-11-14 PROCEDURE — 6370000000 HC RX 637 (ALT 250 FOR IP): Performed by: NURSE PRACTITIONER

## 2024-11-14 PROCEDURE — 99232 SBSQ HOSP IP/OBS MODERATE 35: CPT | Performed by: INTERNAL MEDICINE

## 2024-11-14 PROCEDURE — 80053 COMPREHEN METABOLIC PANEL: CPT

## 2024-11-14 PROCEDURE — 2580000003 HC RX 258: Performed by: NURSE PRACTITIONER

## 2024-11-14 PROCEDURE — 1200000000 HC SEMI PRIVATE

## 2024-11-14 PROCEDURE — 36415 COLL VENOUS BLD VENIPUNCTURE: CPT

## 2024-11-14 RX ORDER — QUETIAPINE FUMARATE 25 MG/1
25 TABLET, FILM COATED ORAL ONCE
Status: COMPLETED | OUTPATIENT
Start: 2024-11-14 | End: 2024-11-14

## 2024-11-14 RX ORDER — ZIPRASIDONE MESYLATE 20 MG/ML
10 INJECTION, POWDER, LYOPHILIZED, FOR SOLUTION INTRAMUSCULAR ONCE
Status: COMPLETED | OUTPATIENT
Start: 2024-11-14 | End: 2024-11-14

## 2024-11-14 RX ADMIN — HEPARIN SODIUM 5000 UNITS: 5000 INJECTION INTRAVENOUS; SUBCUTANEOUS at 05:30

## 2024-11-14 RX ADMIN — SODIUM CHLORIDE 1000 MG: 900 INJECTION INTRAVENOUS at 09:25

## 2024-11-14 RX ADMIN — ATORVASTATIN CALCIUM 80 MG: 40 TABLET, FILM COATED ORAL at 09:18

## 2024-11-14 RX ADMIN — QUETIAPINE FUMARATE 25 MG: 25 TABLET ORAL at 14:16

## 2024-11-14 RX ADMIN — LEVETIRACETAM 500 MG: 500 TABLET, FILM COATED ORAL at 09:17

## 2024-11-14 RX ADMIN — Medication 1000 MCG: at 09:18

## 2024-11-14 RX ADMIN — ISOSORBIDE MONONITRATE 30 MG: 30 TABLET, EXTENDED RELEASE ORAL at 09:18

## 2024-11-14 RX ADMIN — PANTOPRAZOLE SODIUM 20 MG: 20 TABLET, DELAYED RELEASE ORAL at 09:18

## 2024-11-14 RX ADMIN — SODIUM CHLORIDE, PRESERVATIVE FREE 10 ML: 5 INJECTION INTRAVENOUS at 09:41

## 2024-11-14 RX ADMIN — VENLAFAXINE HYDROCHLORIDE 150 MG: 75 CAPSULE, EXTENDED RELEASE ORAL at 09:17

## 2024-11-14 RX ADMIN — CLOPIDOGREL BISULFATE 75 MG: 75 TABLET ORAL at 09:18

## 2024-11-14 RX ADMIN — LEVOTHYROXINE SODIUM 75 MCG: 25 TABLET ORAL at 05:30

## 2024-11-14 RX ADMIN — ZIPRASIDONE MESYLATE 10 MG: 20 INJECTION, POWDER, LYOPHILIZED, FOR SOLUTION INTRAMUSCULAR at 14:15

## 2024-11-14 ASSESSMENT — PAIN SCALES - PAIN ASSESSMENT IN ADVANCED DEMENTIA (PAINAD)
BREATHING: NORMAL
BREATHING: NORMAL
CONSOLABILITY: NO NEED TO CONSOLE
FACIALEXPRESSION: SMILING OR INEXPRESSIVE
TOTALSCORE: 0
BREATHING: NORMAL
BODYLANGUAGE: RELAXED
CONSOLABILITY: NO NEED TO CONSOLE
BREATHING: NORMAL
BODYLANGUAGE: RELAXED
BODYLANGUAGE: RELAXED
CONSOLABILITY: NO NEED TO CONSOLE
FACIALEXPRESSION: SMILING OR INEXPRESSIVE
BREATHING: NORMAL
TOTALSCORE: 0
BODYLANGUAGE: RELAXED
TOTALSCORE: 0
FACIALEXPRESSION: SMILING OR INEXPRESSIVE
BODYLANGUAGE: RELAXED
CONSOLABILITY: NO NEED TO CONSOLE
BREATHING: NORMAL
CONSOLABILITY: NO NEED TO CONSOLE
TOTALSCORE: 0
FACIALEXPRESSION: SMILING OR INEXPRESSIVE
CONSOLABILITY: NO NEED TO CONSOLE
BODYLANGUAGE: RELAXED
TOTALSCORE: 0
BODYLANGUAGE: RELAXED
CONSOLABILITY: NO NEED TO CONSOLE
BREATHING: NORMAL
FACIALEXPRESSION: SMILING OR INEXPRESSIVE
BREATHING: NORMAL
BODYLANGUAGE: RELAXED
TOTALSCORE: 0
BODYLANGUAGE: RELAXED
TOTALSCORE: 0
BODYLANGUAGE: RELAXED
TOTALSCORE: 0
BREATHING: NORMAL
BODYLANGUAGE: RELAXED
TOTALSCORE: 0
CONSOLABILITY: NO NEED TO CONSOLE
TOTALSCORE: 0
TOTALSCORE: 0
BODYLANGUAGE: RELAXED
CONSOLABILITY: NO NEED TO CONSOLE
BODYLANGUAGE: RELAXED
BREATHING: NORMAL
CONSOLABILITY: NO NEED TO CONSOLE
CONSOLABILITY: NO NEED TO CONSOLE
BREATHING: NORMAL
BREATHING: NORMAL
FACIALEXPRESSION: SMILING OR INEXPRESSIVE
FACIALEXPRESSION: SMILING OR INEXPRESSIVE
TOTALSCORE: 0
FACIALEXPRESSION: SMILING OR INEXPRESSIVE
CONSOLABILITY: NO NEED TO CONSOLE
BODYLANGUAGE: RELAXED
FACIALEXPRESSION: SMILING OR INEXPRESSIVE
CONSOLABILITY: NO NEED TO CONSOLE
TOTALSCORE: 0
BREATHING: NORMAL
CONSOLABILITY: NO NEED TO CONSOLE
FACIALEXPRESSION: SMILING OR INEXPRESSIVE
TOTALSCORE: 0
BREATHING: NORMAL

## 2024-11-14 NOTE — CONSULTS
[address] [address]                              CONSULTATION      PATIENT NAME: ALLYN PACHECO               : 1950  MED REC NO: [id number]                      ROOM: 0230  ACCOUNT NO: [id number]                       ADMIT DATE: 2024  PROVIDER: Mariano Thompson MD      CONSULT DATE: 2024    REASON FOR CONSULTATION:  Acute-on-chronic kidney disease.    HISTORY OF PRESENT ILLNESS:  The patient is a 74-year-old  female patient with a past medical history significant for chronic kidney disease and a baseline creatinine ranging between 1.2 and 1.4 mg/dL.  The patient presented to [facility] for further evaluation of an aggressive behavior and was noted to have urinary tract infection along with an acute-on-chronic kidney injury and serum creatinine of 2.2 mg/dL, which prompted Nephrology consultation.    PAST MEDICAL HISTORY:    1. Chronic kidney disease.  2. Coronary artery disease.  3. Fibromyalgia.  4. Hypertension.  5. Hyperlipidemia.  6. Hypothyroidism.    PAST SURGICAL HISTORY:    1. Appendectomy.  2. Coronary artery stent placement.  3. Hysterectomy.    ALLERGIES:  THE PATIENT IS ALLERGIC TO LISINOPRIL, HYDROCHLOROTHIAZIDE, AND SULFA DRUGS.      SOCIAL HISTORY:  The patient does not smoke or drink alcohol.    FAMILY HISTORY:  Negative for kidney disease.    REVIEW OF SYSTEMS:  The patient denied any fever, chills, cough, or expectorations.  Otherwise, a 10-point review of systems was relatively unremarkable.    PHYSICAL EXAMINATION:  VITAL SIGNS:  Blood pressure 121/69, heart rate 84, respirations 17, temperature 97.9 Fahrenheit. The patient is saturating 95% on room air.  GENERAL APPEARANCE: The patient is alert and oriented x3; not in acute distress.  HEENT: Eyes: Reveal normal conjunctivae.  Reactive pupils.  NECK: Reveals midline trachea, nonpalpable thyroid.  LUNGS: Clear to anterior

## 2024-11-14 NOTE — ACP (ADVANCE CARE PLANNING)
Advance Care Planning     General Advance Care Planning (ACP) Conversation    Date of Conversation: 11/14/2024  Conducted with: Patient with Decision Making Capacity and Healthcare Decision Maker  Other persons present: None    Healthcare Decision Maker:   Primary Decision Maker: MarkCandido - Spouse - [phone]       Content/Action Overview:  DECLINED ACP Conversation - will revisit periodically  Reviewed DNR/DNI and patient elects Full Code (Attempt Resuscitation)        Length of Voluntary ACP Conversation in minutes:  <16 minutes (Non-Billable)    Crissy Mejia RN

## 2024-11-14 NOTE — PLAN OF CARE
Problem: Discharge Planning  Goal: Discharge to home or other facility with appropriate resources  Outcome: Progressing  Flowsheets (Taken 11/13/2024 2056)  Discharge to home or other facility with appropriate resources: Identify barriers to discharge with patient and caregiver     Problem: Pain  Goal: Verbalizes/displays adequate comfort level or baseline comfort level  Outcome: Progressing  Flowsheets (Taken 11/13/2024 2054)  Verbalizes/displays adequate comfort level or baseline comfort level: Encourage patient to monitor pain and request assistance

## 2024-11-14 NOTE — CONSULTS
Patient seen and examined, consult note dictated.    Assessment and Plan:    1- A/CKD: The patient has chronic kidney disease with a baseline creatinine of 1.2 to 1.4 mg/dl. Her HUEY is likely secondary to a pre-renal component, although a non oliguric ATN cannot be ruled out. Her urinary output is well maintained and her serum creatinine is slowly trending down.  - Continue volume expansion.  - Avoid all nephrotoxic agents at this time.  - Maintain systolic blood pressure > 120 mmHg.    2- No significant electrolytes disorders noted.    3- HTN: Blood pressure within acceptable range.    4- Anemia: Stable hemoglobin, monitor.    5- Aggressive behavior: Management per Psychiatry.
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
## 2024-11-14 NOTE — PLAN OF CARE
Problem: Discharge Planning  Goal: Discharge to home or other facility with appropriate resources  Outcome: Progressing     Problem: Pain  Goal: Verbalizes/displays adequate comfort level or baseline comfort level  Outcome: Progressing  Flowsheets (Taken 11/14/2024 2252 by Maria C Birmingham RN)  Verbalizes/displays adequate comfort level or baseline comfort level: Encourage patient to monitor pain and request assistance     Problem: Risk for Elopement  Goal: Patient will not exit the unit/facility without proper excort  Outcome: Progressing     Problem: Safety - Adult  Goal: Free from fall injury  Outcome: Progressing

## 2024-11-15 LAB
ANION GAP SERPL CALCULATED.3IONS-SCNC: 10 MMOL/L (ref 3–16)
BACTERIA UR CULT: ABNORMAL
BUN SERPL-MCNC: 16 MG/DL (ref 7–20)
CALCIUM SERPL-MCNC: 8.6 MG/DL (ref 8.3–10.6)
CHLORIDE SERPL-SCNC: 108 MMOL/L (ref 99–110)
CO2 SERPL-SCNC: 19 MMOL/L (ref 21–32)
CREAT SERPL-MCNC: 1.2 MG/DL (ref 0.6–1.2)
GFR SERPLBLD CREATININE-BSD FMLA CKD-EPI: 47 ML/MIN/{1.73_M2}
GLUCOSE BLD-MCNC: 100 MG/DL (ref 70–99)
GLUCOSE BLD-MCNC: 102 MG/DL (ref 70–99)
GLUCOSE BLD-MCNC: 105 MG/DL (ref 70–99)
GLUCOSE BLD-MCNC: 169 MG/DL (ref 70–99)
GLUCOSE BLD-MCNC: 182 MG/DL (ref 70–99)
GLUCOSE BLD-MCNC: 53 MG/DL (ref 70–99)
GLUCOSE BLD-MCNC: 59 MG/DL (ref 70–99)
GLUCOSE BLD-MCNC: 68 MG/DL (ref 70–99)
GLUCOSE BLD-MCNC: 76 MG/DL (ref 70–99)
GLUCOSE SERPL-MCNC: 140 MG/DL (ref 70–99)
ORGANISM: ABNORMAL
PERFORMED ON: ABNORMAL
PERFORMED ON: NORMAL
POTASSIUM SERPL-SCNC: 3.3 MMOL/L (ref 3.5–5.1)
SODIUM SERPL-SCNC: 137 MMOL/L (ref 136–145)

## 2024-11-15 PROCEDURE — 1200000000 HC SEMI PRIVATE

## 2024-11-15 PROCEDURE — 97535 SELF CARE MNGMENT TRAINING: CPT

## 2024-11-15 PROCEDURE — 2580000003 HC RX 258: Performed by: INTERNAL MEDICINE

## 2024-11-15 PROCEDURE — 80048 BASIC METABOLIC PNL TOTAL CA: CPT

## 2024-11-15 PROCEDURE — 6370000000 HC RX 637 (ALT 250 FOR IP): Performed by: NURSE PRACTITIONER

## 2024-11-15 PROCEDURE — 6370000000 HC RX 637 (ALT 250 FOR IP): Performed by: INTERNAL MEDICINE

## 2024-11-15 PROCEDURE — 6360000002 HC RX W HCPCS: Performed by: INTERNAL MEDICINE

## 2024-11-15 PROCEDURE — 97530 THERAPEUTIC ACTIVITIES: CPT

## 2024-11-15 PROCEDURE — 2580000003 HC RX 258: Performed by: NURSE PRACTITIONER

## 2024-11-15 PROCEDURE — 99232 SBSQ HOSP IP/OBS MODERATE 35: CPT | Performed by: INTERNAL MEDICINE

## 2024-11-15 PROCEDURE — 6360000002 HC RX W HCPCS: Performed by: NURSE PRACTITIONER

## 2024-11-15 PROCEDURE — 2580000003 HC RX 258

## 2024-11-15 PROCEDURE — 97162 PT EVAL MOD COMPLEX 30 MIN: CPT

## 2024-11-15 PROCEDURE — 36415 COLL VENOUS BLD VENIPUNCTURE: CPT

## 2024-11-15 PROCEDURE — 97166 OT EVAL MOD COMPLEX 45 MIN: CPT

## 2024-11-15 PROCEDURE — 2500000003 HC RX 250 WO HCPCS: Performed by: INTERNAL MEDICINE

## 2024-11-15 RX ORDER — WATER 10 ML/10ML
INJECTION INTRAMUSCULAR; INTRAVENOUS; SUBCUTANEOUS
Status: COMPLETED
Start: 2024-11-15 | End: 2024-11-15

## 2024-11-15 RX ORDER — POTASSIUM CHLORIDE 1500 MG/1
20 TABLET, EXTENDED RELEASE ORAL ONCE
Status: DISCONTINUED | OUTPATIENT
Start: 2024-11-15 | End: 2024-11-15

## 2024-11-15 RX ORDER — ZIPRASIDONE MESYLATE 20 MG/ML
10 INJECTION, POWDER, LYOPHILIZED, FOR SOLUTION INTRAMUSCULAR DAILY PRN
Status: DISCONTINUED | OUTPATIENT
Start: 2024-11-15 | End: 2024-11-18 | Stop reason: HOSPADM

## 2024-11-15 RX ORDER — DEXTROSE MONOHYDRATE 100 MG/ML
INJECTION, SOLUTION INTRAVENOUS CONTINUOUS PRN
Status: DISCONTINUED | OUTPATIENT
Start: 2024-11-15 | End: 2024-11-18 | Stop reason: HOSPADM

## 2024-11-15 RX ORDER — GLUCAGON 1 MG/ML
1 KIT INJECTION PRN
Status: DISCONTINUED | OUTPATIENT
Start: 2024-11-15 | End: 2024-11-18 | Stop reason: HOSPADM

## 2024-11-15 RX ADMIN — Medication 1000 MCG: at 09:14

## 2024-11-15 RX ADMIN — EZETIMIBE 10 MG: 10 TABLET ORAL at 22:38

## 2024-11-15 RX ADMIN — Medication 5 MG: at 22:38

## 2024-11-15 RX ADMIN — DEXTROSE MONOHYDRATE 125 ML: 100 INJECTION, SOLUTION INTRAVENOUS at 03:17

## 2024-11-15 RX ADMIN — LEVOTHYROXINE SODIUM 75 MCG: 25 TABLET ORAL at 05:40

## 2024-11-15 RX ADMIN — SODIUM BICARBONATE: 84 INJECTION, SOLUTION INTRAVENOUS at 03:59

## 2024-11-15 RX ADMIN — SODIUM CHLORIDE 1000 MG: 900 INJECTION INTRAVENOUS at 09:12

## 2024-11-15 RX ADMIN — QUETIAPINE FUMARATE 25 MG: 25 TABLET ORAL at 22:38

## 2024-11-15 RX ADMIN — Medication 16 G: at 02:29

## 2024-11-15 RX ADMIN — HEPARIN SODIUM 5000 UNITS: 5000 INJECTION INTRAVENOUS; SUBCUTANEOUS at 13:52

## 2024-11-15 RX ADMIN — LEVETIRACETAM 500 MG: 500 TABLET, FILM COATED ORAL at 22:38

## 2024-11-15 RX ADMIN — HEPARIN SODIUM 5000 UNITS: 5000 INJECTION INTRAVENOUS; SUBCUTANEOUS at 22:37

## 2024-11-15 RX ADMIN — PANTOPRAZOLE SODIUM 20 MG: 20 TABLET, DELAYED RELEASE ORAL at 22:38

## 2024-11-15 RX ADMIN — CLOPIDOGREL BISULFATE 75 MG: 75 TABLET ORAL at 09:14

## 2024-11-15 RX ADMIN — POTASSIUM BICARBONATE 20 MEQ: 782 TABLET, EFFERVESCENT ORAL at 13:52

## 2024-11-15 RX ADMIN — HEPARIN SODIUM 5000 UNITS: 5000 INJECTION INTRAVENOUS; SUBCUTANEOUS at 05:40

## 2024-11-15 RX ADMIN — ZIPRASIDONE MESYLATE 10 MG: 20 INJECTION, POWDER, LYOPHILIZED, FOR SOLUTION INTRAMUSCULAR at 22:43

## 2024-11-15 RX ADMIN — ATORVASTATIN CALCIUM 80 MG: 40 TABLET, FILM COATED ORAL at 09:14

## 2024-11-15 RX ADMIN — WATER 20 ML: 1 INJECTION INTRAMUSCULAR; INTRAVENOUS; SUBCUTANEOUS at 22:43

## 2024-11-15 RX ADMIN — PANTOPRAZOLE SODIUM 20 MG: 20 TABLET, DELAYED RELEASE ORAL at 09:13

## 2024-11-15 ASSESSMENT — PAIN SCALES - PAIN ASSESSMENT IN ADVANCED DEMENTIA (PAINAD)
FACIALEXPRESSION: SMILING OR INEXPRESSIVE
TOTALSCORE: 0
BODYLANGUAGE: RELAXED
TOTALSCORE: 0
BREATHING: NORMAL
FACIALEXPRESSION: SMILING OR INEXPRESSIVE
FACIALEXPRESSION: SMILING OR INEXPRESSIVE
CONSOLABILITY: NO NEED TO CONSOLE
CONSOLABILITY: NO NEED TO CONSOLE
BREATHING: NORMAL
CONSOLABILITY: NO NEED TO CONSOLE
BODYLANGUAGE: RELAXED
CONSOLABILITY: NO NEED TO CONSOLE
CONSOLABILITY: NO NEED TO CONSOLE
BREATHING: NORMAL
TOTALSCORE: 0
BODYLANGUAGE: RELAXED
BREATHING: NORMAL
CONSOLABILITY: NO NEED TO CONSOLE
FACIALEXPRESSION: SMILING OR INEXPRESSIVE
FACIALEXPRESSION: SMILING OR INEXPRESSIVE
BREATHING: NORMAL
TOTALSCORE: 0
BREATHING: NORMAL
BODYLANGUAGE: RELAXED
TOTALSCORE: 0
BODYLANGUAGE: RELAXED
TOTALSCORE: 0
TOTALSCORE: 0
BODYLANGUAGE: RELAXED
BREATHING: NORMAL
FACIALEXPRESSION: SMILING OR INEXPRESSIVE
FACIALEXPRESSION: SMILING OR INEXPRESSIVE
BODYLANGUAGE: RELAXED
BREATHING: NORMAL
FACIALEXPRESSION: SMILING OR INEXPRESSIVE
TOTALSCORE: 0
BREATHING: NORMAL
TOTALSCORE: 0
CONSOLABILITY: NO NEED TO CONSOLE
BODYLANGUAGE: RELAXED
BREATHING: NORMAL
BODYLANGUAGE: RELAXED
BREATHING: NORMAL
TOTALSCORE: 0
CONSOLABILITY: NO NEED TO CONSOLE
FACIALEXPRESSION: SMILING OR INEXPRESSIVE
BODYLANGUAGE: RELAXED
BODYLANGUAGE: RELAXED
FACIALEXPRESSION: SMILING OR INEXPRESSIVE
TOTALSCORE: 0
BREATHING: NORMAL
BREATHING: NORMAL
TOTALSCORE: 0
BODYLANGUAGE: RELAXED
FACIALEXPRESSION: SMILING OR INEXPRESSIVE
CONSOLABILITY: NO NEED TO CONSOLE
TOTALSCORE: 0
BODYLANGUAGE: RELAXED

## 2024-11-15 NOTE — PLAN OF CARE
Problem: Discharge Planning  Goal: Discharge to home or other facility with appropriate resources  11/15/2024 1613 by Cholo Moreland RN  Outcome: Progressing  11/15/2024 0350 by Vinh Diane RN  Outcome: Progressing  Flowsheets (Taken 11/14/2024 2217)  Discharge to home or other facility with appropriate resources: Identify barriers to discharge with patient and caregiver     Problem: Pain  Goal: Verbalizes/displays adequate comfort level or baseline comfort level  11/15/2024 1613 by Cholo Moreland RN  Outcome: Progressing  11/15/2024 0350 by Vinh Diane RN  Outcome: Progressing  Flowsheets  Taken 11/15/2024 0156  Verbalizes/displays adequate comfort level or baseline comfort level: Assess pain using appropriate pain scale  Taken 11/14/2024 2216  Verbalizes/displays adequate comfort level or baseline comfort level: Assess pain using appropriate pain scale     Problem: Risk for Elopement  Goal: Patient will not exit the unit/facility without proper excort  11/15/2024 1613 by Cholo Moreland RN  Outcome: Progressing  Flowsheets (Taken 11/15/2024 0904)  Nursing Interventions for Elopement Risk:   Make sure patient has all necessary personal care items   Place patient in room far away from exits and stairways   Reduce environmental triggers  11/15/2024 0350 by Vinh Diane RN  Outcome: Progressing  Flowsheets (Taken 11/14/2024 2217)  Nursing Interventions for Elopement Risk:   Make sure patient has all necessary personal care items   Place patient in room far away from exits and stairways   Reduce environmental triggers     Problem: Safety - Adult  Goal: Free from fall injury  11/15/2024 1613 by Cholo Moreland RN  Outcome: Progressing  11/15/2024 0350 by Vinh Diane RN  Outcome: Progressing     Problem: Skin/Tissue Integrity  Goal: Absence of new skin breakdown  Description: 1.  Monitor for areas of redness and/or skin breakdown  2.  Assess vascular access sites hourly  3.  Every 4-6 hours minimum:

## 2024-11-15 NOTE — FLOWSHEET NOTE
11/15/24 0726   Handoff   Communication Given Shift Handoff   Handoff Given To Estrella NELSON   Handoff Received From Eveline NELSON   Handoff Communication Face to Face;At bedside   Time Handoff Given 0726   End of Shift Check Performed Yes     Pt in bed with eyes closed, easy to arouse and responsive.  With tele-sitter at bedside for safety. No signs of distress noted.  Call light within reach.

## 2024-11-15 NOTE — FLOWSHEET NOTE
11/15/24 0900   Vital Signs   Temp 97.5 °F (36.4 °C)   Temp Source Oral   Pulse 100   Heart Rate Source Monitor   Respirations 16   BP (!) 146/84   MAP (Calculated) 105   Patient Position Semi fowlers   Pain Assessment in Advanced Dementia (PAINAD)   Breathing Independent of Vocalization 0   Negative Vocalization 0   Facial Expression 0   Body Language 0   Consolability 0   PAINAD Score 0   Oxygen Therapy   SpO2 96 %   O2 Device None (Room air)       Shift assessment complete. See flow sheet. Scheduled meds given. See MAR.  Patients head-toe complete, VS are logged, and active bowel sound noted in all four quadrants.    Pt sitting up in bed respirations easy and unlabored. No s/s of distress. Alert to self. Denies pain or SOB pt stable.     No further needs  noted at this time. Call light and bedside table are within reach. The bed is locked and is in the lowest position.        Cholo Moreland RN

## 2024-11-15 NOTE — PLAN OF CARE
Problem: Discharge Planning  Goal: Discharge to home or other facility with appropriate resources  Outcome: Progressing  Flowsheets (Taken 11/14/2024 2217)  Discharge to home or other facility with appropriate resources: Identify barriers to discharge with patient and caregiver     Problem: Pain  Goal: Verbalizes/displays adequate comfort level or baseline comfort level  Outcome: Progressing  Flowsheets  Taken 11/15/2024 0156  Verbalizes/displays adequate comfort level or baseline comfort level: Assess pain using appropriate pain scale  Taken 11/14/2024 2216  Verbalizes/displays adequate comfort level or baseline comfort level: Assess pain using appropriate pain scale     Problem: Risk for Elopement  Goal: Patient will not exit the unit/facility without proper excort  Outcome: Progressing  Flowsheets (Taken 11/14/2024 2217)  Nursing Interventions for Elopement Risk:   Make sure patient has all necessary personal care items   Place patient in room far away from exits and stairways   Reduce environmental triggers     Problem: Safety - Adult  Goal: Free from fall injury  Outcome: Progressing     Problem: Skin/Tissue Integrity  Goal: Absence of new skin breakdown  Description: 1.  Monitor for areas of redness and/or skin breakdown  2.  Assess vascular access sites hourly  3.  Every 4-6 hours minimum:  Change oxygen saturation probe site  4.  Every 4-6 hours:  If on nasal continuous positive airway pressure, respiratory therapy assess nares and determine need for appliance change or resting period.  Outcome: Progressing

## 2024-11-15 NOTE — DISCHARGE INSTR - COC
Continuity of Care Form    Patient Name: Siobhan Flores   :  1950  MRN:  [id number]    Admit date:  2024  Discharge date:  2024    Code Status Order: Full Code   Advance Directives:   Advance Care Flowsheet Documentation             Admitting Physician:  Cinthia Harley DO  PCP: Janet Live, APRN - CNP    Discharging Nurse: Toni  Discharging Hospital Unit/Room#: 0230/0230-01  Discharging Unit Phone Number: [phone]    Emergency Contact:   Extended Emergency Contact Information  Primary Emergency Contact: MarkCandido  Address: [address] [place] [address] of [place]  Home Phone: [phone]  Mobile Phone: [phone]  Relation: Spouse    Past Surgical History:  Past Surgical History:   Procedure Laterality Date    APPENDECTOMY      COLONOSCOPY      CORONARY STENT PLACEMENT  2023    EYE SURGERY      HYSTERECTOMY (CERVIX STATUS UNKNOWN)         Immunization History:   Immunization History   Administered Date(s) Administered    Influenza 10/25/2013    Influenza Virus Vaccine 2014, 10/09/2015    Influenza, AFLURIA (age 3 y+), FLUZONE, (age 6 mo+), Quadv MDV, 0.5mL 10/27/2016    Pneumococcal, PCV-13, PREVNAR 13, (age 6w+), IM, 0.5mL 2016    Pneumococcal, PPSV23, PNEUMOVAX 23, (age 2y+), SC/IM, 0.5mL 2014    Zoster Live (Zostavax) 2014       Active Problems:  Patient Active Problem List   Diagnosis Code    Hyperlipidemia E78.5    Hypothyroidism E03.9    Anxiety F41.9    Osteopenia M85.80    Vitamin D deficiency E55.9    Fibromyalgia M79.7    Dementia (HCC) F03.90    Coronary artery disease due to lipid rich plaque I25.10, I25.83    Chronic renal disease, stage III ([facility]) [595601] N18.30    Gastroesophageal reflux disease K21.9    Cervical pain M54.2    Encephalopathy acute G93.40    Stroke-like symptoms R29.90    Hypertension I10    Seizure ([facility]) R56.9    Acute kidney injury superimposed on CKD (HCC) N17.9, N18.9    Acute cystitis

## 2024-11-16 LAB
GLUCOSE BLD-MCNC: 105 MG/DL (ref 70–99)
GLUCOSE BLD-MCNC: 107 MG/DL (ref 70–99)
GLUCOSE BLD-MCNC: 77 MG/DL (ref 70–99)
GLUCOSE BLD-MCNC: 87 MG/DL (ref 70–99)
GLUCOSE BLD-MCNC: 89 MG/DL (ref 70–99)
PERFORMED ON: ABNORMAL
PERFORMED ON: ABNORMAL
PERFORMED ON: NORMAL

## 2024-11-16 PROCEDURE — 2580000003 HC RX 258

## 2024-11-16 PROCEDURE — 99232 SBSQ HOSP IP/OBS MODERATE 35: CPT | Performed by: INTERNAL MEDICINE

## 2024-11-16 PROCEDURE — 6370000000 HC RX 637 (ALT 250 FOR IP): Performed by: NURSE PRACTITIONER

## 2024-11-16 PROCEDURE — 6360000002 HC RX W HCPCS: Performed by: NURSE PRACTITIONER

## 2024-11-16 PROCEDURE — 97535 SELF CARE MNGMENT TRAINING: CPT

## 2024-11-16 PROCEDURE — 6360000002 HC RX W HCPCS: Performed by: INTERNAL MEDICINE

## 2024-11-16 PROCEDURE — 97530 THERAPEUTIC ACTIVITIES: CPT

## 2024-11-16 PROCEDURE — 1200000000 HC SEMI PRIVATE

## 2024-11-16 PROCEDURE — 2580000003 HC RX 258: Performed by: NURSE PRACTITIONER

## 2024-11-16 RX ORDER — ZIPRASIDONE MESYLATE 20 MG/ML
10 INJECTION, POWDER, LYOPHILIZED, FOR SOLUTION INTRAMUSCULAR ONCE
Status: COMPLETED | OUTPATIENT
Start: 2024-11-16 | End: 2024-11-16

## 2024-11-16 RX ORDER — WATER 10 ML/10ML
INJECTION INTRAMUSCULAR; INTRAVENOUS; SUBCUTANEOUS
Status: COMPLETED
Start: 2024-11-16 | End: 2024-11-16

## 2024-11-16 RX ADMIN — EZETIMIBE 10 MG: 10 TABLET ORAL at 23:19

## 2024-11-16 RX ADMIN — QUETIAPINE FUMARATE 25 MG: 25 TABLET ORAL at 23:19

## 2024-11-16 RX ADMIN — WATER 20 ML: 1 INJECTION INTRAMUSCULAR; INTRAVENOUS; SUBCUTANEOUS at 23:06

## 2024-11-16 RX ADMIN — LEVETIRACETAM 500 MG: 500 TABLET, FILM COATED ORAL at 09:31

## 2024-11-16 RX ADMIN — CLOPIDOGREL BISULFATE 75 MG: 75 TABLET ORAL at 09:32

## 2024-11-16 RX ADMIN — LEVETIRACETAM 500 MG: 500 TABLET, FILM COATED ORAL at 23:05

## 2024-11-16 RX ADMIN — Medication 5 MG: at 23:19

## 2024-11-16 RX ADMIN — SODIUM CHLORIDE 1000 MG: 900 INJECTION INTRAVENOUS at 09:44

## 2024-11-16 RX ADMIN — HEPARIN SODIUM 5000 UNITS: 5000 INJECTION INTRAVENOUS; SUBCUTANEOUS at 06:22

## 2024-11-16 RX ADMIN — Medication 1000 MCG: at 09:31

## 2024-11-16 RX ADMIN — LEVOTHYROXINE SODIUM 75 MCG: 25 TABLET ORAL at 06:45

## 2024-11-16 RX ADMIN — SODIUM CHLORIDE, PRESERVATIVE FREE 10 ML: 5 INJECTION INTRAVENOUS at 09:45

## 2024-11-16 RX ADMIN — ZIPRASIDONE MESYLATE 10 MG: 20 INJECTION, POWDER, LYOPHILIZED, FOR SOLUTION INTRAMUSCULAR at 12:24

## 2024-11-16 RX ADMIN — HEPARIN SODIUM 5000 UNITS: 5000 INJECTION INTRAVENOUS; SUBCUTANEOUS at 23:05

## 2024-11-16 RX ADMIN — ZIPRASIDONE MESYLATE 10 MG: 20 INJECTION, POWDER, LYOPHILIZED, FOR SOLUTION INTRAMUSCULAR at 23:06

## 2024-11-16 RX ADMIN — VENLAFAXINE HYDROCHLORIDE 150 MG: 75 CAPSULE, EXTENDED RELEASE ORAL at 09:32

## 2024-11-16 RX ADMIN — ISOSORBIDE MONONITRATE 30 MG: 30 TABLET, EXTENDED RELEASE ORAL at 09:31

## 2024-11-16 RX ADMIN — PANTOPRAZOLE SODIUM 20 MG: 20 TABLET, DELAYED RELEASE ORAL at 09:32

## 2024-11-16 RX ADMIN — SODIUM CHLORIDE, PRESERVATIVE FREE 10 ML: 5 INJECTION INTRAVENOUS at 23:23

## 2024-11-16 RX ADMIN — HEPARIN SODIUM 5000 UNITS: 5000 INJECTION INTRAVENOUS; SUBCUTANEOUS at 14:32

## 2024-11-16 RX ADMIN — ATORVASTATIN CALCIUM 80 MG: 40 TABLET, FILM COATED ORAL at 09:31

## 2024-11-16 RX ADMIN — PANTOPRAZOLE SODIUM 20 MG: 20 TABLET, DELAYED RELEASE ORAL at 23:05

## 2024-11-16 RX ADMIN — WATER 20 ML: 1 INJECTION INTRAMUSCULAR; INTRAVENOUS; SUBCUTANEOUS at 12:34

## 2024-11-16 ASSESSMENT — PAIN SCALES - PAIN ASSESSMENT IN ADVANCED DEMENTIA (PAINAD)
CONSOLABILITY: NO NEED TO CONSOLE
BODYLANGUAGE: RELAXED
TOTALSCORE: 0
CONSOLABILITY: NO NEED TO CONSOLE
BREATHING: NORMAL
TOTALSCORE: 0
FACIALEXPRESSION: SMILING OR INEXPRESSIVE
BODYLANGUAGE: RELAXED
TOTALSCORE: 0
BREATHING: NORMAL
BODYLANGUAGE: RELAXED
FACIALEXPRESSION: SMILING OR INEXPRESSIVE
BREATHING: NORMAL
FACIALEXPRESSION: SMILING OR INEXPRESSIVE
BODYLANGUAGE: RELAXED
BREATHING: NORMAL
FACIALEXPRESSION: SMILING OR INEXPRESSIVE
FACIALEXPRESSION: SMILING OR INEXPRESSIVE
CONSOLABILITY: NO NEED TO CONSOLE
BREATHING: NORMAL
CONSOLABILITY: NO NEED TO CONSOLE
BODYLANGUAGE: RELAXED
CONSOLABILITY: NO NEED TO CONSOLE
TOTALSCORE: 0
TOTALSCORE: 0
CONSOLABILITY: NO NEED TO CONSOLE
CONSOLABILITY: NO NEED TO CONSOLE
TOTALSCORE: 0
BODYLANGUAGE: RELAXED
BREATHING: NORMAL
BREATHING: NORMAL
BODYLANGUAGE: RELAXED
FACIALEXPRESSION: SMILING OR INEXPRESSIVE
TOTALSCORE: 0
TOTALSCORE: 0
BREATHING: NORMAL
CONSOLABILITY: NO NEED TO CONSOLE
TOTALSCORE: 0
BREATHING: NORMAL
CONSOLABILITY: NO NEED TO CONSOLE
CONSOLABILITY: NO NEED TO CONSOLE
TOTALSCORE: 0
CONSOLABILITY: NO NEED TO CONSOLE
BREATHING: NORMAL
TOTALSCORE: 0
BODYLANGUAGE: RELAXED
CONSOLABILITY: NO NEED TO CONSOLE
BODYLANGUAGE: RELAXED
BREATHING: NORMAL
FACIALEXPRESSION: SMILING OR INEXPRESSIVE
BODYLANGUAGE: RELAXED
FACIALEXPRESSION: SMILING OR INEXPRESSIVE
TOTALSCORE: 0
FACIALEXPRESSION: SMILING OR INEXPRESSIVE
BREATHING: NORMAL
BODYLANGUAGE: RELAXED
FACIALEXPRESSION: SMILING OR INEXPRESSIVE
BODYLANGUAGE: RELAXED

## 2024-11-16 NOTE — PLAN OF CARE
Problem: Discharge Planning  Goal: Discharge to home or other facility with appropriate resources  Outcome: Progressing     Problem: Risk for Elopement  Goal: Patient will not exit the unit/facility without proper excort  Outcome: Progressing  Flowsheets (Taken 11/16/2024 0812)  Nursing Interventions for Elopement Risk:   Make sure patient has all necessary personal care items   Place patient in room far away from exits and stairways   Reduce environmental triggers     Problem: Safety - Adult  Goal: Free from fall injury  Outcome: Progressing     Problem: Safety - Medical Restraint  Goal: Remains free of injury from restraints (Restraint for Interference with Medical Device)  Description: INTERVENTIONS:  1. Determine that other, less restrictive measures have been tried or would not be effective before applying the restraint  2. Evaluate the patient's condition at the time of restraint application  3. Inform patient/family regarding the reason for restraint  4. Q2H: Monitor safety, psychosocial status, comfort, nutrition and hydration  Recent Flowsheet Documentation  Taken 11/16/2024 1647 by Ernesto Patterson RN  Remains free of injury from restraints (restraint for interference with medical device): Every 2 hours: Monitor safety, psychosocial status, comfort, nutrition and hydration  Taken 11/16/2024 1600 by Ernesto Patterson RN  Remains free of injury from restraints (restraint for interference with medical device): Every 2 hours: Monitor safety, psychosocial status, comfort, nutrition and hydration  Taken 11/16/2024 1400 by Ernesto Patterson RN  Remains free of injury from restraints (restraint for interference with medical device): Every 2 hours: Monitor safety, psychosocial status, comfort, nutrition and hydration  Taken 11/16/2024 1200 by Ernesto Patterson RN  Remains free of injury from restraints (restraint for interference with medical device): Every 2 hours: Monitor safety, psychosocial

## 2024-11-17 LAB
GLUCOSE BLD-MCNC: 129 MG/DL (ref 70–99)
GLUCOSE BLD-MCNC: 133 MG/DL (ref 70–99)
GLUCOSE BLD-MCNC: 77 MG/DL (ref 70–99)
GLUCOSE BLD-MCNC: 91 MG/DL (ref 70–99)
PERFORMED ON: ABNORMAL
PERFORMED ON: ABNORMAL
PERFORMED ON: NORMAL
PERFORMED ON: NORMAL

## 2024-11-17 PROCEDURE — 6370000000 HC RX 637 (ALT 250 FOR IP): Performed by: INTERNAL MEDICINE

## 2024-11-17 PROCEDURE — 1200000000 HC SEMI PRIVATE

## 2024-11-17 PROCEDURE — 6360000002 HC RX W HCPCS: Performed by: NURSE PRACTITIONER

## 2024-11-17 PROCEDURE — 99232 SBSQ HOSP IP/OBS MODERATE 35: CPT | Performed by: INTERNAL MEDICINE

## 2024-11-17 PROCEDURE — 6360000002 HC RX W HCPCS: Performed by: INTERNAL MEDICINE

## 2024-11-17 PROCEDURE — 6370000000 HC RX 637 (ALT 250 FOR IP): Performed by: NURSE PRACTITIONER

## 2024-11-17 PROCEDURE — 2580000003 HC RX 258

## 2024-11-17 PROCEDURE — 2580000003 HC RX 258: Performed by: NURSE PRACTITIONER

## 2024-11-17 RX ORDER — QUETIAPINE FUMARATE 25 MG/1
25 TABLET, FILM COATED ORAL 2 TIMES DAILY
Status: DISCONTINUED | OUTPATIENT
Start: 2024-11-17 | End: 2024-11-18 | Stop reason: HOSPADM

## 2024-11-17 RX ORDER — WATER 10 ML/10ML
INJECTION INTRAMUSCULAR; INTRAVENOUS; SUBCUTANEOUS
Status: COMPLETED
Start: 2024-11-17 | End: 2024-11-17

## 2024-11-17 RX ADMIN — QUETIAPINE FUMARATE 25 MG: 25 TABLET ORAL at 22:31

## 2024-11-17 RX ADMIN — SODIUM CHLORIDE, PRESERVATIVE FREE 10 ML: 5 INJECTION INTRAVENOUS at 09:49

## 2024-11-17 RX ADMIN — Medication 5 MG: at 22:32

## 2024-11-17 RX ADMIN — ATORVASTATIN CALCIUM 80 MG: 40 TABLET, FILM COATED ORAL at 09:48

## 2024-11-17 RX ADMIN — PANTOPRAZOLE SODIUM 20 MG: 20 TABLET, DELAYED RELEASE ORAL at 09:48

## 2024-11-17 RX ADMIN — ZIPRASIDONE MESYLATE 10 MG: 20 INJECTION, POWDER, LYOPHILIZED, FOR SOLUTION INTRAMUSCULAR at 13:33

## 2024-11-17 RX ADMIN — HEPARIN SODIUM 5000 UNITS: 5000 INJECTION INTRAVENOUS; SUBCUTANEOUS at 22:31

## 2024-11-17 RX ADMIN — PANTOPRAZOLE SODIUM 20 MG: 20 TABLET, DELAYED RELEASE ORAL at 22:31

## 2024-11-17 RX ADMIN — SODIUM CHLORIDE 1000 MG: 900 INJECTION INTRAVENOUS at 10:18

## 2024-11-17 RX ADMIN — LEVOTHYROXINE SODIUM 75 MCG: 25 TABLET ORAL at 09:48

## 2024-11-17 RX ADMIN — VENLAFAXINE HYDROCHLORIDE 150 MG: 75 CAPSULE, EXTENDED RELEASE ORAL at 09:48

## 2024-11-17 RX ADMIN — Medication 1000 MCG: at 09:48

## 2024-11-17 RX ADMIN — LEVETIRACETAM 500 MG: 500 TABLET, FILM COATED ORAL at 22:31

## 2024-11-17 RX ADMIN — HEPARIN SODIUM 5000 UNITS: 5000 INJECTION INTRAVENOUS; SUBCUTANEOUS at 16:12

## 2024-11-17 RX ADMIN — HEPARIN SODIUM 5000 UNITS: 5000 INJECTION INTRAVENOUS; SUBCUTANEOUS at 09:47

## 2024-11-17 RX ADMIN — WATER: 1 INJECTION INTRAMUSCULAR; INTRAVENOUS; SUBCUTANEOUS at 14:11

## 2024-11-17 RX ADMIN — EZETIMIBE 10 MG: 10 TABLET ORAL at 22:31

## 2024-11-17 RX ADMIN — ISOSORBIDE MONONITRATE 30 MG: 30 TABLET, EXTENDED RELEASE ORAL at 09:48

## 2024-11-17 RX ADMIN — SODIUM CHLORIDE, PRESERVATIVE FREE 10 ML: 5 INJECTION INTRAVENOUS at 22:32

## 2024-11-17 RX ADMIN — CLOPIDOGREL BISULFATE 75 MG: 75 TABLET ORAL at 09:48

## 2024-11-17 RX ADMIN — LEVETIRACETAM 500 MG: 500 TABLET, FILM COATED ORAL at 09:48

## 2024-11-17 RX ADMIN — QUETIAPINE FUMARATE 25 MG: 25 TABLET ORAL at 10:06

## 2024-11-17 ASSESSMENT — PAIN SCALES - PAIN ASSESSMENT IN ADVANCED DEMENTIA (PAINAD)
TOTALSCORE: 0
BREATHING: NORMAL
BODYLANGUAGE: RELAXED
BODYLANGUAGE: RELAXED
FACIALEXPRESSION: SMILING OR INEXPRESSIVE
BREATHING: NORMAL
CONSOLABILITY: NO NEED TO CONSOLE
CONSOLABILITY: NO NEED TO CONSOLE
FACIALEXPRESSION: SMILING OR INEXPRESSIVE
FACIALEXPRESSION: SMILING OR INEXPRESSIVE
BODYLANGUAGE: RELAXED
TOTALSCORE: 0
BREATHING: NORMAL
TOTALSCORE: 0
TOTALSCORE: 0
BREATHING: NORMAL
CONSOLABILITY: NO NEED TO CONSOLE
BODYLANGUAGE: RELAXED
BODYLANGUAGE: RELAXED
BREATHING: NORMAL
TOTALSCORE: 0
BODYLANGUAGE: RELAXED
CONSOLABILITY: NO NEED TO CONSOLE
BREATHING: NORMAL
TOTALSCORE: 0
FACIALEXPRESSION: SMILING OR INEXPRESSIVE
TOTALSCORE: 0
BODYLANGUAGE: RELAXED
FACIALEXPRESSION: SMILING OR INEXPRESSIVE
FACIALEXPRESSION: SMILING OR INEXPRESSIVE
TOTALSCORE: 0
BREATHING: NORMAL
BREATHING: NORMAL
CONSOLABILITY: NO NEED TO CONSOLE
FACIALEXPRESSION: SMILING OR INEXPRESSIVE
CONSOLABILITY: NO NEED TO CONSOLE
FACIALEXPRESSION: SMILING OR INEXPRESSIVE
TOTALSCORE: 0
CONSOLABILITY: NO NEED TO CONSOLE
BREATHING: NORMAL
FACIALEXPRESSION: SMILING OR INEXPRESSIVE
BODYLANGUAGE: RELAXED
CONSOLABILITY: NO NEED TO CONSOLE
BODYLANGUAGE: RELAXED
CONSOLABILITY: NO NEED TO CONSOLE

## 2024-11-17 NOTE — PLAN OF CARE
Problem: Discharge Planning  Goal: Discharge to home or other facility with appropriate resources  Outcome: Progressing     Problem: Pain  Goal: Verbalizes/displays adequate comfort level or baseline comfort level  Outcome: Progressing     Problem: Risk for Elopement  Goal: Patient will not exit the unit/facility without proper excort  Outcome: Progressing  Flowsheets (Taken 11/17/2024 1044)  Nursing Interventions for Elopement Risk:   Make sure patient has all necessary personal care items   Place patient in room far away from exits and stairways   Reduce environmental triggers

## 2024-11-17 NOTE — PLAN OF CARE
Problem: Discharge Planning  Goal: Discharge to home or other facility with appropriate resources  11/17/2024 1429 by Toni Nunez RN  Outcome: Progressing  11/17/2024 1428 by Toni Nunez RN  Outcome: Progressing     Problem: Pain  Goal: Verbalizes/displays adequate comfort level or baseline comfort level  11/17/2024 1429 by Toni Nunez RN  Outcome: Progressing  11/17/2024 1428 by Toni Nunez RN  Outcome: Progressing     Problem: Risk for Elopement  Goal: Patient will not exit the unit/facility without proper excort  11/17/2024 1429 by Toni Nunez RN  Outcome: Progressing  11/17/2024 1428 by Toni Nunez RN  Outcome: Progressing  Flowsheets (Taken 11/17/2024 1048)  Nursing Interventions for Elopement Risk:   Make sure patient has all necessary personal care items   Place patient in room far away from exits and stairways   Reduce environmental triggers

## 2024-11-18 VITALS
DIASTOLIC BLOOD PRESSURE: 95 MMHG | WEIGHT: 134.92 LBS | HEIGHT: 60 IN | OXYGEN SATURATION: 99 % | TEMPERATURE: 97.6 F | HEART RATE: 84 BPM | SYSTOLIC BLOOD PRESSURE: 156 MMHG | RESPIRATION RATE: 18 BRPM | BODY MASS INDEX: 26.49 KG/M2

## 2024-11-18 LAB
ANION GAP SERPL CALCULATED.3IONS-SCNC: 13 MMOL/L (ref 3–16)
BUN SERPL-MCNC: 6 MG/DL (ref 7–20)
CALCIUM SERPL-MCNC: 9.2 MG/DL (ref 8.3–10.6)
CHLORIDE SERPL-SCNC: 100 MMOL/L (ref 99–110)
CO2 SERPL-SCNC: 23 MMOL/L (ref 21–32)
CREAT SERPL-MCNC: 1.2 MG/DL (ref 0.6–1.2)
GFR SERPLBLD CREATININE-BSD FMLA CKD-EPI: 47 ML/MIN/{1.73_M2}
GLUCOSE BLD-MCNC: 69 MG/DL (ref 70–99)
GLUCOSE BLD-MCNC: 98 MG/DL (ref 70–99)
GLUCOSE BLD-MCNC: 99 MG/DL (ref 70–99)
GLUCOSE SERPL-MCNC: 112 MG/DL (ref 70–99)
MAGNESIUM SERPL-MCNC: 1.03 MG/DL (ref 1.8–2.4)
PERFORMED ON: ABNORMAL
PERFORMED ON: NORMAL
PERFORMED ON: NORMAL
POTASSIUM SERPL-SCNC: 3 MMOL/L (ref 3.5–5.1)
SODIUM SERPL-SCNC: 136 MMOL/L (ref 136–145)

## 2024-11-18 PROCEDURE — 80048 BASIC METABOLIC PNL TOTAL CA: CPT

## 2024-11-18 PROCEDURE — 6370000000 HC RX 637 (ALT 250 FOR IP): Performed by: INTERNAL MEDICINE

## 2024-11-18 PROCEDURE — 83735 ASSAY OF MAGNESIUM: CPT

## 2024-11-18 PROCEDURE — 6360000002 HC RX W HCPCS: Performed by: INTERNAL MEDICINE

## 2024-11-18 PROCEDURE — 6360000002 HC RX W HCPCS: Performed by: NURSE PRACTITIONER

## 2024-11-18 PROCEDURE — 99239 HOSP IP/OBS DSCHRG MGMT >30: CPT | Performed by: INTERNAL MEDICINE

## 2024-11-18 PROCEDURE — 97530 THERAPEUTIC ACTIVITIES: CPT

## 2024-11-18 PROCEDURE — 36415 COLL VENOUS BLD VENIPUNCTURE: CPT

## 2024-11-18 PROCEDURE — 2580000003 HC RX 258: Performed by: NURSE PRACTITIONER

## 2024-11-18 PROCEDURE — 6370000000 HC RX 637 (ALT 250 FOR IP): Performed by: NURSE PRACTITIONER

## 2024-11-18 RX ORDER — POTASSIUM CHLORIDE 750 MG/1
40 TABLET, EXTENDED RELEASE ORAL ONCE
Status: COMPLETED | OUTPATIENT
Start: 2024-11-18 | End: 2024-11-18

## 2024-11-18 RX ORDER — SPIRONOLACTONE 25 MG/1
25 TABLET ORAL DAILY
Status: DISCONTINUED | OUTPATIENT
Start: 2024-11-18 | End: 2024-11-18 | Stop reason: HOSPADM

## 2024-11-18 RX ORDER — MAGNESIUM SULFATE IN WATER 40 MG/ML
4000 INJECTION, SOLUTION INTRAVENOUS ONCE
Status: COMPLETED | OUTPATIENT
Start: 2024-11-18 | End: 2024-11-18

## 2024-11-18 RX ORDER — LOSARTAN POTASSIUM 50 MG/1
50 TABLET ORAL DAILY
Status: DISCONTINUED | OUTPATIENT
Start: 2024-11-18 | End: 2024-11-18 | Stop reason: HOSPADM

## 2024-11-18 RX ORDER — LOSARTAN POTASSIUM 50 MG/1
50 TABLET ORAL DAILY
DISCHARGE
Start: 2024-11-18

## 2024-11-18 RX ORDER — QUETIAPINE FUMARATE 25 MG/1
25 TABLET, FILM COATED ORAL 2 TIMES DAILY
DISCHARGE
Start: 2024-11-18

## 2024-11-18 RX ORDER — SPIRONOLACTONE 25 MG/1
25 TABLET ORAL DAILY
DISCHARGE
Start: 2024-11-18

## 2024-11-18 RX ADMIN — LEVETIRACETAM 500 MG: 500 TABLET, FILM COATED ORAL at 08:37

## 2024-11-18 RX ADMIN — SPIRONOLACTONE 25 MG: 25 TABLET ORAL at 13:09

## 2024-11-18 RX ADMIN — SODIUM CHLORIDE, PRESERVATIVE FREE 10 ML: 5 INJECTION INTRAVENOUS at 08:37

## 2024-11-18 RX ADMIN — MAGNESIUM SULFATE HEPTAHYDRATE 4000 MG: 40 INJECTION, SOLUTION INTRAVENOUS at 12:48

## 2024-11-18 RX ADMIN — SODIUM CHLORIDE 1000 MG: 900 INJECTION INTRAVENOUS at 09:11

## 2024-11-18 RX ADMIN — Medication 1000 MCG: at 08:37

## 2024-11-18 RX ADMIN — HEPARIN SODIUM 5000 UNITS: 5000 INJECTION INTRAVENOUS; SUBCUTANEOUS at 08:36

## 2024-11-18 RX ADMIN — VENLAFAXINE HYDROCHLORIDE 150 MG: 75 CAPSULE, EXTENDED RELEASE ORAL at 08:36

## 2024-11-18 RX ADMIN — PANTOPRAZOLE SODIUM 20 MG: 20 TABLET, DELAYED RELEASE ORAL at 08:37

## 2024-11-18 RX ADMIN — POTASSIUM CHLORIDE 40 MEQ: 750 TABLET, EXTENDED RELEASE ORAL at 13:09

## 2024-11-18 RX ADMIN — QUETIAPINE FUMARATE 25 MG: 25 TABLET ORAL at 08:37

## 2024-11-18 RX ADMIN — ATORVASTATIN CALCIUM 80 MG: 40 TABLET, FILM COATED ORAL at 08:37

## 2024-11-18 RX ADMIN — LOSARTAN POTASSIUM 50 MG: 50 TABLET, FILM COATED ORAL at 13:09

## 2024-11-18 RX ADMIN — CLOPIDOGREL BISULFATE 75 MG: 75 TABLET ORAL at 08:36

## 2024-11-18 RX ADMIN — ISOSORBIDE MONONITRATE 30 MG: 30 TABLET, EXTENDED RELEASE ORAL at 08:37

## 2024-11-18 ASSESSMENT — PAIN SCALES - PAIN ASSESSMENT IN ADVANCED DEMENTIA (PAINAD)
CONSOLABILITY: NO NEED TO CONSOLE
BODYLANGUAGE: RELAXED
CONSOLABILITY: NO NEED TO CONSOLE
TOTALSCORE: 0
TOTALSCORE: 0
BODYLANGUAGE: RELAXED
BREATHING: NORMAL
FACIALEXPRESSION: SMILING OR INEXPRESSIVE
BREATHING: NORMAL
FACIALEXPRESSION: SMILING OR INEXPRESSIVE

## 2024-11-18 ASSESSMENT — PAIN SCALES - GENERAL: PAINLEVEL_OUTOF10: 0

## 2024-11-18 NOTE — PLAN OF CARE
Problem: Discharge Planning  Goal: Discharge to home or other facility with appropriate resources  Outcome: Completed     Problem: Pain  Goal: Verbalizes/displays adequate comfort level or baseline comfort level  Outcome: Completed     Problem: Risk for Elopement  Goal: Patient will not exit the unit/facility without proper excort  Outcome: Completed  Flowsheets (Taken 11/18/2024 3408)  Nursing Interventions for Elopement Risk:   Make sure patient has all necessary personal care items   Place patient in room far away from exits and stairways   Reduce environmental triggers     Problem: Safety - Adult  Goal: Free from fall injury  Outcome: Completed     Problem: Skin/Tissue Integrity  Goal: Absence of new skin breakdown  Description: 1.  Monitor for areas of redness and/or skin breakdown  2.  Assess vascular access sites hourly  3.  Every 4-6 hours minimum:  Change oxygen saturation probe site  4.  Every 4-6 hours:  If on nasal continuous positive airway pressure, respiratory therapy assess nares and determine need for appliance change or resting period.  Outcome: Completed     Problem: Safety - Medical Restraint  Goal: Remains free of injury from restraints (Restraint for Interference with Medical Device)  Description: INTERVENTIONS:  1. Determine that other, less restrictive measures have been tried or would not be effective before applying the restraint  2. Evaluate the patient's condition at the time of restraint application  3. Inform patient/family regarding the reason for restraint  4. Q2H: Monitor safety, psychosocial status, comfort, nutrition and hydration  Outcome: Completed

## 2024-11-18 NOTE — CARE COORDINATION
DISCHARGE ORDER  Date/Time 2024 12:13 PM  Completed by: Crissy Mejia RN, Case Management    Patient Name: Siobhan Flores    : 1950      Admit order Date and Status:24 IP  Noted discharge order. (verify MD's last order for status of admission/Traditional Medicare 3 [place] Inpatient qualifying stay required for SNF)    Confirmed discharge plan with:              Patient:  Yes              When pt confirms DC plan does any support person need to be contacted by CM Yes called pt spouse Candido                 Discharge to Facility: [facility]   Facility phone number for staff giving report: [phone]   Pre-certification completed: yes. Able to accept today at [place]   Hospital Exemption Notification (HENS) completed: yes   Discharge orders and Continuity of Care faxed to facility:  [facility]      Transportation:               Medical Transport explained with choice list offered to pt/family.                Choice:(no preference)  Agency used: [facility]   time:         Pt/family/Nursing/Facility aware of  time:   Yes Names: Toni Woodward, Mark, CandidoSiobhan  Ambulance form completed:  yes:      Date Last IMM Given:     Comments:    Pt is being d/c'd to [facility] today. Pt's O2 sats are 96% on RA.    Discharge timeout done with Pt,RN,CM. All discharge needs and concerns addressed.    Discharging nurse to complete CADEN, reconcile AVS, and place final copy with patient's discharge packet. Discharging RN to ensure that written prescriptions for  Level II medications are sent with patient to the facility as per protocol.      
Call back from Micheline Egan to confirm pt status. Pt was dc from [facility] on 11/11 and transferred to [facility]. Called and spoke with Mark at [facility]. Pt is currently there for rehab and ok to return at DC. Precert started.   
Call from Mark at [facility] stating precert approved. Able to accept pt today at [place].   
plan with any other family members/significant others, and if so, who? No  Plans to Return to Present Housing: Yes  Other Identified Issues/Barriers to RETURNING to current housing: na    Potential Assistance needed at discharge: N/A            Potential DME:    Patient expects to discharge to: Skilled nursing facility  Plan for transportation at discharge:      Financial    Payor: [employer] MEDICARE / Plan: [employer] MEDICARE ADVANTAGE HMO / Product Type: Medicare /     Does insurance require precert for SNF: Yes    Potential assistance Purchasing Medications: No  Meds-to-Beds request: No      [id number] - [place], [place] - [address] - P [phone] - F [phone]  [facility] [address]  Phone: [phone] Fax: [phone]      Notes:    Factors facilitating achievement of predicted outcomes: Cooperative    Barriers to discharge: HUEY    Additional Case Management Notes: Reviewed chart. Met with pt at bedside. Role of cm explained. Pt currently at [facility] for rehab. Pt uses DME. Plan for pt to return back to [facility]. Will follow      The Plan for Transition of Care is related to the following treatment goals of HUEY (acute kidney injury) (HCC) [N17.9]  Acute cystitis without hematuria [N30.00]    IF APPLICABLE: The Patient and/or patient representative Siobhan and her family were provided with a choice of provider and agrees with the discharge plan. Freedom of choice list with basic dialogue that supports the patient's individualized plan of care/goals and shares the quality data associated with the providers was provided to:     Patient Representative Name:       The Patient and/or Patient Representative Agree with the Discharge Plan?      Crissy Mejia RN  Case Management Department  Ph: [phone] Fax: [phone]

## 2024-11-19 NOTE — PROGRESS NOTES
/78   Pulse 77   Temp 97.3 °F (36.3 °C) (Axillary)   Resp 16   Ht 1.524 m (5')   Wt 61.2 kg (134 lb 14.7 oz)   SpO2 95%   BMI 26.35 kg/m²     Assessment complete. Meds passed. Pt denies needs at this time. New IV placed to left AC, IV Abx infused. Pleasant this morning. Restraints d/c'd at 1000. New order for seroquel obtained and given      Bedside Mobility Assessment Tool (BMAT):     Assessment Level 1- Sit and Shake    1. From a semi-reclined position, ask patient to sit up and rotate to a seated position at the side of the bed. Can use the bedrail.    2. Ask patient to reach out and grab your hand and shake making sure patient reaches across his/her midline.   Pass- Patient is able to come to a seated position, maintain core strength. Maintains seated balance while reaching across midline. Move on to Assessment Level 2.     Assessment Level 2- Stretch and Point   1. With patient in seated position at the side of the bed, have patient place both feet on the floor (or stool) with knees no higher than hips.    2. Ask patient to stretch one leg and straighten the knee, then bend the ankle/flex and point the toes. If appropriate, repeat with the other leg.   Pass- Patient is able to demonstrate appropriate quad strength on intended weight bearing limb(s). Move onto Assessment Level 3.     Assessment Level 3- Stand   1. Ask patient to elevate off the bed or chair (seated to standing) using an assistive device (cane, bedrail).    2. Patient should be able to raise buttocks off be and hold for a count of five. May repeat once.   Fail- Patient unable to demonstrate standing stability. Patient is MOBILITY LEVEL 3.     Assessment Level 4- Walk   1. Ask patient to march in place at bedside.    2. Then ask patient to advance step and return each foot. Some medical conditions may render a patient from stepping backwards, use your best clinical judgement.   Fail- Patient not able to complete tasks OR requires use 
4 Eyes Skin Assessment     NAME:  Siobhan Flores  YOB: 1950  MEDICAL RECORD NUMBER:  [id number]    The patient is being assessed for  Admission    I agree that at least one RN has performed a thorough Head to Toe Skin Assessment on the patient. ALL assessment sites listed below have been assessed.      Areas assessed by both nurses:    Head, Face, Ears, Shoulders, Back, Chest, Arms, Elbows, Hands, Sacrum. Buttock, Coccyx, Ischium, Legs. Feet and Heels, and Under Medical Devices         Does the Patient have a Wound? No noted wound(s)       Murphy Prevention initiated by RN: No  Wound Care Orders initiated by RN: No    Pressure Injury (Stage 3,4, Unstageable, DTI, NWPT, and Complex wounds) if present, place Wound referral order by RN under : No    New Ostomies, if present place, Ostomy referral order under : No     Nurse 1 eSignature: Electronically signed by Maria C Birmingham RN on 11/13/24 at 10:57 PM EST    **SHARE this note so that the co-signing nurse can place an eSignature**    Nurse 2 eSignature: Electronically signed by Mariluz Briceno RN on 11/13/24 at 11:04 PM EST    
Assisted walking the hallway; back to bed, call light in reach  
BP (!) 144/86   Pulse 88   Temp 97.7 °F (36.5 °C) (Oral)   Resp 18   Ht 1.524 m (5')   Wt 61.2 kg (134 lb 14.7 oz)   SpO2 96%   BMI 26.35 kg/m²     Assessment complete. Meds passed. Pt denies needs at this time. OT working with patient now. IV abx infusing. Patient took PO meds slowly and with encouragement. Off of restraints since yesterday. Took only a couple of bites of food, drank a good bit of fluids. Confused. Pleasant this morning.         Bedside Mobility Assessment Tool (BMAT):     Assessment Level 1- Sit and Shake    1. From a semi-reclined position, ask patient to sit up and rotate to a seated position at the side of the bed. Can use the bedrail.    2. Ask patient to reach out and grab your hand and shake making sure patient reaches across his/her midline.   Pass- Patient is able to come to a seated position, maintain core strength. Maintains seated balance while reaching across midline. Move on to Assessment Level 2.     Assessment Level 2- Stretch and Point   1. With patient in seated position at the side of the bed, have patient place both feet on the floor (or stool) with knees no higher than hips.    2. Ask patient to stretch one leg and straighten the knee, then bend the ankle/flex and point the toes. If appropriate, repeat with the other leg.   Pass- Patient is able to demonstrate appropriate quad strength on intended weight bearing limb(s). Move onto Assessment Level 3.     Assessment Level 3- Stand   1. Ask patient to elevate off the bed or chair (seated to standing) using an assistive device (cane, bedrail).    2. Patient should be able to raise buttocks off be and hold for a count of five. May repeat once.   Pass- Patient maintains standing stability for at least 5 seconds, proceed to assessment level 4.    Assessment Level 4- Walk   1. Ask patient to march in place at bedside.    2. Then ask patient to advance step and return each foot. Some medical conditions may render a patient from 
Bedside report given to Saba NELSON. Pt denies needs at this time. No s/s of distress. Respirations easy and unlabored. Pt stable. Bed in low position call light within reach. No further needs at this time.    
Confused and agitated.  Still hollering out.  Took PM meds w/o diff.  Geodon 10 mg given IVP for the agitation.  Thelma well.  See PM shift assess; telesitter in use, call light in reach  
Department of Internal Medicine  Nephrology Progress Note        SUBJECTIVE:    We are following this patient for A/CKD.  The patient was seen and examined; she feels well today with no CP, SOB, nausea or vomiting.    ROS: No fever or chills.  Social: No family at bedside.    Physical Exam:    VITALS:  BP (!) 146/84   Pulse 100   Temp 97.5 °F (36.4 °C) (Oral)   Resp 16   Ht 1.524 m (5')   Wt 61.2 kg (134 lb 14.7 oz)   SpO2 96%   BMI 26.35 kg/m²     General appearance: Seems comfortable, no acute distress.  Neck: Trachea midline, thyroid normal.   Lungs:  Non labored breathing, CTA to anterior auscultation.  Heart:  S1S2 normal, rub or gallop. No peripheral edema.  Abdomen: Soft, non-tender, no organomegaly.   Skin: No lesions or rashes, warm to touch.     DATA:    CBC with Differential:    Lab Results   Component Value Date/Time    WBC 7.1 11/14/2024 06:36 AM    RBC 3.02 11/14/2024 06:36 AM    HGB 10.4 11/14/2024 06:36 AM    HCT 29.7 11/14/2024 06:36 AM     11/14/2024 06:36 AM    MCV 98.2 11/14/2024 06:36 AM    MCH 34.6 11/14/2024 06:36 AM    MCHC 35.2 11/14/2024 06:36 AM    RDW 14.5 11/14/2024 06:36 AM    LYMPHOPCT 16.1 11/14/2024 06:36 AM    MONOPCT 9.0 11/14/2024 06:36 AM    EOSPCT 3.5 11/14/2024 06:36 AM    BASOPCT 0.6 11/14/2024 06:36 AM    MONOSABS 0.6 11/14/2024 06:36 AM    LYMPHSABS 1.1 11/14/2024 06:36 AM    EOSABS 0.2 11/14/2024 06:36 AM    BASOSABS 0.0 11/14/2024 06:36 AM    DIFFTYPE Auto 12/10/2012 12:33 PM     BMP:    Lab Results   Component Value Date/Time     11/15/2024 05:50 AM    K 3.3 11/15/2024 05:50 AM    K 4.0 11/14/2024 06:36 AM     11/15/2024 05:50 AM    CO2 19 11/15/2024 05:50 AM    BUN 16 11/15/2024 05:50 AM    CREATININE 1.2 11/15/2024 05:50 AM    CALCIUM 8.6 11/15/2024 05:50 AM    GFRAA 53 04/18/2022 02:23 PM    GFRAA >60 03/15/2013 12:42 PM    LABGLOM 47 11/15/2024 05:50 AM    LABGLOM 43 04/30/2024 05:41 AM    GLUCOSE 140 11/15/2024 05:50 AM 
Department of Internal Medicine  Nephrology Progress Note        SUBJECTIVE:    We are following this patient for A/CKD.  The patient was seen and examined; she feels well today with no CP, SOB, nausea or vomiting.    ROS: No fever or chills.  Social: No family at bedside.    Physical Exam:    VITALS:  BP (!) 146/84   Pulse 78   Temp 97.8 °F (36.6 °C) (Oral)   Resp 16   Ht 1.524 m (5')   Wt 61.2 kg (134 lb 14.7 oz)   SpO2 96%   BMI 26.35 kg/m²     General appearance: Seems comfortable, no acute distress.  Neck: Trachea midline, thyroid normal.   Lungs:  Non labored breathing, CTA to anterior auscultation.  Heart:  S1S2 normal, rub or gallop. No peripheral edema.  Abdomen: Soft, non-tender, no organomegaly.   Skin: No lesions or rashes, warm to touch.     DATA:    CBC with Differential:    Lab Results   Component Value Date/Time    WBC 7.1 11/14/2024 06:36 AM    RBC 3.02 11/14/2024 06:36 AM    HGB 10.4 11/14/2024 06:36 AM    HCT 29.7 11/14/2024 06:36 AM     11/14/2024 06:36 AM    MCV 98.2 11/14/2024 06:36 AM    MCH 34.6 11/14/2024 06:36 AM    MCHC 35.2 11/14/2024 06:36 AM    RDW 14.5 11/14/2024 06:36 AM    LYMPHOPCT 16.1 11/14/2024 06:36 AM    MONOPCT 9.0 11/14/2024 06:36 AM    EOSPCT 3.5 11/14/2024 06:36 AM    BASOPCT 0.6 11/14/2024 06:36 AM    MONOSABS 0.6 11/14/2024 06:36 AM    LYMPHSABS 1.1 11/14/2024 06:36 AM    EOSABS 0.2 11/14/2024 06:36 AM    BASOSABS 0.0 11/14/2024 06:36 AM    DIFFTYPE Auto 12/10/2012 12:33 PM     BMP:    Lab Results   Component Value Date/Time     11/15/2024 05:50 AM    K 3.3 11/15/2024 05:50 AM    K 4.0 11/14/2024 06:36 AM     11/15/2024 05:50 AM    CO2 19 11/15/2024 05:50 AM    BUN 16 11/15/2024 05:50 AM    CREATININE 1.2 11/15/2024 05:50 AM    CALCIUM 8.6 11/15/2024 05:50 AM    GFRAA 53 04/18/2022 02:23 PM    GFRAA >60 03/15/2013 12:42 PM    LABGLOM 47 11/15/2024 05:50 AM    LABGLOM 43 04/30/2024 05:41 AM    GLUCOSE 140 11/15/2024 05:50 AM 
Department of Internal Medicine  Nephrology Progress Note        SUBJECTIVE:    We are following this patient for A/CKD.  The patient was seen and examined; she feels well today with no CP, SOB, nausea or vomiting.    ROS: No fever or chills.  Social: No family at bedside.    Physical Exam:    VITALS:  BP (!) 150/88   Pulse 90   Temp 97.6 °F (36.4 °C) (Oral)   Resp 18   Ht 1.524 m (5')   Wt 61.2 kg (134 lb 14.7 oz)   SpO2 96%   BMI 26.35 kg/m²     General appearance: Seems comfortable, no acute distress.  Neck: Trachea midline, thyroid normal.   Lungs:  Non labored breathing, CTA to anterior auscultation.  Heart:  S1S2 normal, rub or gallop. No peripheral edema.  Abdomen: Soft, non-tender, no organomegaly.   Skin: No lesions or rashes, warm to touch.     DATA:    CBC with Differential:    Lab Results   Component Value Date/Time    WBC 7.1 11/14/2024 06:36 AM    RBC 3.02 11/14/2024 06:36 AM    HGB 10.4 11/14/2024 06:36 AM    HCT 29.7 11/14/2024 06:36 AM     11/14/2024 06:36 AM    MCV 98.2 11/14/2024 06:36 AM    MCH 34.6 11/14/2024 06:36 AM    MCHC 35.2 11/14/2024 06:36 AM    RDW 14.5 11/14/2024 06:36 AM    LYMPHOPCT 16.1 11/14/2024 06:36 AM    MONOPCT 9.0 11/14/2024 06:36 AM    EOSPCT 3.5 11/14/2024 06:36 AM    BASOPCT 0.6 11/14/2024 06:36 AM    MONOSABS 0.6 11/14/2024 06:36 AM    LYMPHSABS 1.1 11/14/2024 06:36 AM    EOSABS 0.2 11/14/2024 06:36 AM    BASOSABS 0.0 11/14/2024 06:36 AM    DIFFTYPE Auto 12/10/2012 12:33 PM     BMP:    Lab Results   Component Value Date/Time     11/18/2024 10:47 AM    K 3.0 11/18/2024 10:47 AM     11/18/2024 10:47 AM    CO2 23 11/18/2024 10:47 AM    BUN 6 11/18/2024 10:47 AM    CREATININE 1.2 11/18/2024 10:47 AM    CALCIUM 9.2 11/18/2024 10:47 AM    GFRAA 53 04/18/2022 02:23 PM    GFRAA >60 03/15/2013 12:42 PM    LABGLOM 47 11/18/2024 10:47 AM    LABGLOM 43 04/30/2024 05:41 AM    GLUCOSE 112 11/18/2024 10:47 AM       IMPRESSION/RECOMMENDATIONS:      1- A/CKD: The 
Geodon IM given; patient resting quietly.  Telesitter in use; restraints in use, call light in reach  
Had to pull some meds again from [facility] due to spilling crushed meds onto the floor.    
IV found laying on the bedside table; apparently patient removed it.  Will follow up later to attempt to restart IV.  Patient remains confused; hollering out.  Call light in reach  
Inpatient Occupational Therapy Evaluation & Treatment    Unit: [facility]  Date:  11/15/2024  Patient Name:    Siobhan Flores  Admitting diagnosis:  HUEY (acute kidney injury) (HCC) [N17.9]  Acute cystitis without hematuria [N30.00]  Admit Date:  11/13/2024  Precautions/Restrictions/WB Status/ Lines/ Wounds/ Oxygen: Fall risk, Bed/chair alarm, Lines (IV and external catheter), Confusion, and Telesitter; seizure precautions     Pt seen for cotreatment this date due to patient safety, patient endurance, limited functional status information, and behavioral concerns    Treatment Time:  08:03-08:51  Treatment Number:  1  Timed Code Treatment Minutes: 38 minutes  Total Treatment Minutes: 48 minutes    Patient Goals for Therapy: no goals stated        Discharge Recommendations: LTC with skilled therapy  DME needs for discharge: Defer to facility       Therapy recommendations for staff:   Assist of 1 for transfers with use of gait belt and HHA to/from BSC  to/from chair    History of Present Illness: Per admission H&P from Harinder Mcgee DO on 11/14  \"74 y.o. female with past medical history of dementia, hyperlipidemia, hypothyroidism, CAD, CKD, hypertension, seizure, GERD presented to emergency department from her skilled nursing facility for a psychiatric evaluation.  HPI is obtained from ED attending as patient is not a good historian on my exam; oriented to self at this time.  Facility staff states that patient was very aggressive with staff.  Throwing objects and trying to hit nurses.  Patient does not have any complaints.\"    Preadmission Environment:   Pt lives with                                         with Significant Other  Home environment:                            two story home; bed on 2nd floor; couch on the first floor; able to stay on first floor where bathroom is located  Steps to enter first floor:                     3-4 steps to enter; unstable hand rail  Steps to second floor/basement:        Full flight 
Inpatient Occupational Therapy Treatment    Unit: [address] [place]  Date:  11/16/2024  Patient Name:    Siobhan Flores  Admitting diagnosis:  HUEY (acute kidney injury) (HCC) [N17.9]  Acute cystitis without hematuria [N30.00]  Admit Date:  11/13/2024  Precautions/Restrictions/WB Status/ Lines/ Wounds/ Oxygen: Fall risk, Bed/chair alarm, Lines (IV), Confusion, Telesitter, and Restraints ([place] Belt)    Treatment Time: 14:59-15:37  Treatment Number:  2  Timed Code Treatment Minutes: 38 minutes  Total Treatment Minutes: 38 minutes    Patient Goals for Therapy: none stated       Discharge Recommendations: LTC with skilled therapy  DME needs for discharge: Defer to facility       Therapy recommendations for staff:   Assist of 1 for transfers with use of gait belt and HHA to/from BSC  to/from chair    History of Present Illness: Pt was previously at [facility] and transferred to [facility] for a few days prior to admit to the hospital.  Pt is a poor historian.  From previous assessment:   Pt lives with                                         with Significant Other  Home environment:                            two story home; bed on 2nd floor; couch on the first floor; able to stay on first floor where bathroom is located  Steps to enter first floor:                     3-4 steps to enter; unstable hand rail  Steps to second floor/basement:        Full flight of 12-13; single handrail  Laundry:                                              1st floor  Bathroom:                                           tub/shower unit, grab bars in shower, shower chair , and standard height toilet  Pt sleeps in a:                                     Dave lounge  Equipment owned:                              quad cane     Preadmission Status:  Pt able to drive: No;  is able to drive  Pt fully independent with ADLs: Yes  Pt receives assistance from family for: Bathing, Dressing, Cleaning, Cooking, Laundry , and grocery shopping  Pt supervision 
Inpatient Occupational Therapy Treatment    Unit: [address] [place]  Date:  11/18/2024  Patient Name:    Siobhan Flores  Admitting diagnosis:  HUEY (acute kidney injury) (HCC) [N17.9]  Acute cystitis without hematuria [N30.00]  Admit Date:  11/13/2024  Precautions/Restrictions/WB Status/ Lines/ Wounds/ Oxygen: Fall risk, Bed/chair alarm, Lines (IV), Confusion, and Telesitter    Treatment Time:  905-950  Treatment Number:  1  Timed Code Treatment Minutes: 45 minutes  Total Treatment Minutes:  45  minutes    Patient Goals for Therapy: none stated          Discharge Recommendations: SNF  DME needs for discharge: Defer to facility       Therapy recommendations for staff:   Assist of 1 for ambulation with use of rolling walker (RW) and gait belt to/from bathroom  within room  within halls; may need a second person to assist due to cognition    History of Present Illness: per Dr Everardo SILVEIRA H&P 11/14/24:  \"74 y.o. female with past medical history of dementia, hyperlipidemia, hypothyroidism, CAD, CKD, hypertension, seizure, GERD presented to emergency department from her skilled nursing facility for a psychiatric evaluation.  HPI is obtained from ED attending as patient is not a good historian on my exam; oriented to self at this time.  Facility staff states that patient was very aggressive with staff.  Throwing objects and trying to hit nurses.  Patient does not have any complaints.\"    Preadmission Environment:   Pt was previously at [facility] and transferred to [facility] for a few days prior to admit to the hospital.  Pt is a poor historian.  From previous assessment:   Pt lives with                                         with Spouse, unclear level of assist available  Home environment:                            two story home; bed on 2nd floor; couch on the first floor; able to stay on first floor where bathroom is located  Steps to enter first floor:                     3-4 steps to enter; unstable hand rail  Steps to second 
Inpatient Physical Therapy Treatment    Unit: [facility]  Date:  11/18/2024  Patient Name:    Siobhan Flores  Admitting diagnosis:  HUEY (acute kidney injury) (HCC) [N17.9]  Acute cystitis without hematuria [N30.00]  Admit Date:  11/13/2024  Precautions/Restrictions/WB Status/ Lines/ Wounds/ Oxygen: Fall risk, Bed/chair alarm, and Lines (IV)      Pt seen for cotreatment this date due to patient safety and cognitive status    Treatment Time:  09:32-10:00  Treatment Number:  1   Timed Code Treatment Minutes: 28 minutes  Total Treatment Minutes:  28  minutes    Patient Stated Goals for Therapy: not stated.         Discharge Recommendations:  24/7 care  DME needs for discharge: Defer to facility       Therapy recommendation for EMS Transport: can transport by wheelchair    Therapy recommendations for staff:   Assist of 1-2 for ambulation with use of rolling walker (RW) within room  within halls    History of Present Illness:   Per admission H&P from [facility] on 11/14  \"74 y.o. female with past medical history of dementia, hyperlipidemia, hypothyroidism, CAD, CKD, hypertension, seizure, GERD presented to emergency department from her skilled nursing facility for a psychiatric evaluation.  HPI is obtained from ED attending as patient is not a good historian on my exam; oriented to self at this time.  Facility staff states that patient was very aggressive with staff.  Throwing objects and trying to hit nurses.  Patient does not have any complaints.\"    AM-PAC Mobility Score    AM-PAC Inpatient Mobility Raw Score : 15       Subjective  Patient lying reclined in bed with hospital staff in room (OT).  Pt agreeable to out of bed mobility with max encouragement x 3 people (PT, OT, RN).     Cognition    A&O x0. Responds to first name.  Able to follow 1 step commands, < 25% of the time  Tangential. Hallucinating. Difficult to redirect.    Pain   No    Activity Tolerance   During therapy session noted pt with no adverse 
Latest BS is 169. Patient is responsive and stated that she is feeling \"okay\". Denies needs at this time.  
Left message for pt  to call back he called for up date  
[place] belt removed pt up to chair with OT.  Pt has family at bedside.  While family is at bedside pt is calm & cooperative talking & laughing with family.  Pt family notified to let RN know when leaving will place pt back to bed & may need posey belt placed back on as pt was attempting to climb from bed.  Chair alarm is on & telesitter still in place.   
Patient In bed resting at this time, eyes closed. All medication given per MD orders, no adverse reactions noted and or reported. All safety precautions in place  
Patient awake and trying to get oob.  Pleasantly confused; took PM meds w/o diff.  Reminded that she needs to stay in the bed; voiced understanding.    
Patient becoming increasingly agitated with staff, refusing to get back in bed. PRN Geodon given. Patient now resting in chair watching TV  
Patient is awake, responsive, Alert to self and place only. BS is still low 59, Dextrose bolus 10% 125ml started. To check BS in 15 mins after infusion.  
Patient is in bed awake, calm, quiet and cooperative. Oriented to self only, able to take her AM meds and tolerated it well.   
Patient resting in bed peacefully with eyes closed. RR WNL.Bed alarm on.   
Patient screaming and aggressive. Hitting staff and unable to be redirected at this time. MD notified orders put in place, see MAR. Patient removed IV at this time as well. MD aware. All safety precautions in place.   
Patient transported to [facility] via EMS  
Per Dr. Rosenthal give Geodon 10 mg IM x 1 dose now  
Physical Therapy/Occupational Therapy  Attempted PT/OT this afternoon. RN advised to hold due to pt's aggressive behaviors due to sundowning; better to see the pt in the AM when she exhibits more calm behaviors. Will re-attempt PT/OT in the AM.    Alberto Heart PT, DPT [id number]  Iveth Stafford, OTR/L [id number]     
Progress Note    Admit Date:  11/13/2024    74 y.o. female with past medical history of dementia, hyperlipidemia, hypothyroidism, CAD, CKD, hypertension, seizure, GERD presented to emergency department from her skilled nursing facility for a psychiatric evaluation.    HPI is obtained from ED attending as patient is not a good historian on my exam; oriented to self at this time.  Facility staff states that patient was very aggressive with staff.  Throwing objects and trying to hit nurses.  Patient does not have any complaints.   Admitted for HUEY, UTI.  Nephrology consulted.     Subjective:  Ms. Flores feels okay.    Objective:   Patient Vitals for the past 4 hrs:   BP Temp Temp src Pulse Resp SpO2   11/14/24 0915 121/69 97.9 °F (36.6 °C) Oral 84 17 95 %          Intake/Output Summary (Last 24 hours) at 11/14/2024 1038  Last data filed at 11/14/2024 0531  Gross per 24 hour   Intake 1819.9 ml   Output 500 ml   Net 1319.9 ml       Physical Exam:  Gen: No distress. Drowsy/lethargic.  Eyes: PERRL. No sclera icterus. No conjunctival injection.   ENT: No discharge. Pharynx clear.   Neck: Trachea midline.  Resp: No accessory muscle use. No crackles. No wheezes. No rhonchi.   CV: Regular rate. Regular rhythm. No murmur. No rub. No edema.   Capillary Refill: Brisk,< 3 seconds   Peripheral Pulses: +2 palpable, equal bilaterally   GI: Non-tender. Non-distended. Normal bowel sounds. No hernia.   Skin: Warm and dry. No nodule on exposed extremities. No rash on exposed extremities.   M/S: No cyanosis. No joint deformity. No clubbing.   Neuro: Lethargic. Moves all extremities.   Psych: Oriented to person, knows she is in a hospital; disoriented to time, situation.. No anxiety or agitation      I COOPER RUBIN MD have reviewed the chart on Siobhan Flores and personally interviewed and examined patient, reviewed the data (labs and imaging) and after discussion with my PA formulated the plan.  Agree with note with the 
Progress Note    Admit Date:  11/13/2024    74 y.o. female with past medical history of dementia, hyperlipidemia, hypothyroidism, CAD, CKD, hypertension, seizure, GERD presented to emergency department from her skilled nursing facility for a psychiatric evaluation.    HPI is obtained from ED attending as patient is not a good historian on my exam; oriented to self at this time.  Facility staff states that patient was very aggressive with staff.  Throwing objects and trying to hit nurses.  Patient does not have any complaints.   Admitted for HUEY, UTI.  Nephrology consulted.     Subjective:  Ms. Flores feels okay.  She spit out her oral meds     Objective:   Patient Vitals for the past 4 hrs:   BP Temp Temp src Pulse Resp SpO2   11/15/24 0900 (!) 146/84 97.5 °F (36.4 °C) Oral 100 16 96 %          Intake/Output Summary (Last 24 hours) at 11/15/2024 0947  Last data filed at 11/15/2024 0921  Gross per 24 hour   Intake 561.63 ml   Output 350 ml   Net 211.63 ml       Physical exam:    BP (!) 146/84   Pulse 100   Temp 97.5 °F (36.4 °C) (Oral)   Resp 16   Ht 1.524 m (5')   Wt 61.2 kg (134 lb 14.7 oz)   SpO2 96%   BMI 26.35 kg/m²     Gen: No distress. Alert.   Eyes: PERRL. No sclera icterus. No conjunctival injection.   ENT: No discharge. Pharynx clear.   Neck: Trachea midline. Normal thyroid.   Resp: No accessory muscle use. No crackles. No wheezes. No rhonchi. No dullness on percussion.  CV: Regular rate. Regular rhythm. No murmur or rub. No edema.   GI: Non-tender. Non-distended. No masses. No organomegaly. Normal bowel sounds. No hernia.   Skin: Warm and dry. No nodule on exposed extremities. No rash on exposed extremities.   Lymph: No cervical LAD. No supraclavicular LAD.   M/S: No cyanosis. No joint deformity. No clubbing.   Neuro: Awake. Oriented to place only          Data:  CBC:   Recent Labs     11/13/24  1510 11/14/24  0636   WBC 11.4* 7.1   HGB 11.6* 10.4*   HCT 33.1* 29.7*   MCV 97.4 98.2    193 
Progress Note    Admit Date:  11/13/2024    74 y.o. female with past medical history of dementia, hyperlipidemia, hypothyroidism, CAD, CKD, hypertension, seizure, GERD presented to emergency department from her skilled nursing facility for a psychiatric evaluation.    HPI is obtained from ED attending as patient is not a good historian on my exam; oriented to self at this time.  Facility staff states that patient was very aggressive with staff.  Throwing objects and trying to hit nurses.  Patient does not have any complaints.   Admitted for HUEY, UTI.  Nephrology consulted.     Subjective:  Ms. Flores feels okay.  She took her meds   Has posey restraints     Objective:   Patient Vitals for the past 4 hrs:   BP Temp Temp src Pulse Resp SpO2   11/17/24 0914 108/78 97.3 °F (36.3 °C) Axillary 77 16 95 %   11/17/24 0556 (!) 146/84 97.7 °F (36.5 °C) -- 81 16 93 %          Intake/Output Summary (Last 24 hours) at 11/17/2024 0955  Last data filed at 11/16/2024 1751  Gross per 24 hour   Intake 301.41 ml   Output --   Net 301.41 ml         Physical exam:    /78   Pulse 77   Temp 97.3 °F (36.3 °C) (Axillary)   Resp 16   Ht 1.524 m (5')   Wt 61.2 kg (134 lb 14.7 oz)   SpO2 95%   BMI 26.35 kg/m²     Gen: No distress. Alert.   Eyes: PERRL. No sclera icterus. No conjunctival injection.   ENT: No discharge. Pharynx clear.   Neck: Trachea midline. Normal thyroid.   Resp: No accessory muscle use. No crackles. No wheezes. No rhonchi. No dullness on percussion.  CV: Regular rate. Regular rhythm. No murmur or rub. No edema.   GI: Non-tender. Non-distended. No masses. No organomegaly. Normal bowel sounds. No hernia.   Skin: Warm and dry. No nodule on exposed extremities. No rash on exposed extremities.   Lymph: No cervical LAD. No supraclavicular LAD.   M/S: No cyanosis. No joint deformity. No clubbing.   Neuro: Awake. Oriented to place only          Data:  CBC:   No results for input(s): \"WBC\", \"HGB\", \"HCT\", \"MCV\", \"PLT\" in 
Pt admitted to  230 from ER, pt is from nursing home. Alert to self only,   VSS. Sitter at bedside for pt safety.  Bed in lowest position, bed alarm on, and wheels locked. No needs assessed for pt  at this time.    
Pt had been placed back to bed after family left with bed alarm in place and telesitter notified she was not in a restraint.  Pt had been resting.  She has become restless removing gown and attempting to get up she is wanting to leave she has to go out of town.  Pt remains confused and placed posey belt back on pt for safety.  
Pt increasingly agitated slipped legs out of posey belt.  Pt wanting to get up out of bed to find family members.  Pt redirected and gets back to bed but still confused / agitated.  Message sent to Dr. Rosenthal to see if ok to give geodon or something else only gets once daily prn & given 12 hrs ago  
Pt remained cooperative through the night, no attempt to get OOB, sitter removed from pt room and virtual  initiated for pt safety.  
Pt uncooperative this AM. Pt took meds crushed in apple sauce and spit them all out and said she was not taking them. Attempted to give pills whole as well. Pt refused to take them. Pt very paranoid and yelling out. Pt stated she was calling the police. Attempted to reorient with no success. MD notified.   
Pt's  josie updated on pt being in restraints and plan of care. Pt's  verbalized understanding.   
Received report in ER. Pt arriving to unit. Report given to Admitting OMAR Lombardi.  
Report called to Ivette at [facility].  
Shift assessment completed, see flow sheet.    Pt is awake and alert to self.  Pt confused as to where she is & keeps asking to explain.  Pt denies any pain or SOB    SpO2 96%. Respirations are easy, even, and unlabored.   Bilateral lung sounds diminished.     VSS      PIV, WNL  All lines and monitoring devices in place.Bed in lowest position with wheels locked. No needs expressed at this time. Will continue to monitor.    
Shift report given to OMAR Manzo  
Shift report given to OMAR Muñoz  
Shift report given to OMAR Muñoz  
Shift report received from OMAR Emmanuel.  Jessicaering out; states she wants someone to stay and talk to her.  Confused; talked for a few minutes; call light in reach  
Shift report received from OMAR Manzo.  Remains confused; able to get out of restraints; pulling hair, hitting and cussing at staff.  Assisted back to bed; restraints reapplied; call light in reach  
Shift report received from OMAR Muñoz  patient is sleeping; respirs e/e.  Call light in reach.    
Sleeping; respirs e/e, call light in reach  
Writer and PCA Sarah went inside patient room, PCA got her set of vital signs however still not opening her eyes, no respiratory distress noted, and does not want to take her medications even after encouragement, reinforcement and explanation done by the Writer. Patient refused all her pm medications. Patient still has no IV access as she removed it this afternoon due to agitation and aggressiveness.  
Writer went back to room, informed patient that Writer will check her BS, blood sugar is 53. Informed Charge OMAR Patel. Hypoglycemia treatment initiated. Patient follows commands at this time, opening her eyes and responding. Luciano NELSON started IV access. Glucose chewable 16g tablet given (refer to MAR), crushed in apple sauce, patient tolerated it well. To recheck BS after 15 minutes.  
Writer went inside patient's room, patient in bed with eyes closed, respiration easy and even, tried to wake her up but refusing to open her eyes however she's responsive and agreed to take her vital signs.  
Writer went inside pt's room to assess and checked the patient. Patient is in bed with eyes closed, not in respiratory distress at this time, tried to wake her up, however patient does not want to, does not follow command and just saying \"no\". Explained to pt that Writer needs to get her vital signs and give her medications but still refused and very uncooperative.  With tele-sitter at bedside.  
      IMPRESSION/RECOMMENDATIONS:      1- A/CKD: The patient has chronic kidney disease with a baseline creatinine of 1.2 to 1.4 mg/dl. Her HUEY is likely secondary to a pre-renal component, although a non oliguric ATN cannot be ruled out. Her urinary output is well maintained and her serum creatinine is back to baseline, monitor.     2- Hypokalemia: S/p potassium replacement.     3- HTN: Blood pressure within acceptable range.     4- Anemia: Stable hemoglobin, monitor.     5- Aggressive behavior: Management per Psychiatry.    
first floor; able to stay on first floor where bathroom is located  Steps to enter first floor:                     3-4 steps to enter; unstable hand rail  Steps to second floor/basement:        Full flight of 12-13; single handrail  Laundry:                                              1st floor  Bathroom:                                           tub/shower unit, grab bars in shower, shower chair , and standard height toilet  Pt sleeps in a:                                     Dave lounge  Equipment owned:                              quad cane     Preadmission Status:  Pt able to drive: No;  is able to drive  Pt fully independent with ADLs: Yes  Pt receives assistance from family for: Bathing, Dressing, Cleaning, Cooking, Laundry , and grocery shopping  Pt supervision for functional transfers and utilized Quad Cane for mobility in home and Quad Cane out in community  History of falls:            Unknown  Home Health Services:None    AM-PAC Mobility Score    AM-PAC Inpatient Mobility Raw Score : 15       Subjective  Patient lying reclined in bed with no family present.  Pt agreeable to this PT session. RN cleared pt for therapy session.    Cognition    A&O Person ; responds to first name and knows month of her   Able to follow 1 step commands    Pain   No  Location: NA  Rating: NA /10  Pain Medicine Status: No request made    Activity Tolerance   During therapy session noted pt with no adverse symptoms    Pt Position BP (mmHg) HR (bpm) SpO2 (%) on RA  Comments   Supine at rest 134/71 86 96%    Seated at EOB       Standing       End of session         Objective  Does this pt have an acute or acute on chronic diagnosis of CHF? No    Upper Extremity ROM/Strength  Please see OT evaluation.      Lower Extremity ROM / Strength   AROM WFL: Yes  ROM limitations: WFL    Strength Assessment (measured on a 0-5 scale):  R LE   Quad   4-   Ant Tib  4-   Hamstring 3+   Iliopsoas 4-  L LE  Quad   4-   Ant Tib 
low  -Decreased synthroid to 75 mcg  -needs repeat TSH in 4-6 weeks.     #GERD  -on PPI    #CAD  -S/p MARQUITA, LAD March 2023  - follows with cardiology outpatient   -Medical management for severe diagonal disease due to small vessel size  -continue Plavix, Atorvastatin, Zetia, Imdur    #Hypertension   -holding Losartan, Aldactone    #Depression  -continue Effexor    #Dementia with Behavioral disturbance  -on Seroquel. May need to decrease the dose.   Geodon IM daily prn     DVT Prophylaxis: Heparin  Diet: ADULT DIET; Regular  Code Status: Full Code    PT/OT consult  Needs precert for SNF     LISETTE Price.

## 2024-11-20 PROBLEM — F03.918 DEMENTIA WITH BEHAVIORAL DISTURBANCE (HCC): Status: ACTIVE | Noted: 2024-11-20

## 2024-11-20 NOTE — DISCHARGE SUMMARY
Physician Discharge Summary     Patient ID:  Siobhan Flores  [id number]  74 y.o.  1950    Admit date: 11/13/2024    Discharge date and time: 11/18/2024  7:15 PM     Admitting Physician: Cinthia Harley DO     Discharge Physician: Charlotte Oreilly MD    Admission Diagnoses: HUEY (acute kidney injury) (HCC) [N17.9]  Acute cystitis without hematuria [N30.00]    Discharge Diagnoses: Principal Problem:    Acute kidney injury superimposed on CKD (HCC)  Active Problems:    Coronary artery disease due to lipid rich plaque    Acute cystitis without hematuria  Resolved Problems:    * No resolved hospital problems. *      Discharged Condition: stable    Indication for Admission:    Per HPI     74 y.o. female with past medical history of dementia, hyperlipidemia, hypothyroidism, CAD, CKD, hypertension, seizure, GERD presented to emergency department from her skilled nursing facility for a psychiatric evaluation.  HPI is obtained from ED attending as patient is not a good historian on my exam; oriented to self at this time.  Facility staff states that patient was very aggressive with staff.  Throwing objects and trying to hit nurses.  Patient does not have any complaints.    Admitted for HUEY, UTI. Nephrology consulted.         Hospital Course:     #HUEY on CKD stage 3a  -creatinine on admission 2.2  -previously 1.1  -IVFs  -hold Losartan, Aldactone  -nephrology consulted  -improved to 1.2     HUEY is resolved and crtn remains stable at 1.2 now     # Hypokalemia and hypomagnesemia  - replete     #UTI  -Rocephin D#5/5  -urine cx E. Coli  Patient has completed 5 days of antibiotics     #Seizure disorder  -continue Keppra  -seizure precautions     #Hypothyroidism  -TSH low  -Decreased synthroid to 75 mcg  -needs repeat TSH in 4-6 weeks.      #GERD  -on PPI     #CAD  -S/p MARQUITA, LAD March 2023  - follows with cardiology outpatient   -Medical management for severe diagonal disease due to small vessel size  -continue Plavix, Atorvastatin,

## 2024-11-22 ENCOUNTER — TELEPHONE (OUTPATIENT)
Dept: FAMILY MEDICINE CLINIC | Age: 74
End: 2024-11-22

## 2024-11-22 NOTE — TELEPHONE ENCOUNTER
Care Transitions Initial Follow Up Call    Outreach made within 2 business days of discharge: Yes    Patient: Siobhan Flores Patient : 1950   MRN: [id number]  Reason for Admission: Kidney injury  Discharge Date: 24       Spoke with: patients     Discharge department/facility: [facility] Interactive Patient Contact:  Was patient able to fill all prescriptions: Yes  Was patient instructed to bring all medications to the follow-up visit:   Is patient taking all medications as directed in the discharge summary?   Does patient understand their discharge instructions:   Does patient have questions or concerns that need addressed prior to 7-14 day follow up office visit:     Additional needs identified to be addressed with provider               Scheduled appointment with PCP within 7-14 days    Follow Up  Future Appointments   Date Time Provider Department Center   12/3/2024  1:45 PM Tomas Rowe DO [facility] CAR [facility]   1/10/2025 10:00 AM Conner Franco MD CLER NEURO Neurology -      states patient is still in nursing home.  Pt to cb when she is home.    Marisol Fournier MA

## 2024-11-27 NOTE — PROGRESS NOTES
CARDIOLOGY OFFICE NOTE         Patient Name: Siobhan Flores  Primary Care physician: No primary care provider on file.    Reason for Referral/Chief Complaint: Siobhan Flores is a 74 y.o. patient who presents today for a cardiology follow up.     History of Present Illness:   Siobhan Flores is a 74 y.o. female with a pmhx of CAD, HTN, HLD, thyroid disease, TIA, fibromyalgia.  Previously followed with [facility] cardiology.  She had MARQUITA LAD 3/2023, severe diffuse diagonal disease also noted however too small for intervention, medical management. Admitted 4/2024 for chest pain, hypertensive emergency, elevated troponin (felt to be demand ischemia from HTN).  Echo showed EF 55-60%, no wall motion abnormalities.  At office appointment 5/23/2024, she was hypertensive due to inability to take medications (nausea), staggered dosing recommended.   Admitted 9/29/24 with AMS. Admitted 10/22/24 with AMS, limited echo 10/23/24 EF of 65-70%, mild cLVH, moderately calcified right and noncoronary cusps. Admitted 11/13/24 with HUEY.     Today, she is here with a nursing aide at [place]. She recently transferred from [facility]. She presents in a wheelchair.  Patient poor historian.  Not able to provide much history.  Healthcare aide called nursing facility.  No reported issues to me other than patient will chew up pills and spit them out occasionally.  Blood pressure low normal today in office.  Patient appears asymptomatic.    There have been no reports of chest pain, shortness of breath at rest and dyspnea with exertion. Denies palpitations, dizziness, near-syncope or flaca syncope. Denies paroxysmal nocturnal dyspnea, orthopnea, bendopnea, increasing lower extremity edema or weight gain.      Past Medical History:   has a past medical history of Alzheimer disease (HCC), Anxiety, CAD (coronary artery disease), Dementia (HCC), Fibromyalgia, Hyperlipidemia, Hypertension, Hypothyroidism, Osteopenia, Thyroid disease, and

## 2024-12-03 ENCOUNTER — OFFICE VISIT (OUTPATIENT)
Dept: CARDIOLOGY CLINIC | Age: 74
End: 2024-12-03
Payer: MEDICARE

## 2024-12-03 VITALS
HEIGHT: 60 IN | SYSTOLIC BLOOD PRESSURE: 96 MMHG | HEART RATE: 90 BPM | WEIGHT: 125.4 LBS | DIASTOLIC BLOOD PRESSURE: 70 MMHG | BODY MASS INDEX: 24.62 KG/M2 | OXYGEN SATURATION: 98 %

## 2024-12-03 DIAGNOSIS — I25.83 CORONARY ARTERY DISEASE DUE TO LIPID RICH PLAQUE: Primary | ICD-10-CM

## 2024-12-03 DIAGNOSIS — I10 PRIMARY HYPERTENSION: ICD-10-CM

## 2024-12-03 DIAGNOSIS — I95.9 HYPOTENSION, UNSPECIFIED HYPOTENSION TYPE: ICD-10-CM

## 2024-12-03 DIAGNOSIS — E78.2 MIXED HYPERLIPIDEMIA: ICD-10-CM

## 2024-12-03 DIAGNOSIS — F03.B0 MODERATE DEMENTIA, UNSPECIFIED DEMENTIA TYPE, UNSPECIFIED WHETHER BEHAVIORAL, PSYCHOTIC, OR MOOD DISTURBANCE OR ANXIETY (HCC): ICD-10-CM

## 2024-12-03 DIAGNOSIS — I25.10 CORONARY ARTERY DISEASE DUE TO LIPID RICH PLAQUE: Primary | ICD-10-CM

## 2024-12-03 PROCEDURE — 3078F DIAST BP <80 MM HG: CPT | Performed by: STUDENT IN AN ORGANIZED HEALTH CARE EDUCATION/TRAINING PROGRAM

## 2024-12-03 PROCEDURE — 99204 OFFICE O/P NEW MOD 45 MIN: CPT | Performed by: STUDENT IN AN ORGANIZED HEALTH CARE EDUCATION/TRAINING PROGRAM

## 2024-12-03 PROCEDURE — 3074F SYST BP LT 130 MM HG: CPT | Performed by: STUDENT IN AN ORGANIZED HEALTH CARE EDUCATION/TRAINING PROGRAM

## 2024-12-03 PROCEDURE — 1123F ACP DISCUSS/DSCN MKR DOCD: CPT | Performed by: STUDENT IN AN ORGANIZED HEALTH CARE EDUCATION/TRAINING PROGRAM

## 2024-12-03 PROCEDURE — 1159F MED LIST DOCD IN RCRD: CPT | Performed by: STUDENT IN AN ORGANIZED HEALTH CARE EDUCATION/TRAINING PROGRAM

## 2024-12-03 RX ORDER — LORAZEPAM 0.5 MG/1
0.5 TABLET ORAL EVERY 6 HOURS PRN
Status: ON HOLD | COMMUNITY

## 2024-12-03 RX ORDER — LEVOTHYROXINE SODIUM 75 UG/1
75 TABLET ORAL DAILY
Status: ON HOLD | COMMUNITY

## 2024-12-03 RX ORDER — LOSARTAN POTASSIUM 25 MG/1
25 TABLET ORAL DAILY
Qty: 90 TABLET | Refills: 1 | Status: ON HOLD | OUTPATIENT
Start: 2024-12-03

## 2024-12-03 NOTE — PATIENT INSTRUCTIONS
Decrease losartan to 25mg daily.   Continue atorvastatin 80mg daily, plavix 75mg daily, imdur 30mg daily, zetia 10mg daily, and spironolactone 25mg daily.   Hold antihypertensives (imdur, losartan, spironolactone) if BP systolic <110 .  Monitor your blood pressure at home twice a day, morning and night. Recommend a monitor for your bicep. Goal 130/80 or less. Also call if BP is low (<100 systolic) or if you are having dizziness/fatigue.

## 2024-12-07 ENCOUNTER — APPOINTMENT (OUTPATIENT)
Dept: CT IMAGING | Age: 74
DRG: 689 | End: 2024-12-07
Payer: MEDICARE

## 2024-12-07 ENCOUNTER — APPOINTMENT (OUTPATIENT)
Dept: GENERAL RADIOLOGY | Age: 74
DRG: 689 | End: 2024-12-07
Payer: MEDICARE

## 2024-12-07 ENCOUNTER — HOSPITAL ENCOUNTER (INPATIENT)
Age: 74
LOS: 5 days | Discharge: NURSING, REHAB OR LONG-TERM CARE | DRG: 689 | End: 2024-12-12
Attending: EMERGENCY MEDICINE | Admitting: INTERNAL MEDICINE
Payer: MEDICARE

## 2024-12-07 DIAGNOSIS — R41.82 ALTERED MENTAL STATUS, UNSPECIFIED ALTERED MENTAL STATUS TYPE: Primary | ICD-10-CM

## 2024-12-07 DIAGNOSIS — N39.0 URINARY TRACT INFECTION WITHOUT HEMATURIA, SITE UNSPECIFIED: ICD-10-CM

## 2024-12-07 DIAGNOSIS — F03.918 DEMENTIA WITH BEHAVIORAL DISTURBANCE (HCC): ICD-10-CM

## 2024-12-07 DIAGNOSIS — A41.9 SEPSIS WITHOUT ACUTE ORGAN DYSFUNCTION, DUE TO UNSPECIFIED ORGANISM (HCC): ICD-10-CM

## 2024-12-07 PROBLEM — G93.40 ACUTE ENCEPHALOPATHY: Status: ACTIVE | Noted: 2024-12-07

## 2024-12-07 LAB
ALBUMIN SERPL-MCNC: 4.5 G/DL (ref 3.4–5)
ALBUMIN/GLOB SERPL: 1.2 {RATIO} (ref 1.1–2.2)
ALP SERPL-CCNC: 72 U/L (ref 40–129)
ALT SERPL-CCNC: 28 U/L (ref 10–40)
ANION GAP SERPL CALCULATED.3IONS-SCNC: 24 MMOL/L (ref 3–16)
AST SERPL-CCNC: 45 U/L (ref 15–37)
BACTERIA URNS QL MICRO: ABNORMAL /HPF
BASOPHILS # BLD: 0.1 K/UL (ref 0–0.2)
BASOPHILS NFR BLD: 0.8 %
BILIRUB SERPL-MCNC: 1 MG/DL (ref 0–1)
BILIRUB UR QL STRIP.AUTO: ABNORMAL
BUN SERPL-MCNC: 32 MG/DL (ref 7–20)
CALCIUM SERPL-MCNC: 11.7 MG/DL (ref 8.3–10.6)
CHARACTER UR: ABNORMAL
CHLORIDE SERPL-SCNC: 107 MMOL/L (ref 99–110)
CLARITY UR: CLEAR
CO2 SERPL-SCNC: 18 MMOL/L (ref 21–32)
COLOR UR: YELLOW
CREAT SERPL-MCNC: 1.8 MG/DL (ref 0.6–1.2)
DEPRECATED RDW RBC AUTO: 14.5 % (ref 12.4–15.4)
EOSINOPHIL # BLD: 0.1 K/UL (ref 0–0.6)
EOSINOPHIL NFR BLD: 0.6 %
GFR SERPLBLD CREATININE-BSD FMLA CKD-EPI: 29 ML/MIN/{1.73_M2}
GLUCOSE SERPL-MCNC: 136 MG/DL (ref 70–99)
GLUCOSE UR STRIP.AUTO-MCNC: NEGATIVE MG/DL
HCT VFR BLD AUTO: 40.9 % (ref 36–48)
HGB BLD-MCNC: 13.3 G/DL (ref 12–16)
HGB UR QL STRIP.AUTO: ABNORMAL
KETONES UR STRIP.AUTO-MCNC: ABNORMAL MG/DL
LACTATE BLDV-SCNC: 2.1 MMOL/L (ref 0.4–1.9)
LACTATE BLDV-SCNC: 3.7 MMOL/L (ref 0.4–1.9)
LEUKOCYTE ESTERASE UR QL STRIP.AUTO: ABNORMAL
LYMPHOCYTES # BLD: 2.2 K/UL (ref 1–5.1)
LYMPHOCYTES NFR BLD: 18.7 %
MCH RBC QN AUTO: 32.7 PG (ref 26–34)
MCHC RBC AUTO-ENTMCNC: 32.6 G/DL (ref 31–36)
MCV RBC AUTO: 100.2 FL (ref 80–100)
MONOCYTES # BLD: 0.7 K/UL (ref 0–1.3)
MONOCYTES NFR BLD: 6.4 %
NEUTROPHILS # BLD: 8.5 K/UL (ref 1.7–7.7)
NEUTROPHILS NFR BLD: 73.5 %
NITRITE UR QL STRIP.AUTO: NEGATIVE
PH UR STRIP.AUTO: 6 [PH] (ref 5–8)
PLATELET # BLD AUTO: 336 K/UL (ref 135–450)
PMV BLD AUTO: 9 FL (ref 5–10.5)
POTASSIUM SERPL-SCNC: 4.7 MMOL/L (ref 3.5–5.1)
PROT SERPL-MCNC: 8.2 G/DL (ref 6.4–8.2)
PROT UR STRIP.AUTO-MCNC: 100 MG/DL
RBC # BLD AUTO: 4.08 M/UL (ref 4–5.2)
RBC #/AREA URNS HPF: ABNORMAL /HPF (ref 0–4)
SODIUM SERPL-SCNC: 149 MMOL/L (ref 136–145)
SP GR UR STRIP.AUTO: >=1.03 (ref 1–1.03)
TROPONIN, HIGH SENSITIVITY: 29 NG/L (ref 0–14)
TROPONIN, HIGH SENSITIVITY: 38 NG/L (ref 0–14)
UA COMPLETE W REFLEX CULTURE PNL UR: YES
UA DIPSTICK W REFLEX MICRO PNL UR: YES
URN SPEC COLLECT METH UR: ABNORMAL
UROBILINOGEN UR STRIP-ACNC: 1 E.U./DL
WBC # BLD AUTO: 11.6 K/UL (ref 4–11)
WBC #/AREA URNS HPF: ABNORMAL /HPF (ref 0–5)

## 2024-12-07 PROCEDURE — 80053 COMPREHEN METABOLIC PANEL: CPT

## 2024-12-07 PROCEDURE — 96361 HYDRATE IV INFUSION ADD-ON: CPT

## 2024-12-07 PROCEDURE — 71045 X-RAY EXAM CHEST 1 VIEW: CPT

## 2024-12-07 PROCEDURE — 93005 ELECTROCARDIOGRAM TRACING: CPT | Performed by: EMERGENCY MEDICINE

## 2024-12-07 PROCEDURE — 2580000003 HC RX 258: Performed by: EMERGENCY MEDICINE

## 2024-12-07 PROCEDURE — 6360000002 HC RX W HCPCS: Performed by: EMERGENCY MEDICINE

## 2024-12-07 PROCEDURE — 85025 COMPLETE CBC W/AUTO DIFF WBC: CPT

## 2024-12-07 PROCEDURE — 36415 COLL VENOUS BLD VENIPUNCTURE: CPT

## 2024-12-07 PROCEDURE — 96375 TX/PRO/DX INJ NEW DRUG ADDON: CPT

## 2024-12-07 PROCEDURE — 1200000000 HC SEMI PRIVATE

## 2024-12-07 PROCEDURE — 70450 CT HEAD/BRAIN W/O DYE: CPT

## 2024-12-07 PROCEDURE — 87040 BLOOD CULTURE FOR BACTERIA: CPT

## 2024-12-07 PROCEDURE — 99285 EMERGENCY DEPT VISIT HI MDM: CPT

## 2024-12-07 PROCEDURE — 84443 ASSAY THYROID STIM HORMONE: CPT

## 2024-12-07 PROCEDURE — 84484 ASSAY OF TROPONIN QUANT: CPT

## 2024-12-07 PROCEDURE — 81001 URINALYSIS AUTO W/SCOPE: CPT

## 2024-12-07 PROCEDURE — 96374 THER/PROPH/DIAG INJ IV PUSH: CPT

## 2024-12-07 PROCEDURE — 83605 ASSAY OF LACTIC ACID: CPT

## 2024-12-07 PROCEDURE — 87086 URINE CULTURE/COLONY COUNT: CPT

## 2024-12-07 RX ORDER — HALOPERIDOL 5 MG/ML
2.5 INJECTION INTRAMUSCULAR ONCE
Status: COMPLETED | OUTPATIENT
Start: 2024-12-07 | End: 2024-12-07

## 2024-12-07 RX ORDER — 0.9 % SODIUM CHLORIDE 0.9 %
1000 INTRAVENOUS SOLUTION INTRAVENOUS ONCE
Status: COMPLETED | OUTPATIENT
Start: 2024-12-07 | End: 2024-12-07

## 2024-12-07 RX ADMIN — HALOPERIDOL LACTATE 2.5 MG: 5 INJECTION, SOLUTION INTRAMUSCULAR at 23:29

## 2024-12-07 RX ADMIN — SODIUM CHLORIDE 1000 ML: 9 INJECTION, SOLUTION INTRAVENOUS at 20:47

## 2024-12-07 RX ADMIN — CEFTRIAXONE SODIUM 1000 MG: 1 INJECTION, POWDER, FOR SOLUTION INTRAMUSCULAR; INTRAVENOUS at 22:27

## 2024-12-07 RX ADMIN — SODIUM CHLORIDE 1000 ML: 9 INJECTION, SOLUTION INTRAVENOUS at 21:38

## 2024-12-07 ASSESSMENT — PAIN SCALES - PAIN ASSESSMENT IN ADVANCED DEMENTIA (PAINAD)
TOTALSCORE: 3
BREATHING: OCCASIONAL LABORED BREATHING, SHORT PERIOD OF HYPERVENTILATION
NEGVOCALIZATION: OCCASIONAL MOAN/GROAN, LOW SPEECH, NEGATIVE/DISAPPROVING QUALITY
CONSOLABILITY: NO NEED TO CONSOLE
BODYLANGUAGE: TENSE, DISTRESSED PACING, FIDGETING
FACIALEXPRESSION: SMILING OR INEXPRESSIVE

## 2024-12-07 ASSESSMENT — PAIN - FUNCTIONAL ASSESSMENT: PAIN_FUNCTIONAL_ASSESSMENT: PAIN ASSESSMENT IN ADVANCED DEMENTIA (PAINAD)

## 2024-12-08 LAB
ANION GAP SERPL CALCULATED.3IONS-SCNC: 13 MMOL/L (ref 3–16)
BACTERIA UR CULT: NORMAL
BASOPHILS # BLD: 0 K/UL (ref 0–0.2)
BASOPHILS NFR BLD: 0.4 %
BUN SERPL-MCNC: 30 MG/DL (ref 7–20)
CALCIUM SERPL-MCNC: 9.7 MG/DL (ref 8.3–10.6)
CHLORIDE SERPL-SCNC: 116 MMOL/L (ref 99–110)
CO2 SERPL-SCNC: 20 MMOL/L (ref 21–32)
CREAT SERPL-MCNC: 1.6 MG/DL (ref 0.6–1.2)
DEPRECATED RDW RBC AUTO: 13.9 % (ref 12.4–15.4)
EKG ATRIAL RATE: 106 BPM
EKG DIAGNOSIS: NORMAL
EKG P-R INTERVAL: 124 MS
EKG Q-T INTERVAL: 324 MS
EKG QRS DURATION: 74 MS
EKG QTC CALCULATION (BAZETT): 430 MS
EKG R AXIS: 72 DEGREES
EKG T AXIS: 123 DEGREES
EKG VENTRICULAR RATE: 106 BPM
EOSINOPHIL # BLD: 0 K/UL (ref 0–0.6)
EOSINOPHIL NFR BLD: 0.1 %
GFR SERPLBLD CREATININE-BSD FMLA CKD-EPI: 33 ML/MIN/{1.73_M2}
GLUCOSE SERPL-MCNC: 88 MG/DL (ref 70–99)
HCT VFR BLD AUTO: 30.9 % (ref 36–48)
HGB BLD-MCNC: 10.3 G/DL (ref 12–16)
LYMPHOCYTES # BLD: 1.3 K/UL (ref 1–5.1)
LYMPHOCYTES NFR BLD: 14 %
MCH RBC QN AUTO: 33.2 PG (ref 26–34)
MCHC RBC AUTO-ENTMCNC: 33.2 G/DL (ref 31–36)
MCV RBC AUTO: 100.1 FL (ref 80–100)
MONOCYTES # BLD: 0.9 K/UL (ref 0–1.3)
MONOCYTES NFR BLD: 9.6 %
NEUTROPHILS # BLD: 7.3 K/UL (ref 1.7–7.7)
NEUTROPHILS NFR BLD: 75.9 %
PLATELET # BLD AUTO: 211 K/UL (ref 135–450)
PMV BLD AUTO: 8.8 FL (ref 5–10.5)
POTASSIUM SERPL-SCNC: 3.6 MMOL/L (ref 3.5–5.1)
RBC # BLD AUTO: 3.09 M/UL (ref 4–5.2)
SODIUM SERPL-SCNC: 149 MMOL/L (ref 136–145)
TSH SERPL DL<=0.005 MIU/L-ACNC: 1.99 UIU/ML (ref 0.27–4.2)
WBC # BLD AUTO: 9.6 K/UL (ref 4–11)

## 2024-12-08 PROCEDURE — 36415 COLL VENOUS BLD VENIPUNCTURE: CPT

## 2024-12-08 PROCEDURE — 2580000003 HC RX 258: Performed by: INTERNAL MEDICINE

## 2024-12-08 PROCEDURE — 6360000002 HC RX W HCPCS: Performed by: INTERNAL MEDICINE

## 2024-12-08 PROCEDURE — 6370000000 HC RX 637 (ALT 250 FOR IP): Performed by: NURSE PRACTITIONER

## 2024-12-08 PROCEDURE — 1200000000 HC SEMI PRIVATE

## 2024-12-08 PROCEDURE — 2580000003 HC RX 258: Performed by: NURSE PRACTITIONER

## 2024-12-08 PROCEDURE — 80048 BASIC METABOLIC PNL TOTAL CA: CPT

## 2024-12-08 PROCEDURE — 85025 COMPLETE CBC W/AUTO DIFF WBC: CPT

## 2024-12-08 PROCEDURE — 6360000002 HC RX W HCPCS: Performed by: NURSE PRACTITIONER

## 2024-12-08 PROCEDURE — 93010 ELECTROCARDIOGRAM REPORT: CPT | Performed by: INTERNAL MEDICINE

## 2024-12-08 RX ORDER — LORAZEPAM 2 MG/ML
0.5 INJECTION INTRAMUSCULAR EVERY 4 HOURS PRN
Status: DISCONTINUED | OUTPATIENT
Start: 2024-12-08 | End: 2024-12-11

## 2024-12-08 RX ORDER — LORAZEPAM 0.5 MG/1
0.5 TABLET ORAL EVERY 6 HOURS PRN
Status: DISCONTINUED | OUTPATIENT
Start: 2024-12-08 | End: 2024-12-08

## 2024-12-08 RX ORDER — CLOPIDOGREL BISULFATE 75 MG/1
75 TABLET ORAL DAILY
Status: DISCONTINUED | OUTPATIENT
Start: 2024-12-08 | End: 2024-12-12 | Stop reason: HOSPADM

## 2024-12-08 RX ORDER — ISOSORBIDE MONONITRATE 30 MG/1
30 TABLET, EXTENDED RELEASE ORAL DAILY
Status: DISCONTINUED | OUTPATIENT
Start: 2024-12-08 | End: 2024-12-12 | Stop reason: HOSPADM

## 2024-12-08 RX ORDER — LOSARTAN POTASSIUM 25 MG/1
25 TABLET ORAL DAILY
Status: DISCONTINUED | OUTPATIENT
Start: 2024-12-08 | End: 2024-12-12 | Stop reason: HOSPADM

## 2024-12-08 RX ORDER — LEVETIRACETAM 500 MG/1
500 TABLET ORAL 2 TIMES DAILY
Status: DISCONTINUED | OUTPATIENT
Start: 2024-12-08 | End: 2024-12-12 | Stop reason: HOSPADM

## 2024-12-08 RX ORDER — ENOXAPARIN SODIUM 100 MG/ML
30 INJECTION SUBCUTANEOUS DAILY
Status: DISCONTINUED | OUTPATIENT
Start: 2024-12-08 | End: 2024-12-09

## 2024-12-08 RX ORDER — ACETAMINOPHEN 650 MG/1
650 SUPPOSITORY RECTAL EVERY 6 HOURS PRN
Status: DISCONTINUED | OUTPATIENT
Start: 2024-12-08 | End: 2024-12-12 | Stop reason: HOSPADM

## 2024-12-08 RX ORDER — SODIUM CHLORIDE 0.9 % (FLUSH) 0.9 %
5-40 SYRINGE (ML) INJECTION EVERY 12 HOURS SCHEDULED
Status: DISCONTINUED | OUTPATIENT
Start: 2024-12-08 | End: 2024-12-12 | Stop reason: HOSPADM

## 2024-12-08 RX ORDER — ATORVASTATIN CALCIUM 80 MG/1
80 TABLET, FILM COATED ORAL DAILY
Status: DISCONTINUED | OUTPATIENT
Start: 2024-12-08 | End: 2024-12-12 | Stop reason: HOSPADM

## 2024-12-08 RX ORDER — SPIRONOLACTONE 25 MG/1
25 TABLET ORAL DAILY
Status: DISCONTINUED | OUTPATIENT
Start: 2024-12-08 | End: 2024-12-12 | Stop reason: HOSPADM

## 2024-12-08 RX ORDER — ONDANSETRON 4 MG/1
4 TABLET, ORALLY DISINTEGRATING ORAL EVERY 8 HOURS PRN
Status: DISCONTINUED | OUTPATIENT
Start: 2024-12-08 | End: 2024-12-12 | Stop reason: HOSPADM

## 2024-12-08 RX ORDER — POLYETHYLENE GLYCOL 3350 17 G/17G
17 POWDER, FOR SOLUTION ORAL DAILY PRN
Status: DISCONTINUED | OUTPATIENT
Start: 2024-12-08 | End: 2024-12-12 | Stop reason: HOSPADM

## 2024-12-08 RX ORDER — SODIUM CHLORIDE 0.9 % (FLUSH) 0.9 %
5-40 SYRINGE (ML) INJECTION PRN
Status: DISCONTINUED | OUTPATIENT
Start: 2024-12-08 | End: 2024-12-12 | Stop reason: HOSPADM

## 2024-12-08 RX ORDER — ACETAMINOPHEN 325 MG/1
650 TABLET ORAL EVERY 6 HOURS PRN
Status: DISCONTINUED | OUTPATIENT
Start: 2024-12-08 | End: 2024-12-12 | Stop reason: HOSPADM

## 2024-12-08 RX ORDER — ONDANSETRON 2 MG/ML
4 INJECTION INTRAMUSCULAR; INTRAVENOUS EVERY 6 HOURS PRN
Status: DISCONTINUED | OUTPATIENT
Start: 2024-12-08 | End: 2024-12-12 | Stop reason: HOSPADM

## 2024-12-08 RX ORDER — VENLAFAXINE HYDROCHLORIDE 150 MG/1
150 CAPSULE, EXTENDED RELEASE ORAL DAILY
Status: DISCONTINUED | OUTPATIENT
Start: 2024-12-08 | End: 2024-12-12 | Stop reason: HOSPADM

## 2024-12-08 RX ORDER — SODIUM CHLORIDE 9 MG/ML
INJECTION, SOLUTION INTRAVENOUS PRN
Status: DISCONTINUED | OUTPATIENT
Start: 2024-12-08 | End: 2024-12-12 | Stop reason: HOSPADM

## 2024-12-08 RX ORDER — PANTOPRAZOLE SODIUM 20 MG/1
20 TABLET, DELAYED RELEASE ORAL 2 TIMES DAILY
Status: DISCONTINUED | OUTPATIENT
Start: 2024-12-08 | End: 2024-12-12 | Stop reason: HOSPADM

## 2024-12-08 RX ADMIN — SODIUM CHLORIDE, PRESERVATIVE FREE 10 ML: 5 INJECTION INTRAVENOUS at 20:14

## 2024-12-08 RX ADMIN — PANTOPRAZOLE SODIUM 20 MG: 20 TABLET, DELAYED RELEASE ORAL at 01:32

## 2024-12-08 RX ADMIN — LORAZEPAM 0.5 MG: 2 INJECTION INTRAMUSCULAR; INTRAVENOUS at 22:50

## 2024-12-08 RX ADMIN — ENOXAPARIN SODIUM 30 MG: 100 INJECTION SUBCUTANEOUS at 11:37

## 2024-12-08 RX ADMIN — LORAZEPAM 0.5 MG: 2 INJECTION INTRAMUSCULAR; INTRAVENOUS at 17:18

## 2024-12-08 RX ADMIN — PANTOPRAZOLE SODIUM 20 MG: 20 TABLET, DELAYED RELEASE ORAL at 20:14

## 2024-12-08 RX ADMIN — WATER 1000 MG: 1 INJECTION INTRAMUSCULAR; INTRAVENOUS; SUBCUTANEOUS at 11:37

## 2024-12-08 RX ADMIN — LEVETIRACETAM 500 MG: 500 TABLET, FILM COATED ORAL at 20:14

## 2024-12-08 RX ADMIN — LEVETIRACETAM 500 MG: 500 TABLET, FILM COATED ORAL at 01:32

## 2024-12-08 RX ADMIN — SODIUM CHLORIDE, PRESERVATIVE FREE 10 ML: 5 INJECTION INTRAVENOUS at 11:37

## 2024-12-08 ASSESSMENT — PAIN SCALES - GENERAL
PAINLEVEL_OUTOF10: 0
PAINLEVEL_OUTOF10: 0

## 2024-12-08 NOTE — ED NOTES
Moved to room closer to RN station for better observation. Non-stop pulling cords out of wall cardiac monitor, chewing on cords. Continuously trying to pull out ultrasound IV. Attempting re-orientation of patient without success. Provider notified.

## 2024-12-08 NOTE — H&P
(IMDUR) 30 MG extended release tablet Take 1 tablet by mouth daily 10/5/24   Black, Cinthia, DO   Ergocalciferol (VITAMIN D) 50037 units CAPS Take 50,000 Units by mouth once a week Take every Wednesday. 10/9/24   Black Cinthia, DO   nitroGLYCERIN (NITROSTAT) 0.4 MG SL tablet Place 1 tablet under the tongue every 5 minutes as needed for Chest pain up to max of 3 total doses. If no relief after 1 dose, call 911. 6/24/24   Janet Live, APRN - CNP     Labs: Personally reviewed and interpreted for clinical significance.   Recent Labs     12/07/24 2041   WBC 11.6*   HGB 13.3   HCT 40.9        Recent Labs     12/07/24 2041   *   K 4.7      CO2 18*   BUN 32*   CREATININE 1.8*   CALCIUM 11.7*     Recent Labs     12/07/24 2041 12/07/24  2114   TROPHS 38* 29*     Recent Labs     12/07/24 2041   AST 45*   ALT 28   BILITOT 1.0   ALKPHOS 72     Thank you No primary care provider on file. for the opportunity to be involved in this patient's care. If you have any questions or concerns please feel free to contact me at [phone].     Neeta Lake, APRN - CNP

## 2024-12-08 NOTE — PLAN OF CARE
Problem: Safety - Medical Restraint  Goal: Remains free of injury from restraints (Restraint for Interference with Medical Device)  Description: INTERVENTIONS:  1. Determine that other, less restrictive measures have been tried or would not be effective before applying the restraint  2. Evaluate the patient's condition at the time of restraint application  3. Inform patient/family regarding the reason for restraint  4. Q2H: Monitor safety, psychosocial status, comfort, nutrition and hydration  12/8/2024 1027 by Sarah Gamboa RN  Outcome: Progressing  Flowsheets  Taken 12/8/2024 0900 by Sarah Gamboa RN  Remains free of injury from restraints (restraint for interference with medical device): Every 2 hours: Monitor safety, psychosocial status, comfort, nutrition and hydration  Taken 12/8/2024 0700 by Sarah Gamboa RN  Remains free of injury from restraints (restraint for interference with medical device): Every 2 hours: Monitor safety, psychosocial status, comfort, nutrition and hydration  Taken 12/8/2024 0403 by Ita Farris RN  Remains free of injury from restraints (restraint for interference with medical device): Every 2 hours: Monitor safety, psychosocial status, comfort, nutrition and hydration  12/8/2024 0224 by Ita Farris RN  Outcome: Progressing  Flowsheets (Taken 12/8/2024 0206)  Remains free of injury from restraints (restraint for interference with medical device): Every 2 hours: Monitor safety, psychosocial status, comfort, nutrition and hydration     Problem: Discharge Planning  Goal: Discharge to home or other facility with appropriate resources  12/8/2024 1027 by Sarah Gamboa RN  Outcome: Progressing  12/8/2024 0224 by Ita Farris RN  Outcome: Progressing     Problem: Pain  Goal: Verbalizes/displays adequate comfort level or baseline comfort level  12/8/2024 1027 by Sarah Gamboa RN  Outcome: Progressing  12/8/2024 0224 by Ita Farris RN  Outcome: Progressing

## 2024-12-08 NOTE — PLAN OF CARE
Problem: Safety - Medical Restraint  Goal: Remains free of injury from restraints (Restraint for Interference with Medical Device)  Description: INTERVENTIONS:  1. Determine that other, less restrictive measures have been tried or would not be effective before applying the restraint  2. Evaluate the patient's condition at the time of restraint application  3. Inform patient/family regarding the reason for restraint  4. Q2H: Monitor safety, psychosocial status, comfort, nutrition and hydration  Outcome: Progressing  Flowsheets (Taken 12/8/2024 0206)  Remains free of injury from restraints (restraint for interference with medical device): Every 2 hours: Monitor safety, psychosocial status, comfort, nutrition and hydration     Problem: Discharge Planning  Goal: Discharge to home or other facility with appropriate resources  Outcome: Progressing     Problem: Pain  Goal: Verbalizes/displays adequate comfort level or baseline comfort level  Outcome: Progressing

## 2024-12-08 NOTE — ED NOTES
Siobhan Flores is a 74 y.o. female admitted for  Active Problems:    * No active hospital problems. *  Resolved Problems:    * No resolved hospital problems. *  .   Patient SNF LTC via EMS transportation with   Chief Complaint   Patient presents with    Altered Mental Status     Patient comes from [facility] via for change in mental status. EMS found patient unresponsive, per staff patient was seen with her baseline status of confusion and dementia 20 minutes prior to call. Patient became more responsive during transport, prehospital BGL = 158 mg/dL.   .  Patient is alert and Person  Patient's baseline mobility: unknown  Code Status: Prior   Cardiac Rhythm: Cardiac Rhythm: Sinus rhythm, Sinus tachy     Is patient on baseline Oxygen: no     Isolation: None      NIH Score:    C-SSRS: Risk of Suicide: No Risk  Bedside swallow:        Active LDA's:   Peripheral IV 12/07/24 Left Cephalic (Active)   Site Assessment Clean, dry & intact 12/07/24 2041   Line Status Blood return noted;Clotted;Flushed;Normal saline locked;Specimen collected 12/07/24 2041     Family/Caregiver Present no  Restraints yes (due for next restraint charting at 0130)  Sitter: may consider if restraints are removed         Vitals:      Vitals:    12/07/24 2210 12/07/24 2240 12/07/24 2300 12/07/24 2330   BP: (!) 106/59 123/75 98/80 128/86   Pulse: (!) 105 (!) 108 (!) 109 (!) 101   Resp: 20 10 17 18   Temp:       TempSrc:       SpO2: 100% 100% 100% 100%       Last documented pain score (0-10 scale)    Pain medication administered No.    Pertinent or High Risk Medications/Drips: No.    Pending Blood Product Administration: no    Abnormal labs:   Abnormal Labs Reviewed   CBC WITH AUTO DIFFERENTIAL - Abnormal; Notable for the following components:       Result Value    WBC 11.6 (*)     .2 (*)     Neutrophils Absolute 8.5 (*)     All other components within normal limits   COMPREHENSIVE METABOLIC PANEL W/ REFLEX TO MG FOR LOW K - Abnormal; Notable for the

## 2024-12-08 NOTE — ED PROVIDER NOTES
Emergency Department Provider Note  Location: [facility]  ED  12/7/2024     Patient Identification  Siobhan Flores is a 74 y.o. female    Chief Complaint  Altered Mental Status (Patient comes from [facility] via for change in mental status. EMS found patient unresponsive, per staff patient was seen with her baseline status of confusion and dementia 20 minutes prior to call. Patient became more responsive during transport, prehospital BGL = 158 mg/dL.)          HPI  (History provided by patient)  Patient presents from her nursing facility with an acute mental status change.  She has a history of Alzheimer's dementia, CAD, seizures, multiple strokes.  She was at her usual state of health, confused and ambulatory until roughly 1 hour ago, when she was found to be less responsive.  With EMS she was initially not responsive, not moving extremities, and gradually improved.  Glucose 160.  On arrival patient is not answering questions.  Moving extremities equally, occasionally moaning.    Nursing Notes reviewed.    Allergies:   Allergies   Allergen Reactions    Lisinopril-Hydrochlorothiazide Nausea Only    Sulfa Antibiotics        Past medical history:  has a past medical history of Alzheimer disease ([facility]), Anxiety, CAD (coronary artery disease), Dementia ([facility]), Fibromyalgia, Hyperlipidemia, Hypertension, Hypothyroidism, Osteopenia, Thyroid disease, and Transient ischemic attack (2003).    Past surgical history:  has a past surgical history that includes Hysterectomy (1989); Colonoscopy; Appendectomy; eye surgery; and Coronary stent placement (03/30/2023).    Home medications:   Prior to Admission medications    Medication Sig Start Date End Date Taking? Authorizing Provider   LORazepam (ATIVAN) 0.5 MG tablet Take 1 tablet by mouth every 6 hours as needed for Anxiety. Max Daily Amount: 2 mg    Reji Dennis MD   levothyroxine (SYNTHROID) 75 MCG tablet Take 1 tablet by mouth Daily    Reji Dennis

## 2024-12-08 NOTE — ED NOTES
Unable to draw blood cultures. Difficult stick patient. IV placed via ultrasound. Phlebot to draw blood cultures. Antibiotics to begin post draw.

## 2024-12-09 LAB
ALBUMIN SERPL-MCNC: 3.7 G/DL (ref 3.4–5)
ANION GAP SERPL CALCULATED.3IONS-SCNC: 14 MMOL/L (ref 3–16)
BUN SERPL-MCNC: 22 MG/DL (ref 7–20)
CALCIUM SERPL-MCNC: 9.9 MG/DL (ref 8.3–10.6)
CHLORIDE SERPL-SCNC: 115 MMOL/L (ref 99–110)
CO2 SERPL-SCNC: 20 MMOL/L (ref 21–32)
CREAT SERPL-MCNC: 1.3 MG/DL (ref 0.6–1.2)
DEPRECATED RDW RBC AUTO: 13.9 % (ref 12.4–15.4)
GFR SERPLBLD CREATININE-BSD FMLA CKD-EPI: 43 ML/MIN/{1.73_M2}
GLUCOSE SERPL-MCNC: 78 MG/DL (ref 70–99)
HCT VFR BLD AUTO: 32 % (ref 36–48)
HGB BLD-MCNC: 10.9 G/DL (ref 12–16)
MCH RBC QN AUTO: 33.7 PG (ref 26–34)
MCHC RBC AUTO-ENTMCNC: 34 G/DL (ref 31–36)
MCV RBC AUTO: 99.3 FL (ref 80–100)
PHOSPHATE SERPL-MCNC: 1.7 MG/DL (ref 2.5–4.9)
PLATELET # BLD AUTO: 198 K/UL (ref 135–450)
PMV BLD AUTO: 9.1 FL (ref 5–10.5)
POTASSIUM SERPL-SCNC: 3.3 MMOL/L (ref 3.5–5.1)
RBC # BLD AUTO: 3.23 M/UL (ref 4–5.2)
SODIUM SERPL-SCNC: 149 MMOL/L (ref 136–145)
WBC # BLD AUTO: 5.8 K/UL (ref 4–11)

## 2024-12-09 PROCEDURE — 2580000003 HC RX 258: Performed by: NURSE PRACTITIONER

## 2024-12-09 PROCEDURE — 85027 COMPLETE CBC AUTOMATED: CPT

## 2024-12-09 PROCEDURE — 36415 COLL VENOUS BLD VENIPUNCTURE: CPT

## 2024-12-09 PROCEDURE — 2580000003 HC RX 258: Performed by: INTERNAL MEDICINE

## 2024-12-09 PROCEDURE — 1200000000 HC SEMI PRIVATE

## 2024-12-09 PROCEDURE — 6370000000 HC RX 637 (ALT 250 FOR IP): Performed by: NURSE PRACTITIONER

## 2024-12-09 PROCEDURE — 80069 RENAL FUNCTION PANEL: CPT

## 2024-12-09 PROCEDURE — 6360000002 HC RX W HCPCS: Performed by: INTERNAL MEDICINE

## 2024-12-09 RX ORDER — ENOXAPARIN SODIUM 100 MG/ML
40 INJECTION SUBCUTANEOUS DAILY
Status: DISCONTINUED | OUTPATIENT
Start: 2024-12-09 | End: 2024-12-12 | Stop reason: HOSPADM

## 2024-12-09 RX ADMIN — WATER 1000 MG: 1 INJECTION INTRAMUSCULAR; INTRAVENOUS; SUBCUTANEOUS at 09:53

## 2024-12-09 RX ADMIN — LORAZEPAM 0.5 MG: 2 INJECTION INTRAMUSCULAR; INTRAVENOUS at 13:35

## 2024-12-09 RX ADMIN — ENOXAPARIN SODIUM 40 MG: 100 INJECTION SUBCUTANEOUS at 09:55

## 2024-12-09 RX ADMIN — SODIUM CHLORIDE, PRESERVATIVE FREE 10 ML: 5 INJECTION INTRAVENOUS at 10:01

## 2024-12-09 ASSESSMENT — PAIN SCALES - PAIN ASSESSMENT IN ADVANCED DEMENTIA (PAINAD)
FACIALEXPRESSION: SMILING OR INEXPRESSIVE
BODYLANGUAGE: RELAXED
CONSOLABILITY: NO NEED TO CONSOLE
TOTALSCORE: 0
BREATHING: NORMAL

## 2024-12-09 ASSESSMENT — PAIN SCALES - GENERAL: PAINLEVEL_OUTOF10: 0

## 2024-12-09 ASSESSMENT — PAIN SCALES - WONG BAKER: WONGBAKER_NUMERICALRESPONSE: NO HURT

## 2024-12-10 LAB
ALBUMIN SERPL-MCNC: 3.8 G/DL (ref 3.4–5)
ANION GAP SERPL CALCULATED.3IONS-SCNC: 15 MMOL/L (ref 3–16)
BUN SERPL-MCNC: 19 MG/DL (ref 7–20)
CALCIUM SERPL-MCNC: 10.1 MG/DL (ref 8.3–10.6)
CHLORIDE SERPL-SCNC: 113 MMOL/L (ref 99–110)
CO2 SERPL-SCNC: 20 MMOL/L (ref 21–32)
CREAT SERPL-MCNC: 1.2 MG/DL (ref 0.6–1.2)
DEPRECATED RDW RBC AUTO: 14.5 % (ref 12.4–15.4)
GFR SERPLBLD CREATININE-BSD FMLA CKD-EPI: 47 ML/MIN/{1.73_M2}
GLUCOSE BLD-MCNC: 66 MG/DL (ref 70–99)
GLUCOSE BLD-MCNC: 71 MG/DL (ref 70–99)
GLUCOSE BLD-MCNC: 81 MG/DL (ref 70–99)
GLUCOSE SERPL-MCNC: 106 MG/DL (ref 70–99)
HCT VFR BLD AUTO: 36 % (ref 36–48)
HGB BLD-MCNC: 11.9 G/DL (ref 12–16)
MCH RBC QN AUTO: 33 PG (ref 26–34)
MCHC RBC AUTO-ENTMCNC: 33.1 G/DL (ref 31–36)
MCV RBC AUTO: 99.7 FL (ref 80–100)
PERFORMED ON: ABNORMAL
PERFORMED ON: NORMAL
PERFORMED ON: NORMAL
PHOSPHATE SERPL-MCNC: 1.5 MG/DL (ref 2.5–4.9)
PLATELET # BLD AUTO: 212 K/UL (ref 135–450)
PLATELET BLD QL SMEAR: ADEQUATE
PMV BLD AUTO: 9.4 FL (ref 5–10.5)
POTASSIUM SERPL-SCNC: 3.4 MMOL/L (ref 3.5–5.1)
RBC # BLD AUTO: 3.61 M/UL (ref 4–5.2)
SLIDE REVIEW: ABNORMAL
SODIUM SERPL-SCNC: 148 MMOL/L (ref 136–145)
WBC # BLD AUTO: 9.6 K/UL (ref 4–11)

## 2024-12-10 PROCEDURE — 97167 OT EVAL HIGH COMPLEX 60 MIN: CPT

## 2024-12-10 PROCEDURE — 6360000002 HC RX W HCPCS: Performed by: NURSE PRACTITIONER

## 2024-12-10 PROCEDURE — 36415 COLL VENOUS BLD VENIPUNCTURE: CPT

## 2024-12-10 PROCEDURE — 2580000003 HC RX 258

## 2024-12-10 PROCEDURE — 6360000002 HC RX W HCPCS: Performed by: INTERNAL MEDICINE

## 2024-12-10 PROCEDURE — 85027 COMPLETE CBC AUTOMATED: CPT

## 2024-12-10 PROCEDURE — 1200000000 HC SEMI PRIVATE

## 2024-12-10 PROCEDURE — 2580000003 HC RX 258: Performed by: NURSE PRACTITIONER

## 2024-12-10 PROCEDURE — 6370000000 HC RX 637 (ALT 250 FOR IP): Performed by: NURSE PRACTITIONER

## 2024-12-10 PROCEDURE — 97530 THERAPEUTIC ACTIVITIES: CPT

## 2024-12-10 PROCEDURE — 2580000003 HC RX 258: Performed by: INTERNAL MEDICINE

## 2024-12-10 PROCEDURE — 80069 RENAL FUNCTION PANEL: CPT

## 2024-12-10 PROCEDURE — 97535 SELF CARE MNGMENT TRAINING: CPT

## 2024-12-10 PROCEDURE — 97163 PT EVAL HIGH COMPLEX 45 MIN: CPT

## 2024-12-10 RX ORDER — ZIPRASIDONE MESYLATE 20 MG/ML
5 INJECTION, POWDER, LYOPHILIZED, FOR SOLUTION INTRAMUSCULAR ONCE
Status: COMPLETED | OUTPATIENT
Start: 2024-12-10 | End: 2024-12-10

## 2024-12-10 RX ORDER — MIRTAZAPINE 15 MG/1
15 TABLET, FILM COATED ORAL NIGHTLY
COMMUNITY
Start: 2024-11-30

## 2024-12-10 RX ORDER — QUETIAPINE FUMARATE 25 MG/1
25 TABLET, FILM COATED ORAL ONCE
Status: COMPLETED | OUTPATIENT
Start: 2024-12-10 | End: 2024-12-10

## 2024-12-10 RX ORDER — GLUCAGON 1 MG/ML
1 KIT INJECTION PRN
Status: DISCONTINUED | OUTPATIENT
Start: 2024-12-10 | End: 2024-12-12 | Stop reason: HOSPADM

## 2024-12-10 RX ORDER — DEXTROSE MONOHYDRATE 100 MG/ML
INJECTION, SOLUTION INTRAVENOUS CONTINUOUS PRN
Status: DISCONTINUED | OUTPATIENT
Start: 2024-12-10 | End: 2024-12-12 | Stop reason: HOSPADM

## 2024-12-10 RX ORDER — DIVALPROEX SODIUM 250 MG/1
250 TABLET, FILM COATED, EXTENDED RELEASE ORAL 2 TIMES DAILY
COMMUNITY
Start: 2024-11-30

## 2024-12-10 RX ADMIN — ZIPRASIDONE MESYLATE 5 MG: 20 INJECTION, POWDER, LYOPHILIZED, FOR SOLUTION INTRAMUSCULAR at 21:00

## 2024-12-10 RX ADMIN — CLOPIDOGREL BISULFATE 75 MG: 75 TABLET ORAL at 08:11

## 2024-12-10 RX ADMIN — SODIUM CHLORIDE, PRESERVATIVE FREE 10 ML: 5 INJECTION INTRAVENOUS at 01:19

## 2024-12-10 RX ADMIN — ENOXAPARIN SODIUM 40 MG: 100 INJECTION SUBCUTANEOUS at 08:15

## 2024-12-10 RX ADMIN — ISOSORBIDE MONONITRATE 30 MG: 30 TABLET, EXTENDED RELEASE ORAL at 08:10

## 2024-12-10 RX ADMIN — LEVETIRACETAM 500 MG: 500 TABLET, FILM COATED ORAL at 19:38

## 2024-12-10 RX ADMIN — PANTOPRAZOLE SODIUM 20 MG: 20 TABLET, DELAYED RELEASE ORAL at 08:12

## 2024-12-10 RX ADMIN — LOSARTAN POTASSIUM 25 MG: 25 TABLET, FILM COATED ORAL at 08:05

## 2024-12-10 RX ADMIN — LORAZEPAM 0.5 MG: 2 INJECTION INTRAMUSCULAR; INTRAVENOUS at 16:42

## 2024-12-10 RX ADMIN — LEVETIRACETAM 500 MG: 500 TABLET, FILM COATED ORAL at 08:05

## 2024-12-10 RX ADMIN — QUETIAPINE FUMARATE 25 MG: 25 TABLET ORAL at 19:38

## 2024-12-10 RX ADMIN — SPIRONOLACTONE 25 MG: 25 TABLET ORAL at 08:05

## 2024-12-10 RX ADMIN — PANTOPRAZOLE SODIUM 20 MG: 20 TABLET, DELAYED RELEASE ORAL at 19:39

## 2024-12-10 RX ADMIN — SODIUM CHLORIDE, PRESERVATIVE FREE 10 ML: 5 INJECTION INTRAVENOUS at 19:39

## 2024-12-10 RX ADMIN — LEVOTHYROXINE SODIUM 75 MCG: 0.03 TABLET ORAL at 08:05

## 2024-12-10 RX ADMIN — SODIUM CHLORIDE, PRESERVATIVE FREE 10 ML: 5 INJECTION INTRAVENOUS at 08:18

## 2024-12-10 RX ADMIN — WATER 1000 MG: 1 INJECTION INTRAMUSCULAR; INTRAVENOUS; SUBCUTANEOUS at 08:15

## 2024-12-10 RX ADMIN — DEXTROSE MONOHYDRATE 125 ML: 100 INJECTION, SOLUTION INTRAVENOUS at 00:50

## 2024-12-10 RX ADMIN — LORAZEPAM 0.5 MG: 2 INJECTION INTRAMUSCULAR; INTRAVENOUS at 01:03

## 2024-12-10 RX ADMIN — VENLAFAXINE HYDROCHLORIDE 150 MG: 150 CAPSULE, EXTENDED RELEASE ORAL at 08:13

## 2024-12-10 RX ADMIN — LORAZEPAM 0.5 MG: 2 INJECTION INTRAMUSCULAR; INTRAVENOUS at 20:17

## 2024-12-10 RX ADMIN — ATORVASTATIN CALCIUM 80 MG: 80 TABLET, FILM COATED ORAL at 08:05

## 2024-12-10 ASSESSMENT — PAIN SCALES - PAIN ASSESSMENT IN ADVANCED DEMENTIA (PAINAD)
CONSOLABILITY: NO NEED TO CONSOLE
FACIALEXPRESSION: SMILING OR INEXPRESSIVE
BODYLANGUAGE: RELAXED
TOTALSCORE: 0
BREATHING: NORMAL

## 2024-12-10 ASSESSMENT — PAIN SCALES - GENERAL: PAINLEVEL_OUTOF10: 0

## 2024-12-10 NOTE — DISCHARGE INSTR - COC
Continuity of Care Form    Patient Name: Siobhan Flores   :  1950  MRN:  [id number]    Admit date:  2024  Discharge date:  2024    Code Status Order: Full Code   Advance Directives:   Advance Care Flowsheet Documentation             Admitting Physician:  Jolene Strange MD  PCP: No primary care provider on file.    Discharging Nurse: Tali Grewal  Discharging Hospital Unit/Room#: 0541/0541-01  Discharging Unit Phone Number: [phone]    Emergency Contact:   Extended Emergency Contact Information  Primary Emergency Contact: Candido Flores  Address: [address] [place] [address]  Home Phone: [phone]  Mobile Phone: [phone]  Relation: Spouse    Past Surgical History:  Past Surgical History:   Procedure Laterality Date    APPENDECTOMY      COLONOSCOPY      CORONARY STENT PLACEMENT  2023    EYE SURGERY      HYSTERECTOMY (CERVIX STATUS UNKNOWN)         Immunization History:   Immunization History   Administered Date(s) Administered    Influenza 10/25/2013    Influenza Virus Vaccine 2014, 10/09/2015    Influenza, AFLURIA (age 3 y+), FLUZONE, (age 6 mo+), Quadv MDV, 0.5mL 10/27/2016    Pneumococcal, PCV-13, PREVNAR 13, (age 6w+), IM, 0.5mL 2016    Pneumococcal, PPSV23, PNEUMOVAX 23, (age 2y+), SC/IM, 0.5mL 2014    Zoster Live (Zostavax) 2014       Active Problems:  Patient Active Problem List   Diagnosis Code    Hyperlipidemia E78.5    Hypothyroidism E03.9    Anxiety F41.9    Osteopenia M85.80    Vitamin D deficiency E55.9    Fibromyalgia M79.7    Dementia (HCC) F03.90    Coronary artery disease due to lipid rich plaque I25.10, I25.83    Chronic renal disease, stage III ([facility]) [069822] N18.30    Gastroesophageal reflux disease K21.9    Cervical pain M54.2    Encephalopathy acute G93.40    Stroke-like symptoms R29.90    Hypertension I10    Seizure ([facility]) R56.9    Acute kidney injury superimposed on CKD ([facility]) N17.9, N18.9

## 2024-12-10 NOTE — PLAN OF CARE
Problem: Safety - Medical Restraint  Goal: Remains free of injury from restraints (Restraint for Interference with Medical Device)  Description: INTERVENTIONS:  1. Determine that other, less restrictive measures have been tried or would not be effective before applying the restraint  2. Evaluate the patient's condition at the time of restraint application  3. Inform patient/family regarding the reason for restraint  4. Q2H: Monitor safety, psychosocial status, comfort, nutrition and hydration  Outcome: Progressing     Problem: Discharge Planning  Goal: Discharge to home or other facility with appropriate resources  Outcome: Progressing     Problem: Pain  Goal: Verbalizes/displays adequate comfort level or baseline comfort level  Outcome: Progressing     Problem: Safety - Adult  Goal: Free from fall injury  Outcome: Progressing     Problem: Skin/Tissue Integrity  Goal: Absence of new skin breakdown  Description: 1.  Monitor for areas of redness and/or skin breakdown  2.  Assess vascular access sites hourly  3.  Every 4-6 hours minimum:  Change oxygen saturation probe site  4.  Every 4-6 hours:  If on nasal continuous positive airway pressure, respiratory therapy assess nares and determine need for appliance change or resting period.  Outcome: Progressing

## 2024-12-11 LAB
ALBUMIN SERPL-MCNC: 3.6 G/DL (ref 3.4–5)
ANION GAP SERPL CALCULATED.3IONS-SCNC: 12 MMOL/L (ref 3–16)
BACTERIA BLD CULT ORG #2: NORMAL
BACTERIA BLD CULT: NORMAL
BUN SERPL-MCNC: 16 MG/DL (ref 7–20)
CALCIUM SERPL-MCNC: 9.7 MG/DL (ref 8.3–10.6)
CHLORIDE SERPL-SCNC: 111 MMOL/L (ref 99–110)
CO2 SERPL-SCNC: 23 MMOL/L (ref 21–32)
CREAT SERPL-MCNC: 1.2 MG/DL (ref 0.6–1.2)
DEPRECATED RDW RBC AUTO: 14.3 % (ref 12.4–15.4)
GFR SERPLBLD CREATININE-BSD FMLA CKD-EPI: 47 ML/MIN/{1.73_M2}
GLUCOSE SERPL-MCNC: 95 MG/DL (ref 70–99)
HCT VFR BLD AUTO: 34.6 % (ref 36–48)
HGB BLD-MCNC: 11.5 G/DL (ref 12–16)
MCH RBC QN AUTO: 33.2 PG (ref 26–34)
MCHC RBC AUTO-ENTMCNC: 33.3 G/DL (ref 31–36)
MCV RBC AUTO: 99.6 FL (ref 80–100)
PHOSPHATE SERPL-MCNC: 2 MG/DL (ref 2.5–4.9)
PLATELET # BLD AUTO: 187 K/UL (ref 135–450)
PMV BLD AUTO: 8.6 FL (ref 5–10.5)
POTASSIUM SERPL-SCNC: 3.2 MMOL/L (ref 3.5–5.1)
RBC # BLD AUTO: 3.48 M/UL (ref 4–5.2)
SODIUM SERPL-SCNC: 146 MMOL/L (ref 136–145)
WBC # BLD AUTO: 4.6 K/UL (ref 4–11)

## 2024-12-11 PROCEDURE — 1200000000 HC SEMI PRIVATE

## 2024-12-11 PROCEDURE — 2500000003 HC RX 250 WO HCPCS: Performed by: INTERNAL MEDICINE

## 2024-12-11 PROCEDURE — 80069 RENAL FUNCTION PANEL: CPT

## 2024-12-11 PROCEDURE — 6360000002 HC RX W HCPCS: Performed by: INTERNAL MEDICINE

## 2024-12-11 PROCEDURE — 85027 COMPLETE CBC AUTOMATED: CPT

## 2024-12-11 PROCEDURE — 2580000003 HC RX 258: Performed by: INTERNAL MEDICINE

## 2024-12-11 PROCEDURE — 2580000003 HC RX 258: Performed by: NURSE PRACTITIONER

## 2024-12-11 PROCEDURE — 6360000002 HC RX W HCPCS: Performed by: NURSE PRACTITIONER

## 2024-12-11 PROCEDURE — 6370000000 HC RX 637 (ALT 250 FOR IP): Performed by: NURSE PRACTITIONER

## 2024-12-11 PROCEDURE — 36415 COLL VENOUS BLD VENIPUNCTURE: CPT

## 2024-12-11 RX ORDER — LORAZEPAM 2 MG/ML
0.5 INJECTION INTRAMUSCULAR EVERY 4 HOURS PRN
Status: DISCONTINUED | OUTPATIENT
Start: 2024-12-11 | End: 2024-12-12 | Stop reason: HOSPADM

## 2024-12-11 RX ORDER — LORAZEPAM 0.5 MG/1
0.5 TABLET ORAL EVERY 6 HOURS PRN
Qty: 28 TABLET | Refills: 0 | Status: SHIPPED | OUTPATIENT
Start: 2024-12-11 | End: 2024-12-18

## 2024-12-11 RX ADMIN — WATER 1000 MG: 1 INJECTION INTRAMUSCULAR; INTRAVENOUS; SUBCUTANEOUS at 08:27

## 2024-12-11 RX ADMIN — LEVETIRACETAM 500 MG: 500 TABLET, FILM COATED ORAL at 08:27

## 2024-12-11 RX ADMIN — ENOXAPARIN SODIUM 40 MG: 100 INJECTION SUBCUTANEOUS at 08:28

## 2024-12-11 RX ADMIN — PANTOPRAZOLE SODIUM 20 MG: 20 TABLET, DELAYED RELEASE ORAL at 08:27

## 2024-12-11 RX ADMIN — LEVOTHYROXINE SODIUM 75 MCG: 0.03 TABLET ORAL at 08:27

## 2024-12-11 RX ADMIN — POTASSIUM PHOSPHATE, MONOBASIC AND POTASSIUM PHOSPHATE, DIBASIC 20 MMOL: 224; 236 INJECTION, SOLUTION, CONCENTRATE INTRAVENOUS at 09:23

## 2024-12-11 RX ADMIN — LOSARTAN POTASSIUM 25 MG: 25 TABLET, FILM COATED ORAL at 08:27

## 2024-12-11 RX ADMIN — LORAZEPAM 0.5 MG: 2 INJECTION INTRAMUSCULAR; INTRAVENOUS at 23:31

## 2024-12-11 RX ADMIN — LEVETIRACETAM 500 MG: 500 TABLET, FILM COATED ORAL at 20:40

## 2024-12-11 RX ADMIN — CLOPIDOGREL BISULFATE 75 MG: 75 TABLET ORAL at 08:27

## 2024-12-11 RX ADMIN — ISOSORBIDE MONONITRATE 30 MG: 30 TABLET, EXTENDED RELEASE ORAL at 08:27

## 2024-12-11 RX ADMIN — PANTOPRAZOLE SODIUM 20 MG: 20 TABLET, DELAYED RELEASE ORAL at 20:40

## 2024-12-11 RX ADMIN — VENLAFAXINE HYDROCHLORIDE 150 MG: 150 CAPSULE, EXTENDED RELEASE ORAL at 08:28

## 2024-12-11 RX ADMIN — ATORVASTATIN CALCIUM 80 MG: 80 TABLET, FILM COATED ORAL at 08:27

## 2024-12-11 RX ADMIN — SODIUM CHLORIDE, PRESERVATIVE FREE 10 ML: 5 INJECTION INTRAVENOUS at 08:28

## 2024-12-11 RX ADMIN — SPIRONOLACTONE 25 MG: 25 TABLET ORAL at 08:27

## 2024-12-11 NOTE — PLAN OF CARE
Problem: Safety - Medical Restraint  Goal: Remains free of injury from restraints (Restraint for Interference with Medical Device)  Description: INTERVENTIONS:  1. Determine that other, less restrictive measures have been tried or would not be effective before applying the restraint  2. Evaluate the patient's condition at the time of restraint application  3. Inform patient/family regarding the reason for restraint  4. Q2H: Monitor safety, psychosocial status, comfort, nutrition and hydration  12/11/2024 0954 by Tali Grewal RN  Outcome: Progressing  12/10/2024 2054 by Kristy Coreas RN  Outcome: Progressing  Flowsheets  Taken 12/10/2024 0950 by Iris Malone RN  Remains free of injury from restraints (restraint for interference with medical device): Every 2 hours: Monitor safety, psychosocial status, comfort, nutrition and hydration  Taken 12/10/2024 0756 by Iris Malone RN  Remains free of injury from restraints (restraint for interference with medical device): Every 2 hours: Monitor safety, psychosocial status, comfort, nutrition and hydration     Problem: Discharge Planning  Goal: Discharge to home or other facility with appropriate resources  12/11/2024 0954 by Tali Grewal RN  Outcome: Progressing  12/10/2024 2054 by Kristy Coreas RN  Outcome: Progressing  Flowsheets (Taken 12/10/2024 0753 by Iris Malone RN)  Discharge to home or other facility with appropriate resources:   Arrange for needed discharge resources and transportation as appropriate   Identify barriers to discharge with patient and caregiver     Problem: Pain  Goal: Verbalizes/displays adequate comfort level or baseline comfort level  12/11/2024 0954 by Tali Grewal RN  Outcome: Progressing  12/10/2024 2054 by Kristy Coreas RN  Outcome: Progressing     Problem: Safety - Adult  Goal: Free from fall injury  12/11/2024 0954 by Tali Grewal RN  Outcome: Progressing  12/10/2024 2054 by Kristy Coreas RN  Outcome:

## 2024-12-11 NOTE — PLAN OF CARE
Problem: Safety - Medical Restraint  Goal: Remains free of injury from restraints (Restraint for Interference with Medical Device)  Description: INTERVENTIONS:  1. Determine that other, less restrictive measures have been tried or would not be effective before applying the restraint  2. Evaluate the patient's condition at the time of restraint application  3. Inform patient/family regarding the reason for restraint  4. Q2H: Monitor safety, psychosocial status, comfort, nutrition and hydration  Outcome: Progressing  Flowsheets  Taken 12/10/2024 0950 by Iris aMlone RN  Remains free of injury from restraints (restraint for interference with medical device): Every 2 hours: Monitor safety, psychosocial status, comfort, nutrition and hydration  Taken 12/10/2024 0756 by Iris Malone RN  Remains free of injury from restraints (restraint for interference with medical device): Every 2 hours: Monitor safety, psychosocial status, comfort, nutrition and hydration     Problem: Discharge Planning  Goal: Discharge to home or other facility with appropriate resources  Outcome: Progressing  Flowsheets (Taken 12/10/2024 0753 by Iris Malone RN)  Discharge to home or other facility with appropriate resources:   Arrange for needed discharge resources and transportation as appropriate   Identify barriers to discharge with patient and caregiver     Problem: Pain  Goal: Verbalizes/displays adequate comfort level or baseline comfort level  Outcome: Progressing     Problem: Safety - Adult  Goal: Free from fall injury  Outcome: Progressing

## 2024-12-12 VITALS
TEMPERATURE: 97.4 F | RESPIRATION RATE: 14 BRPM | HEART RATE: 82 BPM | OXYGEN SATURATION: 96 % | DIASTOLIC BLOOD PRESSURE: 91 MMHG | SYSTOLIC BLOOD PRESSURE: 161 MMHG

## 2024-12-12 LAB
ALBUMIN SERPL-MCNC: 3.4 G/DL (ref 3.4–5)
ANION GAP SERPL CALCULATED.3IONS-SCNC: 14 MMOL/L (ref 3–16)
BUN SERPL-MCNC: 12 MG/DL (ref 7–20)
CALCIUM SERPL-MCNC: 9.4 MG/DL (ref 8.3–10.6)
CHLORIDE SERPL-SCNC: 111 MMOL/L (ref 99–110)
CO2 SERPL-SCNC: 20 MMOL/L (ref 21–32)
CREAT SERPL-MCNC: 1 MG/DL (ref 0.6–1.2)
DEPRECATED RDW RBC AUTO: 14.1 % (ref 12.4–15.4)
GFR SERPLBLD CREATININE-BSD FMLA CKD-EPI: 59 ML/MIN/{1.73_M2}
GLUCOSE SERPL-MCNC: 79 MG/DL (ref 70–99)
HCT VFR BLD AUTO: 32.7 % (ref 36–48)
HGB BLD-MCNC: 10.8 G/DL (ref 12–16)
MAGNESIUM SERPL-MCNC: 1.54 MG/DL (ref 1.8–2.4)
MCH RBC QN AUTO: 32.7 PG (ref 26–34)
MCHC RBC AUTO-ENTMCNC: 32.9 G/DL (ref 31–36)
MCV RBC AUTO: 99.5 FL (ref 80–100)
PHOSPHATE SERPL-MCNC: 2.4 MG/DL (ref 2.5–4.9)
PLATELET # BLD AUTO: 192 K/UL (ref 135–450)
PMV BLD AUTO: 9 FL (ref 5–10.5)
POTASSIUM SERPL-SCNC: 3 MMOL/L (ref 3.5–5.1)
RBC # BLD AUTO: 3.29 M/UL (ref 4–5.2)
SODIUM SERPL-SCNC: 145 MMOL/L (ref 136–145)
WBC # BLD AUTO: 5.1 K/UL (ref 4–11)

## 2024-12-12 PROCEDURE — 6370000000 HC RX 637 (ALT 250 FOR IP): Performed by: NURSE PRACTITIONER

## 2024-12-12 PROCEDURE — 83735 ASSAY OF MAGNESIUM: CPT

## 2024-12-12 PROCEDURE — 80069 RENAL FUNCTION PANEL: CPT

## 2024-12-12 PROCEDURE — 6360000002 HC RX W HCPCS: Performed by: INTERNAL MEDICINE

## 2024-12-12 PROCEDURE — 85027 COMPLETE CBC AUTOMATED: CPT

## 2024-12-12 PROCEDURE — 36415 COLL VENOUS BLD VENIPUNCTURE: CPT

## 2024-12-12 PROCEDURE — 6370000000 HC RX 637 (ALT 250 FOR IP): Performed by: INTERNAL MEDICINE

## 2024-12-12 RX ORDER — MAGNESIUM SULFATE IN WATER 40 MG/ML
2000 INJECTION, SOLUTION INTRAVENOUS ONCE
Status: COMPLETED | OUTPATIENT
Start: 2024-12-12 | End: 2024-12-12

## 2024-12-12 RX ORDER — POTASSIUM CHLORIDE 1500 MG/1
40 TABLET, EXTENDED RELEASE ORAL ONCE
Status: COMPLETED | OUTPATIENT
Start: 2024-12-12 | End: 2024-12-12

## 2024-12-12 RX ADMIN — MAGNESIUM SULFATE HEPTAHYDRATE 2000 MG: 40 INJECTION, SOLUTION INTRAVENOUS at 11:29

## 2024-12-12 RX ADMIN — ISOSORBIDE MONONITRATE 30 MG: 30 TABLET, EXTENDED RELEASE ORAL at 09:12

## 2024-12-12 RX ADMIN — ENOXAPARIN SODIUM 40 MG: 100 INJECTION SUBCUTANEOUS at 09:19

## 2024-12-12 RX ADMIN — CLOPIDOGREL BISULFATE 75 MG: 75 TABLET ORAL at 09:13

## 2024-12-12 RX ADMIN — PANTOPRAZOLE SODIUM 20 MG: 20 TABLET, DELAYED RELEASE ORAL at 09:12

## 2024-12-12 RX ADMIN — VENLAFAXINE HYDROCHLORIDE 150 MG: 150 CAPSULE, EXTENDED RELEASE ORAL at 09:13

## 2024-12-12 RX ADMIN — SPIRONOLACTONE 25 MG: 25 TABLET ORAL at 09:12

## 2024-12-12 RX ADMIN — LOSARTAN POTASSIUM 25 MG: 25 TABLET, FILM COATED ORAL at 09:13

## 2024-12-12 RX ADMIN — ATORVASTATIN CALCIUM 80 MG: 80 TABLET, FILM COATED ORAL at 09:13

## 2024-12-12 RX ADMIN — POTASSIUM CHLORIDE 40 MEQ: 1500 TABLET, EXTENDED RELEASE ORAL at 13:44

## 2024-12-12 RX ADMIN — LEVETIRACETAM 500 MG: 500 TABLET, FILM COATED ORAL at 09:13

## 2024-12-12 NOTE — PROGRESS NOTES
[facility] Medicine Progress Note  V 10.25      Date of Admission: 12/7/2024    Hospital Day: 6      Chief Admission Complaint:  altered mental status     Subjective:  no new c/o    Presenting Admission History:         \"74 y.o. female, with PMH of  Alzheimer's and HLD, who presented to [facility] with altered mental status. The patient is a poor historian d/t confusion, history obtained from the patient and review of EMR.  The patient stated she is not sure what is going on or why she is here.  Per EMR, the patient resides at [facility] and was experiencing some confusion.  The patient does have a history of Alzheimer's dementia and seizures and was apparently at her usual state of health ambulating 1 hour prior to EMS being called.  Per EMR, when EMS arrived they found the patient unresponsive.  EMS reported that the patient became more responsive during transport. In the emergency department a CT head was obtained that revealed no acute intracranial abnormality.  The patient's UA did reveal a mild UTI with small leukocyte esterase,  WBC and 1+ bacteria.  A urine culture was ordered and the patient was started on ceftriaxone.  Upon further workup, the patient was found to meet sepsis criteria with a WBC of 11.6, lactic acid 3.7, temp 99.9 and heart rate 103.  However, the patient's lactic acidosis may also be secondary to possible seizure activity.  Patient was given 2 L of normal saline and blood cultures were obtained in the emergency department.  Patient was also found to have an HUEY with creatinine of 1.8.  Patient's baseline creatinine appears to be 1.2.  She was admitted for further evaluation and treatment.The patient denied any other associated symptoms as well as any aggravating and/or alleviating factors. At the time of this assessment, the patient was resting comfortably in bed. She currently denies any chest pain, back pain, abdominal pain, shortness of breath, numbness, 
  [facility] Medicine Progress Note  V 10.25      Date of Admission: 12/7/2024    Hospital Day: 3      Chief Admission Complaint:  altered mental status     Subjective:  no new c/o    Presenting Admission History:         \"74 y.o. female, with PMH of  Alzheimer's and HLD, who presented to [facility] with altered mental status. The patient is a poor historian d/t confusion, history obtained from the patient and review of EMR.  The patient stated she is not sure what is going on or why she is here.  Per EMR, the patient resides at [facility] and was experiencing some confusion.  The patient does have a history of Alzheimer's dementia and seizures and was apparently at her usual state of health ambulating 1 hour prior to EMS being called.  Per EMR, when EMS arrived they found the patient unresponsive.  EMS reported that the patient became more responsive during transport. In the emergency department a CT head was obtained that revealed no acute intracranial abnormality.  The patient's UA did reveal a mild UTI with small leukocyte esterase,  WBC and 1+ bacteria.  A urine culture was ordered and the patient was started on ceftriaxone.  Upon further workup, the patient was found to meet sepsis criteria with a WBC of 11.6, lactic acid 3.7, temp 99.9 and heart rate 103.  However, the patient's lactic acidosis may also be secondary to possible seizure activity.  Patient was given 2 L of normal saline and blood cultures were obtained in the emergency department.  Patient was also found to have an HUEY with creatinine of 1.8.  Patient's baseline creatinine appears to be 1.2.  She was admitted for further evaluation and treatment.The patient denied any other associated symptoms as well as any aggravating and/or alleviating factors. At the time of this assessment, the patient was resting comfortably in bed. She currently denies any chest pain, back pain, abdominal pain, shortness of breath, numbness, 
  Physician Progress Note      PATIENT:               ALLYN PACHECO  CSN #:                  [id number]  :                       1950  ADMIT DATE:       2024 7:56 PM  DISCH DATE:  RESPONDING  PROVIDER #:        Florencio Regan MD          QUERY TEXT:    Pt admitted with AMS/Acute Encephalopathy likely 2/2 post ictal state r/t   seizures.  Noted documentation of sepsis throughout the chart with   leukocytosis, WBC 11.6 max, Temp 99 rectal.  HR , RR 24 with UTI based   on UA.  UCx with NGTD, Lactic 3.7.   In order to support the diagnosis of   sepsis, please include additional clinical indicators in your documentation.    Or please document if the diagnosis of sepsis has been ruled out after further   study    The medical record reflects the following:  Risk Factors: 74 y.o. female with hx of Alzheimer's dementia, HLD, seizures  Clinical Indicators: HR: , RR: 24 with UTI based on UA, UCx with NGTD,   Lactic 3.7 - Documentation of Lactic may also be d/t seizures.  Treatment: Rocephin  Options provided:  -- Sepsis was ruled out after study  -- Sepsis present as evidenced by, Please document evidence.  -- Other - I will add my own diagnosis  -- Disagree - Not applicable / Not valid  -- Disagree - Clinically unable to determine / Unknown  -- Refer to Clinical Documentation Reviewer    PROVIDER RESPONSE TEXT:    Sepsis was ruled out after study.    Query created by: Bradley Davila on 2024 4:51 PM      Electronically signed by:  Florencio Regan MD 12/10/2024 9:09 AM          
  [facility] Medicine Progress Note  V 10.25      Date of Admission: 12/7/2024    Hospital Day: 2      Chief Admission Complaint:  altered mental status     Subjective:  no new c/o    Presenting Admission History:         \"74 y.o. female, with PMH of  Alzheimer's and HLD, who presented to [facility] with altered mental status. The patient is a poor historian d/t confusion, history obtained from the patient and review of EMR.  The patient stated she is not sure what is going on or why she is here.  Per EMR, the patient resides at [facility] and was experiencing some confusion.  The patient does have a history of Alzheimer's dementia and seizures and was apparently at her usual state of health ambulating 1 hour prior to EMS being called.  Per EMR, when EMS arrived they found the patient unresponsive.  EMS reported that the patient became more responsive during transport. In the emergency department a CT head was obtained that revealed no acute intracranial abnormality.  The patient's UA did reveal a mild UTI with small leukocyte esterase,  WBC and 1+ bacteria.  A urine culture was ordered and the patient was started on ceftriaxone.  Upon further workup, the patient was found to meet sepsis criteria with a WBC of 11.6, lactic acid 3.7, temp 99.9 and heart rate 103.  However, the patient's lactic acidosis may also be secondary to possible seizure activity.  Patient was given 2 L of normal saline and blood cultures were obtained in the emergency department.  Patient was also found to have an HUEY with creatinine of 1.8.  Patient's baseline creatinine appears to be 1.2.  She was admitted for further evaluation and treatment.The patient denied any other associated symptoms as well as any aggravating and/or alleviating factors. At the time of this assessment, the patient was resting comfortably in bed. She currently denies any chest pain, back pain, abdominal pain, shortness of breath, numbness, 
Attempted to call nurses to nurse to [facility]. Sent to a voicemail that was not set up. Called back and spoke with Fang and left name and number for a return phone call  
Attempted to remove pt's restraints and she is attempting to pull out lines and climb out of bed. Unable to redirect. Pt is so confused, I dont feel safe giving anything PO. Could I possibly get an order for IM or IV prn for agitation?  
Dr Pruett notified re: f/u on this question. pt fighting against restraints, cannot be redirected verbally. pt can be danger to herself as she attempts to wrap cords around her neck. are we able to get a prn im or iv for agitation?  
IV was removed. No need for new one. Provider approved.   
Pt Aox1, following some commands at this time,  frequently pulling at restraints and turning all around in the bed. Pt took all AM meds, crushed what was able, others given whole in applesauce. Pt tolerated well. Pt assisted with meal, ate approx 6 bites but drank 2 juices. Pt refused further feedings. Pt resting in bed on left side, seizures precautions in place, bilateral wrist restraints in place, video monitoring for pt safety.   
Pt agitated, pulling at IV lines, pulling at bracelets, trying to get up. Unable to redirect. Ativan given as ordered.   
Pt calm, Aox1, following commands at this time. Bilateral wrist restraints removed. Pt tunred onto left side, resting quietly. Video monitoring in use for pt safety.   
Shift assessment completed. Pt Asleep, awakens to voice. Restraints in place, no injury noted. VSS. Video  in place. Bed locked and in lowest position. Call light within reach. Will continue to monitor.   
This RN was told in report that patient needed to be kept out of restraints in order to return to [place].Overnight patient continued to try to get out of bed and pull lines despite redirection. Patient received 25 mg Seroquel, 0.5 Ativan and 5 mg Geodon which kept patient calm until about 0400 am. Patient continues to try to get out of bed, hallucinating \" taping and calling chair Merry\" this RN at bedside, will continue to monitor.   
[facility]    Subjective  General  Chart Reviewed: Yes  Patient assessed for rehabilitation services?: Yes  Family / Caregiver Present: No  Referring Practitioner: FILOMENA Regan  Diagnosis: acute encephalopathy  Subjective  Subjective: Pt rsesting in bed. She shook her head \"no\" to pain  General Comment  Comments: RN approved therapy.     Social/Functional History  Social/Functional History  Type of Home: Facility  Has the patient had two or more falls in the past year or any fall with injury in the past year?: Unknown  Additional Comments: Pt unable to provide reliable hx due to Alz/AMS. Per discussion with CM, pt LTC at [facility] since November. Pt states she ambulates normally, states she doesn't use a walker.    Objective  Temp: 97.5 °F (36.4 °C)  Pulse: (!) 104  Heart Rate Source: Monitor  Respirations: 14  SpO2: 96 %  O2 Device: None (Room air)  BP: 106/63  MAP (Calculated): 77  BP Location: Right lower arm  BP Method: Automatic  Patient Position: Right side         Safety Devices  Type of Devices: All fall risk precautions in place;All liz prominences offloaded;Nurse notified;Call light within reach;Telesitter in use;Left in bed;Bed alarm in place  Bed Mobility Training  Bed Mobility Training: Yes  Interventions: Verbal cues;Safety awareness training;Tactile cues  Supine to Sit: Maximum assistance;Assist X2;Additional time  Sit to Supine: Contact-guard assistance;Additional time  Scooting: Maximum assistance  Balance  Sitting: Impaired (Initially min(A) progressing to SBA with UE support on bedrails; pt keeps eyes closed throughout activity; x10 minutes)  Transfer Training  Transfer Training: No (Not safe due attempt due to pt keeping eyes closed and poor command following)     AROM: Generally decreased, functional (Limited L shoulder AROM (possible resistance by pt).  R shoulder WFL but decreased)  Strength: Generally decreased, functional  Coordination: Generally decreased, functional  Tone: Normal  ADL  Grooming: 
(Mild resistance)  Strength: Grossly decreased, non-functional (difficult to formally assess due to command following)    Bed Mobility Training  Bed Mobility Training: Yes  Interventions: Verbal cues;Safety awareness training;Tactile cues  Supine to Sit: Maximum assistance;Assist X2;Additional time  Sit to Supine: Contact-guard assistance;Additional time  Scooting: Maximum assistance  Balance  Sitting: Impaired (Initially min(A) progressing to SBA with UE support on bedrails; pt keeps eyes closed throughout activity; x10 minutes)  Transfer Training  Transfer Training: No (Not safe due attempt due to pt keeping eyes closed and poor command following)    AM-PAC - Mobility    AM-PAC Basic Mobility - Inpatient   How much help is needed turning from your back to your side while in a flat bed without using bedrails?: None  How much help is needed moving from lying on your back to sitting on the side of a flat bed without using bedrails?: Total  How much help is needed moving to and from a bed to a chair?: Total  How much help is needed standing up from a chair using your arms?: Total  How much help is needed walking in hospital room?: Total  How much help is needed climbing 3-5 steps with a railing?: Total  AM-PAC Inpatient Mobility Raw Score : 9  AM-PAC Inpatient T-Scale Score : 30.55  Mobility Inpatient CMS 0-100% Score: 81.38  Mobility Inpatient CMS G-Code Modifier : CM    Goals  Short Term Goals  Time Frame for Short Term Goals: 1 session  Short Term Goal 1: Pt will tolerate sitting EOB x10 minutes with SBA--12/10 MET  Patient Goals   Patient Goals : none stated by patient       Education  Patient Education  Education Given To: Patient  Education Provided: Role of Therapy;Plan of Care;Transfer Training  Education Provided Comments: Disease Specific Education: Pt educated on importance of OOB mobility, prevention of complications of bedrest, and general safety during hospitalization.  Education Method: Verbal  Barriers 
Illness/injury posing ongoing threat to life and/or bodily function without ongoing treatment    [] Severe exacerbation of chronic illness    --------------------------------------------------  B. Risk of Treatment (any 1)    [] Drugs/treatments that require intensive monitoring for toxicity    [] IV ABX (Vancomycin, Aminoglycosides, etc)     [] Post-Cath/Contrast study requiring serial monitoring    [] IV Narcotic analgesia    [] Aggressive IV diuresis    [] Hypertonic Saline    [] Critical electrolyte abnormalities requiring IV replacement    [] Insulin - Scheduled/SSI or Insulin gtt    [] Anticoagulation (Heparin gtt or Coumadin - other anticoagulants in special circumstances)    [] Cardiac Medications (IV Amiodarone/Diltiazem, Tikosyn, etc)    [] Hemodialysis    [] Other -    [] Change in code status    [] Decision to escalate care    [] Major surgery/procedure with associated risk factors    --------------------------------------------------  C. Data (any 2)    [] Data Review (any 3)    [] Consultant notes from yesterday/today    [x] All available current labs reviewed interpreted for clinical significance    [x] Appropriate follow-up labs were ordered  [] Collateral history obtained     [] Independent Interpretation of tests (any 1)    [] Telemetry (Rhythm Strip) personally reviewed and interpreted        [] Imaging personally reviewed and interpreted     [x] Discussion (any 1)  [x] Multi-Disciplinary Rounds with Case Management  [] Discussed management of the case with           Labs:  Personally reviewed on 12/10/2024 and interpreted for clinical significance as documented above.     Recent Labs     12/08/24  0554 12/09/24  0520 12/10/24  0510   WBC 9.6 5.8 9.6   HGB 10.3* 10.9* 11.9*   HCT 30.9* 32.0* 36.0    198 212     Recent Labs     12/08/24  0554 12/09/24  0520 12/10/24  0510   * 149* 148*   K 3.6 3.3* 3.4*   * 115* 113*   CO2 20* 20* 20*   BUN 30* 22* 19   CREATININE 1.6* 1.3* 1.2 
following recommendations:  Anticipated Discharge Location: []Home w/ []HHC vs [x]SNF - [facility]  []Acute Rehab  []LTAC  []Hospice  []Other -    Anticipated Discharge Day/Date:  Wed/Thurs 11/12 Dec  Barriers to Discharge: clinical course  --------------------------------------------------    MDM (any 2 required for High level billing)    A. Problems (any 1)  [x] Acute/Chronic Illness/injury posing ongoing threat to life and/or bodily function without ongoing treatment    [] Severe exacerbation of chronic illness    --------------------------------------------------  B. Risk of Treatment (any 1)    [] Drugs/treatments that require intensive monitoring for toxicity    [] IV ABX (Vancomycin, Aminoglycosides, etc)     [] Post-Cath/Contrast study requiring serial monitoring    [] IV Narcotic analgesia    [] Aggressive IV diuresis    [] Hypertonic Saline    [] Critical electrolyte abnormalities requiring IV replacement    [] Insulin - Scheduled/SSI or Insulin gtt    [] Anticoagulation (Heparin gtt or Coumadin - other anticoagulants in special circumstances)    [] Cardiac Medications (IV Amiodarone/Diltiazem, Tikosyn, etc)    [] Hemodialysis    [] Other -    [] Change in code status    [] Decision to escalate care    [] Major surgery/procedure with associated risk factors    --------------------------------------------------  C. Data (any 2)    [] Data Review (any 3)    [] Consultant notes from yesterday/today    [x] All available current labs reviewed interpreted for clinical significance    [x] Appropriate follow-up labs were ordered  [] Collateral history obtained     [] Independent Interpretation of tests (any 1)    [] Telemetry (Rhythm Strip) personally reviewed and interpreted        [] Imaging personally reviewed and interpreted     [x] Discussion (any 1)  [x] Multi-Disciplinary Rounds with Case Management  [] Discussed management of the case with           Labs:  Personally reviewed on 12/11/2024 and interpreted for

## 2024-12-12 NOTE — CARE COORDINATION
CASE MANAGEMENT DISCHARGE SUMMARY      Discharge to: [facility];LTC    IMM given: 12/9/24    New Durable Medical Equipment ordered/agency: defer    Transportation:    Medical Transport explained to pt/family. Pt/family voice no agency preference.        Confirmed discharge plan with:     Patient: Confused      Family:  Candido, spouse      Facility/Agency, name:  Confirmed w/Tae at [facility]   Phone number for report to facility: [phone]      RN, name: Iris     Note: Discharging nurse to complete CADEN, reconcile AVS, and place final copy with patient's discharge packet. RN to ensure that written prescriptions for  Level II medications are sent with patient to the facility as per protocol.        
    CASE MANAGEMENT DISCHARGE SUMMARY      Discharge to: Rtn LTC @ [facility]    Precertification completed: N/A  Hospital Exemption Notification (HENS) completed: N/A    IMM given: (date) 12/9/24    New Durable Medical Equipment ordered/agency: Deferred    Transportation: Squad     Medical Transport explained to pt/family. Pt/family voice no agency preference.    Agency used: [facility]   time: 1400   Ambulance form completed: Yes    Confirmed discharge plan with:     Patient: yes     Family:  yes    Name: WARREN Candido Contact number:[phone]     Facility/Agency, name:  [facility]   Phone number for report to facility: [phone]     RN, name: Tali    Note: Discharging nurse to complete CADEN, reconcile AVS, and place final copy with patient's discharge packet. RN to ensure that written prescriptions for  Level II medications are sent with patient to the facility as per protocol.    Lizzy Rdz RN      
    CASE MANAGEMENT DISCHARGE SUMMARY      Discharge to: Rtn to [facility]    Precertification completed: N/A  Hospital Exemption Notification (HENS) completed: N/A    IMM given: (date) 12/12/24    New Durable Medical Equipment ordered/agency: Deferred    Transportation: Squad      Medical Transport explained to pt/family. Pt/family voice no agency preference.    Agency used:    time: 1400   Ambulance form completed: Yes    Confirmed discharge plan with:     Patient: yes     Family:  yes    Name: Candido Contact number: [phone]     Facility/Agency, name: [facility] @ [facility]    Phone number for report to facility: [phone]     RN, name: Raeann    Note: Discharging nurse to complete CADEN, reconcile AVS, and place final copy with patient's discharge packet. RN to ensure that written prescriptions for  Level II medications are sent with patient to the facility as per protocol.      Lizzy Rdz RN    
CM update; LOS # 3. Per conversation with Attending discharge will be tomorrow back to [facility] where she is a LTC Resident. Will follow.Francy Hernandez RN    
Follow-Up copy of Important Message from Medicare (IMM2) has been explained to patient and/or designated healthcare decision maker (HCDM). Pt and/or HCDM aware that patient is permitted to stay an additional 4 hours prior to discharge to consider an appeal if they feel as though they are being discharged too soon. Patient may discharge as planned if chooses to do so.  Patient/HCDM voice no other concerns or questions regarding this process.      Lizzy Rdz RN    
Writer called Senia @ [facility] [phone] to update that MD is not completing P2P Auth# [id number].     Lizzy Rdz RN    
Writer received VM from Senia @ [facility] with an intent to deny, MD not doing the offered P2P. Per Lizzy @ [facility] patient can now return, waiting on confirmation before I update MD, and set up transport.     Lizzy Rdz RN    
Writer spoke with [facility], they are unable to let patient come back due to needing a denial from [employer], cert is pending.     Lizzy Rdz RN    
following:, Bathing, Dressing, Toileting, Cooking, Housework, Shopping, Mobility    PT AM-PAC:   /24  OT AM-PAC:   /24    Family can provide assistance at DC: No  Would you like Case Management to discuss the discharge plan with any other family members/significant others, and if so, who? Yes (Spouse Candido)  Plans to Return to Present Housing: Yes  Other Identified Issues/Barriers to RETURNING to current housing: None  Potential Assistance needed at discharge: Skilled Nursing Facility            Potential DME:    Patient expects to discharge to: Long-term care  Plan for transportation at discharge:      Financial    Payor: [employer] MEDICARE / Plan: [employer] MEDICARE ADVANTAGE HMO / Product Type: Medicare /     Does insurance require precert for SNF: Yes    Potential assistance Purchasing Medications: No  Meds-to-Beds request:        [id number] - [place], [place] - [address] - P [phone] - F [phone] [address]  [facility] [address]  Phone: [phone] Fax: [phone]      Notes:    Factors facilitating achievement of predicted outcomes: Family support    Barriers to discharge: None    Additional Case Management Notes: Patient is LTC at [facility] since 11/18. She has been out of restraints since 1000. Plan to return to [facility], possible Skilled if PT/OT recs.     The Plan for Transition of Care is related to the following treatment goals of Acute encephalopathy [G93.40]  Urinary tract infection without hematuria, site unspecified [N39.0]  Altered mental status, unspecified altered mental status type [R41.82]  Sepsis without acute organ dysfunction, due to unspecified organism (HCC) [A41.9]    IF APPLICABLE: The Patient and/or patient representative Siobhan and her family were provided with a choice of provider and agrees with the discharge plan. Freedom of choice list with basic dialogue that supports the patient's individualized plan of care/goals and shares the quality data

## 2024-12-13 NOTE — DISCHARGE SUMMARY
12/11/24  0622 12/12/24  0523   * 145   K 3.2* 3.0*   * 111*   CO2 23 20*   BUN 16 12   CREATININE 1.2 1.0   CALCIUM 9.7 9.4   MG  --  1.54*   PHOS 2.0* 2.4*     No results for input(s): \"PROBNP\", \"TROPHS\" in the last 72 hours.  No results for input(s): \"LABA1C\" in the last 72 hours.  No results for input(s): \"AST\", \"ALT\", \"BILIDIR\", \"BILITOT\", \"ALKPHOS\" in the last 72 hours.  No results for input(s): \"INR\", \"LACTA\", \"TSH\" in the last 72 hours.    Urine Cultures:   Lab Results   Component Value Date/Time    LABURIN No growth at 18 to 36 hours 12/07/2024 08:28 PM     Blood Cultures:   Lab Results   Component Value Date/Time    BC No Growth after 4 days of incubation. 12/07/2024 10:08 PM     Lab Results   Component Value Date/Time    BLOODCULT2 No Growth after 4 days of incubation. 12/07/2024 10:20 PM     Organism:   Lab Results   Component Value Date/Time    ORG Escherichia coli 11/13/2024 03:10 PM       Signed:    Florencio Regan MD

## 2024-12-18 ENCOUNTER — TELEPHONE (OUTPATIENT)
Dept: FAMILY MEDICINE CLINIC | Age: 74
End: 2024-12-18

## 2024-12-18 NOTE — TELEPHONE ENCOUNTER
Care Transitions Initial Follow Up Call    Outreach made within 2 business days of discharge: no     Patient: Siobhan Flores Patient : 1950   MRN: [id number]  Reason for Admission: seizure   Discharge Date: 24       Spoke with: Tali Estrada department/facility: [facility]        TCM Interactive Patient Contact:  Was patient able to fill all prescriptions: Yes  Was patient instructed to bring all medications to the follow-up visit: Yes  Is patient taking all medications as directed in the discharge summary? Yes  Does patient understand their discharge instructions: Yes  Does patient have questions or concerns that need addressed prior to 7-14 day follow up office visit: no    Additional needs identified to be addressed with provider  Patient is currently in [facility] she is not able to leave can not walk and her mental status has progressed. She does not know who anyone is refuses to eat/drink/take medication.  states at this time she will remain in the [facility] center and may not come home.            Scheduled appointment with PCP within 7-14 days    Follow Up  Future Appointments   Date Time Provider Department Center   3/12/2025  9:00 AM Conner Franco MD AND [facility] Neurology -   4/3/2025  8:00 AM Tomas Rowe DO P [facility] Andrews MA

## 2025-06-26 NOTE — ED NOTES
Writer hooked pt up to external catheter to obtain urine sample. Pt and family educated on device and verbalizes understanding,   Opt out